# Patient Record
Sex: FEMALE | Race: BLACK OR AFRICAN AMERICAN | NOT HISPANIC OR LATINO | Employment: OTHER | URBAN - METROPOLITAN AREA
[De-identification: names, ages, dates, MRNs, and addresses within clinical notes are randomized per-mention and may not be internally consistent; named-entity substitution may affect disease eponyms.]

---

## 2017-01-26 ENCOUNTER — PATIENT OUTREACH (OUTPATIENT)
Dept: ADMINISTRATIVE | Facility: HOSPITAL | Age: 67
End: 2017-01-26

## 2017-01-26 NOTE — PROGRESS NOTES
Eye exam due report.    Due for your annual diabetic eye exam, mammogram, colon cancer screening, and possibly some immunizations (flu, shingles, pneumonia, and tetanus)

## 2017-01-26 NOTE — LETTER
January 26, 2017    Keyona Irene  [address]  [place] [address]             [facility]  [address]  [facility] [address]  Phone: [phone] Dear Mrs. Washington:    [facility] is committed to your overall health.  To help you get the most out of each of your visits, we will review your information to make sure you are up to date on all of your recommended tests and/or procedures.  We have Dr. Howard Gan listed as your primary care provider.     He has found that you may be due for your annual diabetic eye exam, mammogram, colon cancer screening, and possibly some immunizations (flu, shingles, pneumonia, and tetanus).     If you have had any of the above done at an outside facility, please let us know so I can update your record.  If you have a copy of these records, please provide a copy for us to scan into your chart.  If not, please provide that provider/facilities contact information so that we may obtain copies from that facility.     Otherwise, please schedule these appointments at your earliest convenience.  You are due for your diabetic follow up with Dr. Gan in April of 2017.    If you have any questions or concerns, please don't hesitate to call.    Thank you for letting us care for you,  Antonette Lugo LPN Clinical Care Coordinator  [facility]  [phone]

## 2017-02-13 ENCOUNTER — OFFICE VISIT (OUTPATIENT)
Dept: FAMILY MEDICINE | Facility: CLINIC | Age: 67
End: 2017-02-13
Payer: MEDICARE

## 2017-02-13 VITALS
WEIGHT: 110.13 LBS | SYSTOLIC BLOOD PRESSURE: 160 MMHG | HEART RATE: 82 BPM | BODY MASS INDEX: 20.79 KG/M2 | TEMPERATURE: 99 F | DIASTOLIC BLOOD PRESSURE: 74 MMHG | HEIGHT: 61 IN

## 2017-02-13 DIAGNOSIS — I10 ESSENTIAL HYPERTENSION: ICD-10-CM

## 2017-02-13 DIAGNOSIS — J30.1 SEASONAL ALLERGIC RHINITIS DUE TO POLLEN: ICD-10-CM

## 2017-02-13 DIAGNOSIS — R09.89 RIGHT CAROTID BRUIT: Primary | ICD-10-CM

## 2017-02-13 PROCEDURE — 99999 PR PBB SHADOW E&M-EST. PATIENT-LVL III: CPT | Mod: PBBFAC,,, | Performed by: FAMILY MEDICINE

## 2017-02-13 PROCEDURE — 99214 OFFICE O/P EST MOD 30 MIN: CPT | Mod: S$PBB,,, | Performed by: FAMILY MEDICINE

## 2017-02-13 PROCEDURE — 99213 OFFICE O/P EST LOW 20 MIN: CPT | Mod: PBBFAC,PO | Performed by: FAMILY MEDICINE

## 2017-02-13 RX ORDER — FLUTICASONE PROPIONATE 50 MCG
1 SPRAY, SUSPENSION (ML) NASAL DAILY
Qty: 16 G | Refills: 11 | Status: SHIPPED | OUTPATIENT
Start: 2017-02-13 | End: 2018-10-25 | Stop reason: SDUPTHER

## 2017-02-13 RX ORDER — LOSARTAN POTASSIUM AND HYDROCHLOROTHIAZIDE 12.5; 5 MG/1; MG/1
1 TABLET ORAL DAILY
Qty: 90 TABLET | Refills: 3 | Status: SHIPPED | OUTPATIENT
Start: 2017-02-13 | End: 2018-02-05

## 2017-02-13 RX ORDER — DOXEPIN HYDROCHLORIDE 10 MG/1
10 CAPSULE ORAL NIGHTLY
Qty: 90 CAPSULE | Refills: 3 | Status: SHIPPED | OUTPATIENT
Start: 2017-02-13 | End: 2018-10-16 | Stop reason: ALTCHOICE

## 2017-02-13 NOTE — PATIENT INSTRUCTIONS
Check  Your blood pressure weekly. Write them down. Return in 1 month with all of your medication bottles.

## 2017-02-13 NOTE — MR AVS SNAPSHOT
[facility]  [address]  [place] [address]  Phone: [phone]  Fax: [phone]                  Keyona Luis Washington   2017 8:30 AM   Office Visit    Description:  Female : 1950   Provider:  Howard Gan MD   Department:  [facility]           Reason for Visit     Follow-up           Diagnoses this Visit        Comments    Right carotid bruit    -  Primary     Essential hypertension         Seasonal allergic rhinitis due to pollen                To Do List           Future Appointments        Provider Department Dept Phone    2/15/2017 11:00 AM [facility] [phone]      Goals (5 Years of Data)     None      Follow-Up and Disposition     Return in about 4 weeks (around 3/13/2017).       These Medications        Disp Refills Start End    losartan-hydrochlorothiazide 50-12.5 mg (HYZAAR) 50-12.5 mg per tablet 90 tablet 3 2017    Take 1 tablet by mouth once daily. - Oral    Pharmacy: [facility] [address] AT [facility] Ph #: [phone]       doxepin (SINEQUAN) 10 MG capsule 90 capsule 3 2017     Take 1 capsule (10 mg total) by mouth every evening. - Oral    Pharmacy: [facility] [address] Ph #: [phone]       fluticasone (FLONASE) 50 mcg/actuation nasal spray 16 g 11 2017     1 spray by Each Nare route once daily. - Each Nare    Pharmacy: [facility] [address] AT [facility] Ph #: [phone]         [facility]     [facility] -  Assistance  Registered nurses in the [facility] provide clinical advisement, health education, appointment booking, and other advisory services.  Call for this free service at [phone].             Medications            Message regarding Medications     Verify the changes and/or additions to your medication regime listed below are the same as discussed with your clinician today.  If any of these changes or additions are incorrect, please notify your healthcare provider.        START taking these NEW medications        Refills    losartan-hydrochlorothiazide 50-12.5 mg (HYZAAR) 50-12.5 mg per tablet 3    Sig: Take 1 tablet by mouth once daily.    Class: Normal    Route: Oral      STOP taking these medications     valsartan-hydrochlorothiazide (DIOVAN-HCT) 320-12.5 mg per tablet Take 1 tablet by mouth once daily.           Verify that the below list of medications is an accurate representation of the medications you are currently taking.  If none reported, the list may be blank. If incorrect, please contact your healthcare provider. Carry this list with you in case of emergency.           Current Medications     amlodipine (NORVASC) 10 MG tablet Take 1 tablet (10 mg total) by mouth once daily.    aspirin (ECOTRIN) 325 MG EC tablet Take 325 mg by mouth once daily.    cyanocobalamin (VITAMIN B-12) 1000 MCG tablet Take 1 tablet (1,000 mcg total) by mouth once daily.    cyproheptadine (PERIACTIN) 4 mg tablet Take 1 tablet (4 mg total) by mouth 2 (two) times daily as needed.    doxepin (SINEQUAN) 10 MG capsule Take 1 capsule (10 mg total) by mouth every evening.    esomeprazole (NEXIUM) 40 MG capsule Take 1 capsule (40 mg total) by mouth before breakfast.    fluticasone (FLONASE) 50 mcg/actuation nasal spray 1 spray by Each Nare route once daily.    losartan-hydrochlorothiazide 50-12.5 mg (HYZAAR) 50-12.5 mg per tablet Take 1 tablet by mouth once daily.    multivit-min-FA-lycopen-lutein (CENTRUM SILVER) 0.4-300-250 mg-mcg-mcg Tab Take 1 tablet by mouth once daily.    potassium chloride (KLOR-CON) 10 MEQ TbSR Take 1 tablet (10 mEq total) by mouth once daily.    tramadol (ULTRAM) 50 mg tablet Take 1 tablet (50 mg total) by mouth every 6  "(six) hours as needed for Pain.    ursodiol (ACTIGALL) 300 mg capsule Take 1 capsule (300 mg total) by mouth 2 (two) times daily.           Clinical Reference Information           Your Vitals Were     BP Pulse Temp Height Weight Last Period    160/74 (BP Location: Right arm) 82 98.7 °F (37.1 °C) (Oral) 5' 1" (1.549 m) 50 kg (110 lb 1.9 oz) (LMP Unknown)    BMI                20.81 kg/m2          Blood Pressure          Most Recent Value    BP  (!)  160/74      Allergies as of 2/13/2017     No Known Allergies      Immunizations Administered on Date of Encounter - 2/13/2017     None      Orders Placed During Today's Visit     Future Labs/Procedures Expected by Expires    US Carotid Bilateral  2/13/2017 2/13/2018      [facility] Sign-Up     Activating your [facility] account is as easy as 1-2-3!     1) Visit [email or web address], select Sign Up Now, enter this activation code and your date of birth, then select Next.  Activation code not generated  Current Patient Portal Status: Account disabled      2) Create a username and password to use when you visit [facility] in the future and select a security question in case you lose your password and select Next.    3) Enter your e-mail address and click Sign Up!    Additional Information  If you have questions, please e-mail [email or web address] or call [phone] to talk to our [facility] staff. Remember, [facility] is NOT to be used for urgent needs. For medical emergencies, dial 911.         Instructions    Check  Your blood pressure weekly. Write them down. Return in 1 month with all of your medication bottles.        Smoking Cessation     If you would like to quit smoking:   You may be eligible for free services if you are a [place] resident and started smoking cigarettes before September 1, 1988.  Call the Smoking Cessation [employer] (SCT) toll free at (102) 432-8670 or (727) 776-9065.   Call 8-800-QUIT-NOW if you do not meet the above criteria.            Language " Assistance Services     ATTENTION: Language assistance services are available, free of charge. Please call [phone].      ATENCIÓN: Si habla jolly, tiene a hernandez disposición servicios gratuitos de asistencia lingüística. Llame al [phone].     CHÚ Ý: N?u b?n nói Ti?ng Vi?t, có các d?ch v? h? tr? ngôn ng? mi?n phí dành cho b?n. G?i s? [phone].         [facility] complies with applicable Federal civil rights laws and does not discriminate on the basis of race, color, national origin, age, disability, or sex.

## 2017-02-13 NOTE — PROGRESS NOTES
"Subjective:       Patient ID: Keyona Irene is a 66 y.o. female.    Chief Complaint: Follow-up (Discuss HTN meds)    HPI Comments: She is here for follow-up of hypertension.  Her blood pressure has been in variable control.  Back in September I changed her from losartan to valsartan 320/12.5 mg.  It sounds like she has been taking losartan/ mg/12.5.  She wants to return to 50/12.5 because she feels lightheaded.  She occasionally checks her blood pressure at the store and she says that it is normal.  She had her last ERCP in November.  It sounds like that went well and that the stents were removed.  It sounds like they removed further biliary stone fragments.  She is currently not having any abdominal pain.  She has regained some of her lost weight.    Review of Systems   Constitutional: Negative for fatigue, fever and unexpected weight change.   Respiratory: Positive for cough. Negative for shortness of breath.    Cardiovascular: Negative for chest pain, palpitations and leg swelling.   Gastrointestinal: Negative for abdominal pain.       Objective:     Blood pressure (!) 160/74, pulse 82, temperature 98.7 °F (37.1 °C), temperature source Oral, height 5' 1" (1.549 m), weight 50 kg (110 lb 1.9 oz).      Physical Exam   Constitutional:   She is thin and in no distress.   Cardiovascular: Normal rate, regular rhythm and normal heart sounds.    She has a right carotid bruit.  She does comment on a roaring sound in her right ear.  She does not complain of left sided weakness.   Pulmonary/Chest: Effort normal and breath sounds normal.   Abdominal: Soft. She exhibits no distension. There is no tenderness.   Neurological: She is alert.       Assessment:       1. Right carotid bruit    2. Essential hypertension    3. Seasonal allergic rhinitis due to pollen        Plan:       I will refill losartan/HCT, 50/12.5 mg.  She is to check her blood pressure weekly, write them down, return to see me in 4 weeks.  " Carotid ultrasound ordered.  I refilled her Flonase for ALLERGIC rhinitis.

## 2017-02-15 ENCOUNTER — HOSPITAL ENCOUNTER (OUTPATIENT)
Dept: RADIOLOGY | Facility: HOSPITAL | Age: 67
Discharge: HOME | End: 2017-02-15
Attending: FAMILY MEDICINE
Payer: MEDICARE

## 2017-02-15 DIAGNOSIS — R09.89 RIGHT CAROTID BRUIT: ICD-10-CM

## 2017-02-15 PROCEDURE — 93880 EXTRACRANIAL BILAT STUDY: CPT | Mod: 26,,, | Performed by: RADIOLOGY

## 2017-02-15 PROCEDURE — 93880 EXTRACRANIAL BILAT STUDY: CPT | Mod: TC,PO

## 2017-02-24 ENCOUNTER — PATIENT OUTREACH (OUTPATIENT)
Dept: ADMINISTRATIVE | Facility: HOSPITAL | Age: 67
End: 2017-02-24

## 2017-02-24 NOTE — LETTER
February 24, 2017    Keyona Irene  [address]  [place] [address]             [facility]  [address]  [place] [address]  Phone: [phone] Dear Mrs. Washington:    [facility] committed to your overall health.  To help you get the most out of each of your visits, we will review your information to make sure you are up to date on all of your recommended tests and/or procedures.      Dr. Howard Gan has found that you may be due for your annual diabetic eye exam, colon cancer screening, mammogram, and possibly some immunizations (flu, shingles, pneumonia, and tetanus).     If you have had any of the above done at another facility, please bring the records or information with you so that your record at [facility] will be complete.    If you are currently taking medication, please bring it with you to your appointment for review.    If you have any questions or concerns, please don't hesitate to call.    Thank you for letting us care for you,  Antonette Lugo LPN Clinical Care Coordinator  [facility] [place]  [phone]

## 2017-03-13 ENCOUNTER — OFFICE VISIT (OUTPATIENT)
Dept: FAMILY MEDICINE | Facility: CLINIC | Age: 67
End: 2017-03-13
Payer: MEDICARE

## 2017-03-13 VITALS
DIASTOLIC BLOOD PRESSURE: 84 MMHG | BODY MASS INDEX: 21.5 KG/M2 | SYSTOLIC BLOOD PRESSURE: 160 MMHG | HEIGHT: 61 IN | WEIGHT: 113.88 LBS | TEMPERATURE: 98 F

## 2017-03-13 DIAGNOSIS — H54.7 BLINDNESS, CONGENITAL: ICD-10-CM

## 2017-03-13 DIAGNOSIS — E11.9 TYPE 2 DIABETES MELLITUS WITHOUT COMPLICATION, WITHOUT LONG-TERM CURRENT USE OF INSULIN: ICD-10-CM

## 2017-03-13 DIAGNOSIS — K42.9 UMBILICAL HERNIA WITHOUT OBSTRUCTION AND WITHOUT GANGRENE: ICD-10-CM

## 2017-03-13 DIAGNOSIS — I10 ESSENTIAL HYPERTENSION: Primary | ICD-10-CM

## 2017-03-13 LAB
ALBUMIN SERPL BCP-MCNC: 3.3 G/DL
ALP SERPL-CCNC: 151 U/L
ALT SERPL W/O P-5'-P-CCNC: 12 U/L
ANION GAP SERPL CALC-SCNC: 11 MMOL/L
AST SERPL-CCNC: 18 U/L
BILIRUB SERPL-MCNC: 0.5 MG/DL
BUN SERPL-MCNC: 9 MG/DL
CALCIUM SERPL-MCNC: 9.7 MG/DL
CHLORIDE SERPL-SCNC: 102 MMOL/L
CHOLEST/HDLC SERPL: 5.3 {RATIO}
CO2 SERPL-SCNC: 27 MMOL/L
CREAT SERPL-MCNC: 0.9 MG/DL
EST. GFR  (AFRICAN AMERICAN): >60 ML/MIN/1.73 M^2
EST. GFR  (NON AFRICAN AMERICAN): >60 ML/MIN/1.73 M^2
GLUCOSE SERPL-MCNC: 329 MG/DL
HDL/CHOLESTEROL RATIO: 18.8 %
HDLC SERPL-MCNC: 218 MG/DL
HDLC SERPL-MCNC: 41 MG/DL
LDLC SERPL CALC-MCNC: 153 MG/DL
NONHDLC SERPL-MCNC: 177 MG/DL
POTASSIUM SERPL-SCNC: 3.3 MMOL/L
PROT SERPL-MCNC: 7.4 G/DL
SODIUM SERPL-SCNC: 140 MMOL/L
TRIGL SERPL-MCNC: 120 MG/DL

## 2017-03-13 PROCEDURE — 80053 COMPREHEN METABOLIC PANEL: CPT

## 2017-03-13 PROCEDURE — 83036 HEMOGLOBIN GLYCOSYLATED A1C: CPT

## 2017-03-13 PROCEDURE — 80061 LIPID PANEL: CPT

## 2017-03-13 PROCEDURE — 99999 PR PBB SHADOW E&M-EST. PATIENT-LVL III: CPT | Mod: PBBFAC,,, | Performed by: FAMILY MEDICINE

## 2017-03-13 PROCEDURE — 99213 OFFICE O/P EST LOW 20 MIN: CPT | Mod: PBBFAC,PO | Performed by: FAMILY MEDICINE

## 2017-03-13 PROCEDURE — 99214 OFFICE O/P EST MOD 30 MIN: CPT | Mod: S$PBB,,, | Performed by: FAMILY MEDICINE

## 2017-03-13 PROCEDURE — 82570 ASSAY OF URINE CREATININE: CPT

## 2017-03-13 NOTE — PROGRESS NOTES
"Subjective:       Patient ID: Keyona Irene is a 66 y.o. female.    Chief Complaint: Follow-up (1 mo f/u)    HPI Comments: Follow-up hypertension.  She feels that higher doses of blood pressure medicine tend to cause weakness and low blood pressure.  She says that her home readings have been in the 120/70 range.  She also complains of some pain near her belly button over the past 2 days.  She has diabetes and is due for lab work.  She is not complaining of chest pain.  She has had no more biliary symptoms.    Review of Systems   Constitutional: Negative for fever and unexpected weight change.   Respiratory: Negative for cough and shortness of breath.    Cardiovascular: Negative for chest pain.   Gastrointestinal: Positive for abdominal pain.       Objective:     Blood pressure (!) 160/84, temperature 98.2 °F (36.8 °C), temperature source Oral, height 5' 1" (1.549 m), weight 51.6 kg (113 lb 13.9 oz).      Physical Exam   Constitutional: She appears well-nourished. No distress.   Cardiovascular: Normal rate, regular rhythm, normal heart sounds and intact distal pulses.    Pulmonary/Chest: Effort normal and breath sounds normal.   Abdominal: Soft.   She has a small umbilical hernia and possibly a right sided quan-umbilical hernia.  Easily reducible and slightly tender.   Musculoskeletal: She exhibits no edema.   Neurological: She is alert.   Psychiatric: She has a normal mood and affect.       Assessment:       1. Essential hypertension    2. Umbilical hernia without obstruction and without gangrene    3. Type 2 diabetes mellitus without complication, without long-term current use of insulin    4. Blindness, congenital        Plan:       Continue her present dose of losartan/HCT.  Write down her home blood pressures.  See me in 3 months.  Lab work ordered.  Optometry exam ordered.     "

## 2017-03-13 NOTE — PATIENT INSTRUCTIONS
Write down blood pressure and pulse and come back to clinic in 3 months    Optometry. Dr. Iniguez. [phone]

## 2017-03-14 LAB
CREAT UR-MCNC: 12 MG/DL
ESTIMATED AVG GLUCOSE: 189 MG/DL
HBA1C MFR BLD HPLC: 8.2 %
MICROALBUMIN UR DL<=1MG/L-MCNC: <2.5 UG/ML
MICROALBUMIN/CREATININE RATIO: ABNORMAL UG/MG

## 2017-03-15 ENCOUNTER — TELEPHONE (OUTPATIENT)
Dept: FAMILY MEDICINE | Facility: CLINIC | Age: 67
End: 2017-03-15

## 2017-03-15 RX ORDER — METFORMIN HYDROCHLORIDE 500 MG/1
1000 TABLET, EXTENDED RELEASE ORAL DAILY
Qty: 60 TABLET | Refills: 2 | Status: SHIPPED | OUTPATIENT
Start: 2017-03-15 | End: 2017-06-23 | Stop reason: SDUPTHER

## 2017-05-26 DIAGNOSIS — Z12.31 OTHER SCREENING MAMMOGRAM: ICD-10-CM

## 2017-05-28 DIAGNOSIS — I10 ESSENTIAL HYPERTENSION: ICD-10-CM

## 2017-05-29 RX ORDER — AMLODIPINE BESYLATE 10 MG/1
TABLET ORAL
Qty: 90 TABLET | Refills: 3 | Status: SHIPPED | OUTPATIENT
Start: 2017-05-29 | End: 2018-04-23 | Stop reason: SDUPTHER

## 2017-06-23 DIAGNOSIS — E11.9 TYPE 2 DIABETES MELLITUS WITHOUT COMPLICATION: ICD-10-CM

## 2017-06-23 RX ORDER — METFORMIN HYDROCHLORIDE 500 MG/1
TABLET, EXTENDED RELEASE ORAL
Qty: 60 TABLET | Refills: 3 | Status: SHIPPED | OUTPATIENT
Start: 2017-06-23 | End: 2017-07-26 | Stop reason: SDUPTHER

## 2017-07-13 ENCOUNTER — PATIENT OUTREACH (OUTPATIENT)
Dept: ADMINISTRATIVE | Facility: HOSPITAL | Age: 67
End: 2017-07-13

## 2017-07-13 NOTE — PROGRESS NOTES
DM non [facility] bulk communication, due an office visit with him, some labs for your diabetes, your annual diabetic eye exam, a mammogram, colon cancer screening, and possibly some immunizations (tetanus, pneumonia, and shingles)    Last ov Malik 3/2017    appt given.

## 2017-07-26 ENCOUNTER — OFFICE VISIT (OUTPATIENT)
Dept: FAMILY MEDICINE | Facility: CLINIC | Age: 67
End: 2017-07-26
Payer: MEDICARE

## 2017-07-26 VITALS
HEART RATE: 74 BPM | BODY MASS INDEX: 21.64 KG/M2 | TEMPERATURE: 98 F | DIASTOLIC BLOOD PRESSURE: 70 MMHG | SYSTOLIC BLOOD PRESSURE: 120 MMHG | HEIGHT: 61 IN | WEIGHT: 114.63 LBS

## 2017-07-26 DIAGNOSIS — E11.42 TYPE 2 DIABETES MELLITUS WITH DIABETIC POLYNEUROPATHY, WITHOUT LONG-TERM CURRENT USE OF INSULIN: ICD-10-CM

## 2017-07-26 DIAGNOSIS — I10 ESSENTIAL HYPERTENSION: Primary | ICD-10-CM

## 2017-07-26 LAB
ALBUMIN SERPL BCP-MCNC: 3.4 G/DL
ALP SERPL-CCNC: 150 U/L
ALT SERPL W/O P-5'-P-CCNC: 21 U/L
ANION GAP SERPL CALC-SCNC: 10 MMOL/L
AST SERPL-CCNC: 33 U/L
BILIRUB SERPL-MCNC: 0.6 MG/DL
BUN SERPL-MCNC: 6 MG/DL
CALCIUM SERPL-MCNC: 9.3 MG/DL
CHLORIDE SERPL-SCNC: 100 MMOL/L
CO2 SERPL-SCNC: 27 MMOL/L
CREAT SERPL-MCNC: 0.8 MG/DL
EST. GFR  (AFRICAN AMERICAN): >60 ML/MIN/1.73 M^2
EST. GFR  (NON AFRICAN AMERICAN): >60 ML/MIN/1.73 M^2
GLUCOSE SERPL-MCNC: 185 MG/DL
POTASSIUM SERPL-SCNC: 3.2 MMOL/L
PROT SERPL-MCNC: 7.3 G/DL
SODIUM SERPL-SCNC: 137 MMOL/L

## 2017-07-26 PROCEDURE — 3045F PR MOST RECENT HEMOGLOBIN A1C LEVEL 7.0-9.0%: CPT | Mod: ,,, | Performed by: FAMILY MEDICINE

## 2017-07-26 PROCEDURE — 83036 HEMOGLOBIN GLYCOSYLATED A1C: CPT

## 2017-07-26 PROCEDURE — 99213 OFFICE O/P EST LOW 20 MIN: CPT | Mod: PBBFAC,PO | Performed by: FAMILY MEDICINE

## 2017-07-26 PROCEDURE — 4010F ACE/ARB THERAPY RXD/TAKEN: CPT | Mod: ,,, | Performed by: FAMILY MEDICINE

## 2017-07-26 PROCEDURE — 99999 PR PBB SHADOW E&M-EST. PATIENT-LVL III: CPT | Mod: PBBFAC,,, | Performed by: FAMILY MEDICINE

## 2017-07-26 PROCEDURE — 80053 COMPREHEN METABOLIC PANEL: CPT

## 2017-07-26 PROCEDURE — 99214 OFFICE O/P EST MOD 30 MIN: CPT | Mod: S$PBB,,, | Performed by: FAMILY MEDICINE

## 2017-07-26 RX ORDER — METFORMIN HYDROCHLORIDE 500 MG/1
1000 TABLET, EXTENDED RELEASE ORAL 2 TIMES DAILY WITH MEALS
Qty: 120 TABLET | Refills: 3 | Status: SHIPPED | OUTPATIENT
Start: 2017-07-26 | End: 2018-04-20 | Stop reason: SDUPTHER

## 2017-07-26 NOTE — PROGRESS NOTES
"Subjective:       Patient ID: Keyona Irene is a 66 y.o. female.    Chief Complaint: Follow-up (DM f/u. Poss due for lab)    Follow-up hypertension and diabetes.  She does not check blood sugar.  She takes metformin 1000 mg a day.  She is fearful of needles and blood draws.  She does not complain of chest pain.  She is a little bit uncertain about her medications.      Review of Systems   Constitutional: Negative for fever and unexpected weight change.   Eyes: Positive for visual disturbance.   Respiratory: Negative for cough and shortness of breath.    Cardiovascular: Negative for chest pain, palpitations and leg swelling.   Neurological: Positive for numbness.       Objective:     Blood pressure 120/70, pulse 74, temperature 98.3 °F (36.8 °C), temperature source Oral, height 5' 1" (1.549 m), weight 52 kg (114 lb 10.2 oz).      Physical Exam   Constitutional:   She is a thin lady in no distress.   Cardiovascular: Normal rate, regular rhythm and normal heart sounds.    No murmur heard.  Pulses:       Dorsalis pedis pulses are 1+ on the right side, and 1+ on the left side.        Posterior tibial pulses are 1+ on the right side, and 1+ on the left side.   No carotid bruits   Pulmonary/Chest: Effort normal and breath sounds normal.   Musculoskeletal:        Right foot: There is no deformity.        Left foot: There is no deformity.   Feet:   Right Foot:   Protective Sensation: 4 sites tested. 0 sites sensed.   Skin Integrity: Negative for ulcer.   Left Foot:   Protective Sensation: 4 sites tested. 0 sites sensed.   Skin Integrity: Negative for ulcer.       Assessment:       1. Essential hypertension    2. Type 2 diabetes mellitus with diabetic polyneuropathy, without long-term current use of insulin        Plan:       Draw lab today.  Increase metformin to 1 g twice a day.  Follow-up in 3 months.     "

## 2017-07-27 LAB
ESTIMATED AVG GLUCOSE: 148 MG/DL
HBA1C MFR BLD HPLC: 6.8 %

## 2017-08-21 RX ORDER — LOSARTAN POTASSIUM AND HYDROCHLOROTHIAZIDE 12.5; 1 MG/1; MG/1
TABLET ORAL
Qty: 90 TABLET | Refills: 0 | Status: SHIPPED | OUTPATIENT
Start: 2017-08-21 | End: 2017-12-04 | Stop reason: SDUPTHER

## 2017-09-07 ENCOUNTER — PATIENT OUTREACH (OUTPATIENT)
Dept: ADMINISTRATIVE | Facility: HOSPITAL | Age: 67
End: 2017-09-07

## 2017-09-07 ENCOUNTER — TELEPHONE (OUTPATIENT)
Dept: ADMINISTRATIVE | Facility: HOSPITAL | Age: 67
End: 2017-09-07

## 2017-09-07 NOTE — LETTER
September 7, 2017    Keyona Irene  [address]  [place] [address]             [facility]  [address]  [facility] [address]  Phone: [phone] Dear Mrs. Washington:    [facility] committed to your overall health.  To help you get the most out of each of your visits, we will review your information to make sure you are up to date on all of your recommended tests and/or procedures.      We have Dr. Howard Gan listed as your primary care provider.  He has found that you may be due for colon cancer screening, mammogram, and possibly some immunizations (tetanus, pneumonia, and shingles).     Medicare does not cover all immunizations to be given in the clinic.  Check your benefits to ensure that you do not need to receive your immunizations at the pharmacy.    If you have had any of the above done at an outside facility, please let us know so I can update your record.  If you have a copy of these records, please provide a copy for us to scan into your chart.  If not, please provide that provider/facilities contact information so that we may obtain copies from that facility.     Otherwise, please schedule these appointments at your earliest convenience.  You are due for your diabetic follow up with Dr. Gan in January of 2018.    If you have any questions or concerns, please don't hesitate to call.    Thank you for letting us care for you,  Antonette Lugo LPN Clinical Care Coordinator  [facility] [place]  [phone]

## 2017-09-07 NOTE — PROGRESS NOTES
Overdue eye exam report, due colon cancer screening, mammogram, and possibly some immunizations (tetanus, pneumonia, and shingles)

## 2017-12-04 RX ORDER — LOSARTAN POTASSIUM AND HYDROCHLOROTHIAZIDE 12.5; 1 MG/1; MG/1
TABLET ORAL
Qty: 90 TABLET | Refills: 0 | Status: SHIPPED | OUTPATIENT
Start: 2017-12-04 | End: 2018-06-05 | Stop reason: SDUPTHER

## 2018-01-24 DIAGNOSIS — I10 ESSENTIAL HYPERTENSION: ICD-10-CM

## 2018-01-24 DIAGNOSIS — E11.42 TYPE 2 DIABETES MELLITUS WITH DIABETIC POLYNEUROPATHY, WITHOUT LONG-TERM CURRENT USE OF INSULIN: Primary | ICD-10-CM

## 2018-01-24 RX ORDER — POTASSIUM CHLORIDE 750 MG/1
TABLET, EXTENDED RELEASE ORAL
Qty: 30 TABLET | Refills: 0 | Status: SHIPPED | OUTPATIENT
Start: 2018-01-24 | End: 2018-02-22 | Stop reason: SDUPTHER

## 2018-01-24 NOTE — TELEPHONE ENCOUNTER
Attempted to contact pt to inform her that she is due for follow up with labs.     No answer; left message to return call to schedule.

## 2018-02-05 ENCOUNTER — OFFICE VISIT (OUTPATIENT)
Dept: FAMILY MEDICINE | Facility: CLINIC | Age: 68
End: 2018-02-05
Payer: MEDICARE

## 2018-02-05 VITALS
HEIGHT: 61 IN | DIASTOLIC BLOOD PRESSURE: 68 MMHG | HEART RATE: 100 BPM | TEMPERATURE: 99 F | BODY MASS INDEX: 20.06 KG/M2 | WEIGHT: 106.25 LBS | SYSTOLIC BLOOD PRESSURE: 120 MMHG

## 2018-02-05 DIAGNOSIS — E11.42 TYPE 2 DIABETES MELLITUS WITH DIABETIC POLYNEUROPATHY, WITHOUT LONG-TERM CURRENT USE OF INSULIN: ICD-10-CM

## 2018-02-05 DIAGNOSIS — K80.50 CHOLEDOCHOLITHIASIS: Primary | ICD-10-CM

## 2018-02-05 DIAGNOSIS — I10 ESSENTIAL HYPERTENSION: ICD-10-CM

## 2018-02-05 PROCEDURE — 99213 OFFICE O/P EST LOW 20 MIN: CPT | Mod: PBBFAC,PN | Performed by: FAMILY MEDICINE

## 2018-02-05 PROCEDURE — 99214 OFFICE O/P EST MOD 30 MIN: CPT | Mod: S$PBB,,, | Performed by: FAMILY MEDICINE

## 2018-02-05 PROCEDURE — 99999 PR PBB SHADOW E&M-EST. PATIENT-LVL III: CPT | Mod: PBBFAC,,, | Performed by: FAMILY MEDICINE

## 2018-02-05 RX ORDER — INSULIN PUMP SYRINGE, 3 ML
EACH MISCELLANEOUS
Qty: 100 EACH | Refills: 5 | Status: SHIPPED | OUTPATIENT
Start: 2018-02-05 | End: 2022-05-25

## 2018-02-05 RX ORDER — LANCETS
EACH MISCELLANEOUS
Qty: 100 EACH | Refills: 5 | Status: SHIPPED | OUTPATIENT
Start: 2018-02-05 | End: 2022-05-25

## 2018-02-05 NOTE — PROGRESS NOTES
"Subjective:       Patient ID: Keyona Irene is a 67 y.o. female.    Chief Complaint: Follow-up (Med f/u. Please review pt's BP meds. Pt unable to get lab prior to to an insurance issue. Will try [facility] or [facility].)    Ms. Irene is a 66 yo female with a pmh significant for choledocholithiasis s/p cholecystectomy with 4 ERCP's and T2DM who presents to the office today with complains of epigastric pain and vomiting that started on 1/31. She reports that she had frequent bouts of bilious vomiting that resolved on Saturday that was accompanied by epigastric pain that was dull and constant constant with radiation to the umbilicus. She describes the pain as the same as when she had choledocholithiasis and she fears that she the gallstones have returned. She also reports subjective fevers and loss of appetite.    T2DM. Her last HbA1c was 6.8 in July 2017. She is due for repeat labs. She has blindness in her right eye and she has not seen an ophthalmologist in a "long time". She would like a referral for an eye doctor. She is taking metformin 1000mg twice daily.       Review of Systems   Constitutional: Positive for appetite change, fatigue and fever.   Respiratory: Negative for chest tightness and shortness of breath.    Cardiovascular: Negative for chest pain, palpitations and leg swelling.   Gastrointestinal: Positive for abdominal pain (in epigastric region), nausea and vomiting. Negative for diarrhea.   Genitourinary: Negative for dysuria, frequency and hematuria.       Objective:     /68 (BP Location: Left arm)   Pulse 100   Temp 99.1 °F (37.3 °C) (Oral)   Ht 5' 1" (1.549 m)   Wt 48.2 kg (106 lb 4.2 oz)   LMP  (LMP Unknown)   BMI 20.08 kg/m²     Physical Exam   Constitutional: She appears well-developed. No distress.   HENT:   Head: Normocephalic.   Cardiovascular: Normal rate, regular rhythm, normal heart sounds and intact distal pulses.    Pulses:       Dorsalis pedis pulses are 1+ on the " right side, and 1+ on the left side.        Posterior tibial pulses are 1+ on the right side, and 1+ on the left side.   Pulmonary/Chest: Effort normal and breath sounds normal. No respiratory distress. She has no wheezes.   Abdominal: Soft. Bowel sounds are normal. She exhibits no mass. There is tenderness (on light palpation in all quadrants). No hernia.   Feet:   Right Foot:   Protective Sensation: 4 sites tested. 4 sites sensed.   Skin Integrity: Negative for ulcer or skin breakdown.   Left Foot:   Protective Sensation: 4 sites tested. 4 sites sensed.   Skin Integrity: Negative for ulcer or skin breakdown.       Assessment:       1. Choledocholithiasis    2. Essential hypertension    3. Type 2 diabetes mellitus with diabetic polyneuropathy, without long-term current use of insulin        Plan:       1. Choledocholithiasis   - abdominal ultrasound ordered  2. HTN   - continue losartan/HCT   - continue to monitor BP  3. T2DM   - labs done today   - home blood glucose kit ordered   - continue metformin   - referral to eye doctor next visit

## 2018-02-06 LAB
ALBUMIN SERPL-MCNC: 4.3 G/DL (ref 3.6–5.1)
ALBUMIN/CREAT UR: 4 MCG/MG CREAT
ALBUMIN/GLOB SERPL: 1.4 (CALC) (ref 1–2.5)
ALP SERPL-CCNC: 179 U/L (ref 33–130)
ALT SERPL-CCNC: 32 U/L (ref 6–29)
AST SERPL-CCNC: 36 U/L (ref 10–35)
BASOPHILS # BLD AUTO: 41 CELLS/UL (ref 0–200)
BASOPHILS NFR BLD AUTO: 0.9 %
BILIRUB SERPL-MCNC: 0.4 MG/DL (ref 0.2–1.2)
BUN SERPL-MCNC: 7 MG/DL (ref 7–25)
BUN/CREAT SERPL: ABNORMAL (CALC) (ref 6–22)
CALCIUM SERPL-MCNC: 9.7 MG/DL (ref 8.6–10.4)
CHLORIDE SERPL-SCNC: 103 MMOL/L (ref 98–110)
CHOLEST SERPL-MCNC: 191 MG/DL
CHOLEST/HDLC SERPL: 3.5 (CALC)
CO2 SERPL-SCNC: 32 MMOL/L (ref 20–31)
CREAT SERPL-MCNC: 0.64 MG/DL (ref 0.5–0.99)
CREAT UR-MCNC: 57 MG/DL (ref 20–320)
EOSINOPHIL # BLD AUTO: 18 CELLS/UL (ref 15–500)
EOSINOPHIL NFR BLD AUTO: 0.4 %
ERYTHROCYTE [DISTWIDTH] IN BLOOD BY AUTOMATED COUNT: 14.2 % (ref 11–15)
GFR SERPL CREATININE-BSD FRML MDRD: 94 ML/MIN/1.73M2
GLOBULIN SER CALC-MCNC: 3.1 G/DL (CALC) (ref 1.9–3.7)
GLUCOSE SERPL-MCNC: 121 MG/DL (ref 65–99)
HBA1C MFR BLD: 6.2 % OF TOTAL HGB
HCT VFR BLD AUTO: 37 % (ref 35–45)
HDLC SERPL-MCNC: 54 MG/DL
HGB BLD-MCNC: 12.1 G/DL (ref 11.7–15.5)
LDLC SERPL CALC-MCNC: 119 MG/DL (CALC)
LYMPHOCYTES # BLD AUTO: 2249 CELLS/UL (ref 850–3900)
LYMPHOCYTES NFR BLD AUTO: 48.9 %
MCH RBC QN AUTO: 27.4 PG (ref 27–33)
MCHC RBC AUTO-ENTMCNC: 32.7 G/DL (ref 32–36)
MCV RBC AUTO: 83.9 FL (ref 80–100)
MICROALBUMIN UR-MCNC: 0.2 MG/DL
MONOCYTES # BLD AUTO: 290 CELLS/UL (ref 200–950)
MONOCYTES NFR BLD AUTO: 6.3 %
NEUTROPHILS # BLD AUTO: 2001 CELLS/UL (ref 1500–7800)
NEUTROPHILS NFR BLD AUTO: 43.5 %
NONHDLC SERPL-MCNC: 137 MG/DL (CALC)
PLATELET # BLD AUTO: 271 THOUSAND/UL (ref 140–400)
PMV BLD REES-ECKER: 10.2 FL (ref 7.5–12.5)
POTASSIUM SERPL-SCNC: 3.7 MMOL/L (ref 3.5–5.3)
PROT SERPL-MCNC: 7.4 G/DL (ref 6.1–8.1)
RBC # BLD AUTO: 4.41 MILLION/UL (ref 3.8–5.1)
SODIUM SERPL-SCNC: 142 MMOL/L (ref 135–146)
TRIGL SERPL-MCNC: 84 MG/DL
WBC # BLD AUTO: 4.6 THOUSAND/UL (ref 3.8–10.8)

## 2018-02-21 ENCOUNTER — HOSPITAL ENCOUNTER (OUTPATIENT)
Dept: RADIOLOGY | Facility: HOSPITAL | Age: 68
Discharge: HOME | End: 2018-02-21
Attending: FAMILY MEDICINE
Payer: COMMERCIAL

## 2018-02-21 DIAGNOSIS — K80.50 CHOLEDOCHOLITHIASIS: ICD-10-CM

## 2018-02-21 PROCEDURE — 76700 US EXAM ABDOM COMPLETE: CPT | Mod: 26,,, | Performed by: RADIOLOGY

## 2018-02-21 PROCEDURE — 76700 US EXAM ABDOM COMPLETE: CPT | Mod: TC,PO

## 2018-02-22 RX ORDER — POTASSIUM CHLORIDE 750 MG/1
TABLET, EXTENDED RELEASE ORAL
Qty: 30 TABLET | Refills: 3 | Status: SHIPPED | OUTPATIENT
Start: 2018-02-22 | End: 2018-04-20 | Stop reason: SDUPTHER

## 2018-02-22 RX ORDER — ESOMEPRAZOLE MAGNESIUM 40 MG/1
CAPSULE, DELAYED RELEASE ORAL
Qty: 90 CAPSULE | Refills: 0 | Status: SHIPPED | OUTPATIENT
Start: 2018-02-22 | End: 2018-05-29 | Stop reason: SDUPTHER

## 2018-02-26 ENCOUNTER — TELEPHONE (OUTPATIENT)
Dept: FAMILY MEDICINE | Facility: CLINIC | Age: 68
End: 2018-02-26

## 2018-02-26 DIAGNOSIS — K80.50 CHOLEDOCHOLITHIASIS: Primary | ICD-10-CM

## 2018-02-26 DIAGNOSIS — K80.50 CALCULUS OF BILE DUCT WITHOUT CHOLECYSTITIS AND WITHOUT OBSTRUCTION: ICD-10-CM

## 2018-02-26 NOTE — TELEPHONE ENCOUNTER
I spoke with her.  Still some upper abd pain. US shows some biliary abnormalities with possible retained stones. It is hard for her to go to the [facility]. I will refer her to Dr. Aguirre. Please call her and tell her how to get an appointment.

## 2018-02-26 NOTE — TELEPHONE ENCOUNTER
Called Dr Aguirre's office to schedule appt for pt.  indicates they do not take [employer]. Next recommendation?

## 2018-02-26 NOTE — TELEPHONE ENCOUNTER
Spoke w/ pt. She would like us to call Dr Aguirre to sched appt. She would like AM appt, not too early. Advised pt I would schedule it and call her back w/ appt info. Pt agreed

## 2018-02-27 NOTE — TELEPHONE ENCOUNTER
Miguel Yarbrough. I am having trouble and must not be able to book appt for consult with Dr Bustos. Do you contact pt to schedule or do we need to give pt the phone number to call? Would super appreciate if someone in GI Dept could help shcedule this pt as we are afraid she won't call to make own appt. Please advise.

## 2018-02-27 NOTE — TELEPHONE ENCOUNTER
Please let me know if pt should be referred back to [facility]. Dr. Gan is referring pt to you for an ERCP. Please advised.

## 2018-02-28 NOTE — TELEPHONE ENCOUNTER
Pt needs to return to [facility], has had 5 ERCP's there, all complicated requiring lithotripsy of stones, which is not done on [facility].

## 2018-03-06 NOTE — TELEPHONE ENCOUNTER
Please review notes below and pt's record. Please contact pt to advise on how to set up ERCP or what the protocol may be prior to scheduling this. Thank you

## 2018-03-06 NOTE — TELEPHONE ENCOUNTER
Message   Received: Today   Message Contents   MD Cheri Dye MA   Caller: Unspecified (1 week ago)             EUS + ERCP     Please sign order

## 2018-03-06 NOTE — TELEPHONE ENCOUNTER
Spoke w/ pt. Advised that this will have to be done on [facility] and that we would send the msg to Dr Kristopher Bennett staff for review. And that someone would contact her to schedule this.

## 2018-03-08 ENCOUNTER — TELEPHONE (OUTPATIENT)
Dept: GASTROENTEROLOGY | Facility: CLINIC | Age: 68
End: 2018-03-08

## 2018-03-08 NOTE — TELEPHONE ENCOUNTER
Spoke with patient. EUS/ ERCP scheduled for 3/22 at . Reviewed prep instructions. Ms Irene verbalized understanding.

## 2018-03-12 ENCOUNTER — TELEPHONE (OUTPATIENT)
Dept: ENDOSCOPY | Facility: HOSPITAL | Age: 68
End: 2018-03-12

## 2018-03-19 ENCOUNTER — TELEPHONE (OUTPATIENT)
Dept: GASTROENTEROLOGY | Facility: CLINIC | Age: 68
End: 2018-03-19

## 2018-03-19 NOTE — TELEPHONE ENCOUNTER
I spoke with patient and informed her that we are out of network for her insurance. She would need to go to [facility] since they are in network. Patient says she did not want to go to [facility]. She still wants to have procedure here. I will notify pre-service so they can contact her.

## 2018-03-22 ENCOUNTER — HOSPITAL ENCOUNTER (OUTPATIENT)
Facility: HOSPITAL | Age: 68
Discharge: HOME | End: 2018-03-22
Attending: INTERNAL MEDICINE | Admitting: INTERNAL MEDICINE
Payer: COMMERCIAL

## 2018-03-22 ENCOUNTER — ANESTHESIA EVENT (OUTPATIENT)
Dept: ENDOSCOPY | Facility: HOSPITAL | Age: 68
End: 2018-03-22
Payer: COMMERCIAL

## 2018-03-22 ENCOUNTER — SURGERY (OUTPATIENT)
Age: 68
End: 2018-03-22

## 2018-03-22 ENCOUNTER — ANESTHESIA (OUTPATIENT)
Dept: ENDOSCOPY | Facility: HOSPITAL | Age: 68
End: 2018-03-22
Payer: COMMERCIAL

## 2018-03-22 VITALS
OXYGEN SATURATION: 100 % | DIASTOLIC BLOOD PRESSURE: 78 MMHG | WEIGHT: 110 LBS | SYSTOLIC BLOOD PRESSURE: 151 MMHG | TEMPERATURE: 98 F | RESPIRATION RATE: 18 BRPM | BODY MASS INDEX: 20.77 KG/M2 | HEART RATE: 91 BPM | HEIGHT: 61 IN

## 2018-03-22 DIAGNOSIS — K80.50 CHOLEDOCHOLITHIASIS: Primary | ICD-10-CM

## 2018-03-22 DIAGNOSIS — K83.8 DILATED BILE DUCT: ICD-10-CM

## 2018-03-22 LAB
POCT GLUCOSE: 118 MG/DL (ref 70–110)
POCT GLUCOSE: 124 MG/DL (ref 70–110)

## 2018-03-22 PROCEDURE — 37000008 HC ANESTHESIA 1ST 15 MINUTES: Performed by: INTERNAL MEDICINE

## 2018-03-22 PROCEDURE — 43259 EGD US EXAM DUODENUM/JEJUNUM: CPT | Mod: ,,, | Performed by: INTERNAL MEDICINE

## 2018-03-22 PROCEDURE — 37000009 HC ANESTHESIA EA ADD 15 MINS: Performed by: INTERNAL MEDICINE

## 2018-03-22 PROCEDURE — 82962 GLUCOSE BLOOD TEST: CPT | Performed by: INTERNAL MEDICINE

## 2018-03-22 PROCEDURE — D9220A PRA ANESTHESIA: Mod: ANES,,, | Performed by: ANESTHESIOLOGY

## 2018-03-22 PROCEDURE — 43259 EGD US EXAM DUODENUM/JEJUNUM: CPT | Performed by: INTERNAL MEDICINE

## 2018-03-22 PROCEDURE — 25000003 PHARM REV CODE 250: Performed by: NURSE ANESTHETIST, CERTIFIED REGISTERED

## 2018-03-22 PROCEDURE — 25000003 PHARM REV CODE 250: Performed by: INTERNAL MEDICINE

## 2018-03-22 PROCEDURE — D9220A PRA ANESTHESIA: Mod: CRNA,,, | Performed by: NURSE ANESTHETIST, CERTIFIED REGISTERED

## 2018-03-22 PROCEDURE — 63600175 PHARM REV CODE 636 W HCPCS: Performed by: NURSE ANESTHETIST, CERTIFIED REGISTERED

## 2018-03-22 RX ORDER — SODIUM CHLORIDE 0.9 % (FLUSH) 0.9 %
3 SYRINGE (ML) INJECTION
Status: DISCONTINUED | OUTPATIENT
Start: 2018-03-22 | End: 2018-03-22 | Stop reason: HOSPADM

## 2018-03-22 RX ORDER — FENTANYL CITRATE 50 UG/ML
INJECTION, SOLUTION INTRAMUSCULAR; INTRAVENOUS
Status: DISCONTINUED | OUTPATIENT
Start: 2018-03-22 | End: 2018-03-22

## 2018-03-22 RX ORDER — LIDOCAINE HCL/PF 100 MG/5ML
SYRINGE (ML) INTRAVENOUS
Status: DISCONTINUED | OUTPATIENT
Start: 2018-03-22 | End: 2018-03-22

## 2018-03-22 RX ORDER — PROPOFOL 10 MG/ML
VIAL (ML) INTRAVENOUS
Status: DISCONTINUED | OUTPATIENT
Start: 2018-03-22 | End: 2018-03-22

## 2018-03-22 RX ORDER — SODIUM CHLORIDE 9 MG/ML
INJECTION, SOLUTION INTRAVENOUS CONTINUOUS
Status: DISCONTINUED | OUTPATIENT
Start: 2018-03-22 | End: 2018-03-22 | Stop reason: HOSPADM

## 2018-03-22 RX ORDER — SODIUM CHLORIDE 0.9 % (FLUSH) 0.9 %
3 SYRINGE (ML) INJECTION
Status: CANCELLED | OUTPATIENT
Start: 2018-03-22

## 2018-03-22 RX ORDER — PROPOFOL 10 MG/ML
VIAL (ML) INTRAVENOUS CONTINUOUS PRN
Status: DISCONTINUED | OUTPATIENT
Start: 2018-03-22 | End: 2018-03-22

## 2018-03-22 RX ORDER — ESMOLOL HYDROCHLORIDE 10 MG/ML
INJECTION INTRAVENOUS
Status: DISCONTINUED | OUTPATIENT
Start: 2018-03-22 | End: 2018-03-22

## 2018-03-22 RX ORDER — MIDAZOLAM HYDROCHLORIDE 1 MG/ML
INJECTION, SOLUTION INTRAMUSCULAR; INTRAVENOUS
Status: DISCONTINUED | OUTPATIENT
Start: 2018-03-22 | End: 2018-03-22

## 2018-03-22 RX ADMIN — FENTANYL CITRATE 25 MCG: 50 INJECTION, SOLUTION INTRAMUSCULAR; INTRAVENOUS at 08:03

## 2018-03-22 RX ADMIN — MIDAZOLAM HYDROCHLORIDE 2 MG: 1 INJECTION, SOLUTION INTRAMUSCULAR; INTRAVENOUS at 08:03

## 2018-03-22 RX ADMIN — SODIUM CHLORIDE: 0.9 INJECTION, SOLUTION INTRAVENOUS at 07:03

## 2018-03-22 RX ADMIN — PROPOFOL 150 MCG/KG/MIN: 10 INJECTION, EMULSION INTRAVENOUS at 08:03

## 2018-03-22 RX ADMIN — ESMOLOL HYDROCHLORIDE 10 MG: 10 INJECTION INTRAVENOUS at 08:03

## 2018-03-22 RX ADMIN — PROPOFOL 30 MG: 10 INJECTION, EMULSION INTRAVENOUS at 08:03

## 2018-03-22 RX ADMIN — LIDOCAINE HYDROCHLORIDE 50 MG: 20 INJECTION, SOLUTION INTRAVENOUS at 08:03

## 2018-03-22 NOTE — ANESTHESIA POSTPROCEDURE EVALUATION
"Anesthesia Post Evaluation    Patient: Keyona Irene    Procedure(s) Performed: Procedure(s) (LRB):  ULTRASOUND-ENDOSCOPIC-UPPER (N/A)    Final Anesthesia Type: general  Patient location during evaluation: PACU  Patient participation: Yes- Able to Participate  Level of consciousness: awake and alert and oriented  Pain management: adequate  Airway patency: patent  PONV status at discharge: No PONV  Anesthetic complications: no      Cardiovascular status: blood pressure returned to baseline and hemodynamically stable  Respiratory status: unassisted  Hydration status: euvolemic  Follow-up not needed.        Visit Vitals  BP (!) 151/78   Pulse 91   Temp 36.9 °C (98.4 °F) (Temporal)   Resp 18   Ht 5' 1" (1.549 m)   Wt 49.9 kg (110 lb)   LMP  (LMP Unknown)   SpO2 100%   Breastfeeding? No   BMI 20.78 kg/m²       Pain/Leonardo Score: Pain Assessment Performed: Yes (3/22/2018  9:30 AM)  Presence of Pain: denies (3/22/2018  9:30 AM)  Leonardo Score: 10 (3/22/2018  9:30 AM)      "

## 2018-03-22 NOTE — PROGRESS NOTES
Patient states she plans to drink alcohol and eat fried chicken.  Educated patient that she should not drink alcohol within 24 hours after having anesthesia.

## 2018-03-22 NOTE — PROVATION PATIENT INSTRUCTIONS
Discharge Summary/Instructions after an Endoscopic Procedure  Patient Name: Keyona Irene  Patient MRN: [id number]  Patient YOB: 1950 Thursday, March 22, 2018  Lamin Robison MD  RESTRICTIONS:  During your procedure today, you received medications for sedation.  These   medications may affect your judgment, balance and coordination.  Therefore,   for 24 hours, you have the following restrictions:   - DO NOT drive a car, operate machinery, make legal/financial decisions,   sign important papers or drink alcohol.    ACTIVITY:  The following day: return to full activity including work, except no heavy   lifting, straining or running for 3 days if polyps were removed.  DIET:  Eat and drink normally unless instructed otherwise.     TREATMENT FOR COMMON SIDE EFFECTS:  - Mild abdominal pain, nausea, belching, bloating or excessive gas:  rest,   eat lightly and use a heating pad.  - Sore Throat: treat with throat lozenges and/or gargle with warm salt   water.  - Because air was used during the procedure, expelling large amounts of air   from your rectum or belching is normal.  - If a bowel prep was taken, you may not have a bowel movement for 1-3 days.    This is normal.  SYMPTOMS TO WATCH FOR AND REPORT TO YOUR PHYSICIAN:  1. Abdominal pain or bloating, other than gas cramps.  2. Chest pain.  3. Back pain.  4. Signs of infection such as: chills or fever occurring within 24 hours   after the procedure.  5. Rectal bleeding, which would show as bright red, maroon, or black stools.   (A tablespoon of blood from the rectum is not serious, especially if   hemorrhoids are present.)  6. Vomiting.  7. Weakness or dizziness.  GO DIRECTLY TO THE NEAREST EMERGENCY ROOM IF YOU HAVE ANY OF THE FOLLOWING:      Difficulty breathing              Chills and/or fever over 101 F   Persistent vomiting and/or vomiting blood   Severe abdominal pain   Severe chest pain   Black, tarry stools   Bleeding- more than one  tablespoon   Any other symptom or condition that you feel may need urgent attention  Your doctor recommends these additional instructions:  If any biopsies were taken, your doctors clinic will contact you in 1 to 2   weeks with any results.  You are being discharged to home.   Resume your previous diet.   Continue your present medications.   Return to your primary care physician at appointment to be scheduled.  For questions, problems or results please call your physician - Lamin Robison MD at Work:  [phone].  [facility] [place], EMERGENCY ROOM PHONE NUMBER: [phone]  IF A COMPLICATION OR EMERGENCY SITUATION ARISES AND YOU ARE UNABLE TO REACH   YOUR PHYSICIAN - GO DIRECTLY TO THE EMERGENCY ROOM.  Lamin Robison MD  3/22/2018 8:51:37 AM  This report has been verified and signed electronically.

## 2018-03-22 NOTE — OR NURSING
Upper dentures given to daughter.  Pt verbalized she would not like information to be shared with family if anything more than stones are found.

## 2018-03-22 NOTE — ANESTHESIA PREPROCEDURE EVALUATION
03/22/2018  Keyona Irene is a 67 y.o., female.  Patient Active Problem List   Diagnosis    Essential hypertension    Tobacco abuse    Hyperbilirubinemia    Choledocholithiasis    Anxiety    Anemia    Type 2 diabetes mellitus with diabetic polyneuropathy, without long-term current use of insulin    Blindness, congenital    Calculus of bile duct without cholecystitis and without obstruction    Dilated bile duct     Past Surgical History:   Procedure Laterality Date    COLONOSCOPY      ERCP W/ PLASTIC STENT PLACEMENT      ERCP W/ SPHICTEROTOMY      ESOPHAGOGASTRODUODENOSCOPY      HYSTERECTOMY         Anesthesia Evaluation    I have reviewed the Patient Summary Reports.    I have reviewed the Nursing Notes.   I have reviewed the Medications.     Review of Systems      Physical Exam  General:  Well nourished    Airway/Jaw/Neck:  Airway Findings: Mouth Opening: Normal General Airway Assessment: Adult  Mallampati: II  Improves to II with phonation.  Jaw/Neck Findings:  Limited Ability to Prognath  Neck ROM: Normal ROM     Eyes/Ears/Nose:  Eyes/Ears/Nose Findings:    Dental:  Dental Findings: In tact   Chest/Lungs:  Chest/Lungs Findings: Clear to auscultation, Normal Respiratory Rate     Heart/Vascular:  Heart Findings: Rate: Normal  Rhythm: Regular Rhythm  Sounds: Normal     Abdomen:  Abdomen Findings:  Normal     Musculoskeletal:  Musculoskeletal Findings:    Skin:  Skin Findings:     Mental Status:  Mental Status Findings:  Cooperative, Alert and Oriented         Anesthesia Plan  Type of Anesthesia, risks & benefits discussed:  Anesthesia Type:  general, MAC  Patient's Preference:   Intra-op Monitoring Plan:   Intra-op Monitoring Plan Comments:   Post Op Pain Control Plan:   Post Op Pain Control Plan Comments:   Induction:   IV  Beta Blocker:  Patient is not currently on a Beta-Blocker  (No further documentation required).       Informed Consent: Patient understands risks and agrees with Anesthesia plan.  Questions answered. Anesthesia consent signed with patient.  ASA Score: 3     Day of Surgery Review of History & Physical:    H&P update referred to the surgeon.         Ready For Surgery From Anesthesia Perspective.

## 2018-03-22 NOTE — TRANSFER OF CARE
"Anesthesia Transfer of Care Note    Patient: Keyona Irene    Procedure(s) Performed: Procedure(s) (LRB):  ULTRASOUND-ENDOSCOPIC-UPPER (N/A)    Patient location: PACU    Anesthesia Type: general    Transport from OR: Transported from OR on 2-3 L/min O2 by NC with adequate spontaneous ventilation    Post pain: adequate analgesia    Post assessment: no apparent anesthetic complications and tolerated procedure well    Post vital signs: stable    Level of consciousness: sedated and responds to stimulation    Nausea/Vomiting: no nausea/vomiting    Complications: none    Transfer of care protocol was followed      Last vitals:   Visit Vitals  /77   Pulse 88   Temp 36.7 °C (98.1 °F)   Resp 17   Ht 5' 1" (1.549 m)   Wt 49.9 kg (110 lb)   LMP  (LMP Unknown)   SpO2 100%   Breastfeeding? No   BMI 20.78 kg/m²     "

## 2018-03-22 NOTE — DISCHARGE INSTRUCTIONS
Endoscopic Ultrasound (EUS)    An endoscopic ultrasound (EUS) is a test to look at the inside of your gastrointestinal (GI) tract. It's commonly used to look for cancers or growths in the esophagus, stomach, pancreas, liver, and rectum. It can help to stage cancer (see how advanced a cancer is). EUS may also be used to help diagnose certain diseases or to drain cysts or abscesses.  What is EUS?  EUS shows both ultrasound images and live video of the GI tract. During the test, a flexible tube called an endoscope (scope) is used. At the end of the scope is a tiny video camera and light. The video camera sends live images to a monitor. The scope also contains a very small ultrasound device. This uses sound waves to create images and send them to a monitor.  A needle is passed through the scope. The needle can be used take a small sample of tissue for testing. This is called a biopsy. The needle can be used to take a sample of fluid. This is called fine-needle aspiration (FNA).  Risks and possible complications of EUS  Risks and possible complications include the following:  · Bleeding  · Infection  · A perforation (hole) in the digestive tract   · Risks of sedation or anesthesia   Before the test  Be prepared prior to the test:  · Tell your healthcare provider what medicine you take. This includes vitamins, herbs, and over-the-counter medicine. It also includes any blood thinners, such as warfarin, clopidogrel, ibuprofen, or daily aspirin. Ask your healthcare provider if you need to stop taking some or all of them before the test.  · You may be prescribed antibiotics to take before or after the test. This depends on the area being studied and what is done during the test. These medicines help prevent infection.  · Carefully follow the instructions for preparing for the test to make sure results are accurate. Instructions may include:  ¨ If youre having an EUS of the upper GI tract (esophagus, stomach, duodenum,  pancreas, liver):  § Do not eat or drink for 6 hours before the test.  ¨ If youre having an EUS of the lower GI tract (rectum):  § Before the test, do bowel prep as instructed to clean your rectum of stool. This may involve a clear liquid diet and using a laxative (liquid or pills) the night before the test. Or it may mean doing one or more enemas the morning of the test.  § Do not eat or drink for 6 hours before the test.  · Be sure to arrive on time at the facility. Bring your identification and health insurance card. Leave valuables at home. If you have them, bring X-rays or other test results with you.  Let the healthcare provider know  For your safety, tell the healthcare provider if you:  · Take insulin. Your dose may need to be changed on the day of your test.  · Are allergic to latex.  · Have any other allergies.  · Are taking blood thinners.   During the test  An endoscopic ultrasound usually takes place in a hospital. The procedure itself may take 1 to 2 hours. You will likely go home soon afterward. During the test:  · You lie on your left side on an exam table.  · An intravenous (IV) line will be put into a vein in your arm or hand. This line supplies fluids and medicines. To keep you comfortable during the test, you will be given a sedative medicine. This medicine prevents discomfort and will make you sleepy.  · If you are having an EUS of the upper GI tract, local anesthetic may be sprayed in your throat. This will help you be more comfortable as the healthcare provider inserts the scope. The healthcare provider then gently puts the flexible scope into your mouth or nose and down your throat.  · If youre having an EUS of the lower GI tract, the healthcare provider gently puts the flexible scope into your anus.  · During the test, the scope sends live video and ultrasound images from inside your body to nearby monitors. These are used to examine your GI tract. Specialized procedures, such as drainage,  are done as needed.  · The healthcare provider may discuss the results with you soon after the test. Biopsy results take several  days.  · In most cases, you can go home within a few hours of the test. When you leave the facility, have an adult family member or friend drive you, even if you don't feel that sleepy.  After the test  Here is what to expect after the test:  · You may feel tired from the sedative. This should wear off by the end of the day.  · If you had an upper digestive endoscopy, your throat may feel sore for a day or two. Over-the-counter sore throat lozenges and spray should help.  · You can eat and drink normally as soon as the test is done.  When to call the healthcare provider  Call your healthcare provider if you notice any of the following:  · Fever of 100.4°F (38.0°C) or higher, or as advised by your healthcare provider  · Shortness of breath  · Vomiting blood, blood in stool, or black stools  · Coughing or hoarse voice that wont go away   Date Last Reviewed: 7/1/2016  © 9086-3824 "[employer]. [address] [place] [address]. All rights reserved. This information is not intended as a substitute for professional medical care. Always follow your healthcare professional's instructions.

## 2018-03-22 NOTE — H&P
Short Stay Endoscopy History and Physical    PCP - Howard Gan MD  Referring Physician - Howard Gan MD  [address]  [place] [address]    Procedure - eus/ercp  ASA - per anesthesia  Mallampati - per anesthesia  History of Anesthesia problems - no  Family history Anesthesia problems -  no   Plan of anesthesia - General    HPI:  This is a 67 y.o. female here for evaluation of: cbd stones    Reflux - no  Dysphagia - no  Abdominal pain - no  Diarrhea - no    ROS:  Constitutional: No fevers, chills, No weight loss  CV: No chest pain  Pulm: No cough, No shortness of breath  Ophtho: No vision changes  GI: see HPI  Derm: No rash    Medical History:  has a past medical history of Anemia; Biliary obstruction; Cholangitis; Diabetes mellitus; EtOH dependence; and HTN (hypertension) (12/30/2013).    Surgical History:  has a past surgical history that includes Hysterectomy; Colonoscopy; Esophagogastroduodenoscopy; ERCP w/ sphicterotomy; and ERCP w/ plastic stent placement.    Family History: family history is not on file..    Social History:  reports that she has been smoking Cigarettes.  She has a 20.00 pack-year smoking history. She has never used smokeless tobacco. She reports that she drinks about 7.2 oz of alcohol per week . She reports that she does not use drugs.    Review of patient's allergies indicates:  No Known Allergies    Medications:   Prescriptions Prior to Admission   Medication Sig Dispense Refill Last Dose    amlodipine (NORVASC) 10 MG tablet TAKE 1 TABLET(10 MG) BY MOUTH EVERY DAY 90 tablet 3 3/22/2018 at Unknown time    aspirin (ECOTRIN) 325 MG EC tablet Take 325 mg by mouth once daily.   3/21/2018 at Unknown time    cyanocobalamin (VITAMIN B-12) 1000 MCG tablet Take 1 tablet (1,000 mcg total) by mouth once daily. 30 tablet 2 3/21/2018 at Unknown time    cyproheptadine (PERIACTIN) 4 mg tablet Take 1 tablet (4 mg total) by mouth 2 (two) times daily as needed. 60 tablet 1  3/21/2018 at Unknown time    doxepin (SINEQUAN) 10 MG capsule Take 1 capsule (10 mg total) by mouth every evening. 90 capsule 3 3/21/2018 at Unknown time    esomeprazole (NEXIUM) 40 MG capsule TAKE 1 CAPSULE(40 MG) BY MOUTH BEFORE BREAKFAST 90 capsule 0 3/21/2018 at Unknown time    losartan-hydrochlorothiazide 100-12.5 mg (HYZAAR) 100-12.5 mg Tab TAKE 1 TABLET BY MOUTH EVERY DAY 90 tablet 0 3/21/2018 at Unknown time    metformin (GLUCOPHAGE-XR) 500 MG 24 hr tablet Take 2 tablets (1,000 mg total) by mouth 2 (two) times daily with meals. 120 tablet 3 3/21/2018 at Unknown time    multivit-min-FA-lycopen-lutein (CENTRUM SILVER) 0.4-300-250 mg-mcg-mcg Tab Take 1 tablet by mouth once daily.   3/21/2018 at Unknown time    potassium chloride (KLOR-CON) 10 MEQ TbSR TAKE 1 TABLET(10 MEQ) BY MOUTH EVERY DAY 30 tablet 3 3/21/2018 at Unknown time    tramadol (ULTRAM) 50 mg tablet Take 1 tablet (50 mg total) by mouth every 6 (six) hours as needed for Pain. 60 tablet 0 3/21/2018 at Unknown time    blood sugar diagnostic Strp To check BG 1 times daily, to use with insurance preferred meter 100 strip 5     blood-glucose meter kit To check BG 1 times daily, to use with insurance preferred meter 100 each 5     fluticasone (FLONASE) 50 mcg/actuation nasal spray 1 spray by Each Nare route once daily. 16 g 11 More than a month at Unknown time    lancets Misc To check BG 1 times daily, to use with insurance preferred meter 100 each 5        Physical Exam:    Vital Signs:   Vitals:    03/22/18 0728   BP: 133/77   Pulse: 88   Resp: 17   Temp: 98.1 °F (36.7 °C)       General Appearance: Well appearing in no acute distress  Eyes:    No scleral icterus  ENT: Neck supple  Lungs: CTA anteriorly  Heart:  Regular rate  Abdomen: Soft, non tender, non distended with normal bowel sounds.  Extremities: No edema  Skin: No rash    Labs:  Lab Results   Component Value Date    WBC 4.6 02/05/2018    HGB 12.1 02/05/2018    HCT 37.0 02/05/2018      02/05/2018    CHOL 191 02/05/2018    TRIG 84 02/05/2018    HDL 54 02/05/2018    ALT 32 (H) 02/05/2018    AST 36 (H) 02/05/2018     02/05/2018    K 3.7 02/05/2018     02/05/2018    CREATININE 0.64 02/05/2018    BUN 7 02/05/2018    CO2 32 (H) 02/05/2018    TSH 0.540 10/26/2010    INR 1.4 (H) 09/04/2016    HGBA1C 6.8 (H) 07/26/2017    MICROALBUR 0.2 02/05/2018       I have explained the risks and benefits of this endoscopic procedure to the patient including but not limited to bleeding, inflammation, infection, perforation, and death.      Lamin Robison MD

## 2018-04-20 RX ORDER — METFORMIN HYDROCHLORIDE 500 MG/1
TABLET, EXTENDED RELEASE ORAL
Qty: 120 TABLET | Refills: 3 | Status: SHIPPED | OUTPATIENT
Start: 2018-04-20 | End: 2018-10-09 | Stop reason: SDUPTHER

## 2018-04-21 RX ORDER — POTASSIUM CHLORIDE 750 MG/1
TABLET, EXTENDED RELEASE ORAL
Qty: 30 TABLET | Refills: 3 | Status: SHIPPED | OUTPATIENT
Start: 2018-04-21 | End: 2019-01-16 | Stop reason: SDUPTHER

## 2018-04-23 DIAGNOSIS — I10 ESSENTIAL HYPERTENSION: ICD-10-CM

## 2018-04-23 RX ORDER — AMLODIPINE BESYLATE 10 MG/1
TABLET ORAL
Qty: 90 TABLET | Refills: 3 | Status: SHIPPED | OUTPATIENT
Start: 2018-04-23 | End: 2019-04-30 | Stop reason: SDUPTHER

## 2018-05-07 ENCOUNTER — OFFICE VISIT (OUTPATIENT)
Dept: FAMILY MEDICINE | Facility: CLINIC | Age: 68
End: 2018-05-07
Payer: COMMERCIAL

## 2018-05-07 VITALS
BODY MASS INDEX: 19.75 KG/M2 | DIASTOLIC BLOOD PRESSURE: 62 MMHG | SYSTOLIC BLOOD PRESSURE: 138 MMHG | WEIGHT: 104.63 LBS | HEART RATE: 84 BPM | TEMPERATURE: 99 F | HEIGHT: 61 IN

## 2018-05-07 DIAGNOSIS — I10 ESSENTIAL HYPERTENSION: ICD-10-CM

## 2018-05-07 DIAGNOSIS — E11.42 TYPE 2 DIABETES MELLITUS WITH DIABETIC POLYNEUROPATHY, WITHOUT LONG-TERM CURRENT USE OF INSULIN: Primary | ICD-10-CM

## 2018-05-07 DIAGNOSIS — R21 SCALY PATCH RASH: ICD-10-CM

## 2018-05-07 PROCEDURE — 99214 OFFICE O/P EST MOD 30 MIN: CPT | Mod: S$GLB,,, | Performed by: FAMILY MEDICINE

## 2018-05-07 PROCEDURE — 99999 PR PBB SHADOW E&M-EST. PATIENT-LVL III: CPT | Mod: PBBFAC,,, | Performed by: FAMILY MEDICINE

## 2018-05-07 RX ORDER — TRIAMCINOLONE ACETONIDE 1 MG/G
OINTMENT TOPICAL 2 TIMES DAILY
Qty: 30 G | Refills: 11 | Status: ON HOLD | OUTPATIENT
Start: 2018-05-07 | End: 2020-05-23 | Stop reason: CLARIF

## 2018-05-07 RX ORDER — TERBINAFINE HYDROCHLORIDE 250 MG/1
250 TABLET ORAL DAILY
Qty: 14 TABLET | Refills: 0 | Status: SHIPPED | OUTPATIENT
Start: 2018-05-07 | End: 2018-05-21

## 2018-05-07 NOTE — PROGRESS NOTES
"Subjective:       Patient ID: Keyona Irene is a 67 y.o. female.    Chief Complaint: Follow-up (f/u tests done at main campus. Needs mammo.)    HTN on losartan/HCT and amlodipine.  Variable control.  Has hx of epigastric pain and choledocholithiasis. She had ERCP in January with no stone. She describes epigastric pain and when she pushes there, she feels some regurgitation. No melena. Tolerates fatty foods. Weight is variable. There have been issues with her [employer] insurance. Diabetes has been controlled on metformin with HgA1c 6.2%.        Review of Systems   Constitutional: Negative for appetite change and fever.   Respiratory: Negative for shortness of breath and wheezing.    Cardiovascular: Negative for chest pain, palpitations and leg swelling.   Gastrointestinal: Positive for abdominal pain.   Skin:        Itchy rash on back that she says started after her hospitalization in January. She uses Zest to bathe.        Objective:     Blood pressure 138/62, pulse 84, temperature 98.9 °F (37.2 °C), temperature source Oral, height 5' 1" (1.549 m), weight 47.4 kg (104 lb 9.7 oz).      Physical Exam   Constitutional:   Thin lady in no distress   Neck: No thyromegaly present.   Cardiovascular: Normal rate, regular rhythm and normal heart sounds.    No murmur heard.  Pulmonary/Chest: Effort normal and breath sounds normal. No respiratory distress.   Abdominal: Soft. Bowel sounds are normal. She exhibits no distension. There is tenderness (Mild mid abd tenderness).   Musculoskeletal: She exhibits no edema.   Lymphadenopathy:     She has no cervical adenopathy.   Neurological: She is alert.       Assessment:       1. Type 2 diabetes mellitus with diabetic polyneuropathy, without long-term current use of insulin    2. Essential hypertension    3. Scaly patch rash Tinea vs eczema.        Plan:       Will monitor abd pain. Change from Zest to Dove. Less hot water. Triamcinalone ointment.    Trial lamisil 250 " daily for two weeks. RTC 3 months

## 2018-05-07 NOTE — PATIENT INSTRUCTIONS
Change Zest to Dove.   Take lamisil 250 mg daily for 2 weeks  Use triamcinolone ointment on the rash.

## 2018-05-29 RX ORDER — ESOMEPRAZOLE MAGNESIUM 40 MG/1
CAPSULE, DELAYED RELEASE ORAL
Qty: 90 CAPSULE | Refills: 0 | Status: SHIPPED | OUTPATIENT
Start: 2018-05-29 | End: 2019-06-26 | Stop reason: SDUPTHER

## 2018-06-05 RX ORDER — LOSARTAN POTASSIUM AND HYDROCHLOROTHIAZIDE 12.5; 1 MG/1; MG/1
TABLET ORAL
Qty: 90 TABLET | Refills: 0 | Status: SHIPPED | OUTPATIENT
Start: 2018-06-05 | End: 2018-10-16 | Stop reason: SDUPTHER

## 2018-06-05 RX ORDER — LOSARTAN POTASSIUM AND HYDROCHLOROTHIAZIDE 12.5; 1 MG/1; MG/1
TABLET ORAL
Qty: 90 TABLET | Refills: 0 | Status: SHIPPED | OUTPATIENT
Start: 2018-06-05 | End: 2018-10-25 | Stop reason: SDUPTHER

## 2018-07-27 DIAGNOSIS — Z12.39 BREAST CANCER SCREENING: ICD-10-CM

## 2018-08-17 DIAGNOSIS — E11.9 TYPE 2 DIABETES MELLITUS WITHOUT COMPLICATION: ICD-10-CM

## 2018-10-09 ENCOUNTER — TELEPHONE (OUTPATIENT)
Dept: FAMILY MEDICINE | Facility: CLINIC | Age: 68
End: 2018-10-09

## 2018-10-09 DIAGNOSIS — E11.42 TYPE 2 DIABETES MELLITUS WITH DIABETIC POLYNEUROPATHY, WITHOUT LONG-TERM CURRENT USE OF INSULIN: Primary | ICD-10-CM

## 2018-10-09 RX ORDER — METFORMIN HYDROCHLORIDE 500 MG/1
TABLET, EXTENDED RELEASE ORAL
Qty: 360 TABLET | Refills: 0 | Status: SHIPPED | OUTPATIENT
Start: 2018-10-09 | End: 2018-10-16

## 2018-10-10 ENCOUNTER — LAB VISIT (OUTPATIENT)
Dept: LAB | Facility: HOSPITAL | Age: 68
End: 2018-10-10
Attending: FAMILY MEDICINE
Payer: COMMERCIAL

## 2018-10-10 DIAGNOSIS — E11.42 TYPE 2 DIABETES MELLITUS WITH DIABETIC POLYNEUROPATHY, WITHOUT LONG-TERM CURRENT USE OF INSULIN: ICD-10-CM

## 2018-10-10 LAB
ALBUMIN SERPL BCP-MCNC: 3.8 G/DL
ALP SERPL-CCNC: 110 U/L
ALT SERPL W/O P-5'-P-CCNC: 12 U/L
ANION GAP SERPL CALC-SCNC: 10 MMOL/L
AST SERPL-CCNC: 24 U/L
BILIRUB SERPL-MCNC: 0.6 MG/DL
BUN SERPL-MCNC: 7 MG/DL
CALCIUM SERPL-MCNC: 8.7 MG/DL
CHLORIDE SERPL-SCNC: 105 MMOL/L
CHOLEST SERPL-MCNC: 170 MG/DL
CHOLEST/HDLC SERPL: 2.7 {RATIO}
CO2 SERPL-SCNC: 25 MMOL/L
CREAT SERPL-MCNC: 0.7 MG/DL
EST. GFR  (AFRICAN AMERICAN): >60 ML/MIN/1.73 M^2
EST. GFR  (NON AFRICAN AMERICAN): >60 ML/MIN/1.73 M^2
ESTIMATED AVG GLUCOSE: 105 MG/DL
GLUCOSE SERPL-MCNC: 106 MG/DL
HBA1C MFR BLD HPLC: 5.3 %
HDLC SERPL-MCNC: 62 MG/DL
HDLC SERPL: 36.5 %
LDLC SERPL CALC-MCNC: 95.4 MG/DL
NONHDLC SERPL-MCNC: 108 MG/DL
POTASSIUM SERPL-SCNC: 3.4 MMOL/L
PROT SERPL-MCNC: 7.6 G/DL
SODIUM SERPL-SCNC: 140 MMOL/L
TRIGL SERPL-MCNC: 63 MG/DL

## 2018-10-10 PROCEDURE — 36415 COLL VENOUS BLD VENIPUNCTURE: CPT | Mod: PN

## 2018-10-10 PROCEDURE — 83036 HEMOGLOBIN GLYCOSYLATED A1C: CPT

## 2018-10-10 PROCEDURE — 80053 COMPREHEN METABOLIC PANEL: CPT

## 2018-10-10 PROCEDURE — 80061 LIPID PANEL: CPT

## 2018-10-11 ENCOUNTER — TELEPHONE (OUTPATIENT)
Dept: FAMILY MEDICINE | Facility: CLINIC | Age: 68
End: 2018-10-11

## 2018-10-11 NOTE — TELEPHONE ENCOUNTER
----- Message from Janet Andrade sent at 10/11/2018  8:46 AM CDT -----  Contact: Anastasia from [facility]  Name of Who is Calling: Anastasia      What is the request in detail: Anastasia is calling requesting speak with a nurse in regards to add on patient's appointment. Patient needs the follow things checked: high blood pressure screening, diabetic eye exam, medication for diabetes.      Can the clinic reply by [facility]:       What Number to Call Back if not in [facility]: Anastasia [phone]

## 2018-10-16 ENCOUNTER — OFFICE VISIT (OUTPATIENT)
Dept: FAMILY MEDICINE | Facility: CLINIC | Age: 68
End: 2018-10-16
Payer: COMMERCIAL

## 2018-10-16 VITALS
BODY MASS INDEX: 19.16 KG/M2 | SYSTOLIC BLOOD PRESSURE: 138 MMHG | HEIGHT: 61 IN | TEMPERATURE: 100 F | DIASTOLIC BLOOD PRESSURE: 72 MMHG | WEIGHT: 101.5 LBS | HEART RATE: 80 BPM

## 2018-10-16 DIAGNOSIS — Z72.0 TOBACCO ABUSE: ICD-10-CM

## 2018-10-16 DIAGNOSIS — Z12.11 COLON CANCER SCREENING: ICD-10-CM

## 2018-10-16 DIAGNOSIS — E11.42 TYPE 2 DIABETES MELLITUS WITH DIABETIC POLYNEUROPATHY, WITHOUT LONG-TERM CURRENT USE OF INSULIN: Primary | ICD-10-CM

## 2018-10-16 DIAGNOSIS — R06.00 DYSPNEA, UNSPECIFIED TYPE: ICD-10-CM

## 2018-10-16 DIAGNOSIS — H54.7 VISION IMPAIRMENT: ICD-10-CM

## 2018-10-16 DIAGNOSIS — R63.4 WEIGHT LOSS, ABNORMAL: ICD-10-CM

## 2018-10-16 PROCEDURE — 99999 PR PBB SHADOW E&M-EST. PATIENT-LVL IV: CPT | Mod: PBBFAC,,, | Performed by: FAMILY MEDICINE

## 2018-10-16 PROCEDURE — 99214 OFFICE O/P EST MOD 30 MIN: CPT | Mod: S$PBB,,, | Performed by: FAMILY MEDICINE

## 2018-10-16 PROCEDURE — 99214 OFFICE O/P EST MOD 30 MIN: CPT | Mod: PBBFAC,PN | Performed by: FAMILY MEDICINE

## 2018-10-16 RX ORDER — LANOLIN ALCOHOL/MO/W.PET/CERES
1000 CREAM (GRAM) TOPICAL DAILY
Qty: 30 TABLET | Refills: 2 | COMMUNITY
Start: 2018-10-16 | End: 2022-05-25

## 2018-10-16 RX ORDER — METFORMIN HYDROCHLORIDE 500 MG/1
1000 TABLET, EXTENDED RELEASE ORAL DAILY
Qty: 360 TABLET | Refills: 0
Start: 2018-10-16 | End: 2019-01-16 | Stop reason: SDUPTHER

## 2018-10-16 NOTE — PROGRESS NOTES
"Subjective:       Patient ID: Keyona Irene is a 68 y.o. female.    Chief Complaint: Diabetes (F/U)    She is here with her daughter.  Follow-up diabetes.  I reviewed her recent lab work.  Hemoglobin A1c 5.3% with normal fasting blood sugar.  Her weight has been variable.  We did some evaluation and treatment for weight loss a year so ago.  She had an endoscopic ultrasound because of a dilated bile duct.  She takes Nexium for heartburn and indigestion.  She does have occasional dysphagia.  There was no sign of esophageal lesions on her upper endoscopy in March.  She takes metformin 1 g twice a day for diabetes.  She has taken Periactin in the past and it helped her appetite and she would like to resume.  She has lost 5 lb since her last visit.  She complains of some posterior neck pain.  She continues to smoke 1/2 pack per day.  She does acknowledge coughing and dyspnea.  She has a history of an elevated MCV.      Review of Systems   Constitutional: Positive for appetite change and unexpected weight change. Negative for fever.   Eyes: Positive for visual disturbance (Previous right cataract surgery did not improve her vision.).   Respiratory: Positive for cough and shortness of breath.    Cardiovascular: Negative for leg swelling.   Gastrointestinal: Negative for abdominal pain.   Neurological: Negative for numbness.       Objective:     Blood pressure 138/72, pulse 80, temperature 99.6 °F (37.6 °C), temperature source Oral, height 5' 1" (1.549 m), weight 46.1 kg (101 lb 8.4 oz).      Physical Exam   Constitutional:   She is a thin lady.  Upper and lower dentures   Neck: Normal range of motion. No thyromegaly present.   She has some tender posterior cervical muscles especially on the left.  45° rotation bilaterally.   Cardiovascular: Normal rate, regular rhythm and normal heart sounds.   No murmur heard.  Pulses:       Dorsalis pedis pulses are 1+ on the right side, and 1+ on the left side.        " Posterior tibial pulses are 1+ on the right side, and 1+ on the left side.   Pulmonary/Chest: Effort normal.   Mild delay of expiration.   Abdominal: Soft. Bowel sounds are normal. There is tenderness (Mild diffuse tenderness with no mass.).   Musculoskeletal:        Right foot: There is no deformity.        Left foot: There is no deformity.   Feet:   Right Foot:   Protective Sensation: 4 sites tested. 4 sites sensed.   Skin Integrity: Negative for ulcer.   Left Foot:   Protective Sensation: 4 sites tested. 4 sites sensed.   Skin Integrity: Negative for ulcer.   Lymphadenopathy:     She has no cervical adenopathy.   Neurological: She is alert.       Assessment:       1. Type 2 diabetes mellitus with diabetic polyneuropathy, without long-term current use of insulin    2. Vision impairment    3. Dyspnea, unspecified type    4. Tobacco abuse    5. Colon cancer screening    6. Weight loss, abnormal        Plan:       Periactin for appetite improvement.  Decrease metformin to 1 g daily.  Fit kit.  Vitamin B12 supplement.  Follow-up in 3 months.

## 2018-10-23 ENCOUNTER — LAB VISIT (OUTPATIENT)
Dept: LAB | Facility: HOSPITAL | Age: 68
End: 2018-10-23
Attending: FAMILY MEDICINE
Payer: COMMERCIAL

## 2018-10-23 DIAGNOSIS — Z12.11 COLON CANCER SCREENING: ICD-10-CM

## 2018-10-23 LAB — HEMOCCULT STL QL IA: NEGATIVE

## 2018-10-23 PROCEDURE — 82274 ASSAY TEST FOR BLOOD FECAL: CPT

## 2018-10-25 DIAGNOSIS — I10 ESSENTIAL HYPERTENSION: ICD-10-CM

## 2018-10-25 DIAGNOSIS — J30.1 SEASONAL ALLERGIC RHINITIS DUE TO POLLEN: ICD-10-CM

## 2018-10-25 NOTE — TELEPHONE ENCOUNTER
----- Message from Anika Rosas sent at 10/25/2018  2:16 PM CDT -----  Contact: Pt    Pt says she did not get a refill yet on her medication     cyanocobalamin (VITAMIN B-12) 1000 MCG tablet    losartan-hydrochlorothiazide 100-12.5 mg (HYZAAR) 100-12.5 mg Tab    fluticasone (FLONASE) 50 mcg/actuation nasal spray    Requesting that it is sent to pharmacy    Pt can be reached at [phone]      Thanks

## 2018-10-26 RX ORDER — FLUTICASONE PROPIONATE 50 MCG
1 SPRAY, SUSPENSION (ML) NASAL DAILY
Qty: 16 G | Refills: 11 | Status: SHIPPED | OUTPATIENT
Start: 2018-10-26 | End: 2022-05-25

## 2018-10-26 RX ORDER — LOSARTAN POTASSIUM AND HYDROCHLOROTHIAZIDE 12.5; 1 MG/1; MG/1
1 TABLET ORAL DAILY
Qty: 90 TABLET | Refills: 1 | Status: SHIPPED | OUTPATIENT
Start: 2018-10-26 | End: 2019-01-16 | Stop reason: SDUPTHER

## 2019-01-02 ENCOUNTER — PATIENT OUTREACH (OUTPATIENT)
Dept: ADMINISTRATIVE | Facility: HOSPITAL | Age: 69
End: 2019-01-02

## 2019-01-02 NOTE — LETTER
January 2, 2019    Keyona Irene  [address]  [place] [address]             [facility]  [address]  [place] [address]  Phone: [phone] Dear Mrs. Washington:    [facility] is committed to your overall health.  To help you get the most out of each of your visits, we will review your information to make sure you are up to date on all of your recommended tests and/or procedures.      Dr. Gan has found that your chart shows you may be due for DEXA scan, mammogram and some vaccinations.     If you have had any of the above done at another facility, please bring the records or information with you so that your record at [facility] will be complete.  If you would like to schedule any of these, please contact me.    If you are currently taking medication, please bring it with you to your appointment for review.    Also, if you have any type of Advanced Directives, please bring them with you to your office visit so we may scan them into your chart.        If you have any questions or concerns, please don't hesitate to call.    Sincerely,        Miguel Romero LPN Clinical Care Coordinator  [facility]  [address] [facility] [address]  [place] [address]  [phone] (p)   [phone] (f)

## 2019-01-04 RX ORDER — LOSARTAN POTASSIUM AND HYDROCHLOROTHIAZIDE 12.5; 1 MG/1; MG/1
TABLET ORAL
Qty: 90 TABLET | Refills: 0 | Status: SHIPPED | OUTPATIENT
Start: 2019-01-04 | End: 2019-01-16 | Stop reason: SDUPTHER

## 2019-01-16 ENCOUNTER — HOSPITAL ENCOUNTER (OUTPATIENT)
Dept: RADIOLOGY | Facility: HOSPITAL | Age: 69
Discharge: HOME | End: 2019-01-16
Attending: FAMILY MEDICINE
Payer: COMMERCIAL

## 2019-01-16 ENCOUNTER — OFFICE VISIT (OUTPATIENT)
Dept: FAMILY MEDICINE | Facility: CLINIC | Age: 69
End: 2019-01-16
Payer: COMMERCIAL

## 2019-01-16 VITALS
HEIGHT: 61 IN | WEIGHT: 101.44 LBS | SYSTOLIC BLOOD PRESSURE: 122 MMHG | HEART RATE: 86 BPM | BODY MASS INDEX: 19.15 KG/M2 | DIASTOLIC BLOOD PRESSURE: 74 MMHG

## 2019-01-16 DIAGNOSIS — R06.00 DYSPNEA, UNSPECIFIED TYPE: ICD-10-CM

## 2019-01-16 DIAGNOSIS — Z72.0 TOBACCO ABUSE: ICD-10-CM

## 2019-01-16 DIAGNOSIS — E11.42 TYPE 2 DIABETES MELLITUS WITH DIABETIC POLYNEUROPATHY, WITHOUT LONG-TERM CURRENT USE OF INSULIN: ICD-10-CM

## 2019-01-16 DIAGNOSIS — I10 ESSENTIAL HYPERTENSION: Primary | ICD-10-CM

## 2019-01-16 PROCEDURE — 99214 OFFICE O/P EST MOD 30 MIN: CPT | Mod: S$GLB,,, | Performed by: FAMILY MEDICINE

## 2019-01-16 PROCEDURE — 99999 PR PBB SHADOW E&M-EST. PATIENT-LVL III: ICD-10-PCS | Mod: PBBFAC,,, | Performed by: FAMILY MEDICINE

## 2019-01-16 PROCEDURE — 71046 X-RAY EXAM CHEST 2 VIEWS: CPT | Mod: TC,PN

## 2019-01-16 PROCEDURE — 71046 X-RAY EXAM CHEST 2 VIEWS: CPT | Mod: 26,,, | Performed by: RADIOLOGY

## 2019-01-16 PROCEDURE — 71046 XR CHEST PA AND LATERAL: ICD-10-PCS | Mod: 26,,, | Performed by: RADIOLOGY

## 2019-01-16 PROCEDURE — 99999 PR PBB SHADOW E&M-EST. PATIENT-LVL III: CPT | Mod: PBBFAC,,, | Performed by: FAMILY MEDICINE

## 2019-01-16 PROCEDURE — 99214 PR OFFICE/OUTPT VISIT, EST, LEVL IV, 30-39 MIN: ICD-10-PCS | Mod: S$GLB,,, | Performed by: FAMILY MEDICINE

## 2019-01-16 RX ORDER — METFORMIN HYDROCHLORIDE 500 MG/1
1000 TABLET, EXTENDED RELEASE ORAL DAILY
Qty: 180 TABLET | Refills: 3 | Status: SHIPPED | OUTPATIENT
Start: 2019-01-16 | End: 2019-06-26 | Stop reason: SDUPTHER

## 2019-01-16 RX ORDER — AMLODIPINE BESYLATE 10 MG/1
TABLET ORAL
Qty: 90 TABLET | Refills: 3 | Status: SHIPPED | OUTPATIENT
Start: 2019-01-16 | End: 2019-06-26 | Stop reason: SDUPTHER

## 2019-01-16 RX ORDER — CYPROHEPTADINE HYDROCHLORIDE 4 MG/1
4 TABLET ORAL 2 TIMES DAILY PRN
Qty: 60 TABLET | Refills: 1 | Status: SHIPPED | OUTPATIENT
Start: 2019-01-16 | End: 2019-04-30 | Stop reason: SDUPTHER

## 2019-01-16 RX ORDER — POTASSIUM CHLORIDE 750 MG/1
TABLET, EXTENDED RELEASE ORAL
Qty: 90 TABLET | Refills: 3 | Status: SHIPPED | OUTPATIENT
Start: 2019-01-16 | End: 2019-06-26 | Stop reason: SDUPTHER

## 2019-01-16 RX ORDER — LOSARTAN POTASSIUM AND HYDROCHLOROTHIAZIDE 12.5; 1 MG/1; MG/1
1 TABLET ORAL DAILY
Qty: 90 TABLET | Refills: 3 | Status: SHIPPED | OUTPATIENT
Start: 2019-01-16 | End: 2019-06-26 | Stop reason: SDUPTHER

## 2019-01-16 NOTE — PROGRESS NOTES
"Subjective:       Patient ID: Keyona Irene is a 68 y.o. female.    Chief Complaint: Follow-up (3 mo f/u)    She is here with her daughter.  She had experience weight loss and no appetite.  I had meant to refer prescribed Periactin last time but I apparently had for gotten.  She probably has emphysema and does have a chronic cough and continues to smoke.  She did not get the chest x-ray that I ordered last time nor the mammogram.  Her diabetes has been well controlled with metformin 1 g. She does complain of some decreased sensation in her feet.  Her blood pressure has been well controlled on 3 medications.  She has run out of potassium.  She does have a history of hypokalemia.      Review of Systems   Constitutional: Positive for appetite change and unexpected weight change. Negative for fever.   Respiratory: Positive for cough and shortness of breath.    Cardiovascular: Negative for chest pain, palpitations and leg swelling.   Gastrointestinal: Negative for abdominal pain.       Objective:     Blood pressure 122/74, pulse 86, height 5' 1" (1.549 m), weight 46 kg (101 lb 6.6 oz).      Physical Exam   Constitutional:   She is a thin lady in no distress.   Neck: No thyromegaly present.   Cardiovascular: Normal rate, regular rhythm and normal heart sounds.   No murmur heard.  Pulses:       Dorsalis pedis pulses are 1+ on the right side, and 1+ on the left side.        Posterior tibial pulses are 1+ on the right side, and 1+ on the left side.   Pulmonary/Chest: Effort normal and breath sounds normal. No respiratory distress.   Abdominal: Soft. Bowel sounds are normal. There is tenderness (Mild epigastric tenderness.).   Musculoskeletal:        Right foot: There is no deformity.        Left foot: There is no deformity.   Feet:   Right Foot:   Protective Sensation: 4 sites tested. 2 sites sensed.   Skin Integrity: Negative for ulcer.   Left Foot:   Protective Sensation: 4 sites tested. 2 sites sensed.   Skin " Integrity: Negative for ulcer.   Lymphadenopathy:     She has no cervical adenopathy.   Neurological: She is alert.       Assessment:       1. Essential hypertension    2. Type 2 diabetes mellitus with diabetic polyneuropathy, without long-term current use of insulin        Plan:     she declines vaccines.  Chest x-ray today.  Order mammogram.  Prescription for Periactin.  I refilled other medication.

## 2019-01-23 ENCOUNTER — PATIENT OUTREACH (OUTPATIENT)
Dept: ADMINISTRATIVE | Facility: HOSPITAL | Age: 69
End: 2019-01-23

## 2019-03-10 RX ORDER — METFORMIN HYDROCHLORIDE 500 MG/1
TABLET, EXTENDED RELEASE ORAL
Qty: 360 TABLET | Refills: 3 | Status: SHIPPED | OUTPATIENT
Start: 2019-03-10 | End: 2019-06-26 | Stop reason: SDUPTHER

## 2019-04-03 RX ORDER — LOSARTAN POTASSIUM AND HYDROCHLOROTHIAZIDE 12.5; 1 MG/1; MG/1
TABLET ORAL
Qty: 90 TABLET | Refills: 0 | Status: SHIPPED | OUTPATIENT
Start: 2019-04-03 | End: 2019-06-26 | Stop reason: SDUPTHER

## 2019-04-09 ENCOUNTER — PATIENT OUTREACH (OUTPATIENT)
Dept: ADMINISTRATIVE | Facility: HOSPITAL | Age: 69
End: 2019-04-09

## 2019-04-30 DIAGNOSIS — I10 ESSENTIAL HYPERTENSION: ICD-10-CM

## 2019-04-30 RX ORDER — CYPROHEPTADINE HYDROCHLORIDE 4 MG/1
TABLET ORAL
Qty: 60 TABLET | Refills: 0 | Status: SHIPPED | OUTPATIENT
Start: 2019-04-30 | End: 2019-06-26

## 2019-04-30 RX ORDER — AMLODIPINE BESYLATE 10 MG/1
TABLET ORAL
Qty: 90 TABLET | Refills: 0 | Status: SHIPPED | OUTPATIENT
Start: 2019-04-30 | End: 2019-06-26 | Stop reason: SDUPTHER

## 2019-06-26 ENCOUNTER — OFFICE VISIT (OUTPATIENT)
Dept: FAMILY MEDICINE | Facility: CLINIC | Age: 69
End: 2019-06-26
Payer: COMMERCIAL

## 2019-06-26 ENCOUNTER — LAB VISIT (OUTPATIENT)
Dept: LAB | Facility: HOSPITAL | Age: 69
End: 2019-06-26
Attending: FAMILY MEDICINE
Payer: COMMERCIAL

## 2019-06-26 VITALS
WEIGHT: 110.44 LBS | HEIGHT: 61 IN | SYSTOLIC BLOOD PRESSURE: 138 MMHG | DIASTOLIC BLOOD PRESSURE: 70 MMHG | HEART RATE: 86 BPM | BODY MASS INDEX: 20.85 KG/M2

## 2019-06-26 DIAGNOSIS — E11.42 TYPE 2 DIABETES MELLITUS WITH DIABETIC POLYNEUROPATHY, WITHOUT LONG-TERM CURRENT USE OF INSULIN: ICD-10-CM

## 2019-06-26 DIAGNOSIS — I10 ESSENTIAL HYPERTENSION: ICD-10-CM

## 2019-06-26 DIAGNOSIS — K21.9 GASTROESOPHAGEAL REFLUX DISEASE WITHOUT ESOPHAGITIS: ICD-10-CM

## 2019-06-26 DIAGNOSIS — E11.42 TYPE 2 DIABETES MELLITUS WITH DIABETIC POLYNEUROPATHY, WITHOUT LONG-TERM CURRENT USE OF INSULIN: Primary | ICD-10-CM

## 2019-06-26 LAB
ALBUMIN SERPL BCP-MCNC: 3.7 G/DL (ref 3.5–5.2)
ALP SERPL-CCNC: 123 U/L (ref 55–135)
ALT SERPL W/O P-5'-P-CCNC: 26 U/L (ref 10–44)
ANION GAP SERPL CALC-SCNC: 9 MMOL/L (ref 8–16)
AST SERPL-CCNC: 32 U/L (ref 10–40)
BILIRUB SERPL-MCNC: 0.7 MG/DL (ref 0.1–1)
BUN SERPL-MCNC: 11 MG/DL (ref 8–23)
CALCIUM SERPL-MCNC: 9.7 MG/DL (ref 8.7–10.5)
CHLORIDE SERPL-SCNC: 102 MMOL/L (ref 95–110)
CHOLEST SERPL-MCNC: 201 MG/DL (ref 120–199)
CHOLEST/HDLC SERPL: 2.9 {RATIO} (ref 2–5)
CO2 SERPL-SCNC: 29 MMOL/L (ref 23–29)
CREAT SERPL-MCNC: 0.8 MG/DL (ref 0.5–1.4)
ERYTHROCYTE [DISTWIDTH] IN BLOOD BY AUTOMATED COUNT: 16.6 % (ref 11.5–14.5)
EST. GFR  (AFRICAN AMERICAN): >60 ML/MIN/1.73 M^2
EST. GFR  (NON AFRICAN AMERICAN): >60 ML/MIN/1.73 M^2
ESTIMATED AVG GLUCOSE: 117 MG/DL (ref 68–131)
GLUCOSE SERPL-MCNC: 197 MG/DL (ref 70–110)
HBA1C MFR BLD HPLC: 5.7 % (ref 4–5.6)
HCT VFR BLD AUTO: 37.9 % (ref 37–48.5)
HDLC SERPL-MCNC: 69 MG/DL (ref 40–75)
HDLC SERPL: 34.3 % (ref 20–50)
HGB BLD-MCNC: 12 G/DL (ref 12–16)
LDLC SERPL CALC-MCNC: 119.6 MG/DL (ref 63–159)
MCH RBC QN AUTO: 27.8 PG (ref 27–31)
MCHC RBC AUTO-ENTMCNC: 31.7 G/DL (ref 32–36)
MCV RBC AUTO: 88 FL (ref 82–98)
NONHDLC SERPL-MCNC: 132 MG/DL
PLATELET # BLD AUTO: 279 K/UL (ref 150–350)
PMV BLD AUTO: 10 FL (ref 9.2–12.9)
POTASSIUM SERPL-SCNC: 3.2 MMOL/L (ref 3.5–5.1)
PROT SERPL-MCNC: 7.7 G/DL (ref 6–8.4)
RBC # BLD AUTO: 4.31 M/UL (ref 4–5.4)
SODIUM SERPL-SCNC: 140 MMOL/L (ref 136–145)
TRIGL SERPL-MCNC: 62 MG/DL (ref 30–150)
WBC # BLD AUTO: 4.33 K/UL (ref 3.9–12.7)

## 2019-06-26 PROCEDURE — 80061 LIPID PANEL: CPT

## 2019-06-26 PROCEDURE — 99999 PR PBB SHADOW E&M-EST. PATIENT-LVL III: ICD-10-PCS | Mod: PBBFAC,,, | Performed by: FAMILY MEDICINE

## 2019-06-26 PROCEDURE — 99214 PR OFFICE/OUTPT VISIT, EST, LEVL IV, 30-39 MIN: ICD-10-PCS | Mod: S$GLB,,, | Performed by: FAMILY MEDICINE

## 2019-06-26 PROCEDURE — 36415 COLL VENOUS BLD VENIPUNCTURE: CPT | Mod: PN

## 2019-06-26 PROCEDURE — 99999 PR PBB SHADOW E&M-EST. PATIENT-LVL III: CPT | Mod: PBBFAC,,, | Performed by: FAMILY MEDICINE

## 2019-06-26 PROCEDURE — 99214 OFFICE O/P EST MOD 30 MIN: CPT | Mod: S$GLB,,, | Performed by: FAMILY MEDICINE

## 2019-06-26 PROCEDURE — 85027 COMPLETE CBC AUTOMATED: CPT

## 2019-06-26 PROCEDURE — 80053 COMPREHEN METABOLIC PANEL: CPT

## 2019-06-26 PROCEDURE — 83036 HEMOGLOBIN GLYCOSYLATED A1C: CPT

## 2019-06-26 RX ORDER — CYPROHEPTADINE HYDROCHLORIDE 4 MG/1
4 TABLET ORAL DAILY
Qty: 90 TABLET | Refills: 3 | Status: SHIPPED | OUTPATIENT
Start: 2019-06-26 | End: 2020-05-18 | Stop reason: SDUPTHER

## 2019-06-26 RX ORDER — AMLODIPINE BESYLATE 10 MG/1
TABLET ORAL
Qty: 90 TABLET | Refills: 3 | Status: SHIPPED | OUTPATIENT
Start: 2019-06-26 | End: 2020-06-10 | Stop reason: SDUPTHER

## 2019-06-26 RX ORDER — LOSARTAN POTASSIUM AND HYDROCHLOROTHIAZIDE 12.5; 1 MG/1; MG/1
1 TABLET ORAL DAILY
Qty: 90 TABLET | Refills: 3 | Status: SHIPPED | OUTPATIENT
Start: 2019-06-26 | End: 2020-05-18 | Stop reason: SDUPTHER

## 2019-06-26 RX ORDER — METFORMIN HYDROCHLORIDE 500 MG/1
1000 TABLET, EXTENDED RELEASE ORAL DAILY
Qty: 180 TABLET | Refills: 3 | Status: SHIPPED | OUTPATIENT
Start: 2019-06-26 | End: 2020-05-18 | Stop reason: SDUPTHER

## 2019-06-26 RX ORDER — ESOMEPRAZOLE MAGNESIUM 40 MG/1
CAPSULE, DELAYED RELEASE ORAL
Qty: 90 CAPSULE | Refills: 3 | Status: SHIPPED | OUTPATIENT
Start: 2019-06-26 | End: 2020-05-18 | Stop reason: SDUPTHER

## 2019-06-26 RX ORDER — POTASSIUM CHLORIDE 750 MG/1
TABLET, EXTENDED RELEASE ORAL
Qty: 90 TABLET | Refills: 3 | Status: SHIPPED | OUTPATIENT
Start: 2019-06-26 | End: 2020-05-18 | Stop reason: SDUPTHER

## 2019-06-26 NOTE — PROGRESS NOTES
"Subjective:       Patient ID: Keyona Irene is a 68 y.o. female.    Chief Complaint: Follow-up (3 mo f/u. Needs meds refilled.)    Follow-up hypertension and weight loss.  She has been taking Periactin 4 mg daily and her appetite is better and she has regained some of the lost weight.  Her blood pressure is usually fairly well controlled.  No concerns about abdominal pain. She has a history of diabetes but her last hemoglobin A1c was 5.3%.  She is taking metformin 1000 mg daily.  We may be able to adjust that dose after we get her lab work.  She takes Nexium for gastritis.  If she misses more than 2 days she gets burning in the epigastrium.    Review of Systems   Constitutional: Negative for fatigue, fever and unexpected weight change.   Respiratory: Negative for shortness of breath and wheezing.    Cardiovascular: Negative for chest pain, palpitations and leg swelling.       Objective:     Blood pressure 138/70, pulse 86, height 5' 1" (1.549 m), weight 50.1 kg (110 lb 7.2 oz).      Physical Exam   Constitutional:   Thin lady in no distress. She has a very soft voice.   Neck: No thyromegaly present.   Cardiovascular: Normal rate and regular rhythm.   Pulmonary/Chest: Effort normal and breath sounds normal. No respiratory distress.   Musculoskeletal: She exhibits no edema.   Lymphadenopathy:     She has no cervical adenopathy.   Neurological: She is alert.       Assessment:       1. Type 2 diabetes mellitus with diabetic polyneuropathy, without long-term current use of insulin    2. Essential hypertension    3. Gastroesophageal reflux disease without esophagitis        Plan:       Lab work today.  We still need to talk to her about possible cholesterol medicine because of her diagnosis of diabetes.  Continue current medications.      "

## 2019-07-29 DIAGNOSIS — I10 ESSENTIAL HYPERTENSION: ICD-10-CM

## 2019-07-29 RX ORDER — AMLODIPINE BESYLATE 10 MG/1
TABLET ORAL
Qty: 90 TABLET | Refills: 3 | Status: ON HOLD | OUTPATIENT
Start: 2019-07-29 | End: 2020-05-23 | Stop reason: CLARIF

## 2020-01-17 DIAGNOSIS — E11.9 TYPE 2 DIABETES MELLITUS WITHOUT COMPLICATION: ICD-10-CM

## 2020-04-03 DIAGNOSIS — Z12.11 COLON CANCER SCREENING: ICD-10-CM

## 2020-05-18 DIAGNOSIS — E11.42 TYPE 2 DIABETES MELLITUS WITH DIABETIC POLYNEUROPATHY, WITHOUT LONG-TERM CURRENT USE OF INSULIN: Primary | ICD-10-CM

## 2020-05-18 RX ORDER — LOSARTAN POTASSIUM AND HYDROCHLOROTHIAZIDE 12.5; 1 MG/1; MG/1
1 TABLET ORAL DAILY
Qty: 90 TABLET | Refills: 0 | Status: ON HOLD | OUTPATIENT
Start: 2020-05-18 | End: 2020-06-08 | Stop reason: HOSPADM

## 2020-05-18 RX ORDER — METFORMIN HYDROCHLORIDE 500 MG/1
1000 TABLET, EXTENDED RELEASE ORAL DAILY
Qty: 180 TABLET | Refills: 0 | Status: SHIPPED | OUTPATIENT
Start: 2020-05-18 | End: 2020-08-20

## 2020-05-18 RX ORDER — POTASSIUM CHLORIDE 750 MG/1
TABLET, EXTENDED RELEASE ORAL
Qty: 90 TABLET | Refills: 0 | Status: ON HOLD | OUTPATIENT
Start: 2020-05-18 | End: 2020-06-08 | Stop reason: HOSPADM

## 2020-05-18 RX ORDER — ESOMEPRAZOLE MAGNESIUM 40 MG/1
CAPSULE, DELAYED RELEASE ORAL
Qty: 90 CAPSULE | Refills: 0 | Status: ON HOLD | OUTPATIENT
Start: 2020-05-18 | End: 2020-06-08 | Stop reason: SDUPTHER

## 2020-05-18 RX ORDER — CYPROHEPTADINE HYDROCHLORIDE 4 MG/1
4 TABLET ORAL DAILY
Qty: 90 TABLET | Refills: 0 | Status: SHIPPED | OUTPATIENT
Start: 2020-05-18 | End: 2020-07-25

## 2020-05-18 NOTE — TELEPHONE ENCOUNTER
Received refill request form [facility] mail order pharmacy for Esomeprazole and Losartan HCTZ. Please review and advise.     LOV 6/26/19  Last refill 6/26/19 #90 x 3  Last labs 6/26/19

## 2020-05-20 PROBLEM — J96.01 ACUTE HYPOXEMIC RESPIRATORY FAILURE: Status: ACTIVE | Noted: 2020-05-20

## 2020-05-20 PROBLEM — J96.00 ACUTE RESPIRATORY FAILURE: Status: ACTIVE | Noted: 2020-05-20

## 2020-05-20 PROBLEM — E87.6 HYPOKALEMIA: Status: ACTIVE | Noted: 2020-05-20

## 2020-05-20 PROBLEM — E87.20 LACTIC ACIDOSIS: Status: ACTIVE | Noted: 2020-05-20

## 2020-05-20 PROBLEM — J96.00 ACUTE RESPIRATORY FAILURE: Status: RESOLVED | Noted: 2020-05-20 | Resolved: 2020-05-20

## 2020-05-21 PROBLEM — J18.9 PNEUMONIA OF BOTH LUNGS DUE TO INFECTIOUS ORGANISM: Status: ACTIVE | Noted: 2020-05-21

## 2020-05-22 PROBLEM — R50.9 FEBRILE: Status: ACTIVE | Noted: 2020-05-22

## 2020-05-23 PROBLEM — I25.110 CORONARY ARTERY DISEASE INVOLVING NATIVE CORONARY ARTERY OF NATIVE HEART WITH UNSTABLE ANGINA PECTORIS: Status: ACTIVE | Noted: 2020-05-23

## 2020-05-30 PROBLEM — I35.1 AI (AORTIC INCOMPETENCE): Status: ACTIVE | Noted: 2020-05-30

## 2020-05-30 PROBLEM — Z95.1 S/P CABG (CORONARY ARTERY BYPASS GRAFT): Status: ACTIVE | Noted: 2020-05-30

## 2020-05-30 PROBLEM — I34.0 MR (MITRAL REGURGITATION): Status: ACTIVE | Noted: 2020-05-30

## 2020-06-05 PROBLEM — R00.0 SINUS TACHYCARDIA: Status: ACTIVE | Noted: 2020-06-05

## 2020-06-05 PROBLEM — G93.41 METABOLIC ENCEPHALOPATHY: Status: ACTIVE | Noted: 2020-06-05

## 2020-06-05 PROBLEM — J18.9 PNEUMONIA OF BOTH LUNGS DUE TO INFECTIOUS ORGANISM: Status: ACTIVE | Noted: 2020-06-05

## 2020-06-09 ENCOUNTER — TELEPHONE (OUTPATIENT)
Dept: VASCULAR SURGERY | Facility: CLINIC | Age: 70
End: 2020-06-09

## 2020-06-09 NOTE — TELEPHONE ENCOUNTER
Spoke to Yola, informed no intervention need for temp of 100.8.  Also requested clarification of metoprolol dosage, instructed to contact patient's cardiologist Dr Carbajal.  Voices understanding and is agreeable.

## 2020-06-09 NOTE — TELEPHONE ENCOUNTER
----- Message from [place] sent at 6/9/2020 12:42 PM CDT -----  Contact: Yola  Type: Needs Medical Advice  Who Called:  Yola with [facility] home health  Symptoms (please be specific):    How long has patient had these symptoms:    Pharmacy name and phone #:    Best Call Back Number:   Additional Information: stated she is starting patient with home health today and patient has a temp of 100.8, and need to verify medication requesting a call back

## 2020-06-17 ENCOUNTER — PATIENT OUTREACH (OUTPATIENT)
Dept: ADMINISTRATIVE | Facility: HOSPITAL | Age: 70
End: 2020-06-17

## 2020-06-17 DIAGNOSIS — Z78.0 POST-MENOPAUSAL: Primary | ICD-10-CM

## 2020-06-17 NOTE — PROGRESS NOTES
Chart review completed 06/17/2020.  Care Everywhere updates requested and reviewed.  Immunizations reconciled. Media reviewed.     Lab vahid, and quest reviewed.  WOG orders placed for bone density scan.  Letter mailed to patient.    Health Maintenance Due   Topic Date Due    TETANUS VACCINE  09/25/1970    DEXA SCAN  09/25/1992    Shingles Vaccine (1 of 2) 09/25/2002    Colorectal Cancer Screening  10/24/2018    Foot Exam  01/16/2020    Urine Microalbumin  06/26/2020

## 2020-06-17 NOTE — LETTER
June 17, 2020    Keyona Luis Washington  [address] [facility] [address]             [facility]  [address]  [facility] [address]  Phone: [phone] Dear Farshad [facility] is committed to your overall health.  To help you get the most out of each of your visits, we will review your information to make sure you are up to date on all of your recommended tests and/or procedures.      Howard Gan MD  has found that your chart shows you may be due for the following:    Colon Cancer Screening  Bone density scan  Foot exam  Urine microalbumin    If you have had any of the above done at another facility, please bring the records with you or Fax them to [phone] so that your record at [facility] will be complete. If you have not had any of these tests or procedures done recently and would like to complete this testing ,  please call [phone] or send a message through your [facility] portal to your provider's office.     If you have an upcoming scheduled appointment for the above test and/or procedures, please disregard this letter.    If you are currently taking medication, please bring it with you to your appointment for review.      Thank you for letting us care for you,    Radha Garner, Care Coordinator  [facility]  Phone: [phone]  Fax: [phone]

## 2020-06-25 ENCOUNTER — OFFICE VISIT (OUTPATIENT)
Dept: VASCULAR SURGERY | Facility: CLINIC | Age: 70
End: 2020-06-25
Payer: MEDICARE

## 2020-06-25 VITALS
SYSTOLIC BLOOD PRESSURE: 120 MMHG | DIASTOLIC BLOOD PRESSURE: 63 MMHG | WEIGHT: 110.25 LBS | HEART RATE: 85 BPM | HEIGHT: 60 IN | BODY MASS INDEX: 21.65 KG/M2

## 2020-06-25 DIAGNOSIS — Z95.1 S/P CABG (CORONARY ARTERY BYPASS GRAFT): Primary | ICD-10-CM

## 2020-06-25 PROCEDURE — 99999 PR PBB SHADOW E&M-EST. PATIENT-LVL IV: CPT | Mod: PBBFAC,HCNC,, | Performed by: THORACIC SURGERY (CARDIOTHORACIC VASCULAR SURGERY)

## 2020-06-25 PROCEDURE — 99499 RISK ADDL DX/OHS AUDIT: ICD-10-PCS | Mod: S$GLB,,, | Performed by: THORACIC SURGERY (CARDIOTHORACIC VASCULAR SURGERY)

## 2020-06-25 PROCEDURE — 99024 POSTOP FOLLOW-UP VISIT: CPT | Mod: HCNC,S$GLB,, | Performed by: THORACIC SURGERY (CARDIOTHORACIC VASCULAR SURGERY)

## 2020-06-25 PROCEDURE — 99499 UNLISTED E&M SERVICE: CPT | Mod: S$GLB,,, | Performed by: THORACIC SURGERY (CARDIOTHORACIC VASCULAR SURGERY)

## 2020-06-25 PROCEDURE — 99999 PR PBB SHADOW E&M-EST. PATIENT-LVL IV: ICD-10-PCS | Mod: PBBFAC,HCNC,, | Performed by: THORACIC SURGERY (CARDIOTHORACIC VASCULAR SURGERY)

## 2020-06-25 PROCEDURE — 99024 PR POST-OP FOLLOW-UP VISIT: ICD-10-PCS | Mod: HCNC,S$GLB,, | Performed by: THORACIC SURGERY (CARDIOTHORACIC VASCULAR SURGERY)

## 2020-06-25 NOTE — PROGRESS NOTES
The patient is a 69-year-old female who on the when coronary artery bypass grafting at [facility] on 05/29/2020 after she presented to the hospital with shortness of breath and a chest CT scan of the chest showing ground-glass appearance and bilateral consolidations.  She was thought to have COVID-19 but testing for this was negative.  She was placed on antibiotics.  Her BNP was 1900.  She was treated with diuretics.  Her troponins were elevated and cardiac catheterization was performed.  This showed severe coronary artery disease and a left ventricular ejection fraction of around 45%.  She states that she is still feeling weak but is getting stronger and stronger.  Her sternum is stable.  Heart has regular rate and rhythm lungs are clear.  She continues to smoke cigarettes an has had pancreatitis in the past because of alcohol use.  She was counseled regarding smoking cessation.  She will follow up with her cardiologist return to see me on a p.r.n. basis.

## 2020-07-02 ENCOUNTER — PATIENT OUTREACH (OUTPATIENT)
Dept: ADMINISTRATIVE | Facility: HOSPITAL | Age: 70
End: 2020-07-02

## 2020-07-02 NOTE — PROGRESS NOTES
Chart review completed 2020.  Care Everywhere updates requested and reviewed.  Immunizations reconciled. Media reports reviewed.  Duplicate HM overrides and  orders removed.  Overdue HM topic chart audit and/or requested.  Overdue lab testing linked to upcoming lab appointments if applies.    Lab vahid, and quest reviewed.  DIS reviewed.         Health Maintenance Due   Topic Date Due    TETANUS VACCINE  1970    DEXA SCAN  1992    Shingles Vaccine (1 of 2) 2002    Colorectal Cancer Screening  10/24/2018    Foot Exam  2020    Urine Microalbumin  2020

## 2020-07-09 ENCOUNTER — OFFICE VISIT (OUTPATIENT)
Dept: FAMILY MEDICINE | Facility: CLINIC | Age: 70
End: 2020-07-09
Payer: MEDICARE

## 2020-07-09 ENCOUNTER — LAB VISIT (OUTPATIENT)
Dept: LAB | Facility: HOSPITAL | Age: 70
End: 2020-07-09
Attending: FAMILY MEDICINE
Payer: MEDICARE

## 2020-07-09 VITALS
WEIGHT: 106.69 LBS | TEMPERATURE: 99 F | BODY MASS INDEX: 20.84 KG/M2 | SYSTOLIC BLOOD PRESSURE: 118 MMHG | DIASTOLIC BLOOD PRESSURE: 60 MMHG | HEART RATE: 74 BPM

## 2020-07-09 DIAGNOSIS — I10 ESSENTIAL HYPERTENSION: Primary | ICD-10-CM

## 2020-07-09 DIAGNOSIS — E11.42 TYPE 2 DIABETES MELLITUS WITH DIABETIC POLYNEUROPATHY, WITHOUT LONG-TERM CURRENT USE OF INSULIN: ICD-10-CM

## 2020-07-09 DIAGNOSIS — Z95.1 S/P CABG (CORONARY ARTERY BYPASS GRAFT): ICD-10-CM

## 2020-07-09 PROBLEM — E87.20 LACTIC ACIDOSIS: Status: RESOLVED | Noted: 2020-05-20 | Resolved: 2020-07-09

## 2020-07-09 PROBLEM — E87.6 HYPOKALEMIA: Status: RESOLVED | Noted: 2020-05-20 | Resolved: 2020-07-09

## 2020-07-09 PROBLEM — R50.9 FEBRILE: Status: RESOLVED | Noted: 2020-05-22 | Resolved: 2020-07-09

## 2020-07-09 PROBLEM — J96.01 ACUTE HYPOXEMIC RESPIRATORY FAILURE: Status: RESOLVED | Noted: 2020-05-20 | Resolved: 2020-07-09

## 2020-07-09 PROBLEM — R00.0 SINUS TACHYCARDIA: Status: RESOLVED | Noted: 2020-06-05 | Resolved: 2020-07-09

## 2020-07-09 PROBLEM — J18.9 PNEUMONIA OF BOTH LUNGS DUE TO INFECTIOUS ORGANISM: Status: RESOLVED | Noted: 2020-06-05 | Resolved: 2020-07-09

## 2020-07-09 PROBLEM — G93.41 METABOLIC ENCEPHALOPATHY: Status: RESOLVED | Noted: 2020-06-05 | Resolved: 2020-07-09

## 2020-07-09 LAB
ALBUMIN SERPL BCP-MCNC: 3.6 G/DL (ref 3.5–5.2)
ALP SERPL-CCNC: 143 U/L (ref 55–135)
ALT SERPL W/O P-5'-P-CCNC: 9 U/L (ref 10–44)
ANION GAP SERPL CALC-SCNC: 9 MMOL/L (ref 8–16)
AST SERPL-CCNC: 18 U/L (ref 10–40)
BILIRUB SERPL-MCNC: 0.5 MG/DL (ref 0.1–1)
BUN SERPL-MCNC: 7 MG/DL (ref 8–23)
CALCIUM SERPL-MCNC: 9.8 MG/DL (ref 8.7–10.5)
CHLORIDE SERPL-SCNC: 103 MMOL/L (ref 95–110)
CHOLEST SERPL-MCNC: 145 MG/DL (ref 120–199)
CHOLEST/HDLC SERPL: 2.7 {RATIO} (ref 2–5)
CO2 SERPL-SCNC: 25 MMOL/L (ref 23–29)
CREAT SERPL-MCNC: 0.8 MG/DL (ref 0.5–1.4)
ERYTHROCYTE [DISTWIDTH] IN BLOOD BY AUTOMATED COUNT: 15.5 % (ref 11.5–14.5)
EST. GFR  (AFRICAN AMERICAN): >60 ML/MIN/1.73 M^2
EST. GFR  (NON AFRICAN AMERICAN): >60 ML/MIN/1.73 M^2
ESTIMATED AVG GLUCOSE: 131 MG/DL (ref 68–131)
GLUCOSE SERPL-MCNC: 116 MG/DL (ref 70–110)
HBA1C MFR BLD HPLC: 6.2 % (ref 4–5.6)
HCT VFR BLD AUTO: 34.6 % (ref 37–48.5)
HDLC SERPL-MCNC: 54 MG/DL (ref 40–75)
HDLC SERPL: 37.2 % (ref 20–50)
HGB BLD-MCNC: 10.8 G/DL (ref 12–16)
LDLC SERPL CALC-MCNC: 77.2 MG/DL (ref 63–159)
MCH RBC QN AUTO: 28.4 PG (ref 27–31)
MCHC RBC AUTO-ENTMCNC: 31.2 G/DL (ref 32–36)
MCV RBC AUTO: 91 FL (ref 82–98)
NONHDLC SERPL-MCNC: 91 MG/DL
PLATELET # BLD AUTO: 320 K/UL (ref 150–350)
PMV BLD AUTO: 10.1 FL (ref 9.2–12.9)
POTASSIUM SERPL-SCNC: 3.6 MMOL/L (ref 3.5–5.1)
PROT SERPL-MCNC: 8.4 G/DL (ref 6–8.4)
RBC # BLD AUTO: 3.8 M/UL (ref 4–5.4)
SODIUM SERPL-SCNC: 137 MMOL/L (ref 136–145)
TRIGL SERPL-MCNC: 69 MG/DL (ref 30–150)
WBC # BLD AUTO: 9.22 K/UL (ref 3.9–12.7)

## 2020-07-09 PROCEDURE — 1101F PT FALLS ASSESS-DOCD LE1/YR: CPT | Mod: HCNC,CPTII,S$GLB, | Performed by: FAMILY MEDICINE

## 2020-07-09 PROCEDURE — 83036 HEMOGLOBIN GLYCOSYLATED A1C: CPT | Mod: HCNC

## 2020-07-09 PROCEDURE — 3044F PR MOST RECENT HEMOGLOBIN A1C LEVEL <7.0%: ICD-10-PCS | Mod: HCNC,CPTII,S$GLB, | Performed by: FAMILY MEDICINE

## 2020-07-09 PROCEDURE — 3078F DIAST BP <80 MM HG: CPT | Mod: HCNC,CPTII,S$GLB, | Performed by: FAMILY MEDICINE

## 2020-07-09 PROCEDURE — 3074F PR MOST RECENT SYSTOLIC BLOOD PRESSURE < 130 MM HG: ICD-10-PCS | Mod: HCNC,CPTII,S$GLB, | Performed by: FAMILY MEDICINE

## 2020-07-09 PROCEDURE — 3044F HG A1C LEVEL LT 7.0%: CPT | Mod: HCNC,CPTII,S$GLB, | Performed by: FAMILY MEDICINE

## 2020-07-09 PROCEDURE — 99999 PR PBB SHADOW E&M-EST. PATIENT-LVL V: CPT | Mod: PBBFAC,HCNC,, | Performed by: FAMILY MEDICINE

## 2020-07-09 PROCEDURE — 1159F MED LIST DOCD IN RCRD: CPT | Mod: HCNC,S$GLB,, | Performed by: FAMILY MEDICINE

## 2020-07-09 PROCEDURE — 80061 LIPID PANEL: CPT | Mod: HCNC

## 2020-07-09 PROCEDURE — 3008F PR BODY MASS INDEX (BMI) DOCUMENTED: ICD-10-PCS | Mod: HCNC,CPTII,S$GLB, | Performed by: FAMILY MEDICINE

## 2020-07-09 PROCEDURE — 1126F AMNT PAIN NOTED NONE PRSNT: CPT | Mod: HCNC,S$GLB,, | Performed by: FAMILY MEDICINE

## 2020-07-09 PROCEDURE — 99214 OFFICE O/P EST MOD 30 MIN: CPT | Mod: HCNC,S$GLB,, | Performed by: FAMILY MEDICINE

## 2020-07-09 PROCEDURE — 1101F PR PT FALLS ASSESS DOC 0-1 FALLS W/OUT INJ PAST YR: ICD-10-PCS | Mod: HCNC,CPTII,S$GLB, | Performed by: FAMILY MEDICINE

## 2020-07-09 PROCEDURE — 99999 PR PBB SHADOW E&M-EST. PATIENT-LVL V: ICD-10-PCS | Mod: PBBFAC,HCNC,, | Performed by: FAMILY MEDICINE

## 2020-07-09 PROCEDURE — 80053 COMPREHEN METABOLIC PANEL: CPT | Mod: HCNC

## 2020-07-09 PROCEDURE — 99214 PR OFFICE/OUTPT VISIT, EST, LEVL IV, 30-39 MIN: ICD-10-PCS | Mod: HCNC,S$GLB,, | Performed by: FAMILY MEDICINE

## 2020-07-09 PROCEDURE — 3078F PR MOST RECENT DIASTOLIC BLOOD PRESSURE < 80 MM HG: ICD-10-PCS | Mod: HCNC,CPTII,S$GLB, | Performed by: FAMILY MEDICINE

## 2020-07-09 PROCEDURE — 1159F PR MEDICATION LIST DOCUMENTED IN MEDICAL RECORD: ICD-10-PCS | Mod: HCNC,S$GLB,, | Performed by: FAMILY MEDICINE

## 2020-07-09 PROCEDURE — 85027 COMPLETE CBC AUTOMATED: CPT | Mod: HCNC

## 2020-07-09 PROCEDURE — 3008F BODY MASS INDEX DOCD: CPT | Mod: HCNC,CPTII,S$GLB, | Performed by: FAMILY MEDICINE

## 2020-07-09 PROCEDURE — 1126F PR PAIN SEVERITY QUANTIFIED, NO PAIN PRESENT: ICD-10-PCS | Mod: HCNC,S$GLB,, | Performed by: FAMILY MEDICINE

## 2020-07-09 PROCEDURE — 3074F SYST BP LT 130 MM HG: CPT | Mod: HCNC,CPTII,S$GLB, | Performed by: FAMILY MEDICINE

## 2020-07-09 PROCEDURE — 36415 COLL VENOUS BLD VENIPUNCTURE: CPT | Mod: HCNC,PN

## 2020-07-09 NOTE — PROGRESS NOTES
Subjective:       Patient ID: Keyona Irene is a 69 y.o. female.    Chief Complaint: Diabetes (DM f/u)    She is here with her daughter.  She had 5 vessel bypass surgery the end of May.  She presented with dyspnea.  I reviewed her updated medication list.  She seems to be tolerating it well.  She still has some fatigue and chest wall pain.  Her glucose this morning was 127.  She is taking metformin 1 g daily.  No side effects.  Her weight has been stable.  She is still taking Periactin to stimulate her appetite.  She has stop smoking since her hospitalization but it is still a struggle to fight the addiction.  Her blood pressure has been well controlled.  She is due for follow-up relating to her diabetes.  She is due for an eye exam.    Review of Systems   Constitutional: Positive for fatigue. Negative for fever and unexpected weight change.   Respiratory: Negative for shortness of breath.    Cardiovascular: Positive for chest pain and leg swelling (Mild left leg swelling).   Gastrointestinal: Negative for abdominal pain and constipation.         Objective:     Blood pressure 118/60, pulse 74, temperature 98.6 °F (37 °C), temperature source Temporal, weight 48.4 kg (106 lb 11.2 oz).      Physical Exam  Constitutional:       Comments: She is a thin soft spoken lady who does not seem to be in any distress   Cardiovascular:      Rate and Rhythm: Normal rate and regular rhythm.      Pulses:           Dorsalis pedis pulses are 1+ on the right side and 1+ on the left side.        Posterior tibial pulses are 1+ on the right side and 1+ on the left side.      Heart sounds: No murmur.   Pulmonary:      Effort: Pulmonary effort is normal. No respiratory distress.      Breath sounds: No wheezing or rales.   Abdominal:      Palpations: There is no mass.      Tenderness: There is no abdominal tenderness.   Musculoskeletal:      Right lower leg: No edema.      Left lower leg: Edema present.      Right foot: No  deformity.      Left foot: No deformity.   Feet:      Right foot:      Protective Sensation: 4 sites tested. 4 sites sensed.      Skin integrity: No ulcer.      Left foot:      Protective Sensation: 4 sites tested. 4 sites sensed.      Skin integrity: No ulcer.   Lymphadenopathy:      Cervical: No cervical adenopathy.         Assessment:       1. Essential hypertension    2. Type 2 diabetes mellitus with diabetic polyneuropathy, without long-term current use of insulin    3. S/P CABG (coronary artery bypass graft)        Plan:       Lab work today to include CMP, hemoglobin A1c, lipid, microalbumin, blood count

## 2020-08-20 RX ORDER — METFORMIN HYDROCHLORIDE 500 MG/1
1000 TABLET, EXTENDED RELEASE ORAL DAILY
Qty: 180 TABLET | Refills: 3 | Status: SHIPPED | OUTPATIENT
Start: 2020-08-20 | End: 2020-08-24

## 2020-08-31 NOTE — TELEPHONE ENCOUNTER
----- Message from Fide Richard sent at 8/31/2020  3:29 PM CDT -----  Regarding: Refill  Contact: Daughter  Type:  RX Refill Request    Who Called:  Nelsy, daughter  Refill or New Rx:  New from Dr. Gan  RX Name and Strength: spironolactone (ALDACTONE) 25 MG tablet  How is the patient currently taking it? (ex. 1XDay):  1XDay  Is this a 30 day or 90 day RX:    Preferred Pharmacy with phone number:  [id number] [address] [facility] [phone] (Phone)   Local or Mail Order:  Local  Ordering Provider:  Lesa prescribed in hospital  Best Call Back Number:  [phone]- nelsy  Additional Information:    Pt was prescribed this in hospital  & requesting Dr. Gan please take over prescribing this for her.  Requesting call back to find out if possible, thank you!

## 2020-09-01 RX ORDER — SPIRONOLACTONE 25 MG/1
TABLET ORAL
Qty: 90 TABLET | Refills: 0 | Status: SHIPPED | OUTPATIENT
Start: 2020-09-01 | End: 2020-12-10 | Stop reason: SDUPTHER

## 2020-09-01 RX ORDER — SPIRONOLACTONE 25 MG/1
25 TABLET ORAL DAILY
Qty: 30 TABLET | Refills: 1 | Status: SHIPPED | OUTPATIENT
Start: 2020-09-01 | End: 2020-09-01

## 2020-09-25 RX ORDER — METOPROLOL TARTRATE 75 MG/1
75 TABLET, FILM COATED ORAL 3 TIMES DAILY
Qty: 90 TABLET | Refills: 1 | Status: SHIPPED | OUTPATIENT
Start: 2020-09-25 | End: 2021-01-19 | Stop reason: SDUPTHER

## 2020-09-25 NOTE — TELEPHONE ENCOUNTER
----- Message from Gucci Adames sent at 9/25/2020 11:52 AM CDT -----  Regarding: rx  Contact: pharmacist  Type:  RX Refill Request    Who Called: Jesus Fernandesill or New Rx:  refill  RX Name and Strength:  metoprolol 75 mg   How is the patient currently taking it? (ex. 1XDay):   Is this a 30 day or 90 day RX: 90 day supply  Preferred Pharmacy with phone number:  [facility]  Local or Mail Order: local  Ordering Provider:  Dr Malik Albarado Call Back Number:  [phone]  Additional Information:

## 2020-10-22 RX ORDER — ESOMEPRAZOLE MAGNESIUM 40 MG/1
40 CAPSULE, DELAYED RELEASE ORAL DAILY
Qty: 90 CAPSULE | Refills: 1 | Status: SHIPPED | OUTPATIENT
Start: 2020-10-22 | End: 2021-04-21

## 2020-12-09 ENCOUNTER — TELEPHONE (OUTPATIENT)
Dept: FAMILY MEDICINE | Facility: CLINIC | Age: 70
End: 2020-12-09

## 2020-12-09 NOTE — TELEPHONE ENCOUNTER
Rec'd refill request for cyproheptadine 4mg.   Pt last seen 7/9/20  Last refill date 7/25/20 #90 x 0R    Please advise if pt needs appt. If ok, please send to [facility]

## 2020-12-10 RX ORDER — CYPROHEPTADINE HYDROCHLORIDE 4 MG/1
TABLET ORAL
Qty: 90 TABLET | Refills: 0 | Status: SHIPPED | OUTPATIENT
Start: 2020-12-10 | End: 2021-03-13

## 2020-12-10 RX ORDER — SPIRONOLACTONE 25 MG/1
25 TABLET ORAL DAILY
Qty: 90 TABLET | Refills: 0 | Status: SHIPPED | OUTPATIENT
Start: 2020-12-10 | End: 2021-03-13

## 2021-01-19 RX ORDER — METOPROLOL TARTRATE 75 MG/1
75 TABLET, FILM COATED ORAL 3 TIMES DAILY
Qty: 90 TABLET | Refills: 1 | Status: SHIPPED | OUTPATIENT
Start: 2021-01-19 | End: 2021-05-13 | Stop reason: SDUPTHER

## 2021-02-26 DIAGNOSIS — E11.42 TYPE 2 DIABETES MELLITUS WITH DIABETIC POLYNEUROPATHY, WITHOUT LONG-TERM CURRENT USE OF INSULIN: Primary | ICD-10-CM

## 2021-02-27 RX ORDER — INSULIN PUMP SYRINGE, 3 ML
EACH MISCELLANEOUS
Qty: 1 EACH | Refills: 0 | Status: SHIPPED | OUTPATIENT
Start: 2021-02-27 | End: 2022-01-12 | Stop reason: SDUPTHER

## 2021-02-27 RX ORDER — LANCETS
EACH MISCELLANEOUS
Qty: 200 EACH | Refills: 3 | Status: SHIPPED | OUTPATIENT
Start: 2021-02-27 | End: 2022-01-12 | Stop reason: SDUPTHER

## 2021-03-09 ENCOUNTER — LAB VISIT (OUTPATIENT)
Dept: LAB | Facility: HOSPITAL | Age: 71
End: 2021-03-09
Attending: FAMILY MEDICINE
Payer: MEDICARE

## 2021-03-09 DIAGNOSIS — E11.42 TYPE 2 DIABETES MELLITUS WITH DIABETIC POLYNEUROPATHY, WITHOUT LONG-TERM CURRENT USE OF INSULIN: ICD-10-CM

## 2021-03-09 LAB
ALBUMIN SERPL BCP-MCNC: 4 G/DL (ref 3.5–5.2)
ALP SERPL-CCNC: 163 U/L (ref 55–135)
ALT SERPL W/O P-5'-P-CCNC: 23 U/L (ref 10–44)
ANION GAP SERPL CALC-SCNC: 8 MMOL/L (ref 8–16)
AST SERPL-CCNC: 25 U/L (ref 10–40)
BILIRUB SERPL-MCNC: 0.6 MG/DL (ref 0.1–1)
BUN SERPL-MCNC: 9 MG/DL (ref 8–23)
CALCIUM SERPL-MCNC: 9.7 MG/DL (ref 8.7–10.5)
CHLORIDE SERPL-SCNC: 107 MMOL/L (ref 95–110)
CHOLEST SERPL-MCNC: 135 MG/DL (ref 120–199)
CHOLEST/HDLC SERPL: 2.5 {RATIO} (ref 2–5)
CO2 SERPL-SCNC: 27 MMOL/L (ref 23–29)
CREAT SERPL-MCNC: 0.8 MG/DL (ref 0.5–1.4)
ERYTHROCYTE [DISTWIDTH] IN BLOOD BY AUTOMATED COUNT: 16.6 % (ref 11.5–14.5)
EST. GFR  (AFRICAN AMERICAN): >60 ML/MIN/1.73 M^2
EST. GFR  (NON AFRICAN AMERICAN): >60 ML/MIN/1.73 M^2
GLUCOSE SERPL-MCNC: 146 MG/DL (ref 70–110)
HCT VFR BLD AUTO: 34.8 % (ref 37–48.5)
HDLC SERPL-MCNC: 55 MG/DL (ref 40–75)
HDLC SERPL: 40.7 % (ref 20–50)
HGB BLD-MCNC: 10.6 G/DL (ref 12–16)
LDLC SERPL CALC-MCNC: 67.2 MG/DL (ref 63–159)
MCH RBC QN AUTO: 26.8 PG (ref 27–31)
MCHC RBC AUTO-ENTMCNC: 30.5 G/DL (ref 32–36)
MCV RBC AUTO: 88 FL (ref 82–98)
NONHDLC SERPL-MCNC: 80 MG/DL
PLATELET # BLD AUTO: 316 K/UL (ref 150–350)
PMV BLD AUTO: 10.8 FL (ref 9.2–12.9)
POTASSIUM SERPL-SCNC: 4.1 MMOL/L (ref 3.5–5.1)
PROT SERPL-MCNC: 7.5 G/DL (ref 6–8.4)
RBC # BLD AUTO: 3.96 M/UL (ref 4–5.4)
SODIUM SERPL-SCNC: 142 MMOL/L (ref 136–145)
TRIGL SERPL-MCNC: 64 MG/DL (ref 30–150)
WBC # BLD AUTO: 6.87 K/UL (ref 3.9–12.7)

## 2021-03-09 PROCEDURE — 83036 HEMOGLOBIN GLYCOSYLATED A1C: CPT | Performed by: FAMILY MEDICINE

## 2021-03-09 PROCEDURE — 80053 COMPREHEN METABOLIC PANEL: CPT | Performed by: FAMILY MEDICINE

## 2021-03-09 PROCEDURE — 80061 LIPID PANEL: CPT | Performed by: FAMILY MEDICINE

## 2021-03-09 PROCEDURE — 36415 COLL VENOUS BLD VENIPUNCTURE: CPT | Mod: PN | Performed by: FAMILY MEDICINE

## 2021-03-09 PROCEDURE — 85027 COMPLETE CBC AUTOMATED: CPT | Performed by: FAMILY MEDICINE

## 2021-03-10 LAB
ESTIMATED AVG GLUCOSE: 143 MG/DL (ref 68–131)
HBA1C MFR BLD: 6.6 % (ref 4–5.6)

## 2021-03-16 ENCOUNTER — OFFICE VISIT (OUTPATIENT)
Dept: FAMILY MEDICINE | Facility: CLINIC | Age: 71
End: 2021-03-16
Payer: MEDICARE

## 2021-03-16 VITALS
DIASTOLIC BLOOD PRESSURE: 70 MMHG | HEART RATE: 84 BPM | WEIGHT: 114.44 LBS | SYSTOLIC BLOOD PRESSURE: 138 MMHG | HEIGHT: 61 IN | BODY MASS INDEX: 21.61 KG/M2

## 2021-03-16 DIAGNOSIS — Z95.1 S/P CABG (CORONARY ARTERY BYPASS GRAFT): ICD-10-CM

## 2021-03-16 DIAGNOSIS — I25.110 CORONARY ARTERY DISEASE INVOLVING NATIVE CORONARY ARTERY OF NATIVE HEART WITH UNSTABLE ANGINA PECTORIS: ICD-10-CM

## 2021-03-16 DIAGNOSIS — E11.42 TYPE 2 DIABETES MELLITUS WITH DIABETIC POLYNEUROPATHY, WITHOUT LONG-TERM CURRENT USE OF INSULIN: Primary | ICD-10-CM

## 2021-03-16 DIAGNOSIS — Z12.11 COLON CANCER SCREENING: ICD-10-CM

## 2021-03-16 DIAGNOSIS — Z72.0 TOBACCO ABUSE: ICD-10-CM

## 2021-03-16 DIAGNOSIS — J30.1 SEASONAL ALLERGIC RHINITIS DUE TO POLLEN: ICD-10-CM

## 2021-03-16 DIAGNOSIS — I10 ESSENTIAL HYPERTENSION: ICD-10-CM

## 2021-03-16 PROCEDURE — 99499 UNLISTED E&M SERVICE: CPT | Mod: S$GLB,,, | Performed by: FAMILY MEDICINE

## 2021-03-16 PROCEDURE — 3078F DIAST BP <80 MM HG: CPT | Mod: CPTII,S$GLB,, | Performed by: FAMILY MEDICINE

## 2021-03-16 PROCEDURE — 3044F HG A1C LEVEL LT 7.0%: CPT | Mod: CPTII,S$GLB,, | Performed by: FAMILY MEDICINE

## 2021-03-16 PROCEDURE — 3008F BODY MASS INDEX DOCD: CPT | Mod: CPTII,S$GLB,, | Performed by: FAMILY MEDICINE

## 2021-03-16 PROCEDURE — 1101F PR PT FALLS ASSESS DOC 0-1 FALLS W/OUT INJ PAST YR: ICD-10-PCS | Mod: CPTII,S$GLB,, | Performed by: FAMILY MEDICINE

## 2021-03-16 PROCEDURE — 3078F PR MOST RECENT DIASTOLIC BLOOD PRESSURE < 80 MM HG: ICD-10-PCS | Mod: CPTII,S$GLB,, | Performed by: FAMILY MEDICINE

## 2021-03-16 PROCEDURE — 3075F SYST BP GE 130 - 139MM HG: CPT | Mod: CPTII,S$GLB,, | Performed by: FAMILY MEDICINE

## 2021-03-16 PROCEDURE — 3075F PR MOST RECENT SYSTOLIC BLOOD PRESS GE 130-139MM HG: ICD-10-PCS | Mod: CPTII,S$GLB,, | Performed by: FAMILY MEDICINE

## 2021-03-16 PROCEDURE — 99499 RISK ADDL DX/OHS AUDIT: ICD-10-PCS | Mod: S$GLB,,, | Performed by: FAMILY MEDICINE

## 2021-03-16 PROCEDURE — 3288F PR FALLS RISK ASSESSMENT DOCUMENTED: ICD-10-PCS | Mod: CPTII,S$GLB,, | Performed by: FAMILY MEDICINE

## 2021-03-16 PROCEDURE — 1101F PT FALLS ASSESS-DOCD LE1/YR: CPT | Mod: CPTII,S$GLB,, | Performed by: FAMILY MEDICINE

## 2021-03-16 PROCEDURE — 3044F PR MOST RECENT HEMOGLOBIN A1C LEVEL <7.0%: ICD-10-PCS | Mod: CPTII,S$GLB,, | Performed by: FAMILY MEDICINE

## 2021-03-16 PROCEDURE — 3288F FALL RISK ASSESSMENT DOCD: CPT | Mod: CPTII,S$GLB,, | Performed by: FAMILY MEDICINE

## 2021-03-16 PROCEDURE — 99397 PER PM REEVAL EST PAT 65+ YR: CPT | Mod: S$GLB,,, | Performed by: FAMILY MEDICINE

## 2021-03-16 PROCEDURE — 1126F PR PAIN SEVERITY QUANTIFIED, NO PAIN PRESENT: ICD-10-PCS | Mod: S$GLB,,, | Performed by: FAMILY MEDICINE

## 2021-03-16 PROCEDURE — 99397 PR PREVENTIVE VISIT,EST,65 & OVER: ICD-10-PCS | Mod: S$GLB,,, | Performed by: FAMILY MEDICINE

## 2021-03-16 PROCEDURE — 1126F AMNT PAIN NOTED NONE PRSNT: CPT | Mod: S$GLB,,, | Performed by: FAMILY MEDICINE

## 2021-03-16 PROCEDURE — 3008F PR BODY MASS INDEX (BMI) DOCUMENTED: ICD-10-PCS | Mod: CPTII,S$GLB,, | Performed by: FAMILY MEDICINE

## 2021-03-16 RX ORDER — LEVOCETIRIZINE DIHYDROCHLORIDE 5 MG/1
5 TABLET, FILM COATED ORAL NIGHTLY
Qty: 30 TABLET | Refills: 11 | Status: SHIPPED | OUTPATIENT
Start: 2021-03-16 | End: 2021-06-01 | Stop reason: SDUPTHER

## 2021-04-21 RX ORDER — ESOMEPRAZOLE MAGNESIUM 40 MG/1
40 CAPSULE, DELAYED RELEASE ORAL DAILY
Qty: 90 CAPSULE | Refills: 1 | Status: SHIPPED | OUTPATIENT
Start: 2021-04-21 | End: 2022-01-12

## 2021-05-26 DIAGNOSIS — I10 ESSENTIAL HYPERTENSION: ICD-10-CM

## 2021-05-26 RX ORDER — CYPROHEPTADINE HYDROCHLORIDE 4 MG/1
4 TABLET ORAL DAILY
Qty: 90 TABLET | Refills: 0 | Status: SHIPPED | OUTPATIENT
Start: 2021-05-26 | End: 2022-05-25

## 2021-05-26 RX ORDER — METFORMIN HYDROCHLORIDE 500 MG/1
1000 TABLET, EXTENDED RELEASE ORAL DAILY
Qty: 180 TABLET | Refills: 3 | Status: SHIPPED | OUTPATIENT
Start: 2021-05-26 | End: 2021-06-01 | Stop reason: SDUPTHER

## 2021-05-26 RX ORDER — METOPROLOL TARTRATE 75 MG/1
1 TABLET, FILM COATED ORAL 3 TIMES DAILY
Qty: 90 TABLET | Refills: 1 | Status: SHIPPED | OUTPATIENT
Start: 2021-05-26 | End: 2021-06-01 | Stop reason: SDUPTHER

## 2021-05-26 RX ORDER — AMLODIPINE BESYLATE 10 MG/1
10 TABLET ORAL DAILY
Qty: 90 TABLET | Refills: 3 | Status: SHIPPED | OUTPATIENT
Start: 2021-05-26 | End: 2022-05-25

## 2021-05-26 RX ORDER — SPIRONOLACTONE 25 MG/1
25 TABLET ORAL DAILY
Qty: 90 TABLET | Refills: 3 | Status: SHIPPED | OUTPATIENT
Start: 2021-05-26 | End: 2021-06-01 | Stop reason: SDUPTHER

## 2021-05-26 RX ORDER — ATORVASTATIN CALCIUM 10 MG/1
10 TABLET, FILM COATED ORAL NIGHTLY
Qty: 90 TABLET | Refills: 3 | Status: SHIPPED | OUTPATIENT
Start: 2021-05-26 | End: 2022-05-30

## 2021-06-01 ENCOUNTER — TELEPHONE (OUTPATIENT)
Dept: FAMILY MEDICINE | Facility: CLINIC | Age: 71
End: 2021-06-01

## 2021-06-01 RX ORDER — SPIRONOLACTONE 25 MG/1
25 TABLET ORAL DAILY
Qty: 90 TABLET | Refills: 1 | Status: SHIPPED | OUTPATIENT
Start: 2021-06-01 | End: 2022-01-12 | Stop reason: SDUPTHER

## 2021-06-01 RX ORDER — METFORMIN HYDROCHLORIDE 500 MG/1
1000 TABLET, EXTENDED RELEASE ORAL DAILY
Qty: 180 TABLET | Refills: 1 | Status: SHIPPED | OUTPATIENT
Start: 2021-06-01 | End: 2022-08-03

## 2021-06-01 RX ORDER — ISOPROPYL ALCOHOL 70 ML/100ML
1 SWAB TOPICAL
Qty: 100 EACH | Refills: 2 | Status: ON HOLD | OUTPATIENT
Start: 2021-06-01 | End: 2024-03-06 | Stop reason: HOSPADM

## 2021-06-01 RX ORDER — LISINOPRIL 10 MG/1
10 TABLET ORAL DAILY
Qty: 90 TABLET | Refills: 3 | Status: SHIPPED | OUTPATIENT
Start: 2021-06-01 | End: 2022-01-12 | Stop reason: SDUPTHER

## 2021-06-01 RX ORDER — METOPROLOL TARTRATE 75 MG/1
1 TABLET, FILM COATED ORAL 3 TIMES DAILY
Qty: 90 TABLET | Refills: 1 | Status: SHIPPED | OUTPATIENT
Start: 2021-06-01 | End: 2021-12-27 | Stop reason: SDUPTHER

## 2021-06-01 RX ORDER — LEVOCETIRIZINE DIHYDROCHLORIDE 5 MG/1
5 TABLET, FILM COATED ORAL NIGHTLY
Qty: 90 TABLET | Refills: 1 | Status: SHIPPED | OUTPATIENT
Start: 2021-06-01 | End: 2022-05-25

## 2021-06-01 RX ORDER — BLOOD GLUCOSE CONTROL HIGH,LOW
EACH MISCELLANEOUS
Qty: 1 EACH | Refills: 1 | Status: ON HOLD | OUTPATIENT
Start: 2021-06-01 | End: 2024-03-06 | Stop reason: HOSPADM

## 2021-10-06 DIAGNOSIS — E11.9 TYPE 2 DIABETES MELLITUS WITHOUT COMPLICATION: ICD-10-CM

## 2021-12-07 ENCOUNTER — PATIENT OUTREACH (OUTPATIENT)
Dept: ADMINISTRATIVE | Facility: HOSPITAL | Age: 71
End: 2021-12-07
Payer: MEDICARE

## 2021-12-27 NOTE — TELEPHONE ENCOUNTER
Care Due:                  Date            Visit Type   Department     Provider  --------------------------------------------------------------------------------                                             [facility]  Last Visit: 03-      None         MEDICINE       Howard Gan  Next Visit: None Scheduled  None         None Found                                                            Last  Test          Frequency    Reason                     Performed    Due Date  --------------------------------------------------------------------------------    Office Visit  12 months..  amLODIPine, atorvastatin,   03- 03-                             cyproheptadine,                             esomeprazole,                             levocetirizine,                             lisinopriL, metFORMIN,                             metoprolol,                             spironolactone...........    CMP.........  12 months..  atorvastatin, lisinopriL,   Not Found    Overdue                             metFORMIN, spironolactone    HBA1C.......  6 months...  metFORMIN................  03-   09-    Lipid Panel.  12 months..  atorvastatin.............  Not Found    Overdue    Powered by [email or web address]. Reference number: [id number].   12/27/2021 4:39:35 PM CST

## 2021-12-31 RX ORDER — METOPROLOL TARTRATE 75 MG/1
1 TABLET, FILM COATED ORAL 3 TIMES DAILY
Qty: 270 TABLET | Refills: 0 | Status: SHIPPED | OUTPATIENT
Start: 2021-12-31 | End: 2022-01-12 | Stop reason: SDUPTHER

## 2022-01-01 NOTE — TELEPHONE ENCOUNTER
Refill Authorization Note   Keyona Irene  is requesting a refill authorization.  Brief Assessment and Rationale for Refill:  Approve    -Medication-Related Problems Identified: Requires labs  Medication Therapy Plan:  FOVS    Medication Reconciliation Completed: No   Comments:   --->Care Gap information included below if applicable.   Orders Placed This Encounter    metoprolol tartrate 75 mg Tab      Requested Prescriptions   Signed Prescriptions Disp Refills    metoprolol tartrate 75 mg Tab 270 tablet 0     Sig: Take 1 tablet by mouth 3 (three) times daily.       Cardiovascular:  Beta Blockers Passed - 12/27/2021  5:08 PM        Passed - Patient is at least 18 years old        Passed - Last BP in normal range within 360 days.     BP Readings from Last 3 Encounters:   03/16/21 138/70   07/14/20 114/67   07/09/20 118/60              Passed - Last Heart Rate in normal range within 360 days.     Pulse Readings from Last 3 Encounters:   03/16/21 84   07/14/20 89   07/09/20 74             Passed - Office visit in past 12 months.     Recent Visits  Date Type Provider Dept   03/16/21 Office Visit Howard Gan MD [facility]   07/09/20 Office Visit Howard Gan MD [facility]   Showing recent visits within past 720 days and meeting all other requirements  Future Appointments  No visits were found meeting these conditions.  Showing future appointments within next 150 days and meeting all other requirements      Future Appointments              In 1 week Howard Gan MD [facility]                    Appointments  past 12m or future 3m with PCP    Date Provider   Last Visit   3/16/2021 Howard Gan MD   Next Visit   1/12/2022 Howard Gan MD   ED visits in past 90 days: 0     Note composed:7:34 PM 12/31/2021

## 2022-01-01 NOTE — TELEPHONE ENCOUNTER
Provider Staff:     Action is required for this patient.   Please see care gap opportunities below in Care Due Message.     Thanks!  [facility]     Appointments      Date Provider   Last Visit   3/16/2021 Howard Gan MD   Next Visit   1/12/2022 Howard Gan MD     Note composed:7:34 PM 12/31/2021

## 2022-01-10 ENCOUNTER — PATIENT OUTREACH (OUTPATIENT)
Dept: ADMINISTRATIVE | Facility: HOSPITAL | Age: 72
End: 2022-01-10
Payer: MEDICARE

## 2022-01-10 NOTE — PROGRESS NOTES
Digital medicine Day sign up  at the [facility] 1-6:00 for patients eligible for either the DM or HTN program.    Left message for patient to return call with a family member.  Patient is attending a  currently.  Will welcome patient to come in on Thursday to gather information, sign up, etc.  This patient is eligible for both the HTN and DM program.  Will put notes in appt note

## 2022-01-12 ENCOUNTER — LAB VISIT (OUTPATIENT)
Dept: LAB | Facility: HOSPITAL | Age: 72
End: 2022-01-12
Attending: FAMILY MEDICINE
Payer: MEDICARE

## 2022-01-12 ENCOUNTER — OFFICE VISIT (OUTPATIENT)
Dept: FAMILY MEDICINE | Facility: CLINIC | Age: 72
End: 2022-01-12
Payer: MEDICARE

## 2022-01-12 ENCOUNTER — TELEPHONE (OUTPATIENT)
Dept: GASTROENTEROLOGY | Facility: CLINIC | Age: 72
End: 2022-01-12
Payer: MEDICARE

## 2022-01-12 VITALS
HEART RATE: 88 BPM | WEIGHT: 105.38 LBS | BODY MASS INDEX: 19.39 KG/M2 | HEIGHT: 62 IN | DIASTOLIC BLOOD PRESSURE: 70 MMHG | SYSTOLIC BLOOD PRESSURE: 132 MMHG

## 2022-01-12 DIAGNOSIS — E11.42 TYPE 2 DIABETES MELLITUS WITH DIABETIC POLYNEUROPATHY, WITHOUT LONG-TERM CURRENT USE OF INSULIN: ICD-10-CM

## 2022-01-12 DIAGNOSIS — R10.13 EPIGASTRIC PAIN: Primary | ICD-10-CM

## 2022-01-12 DIAGNOSIS — I25.119 ATHEROSCLEROSIS OF NATIVE CORONARY ARTERY OF NATIVE HEART WITH ANGINA PECTORIS: ICD-10-CM

## 2022-01-12 DIAGNOSIS — R13.19 ESOPHAGEAL DYSPHAGIA: ICD-10-CM

## 2022-01-12 LAB
ALBUMIN/CREAT UR: NORMAL UG/MG (ref 0–30)
CREAT UR-MCNC: 39 MG/DL (ref 15–325)
MICROALBUMIN UR DL<=1MG/L-MCNC: <5 UG/ML

## 2022-01-12 PROCEDURE — 1101F PR PT FALLS ASSESS DOC 0-1 FALLS W/OUT INJ PAST YR: ICD-10-PCS | Mod: HCNC,CPTII,S$GLB, | Performed by: FAMILY MEDICINE

## 2022-01-12 PROCEDURE — 3078F PR MOST RECENT DIASTOLIC BLOOD PRESSURE < 80 MM HG: ICD-10-PCS | Mod: HCNC,CPTII,S$GLB, | Performed by: FAMILY MEDICINE

## 2022-01-12 PROCEDURE — 3008F PR BODY MASS INDEX (BMI) DOCUMENTED: ICD-10-PCS | Mod: HCNC,CPTII,S$GLB, | Performed by: FAMILY MEDICINE

## 2022-01-12 PROCEDURE — 1160F PR REVIEW ALL MEDS BY PRESCRIBER/CLIN PHARMACIST DOCUMENTED: ICD-10-PCS | Mod: HCNC,CPTII,S$GLB, | Performed by: FAMILY MEDICINE

## 2022-01-12 PROCEDURE — 3288F FALL RISK ASSESSMENT DOCD: CPT | Mod: HCNC,CPTII,S$GLB, | Performed by: FAMILY MEDICINE

## 2022-01-12 PROCEDURE — 3288F PR FALLS RISK ASSESSMENT DOCUMENTED: ICD-10-PCS | Mod: HCNC,CPTII,S$GLB, | Performed by: FAMILY MEDICINE

## 2022-01-12 PROCEDURE — 99999 PR PBB SHADOW E&M-EST. PATIENT-LVL V: ICD-10-PCS | Mod: PBBFAC,HCNC,, | Performed by: FAMILY MEDICINE

## 2022-01-12 PROCEDURE — 1159F MED LIST DOCD IN RCRD: CPT | Mod: HCNC,CPTII,S$GLB, | Performed by: FAMILY MEDICINE

## 2022-01-12 PROCEDURE — 1160F RVW MEDS BY RX/DR IN RCRD: CPT | Mod: HCNC,CPTII,S$GLB, | Performed by: FAMILY MEDICINE

## 2022-01-12 PROCEDURE — 3008F BODY MASS INDEX DOCD: CPT | Mod: HCNC,CPTII,S$GLB, | Performed by: FAMILY MEDICINE

## 2022-01-12 PROCEDURE — 1126F AMNT PAIN NOTED NONE PRSNT: CPT | Mod: HCNC,CPTII,S$GLB, | Performed by: FAMILY MEDICINE

## 2022-01-12 PROCEDURE — 99999 PR PBB SHADOW E&M-EST. PATIENT-LVL V: CPT | Mod: PBBFAC,HCNC,, | Performed by: FAMILY MEDICINE

## 2022-01-12 PROCEDURE — 3078F DIAST BP <80 MM HG: CPT | Mod: HCNC,CPTII,S$GLB, | Performed by: FAMILY MEDICINE

## 2022-01-12 PROCEDURE — 4010F PR ACE/ARB THEARPY RXD/TAKEN: ICD-10-PCS | Mod: HCNC,CPTII,S$GLB, | Performed by: FAMILY MEDICINE

## 2022-01-12 PROCEDURE — 82043 UR ALBUMIN QUANTITATIVE: CPT | Mod: HCNC | Performed by: FAMILY MEDICINE

## 2022-01-12 PROCEDURE — 99214 PR OFFICE/OUTPT VISIT, EST, LEVL IV, 30-39 MIN: ICD-10-PCS | Mod: HCNC,S$GLB,, | Performed by: FAMILY MEDICINE

## 2022-01-12 PROCEDURE — 99499 RISK ADDL DX/OHS AUDIT: ICD-10-PCS | Mod: S$PBB,,, | Performed by: FAMILY MEDICINE

## 2022-01-12 PROCEDURE — 4010F ACE/ARB THERAPY RXD/TAKEN: CPT | Mod: HCNC,CPTII,S$GLB, | Performed by: FAMILY MEDICINE

## 2022-01-12 PROCEDURE — 3075F SYST BP GE 130 - 139MM HG: CPT | Mod: HCNC,CPTII,S$GLB, | Performed by: FAMILY MEDICINE

## 2022-01-12 PROCEDURE — 3075F PR MOST RECENT SYSTOLIC BLOOD PRESS GE 130-139MM HG: ICD-10-PCS | Mod: HCNC,CPTII,S$GLB, | Performed by: FAMILY MEDICINE

## 2022-01-12 PROCEDURE — 99214 OFFICE O/P EST MOD 30 MIN: CPT | Mod: HCNC,S$GLB,, | Performed by: FAMILY MEDICINE

## 2022-01-12 PROCEDURE — 99499 UNLISTED E&M SERVICE: CPT | Mod: S$PBB,,, | Performed by: FAMILY MEDICINE

## 2022-01-12 PROCEDURE — 1159F PR MEDICATION LIST DOCUMENTED IN MEDICAL RECORD: ICD-10-PCS | Mod: HCNC,CPTII,S$GLB, | Performed by: FAMILY MEDICINE

## 2022-01-12 PROCEDURE — 1101F PT FALLS ASSESS-DOCD LE1/YR: CPT | Mod: HCNC,CPTII,S$GLB, | Performed by: FAMILY MEDICINE

## 2022-01-12 PROCEDURE — 1126F PR PAIN SEVERITY QUANTIFIED, NO PAIN PRESENT: ICD-10-PCS | Mod: HCNC,CPTII,S$GLB, | Performed by: FAMILY MEDICINE

## 2022-01-12 RX ORDER — CLOPIDOGREL BISULFATE 75 MG/1
75 TABLET ORAL DAILY
Qty: 90 TABLET | Refills: 3 | Status: SHIPPED | OUTPATIENT
Start: 2022-01-12 | End: 2022-12-04 | Stop reason: SDUPTHER

## 2022-01-12 RX ORDER — LISINOPRIL 10 MG/1
10 TABLET ORAL DAILY
Qty: 90 TABLET | Refills: 3 | Status: SHIPPED | OUTPATIENT
Start: 2022-01-12 | End: 2022-08-04

## 2022-01-12 RX ORDER — SPIRONOLACTONE 25 MG/1
25 TABLET ORAL DAILY
Qty: 90 TABLET | Refills: 3 | Status: SHIPPED | OUTPATIENT
Start: 2022-01-12 | End: 2022-12-20

## 2022-01-12 RX ORDER — METOPROLOL TARTRATE 75 MG/1
1 TABLET, FILM COATED ORAL 3 TIMES DAILY
Qty: 270 TABLET | Refills: 3 | Status: ON HOLD | OUTPATIENT
Start: 2022-01-12 | End: 2022-05-28 | Stop reason: HOSPADM

## 2022-01-12 NOTE — TELEPHONE ENCOUNTER
Pt MB full. Unable to leave vmail. [employer] msg sent requesting call back to schedule cscope.

## 2022-01-12 NOTE — PATIENT INSTRUCTIONS
Stay off nexium until after upper scope.   Get Covid vaccine at [facility] or at your pharmacy. Call for an appointment.

## 2022-01-12 NOTE — PROGRESS NOTES
"Subjective:       Patient ID: Keyona Irene is a 71 y.o. female.    Chief Complaint: Abdominal Pain (Upper stomach pain. Worse w/ eating/drinking. Started 3-4 mo ago. Pt out of nexium x 1 mo. Pt only have BM 2x/wk, for longer than 3wk. Pain feels better w/ belching.)    She is here with her daughter.  She complains of some epigastric pain.  the duration is uncertain.  She refers back to pain that she had starting in 2014. I reviewed some of those notes when she had gallstones and later on air in the bile ducts and pancreatic ducts.  Her last endoscopic ultrasound in 2018 did not show any esophageal or gastric abnormalities.  She relates the stomach pain as worsening when she eats and a feeling that the food does not go all the way into her stomach.  She relates some nausea and decreased appetite and has lost a few lb.  No vomiting.  She had lost weight then regained it and then has lost the weight.  She ran out of Nexium 1 month ago and has not noticed any change in stomach symptoms and has not reported any heartburn.  Her bowel movements are daily except for the past week when they have been less frequent.  No melena or blood.  No dysuria or frequency.  She also asks about 2 dark spots on her upper abdomen.  She is status post coronary bypass surgery in 2000. She relates some chest discomfort and shortness of breath with working in the yard.  She has not seen Cardiology in some time.      Review of Systems   Constitutional: Positive for appetite change and unexpected weight change. Negative for fever.   Respiratory: Positive for chest tightness and shortness of breath.    Cardiovascular: Negative for palpitations and leg swelling.   Gastrointestinal: Positive for abdominal pain. Negative for blood in stool and diarrhea.         Objective:     Blood pressure 132/70, pulse 88, height 5' 2" (1.575 m), weight 47.8 kg (105 lb 6.1 oz).      Physical Exam  Constitutional:       General: She is not in acute " distress.     Appearance: She is normal weight.   Neck:      Vascular: No carotid bruit.   Cardiovascular:      Rate and Rhythm: Normal rate and regular rhythm.   Pulmonary:      Effort: No respiratory distress.      Breath sounds: No wheezing or rales.   Abdominal:      General: There is no distension.      Palpations: There is no mass.      Comments: 2+ epigastric tenderness.  plus-minus tenderness in other parts of the abdomen.  The dark spots that she was asking about look to be scars from previous drains after her CABG   Lymphadenopathy:      Cervical: No cervical adenopathy.   Neurological:      Mental Status: She is alert.   Psychiatric:         Mood and Affect: Mood normal.         Assessment:       Problem List Items Addressed This Visit     Type 2 diabetes mellitus with diabetic polyneuropathy, without long-term current use of insulin    Relevant Orders    Comprehensive Metabolic Panel    Hemoglobin A1C    Lipid Panel    Microalbumin/Creatinine Ratio, Urine    Atherosclerosis of native coronary artery of native heart with angina pectoris      Other Visit Diagnoses     Epigastric pain    -  Primary    Relevant Orders    Amylase    CBC Without Differential    US Abdomen Complete    Case Request Endoscopy: ESOPHAGOGASTRODUODENOSCOPY (EGD) (Completed)    Esophageal dysphagia        Relevant Orders    Case Request Endoscopy: ESOPHAGOGASTRODUODENOSCOPY (EGD) (Completed)          Plan:       Stay off Nexium for now.  Abdominal ultrasound and EGD scheduled.  Lab work scheduled.  She has well-controlled diabetes but is due for routine lab work.  She declines a flu shot.    she is receptive to the idea of getting COVID vaccine.  I gave her information on contacting [facility] or her pharmacy.

## 2022-01-18 ENCOUNTER — HOSPITAL ENCOUNTER (OUTPATIENT)
Dept: RADIOLOGY | Facility: HOSPITAL | Age: 72
Discharge: HOME | End: 2022-01-18
Attending: FAMILY MEDICINE
Payer: MEDICARE

## 2022-01-18 DIAGNOSIS — R10.13 EPIGASTRIC PAIN: ICD-10-CM

## 2022-01-18 PROCEDURE — 76700 US EXAM ABDOM COMPLETE: CPT | Mod: TC,HCNC,PO

## 2022-01-18 PROCEDURE — 76700 US EXAM ABDOM COMPLETE: CPT | Mod: 26,HCNC,, | Performed by: RADIOLOGY

## 2022-01-18 PROCEDURE — 76700 US ABDOMEN COMPLETE: ICD-10-PCS | Mod: 26,HCNC,, | Performed by: RADIOLOGY

## 2022-02-04 ENCOUNTER — TELEPHONE (OUTPATIENT)
Dept: OBSTETRICS AND GYNECOLOGY | Facility: CLINIC | Age: 72
End: 2022-02-04
Payer: MEDICARE

## 2022-02-04 NOTE — TELEPHONE ENCOUNTER
----- Message from Karlie Mahoney sent at 2/4/2022  9:39 AM CST -----  Contact: Keyona  .Type:  Sooner Apoointment Request    Caller is requesting a sooner appointment.  Caller declined first available appointment listed below.  Caller will not accept being placed on the waitlist and is requesting a message be sent to doctor.  Name of Caller: [facility]  When is the first available appointment? unknown  Symptoms: to establish care for pain for tubal  Would the patient rather a call back or a response via [facility]? call  Best Call Back Number: [phone]  Additional Information: patient requests to be seen today, please give a hi  priority call back  Thanks,  DAISY

## 2022-03-29 ENCOUNTER — TELEPHONE (OUTPATIENT)
Dept: GASTROENTEROLOGY | Facility: CLINIC | Age: 72
End: 2022-03-29
Payer: MEDICARE

## 2022-03-29 DIAGNOSIS — Z01.818 PRE-OP TESTING: ICD-10-CM

## 2022-05-05 ENCOUNTER — TELEPHONE (OUTPATIENT)
Dept: FAMILY MEDICINE | Facility: CLINIC | Age: 72
End: 2022-05-05
Payer: MEDICARE

## 2022-05-05 NOTE — TELEPHONE ENCOUNTER
----- Message from Delroy No sent at 5/5/2022 11:57 AM CDT -----  Type:  Patient Returning Call    Who Called: Patient  Who Left Message for Patient:  NA  Does the patient know what this is regarding?: Appointment  Best Call Back Number: [phone]  Additional Information:

## 2022-05-25 ENCOUNTER — HOSPITAL ENCOUNTER (INPATIENT)
Facility: HOSPITAL | Age: 72
LOS: 3 days | Discharge: HOME HEALTH | DRG: 189 | End: 2022-05-28
Attending: EMERGENCY MEDICINE | Admitting: HOSPITALIST
Payer: MEDICARE

## 2022-05-25 DIAGNOSIS — I50.23 ACUTE ON CHRONIC SYSTOLIC CONGESTIVE HEART FAILURE: ICD-10-CM

## 2022-05-25 DIAGNOSIS — R07.9 CHEST PAIN: ICD-10-CM

## 2022-05-25 DIAGNOSIS — R06.02 SHORTNESS OF BREATH: ICD-10-CM

## 2022-05-25 DIAGNOSIS — I50.9 ACUTE HEART FAILURE, UNSPECIFIED HEART FAILURE TYPE: Primary | ICD-10-CM

## 2022-05-25 DIAGNOSIS — I50.9 CHF (CONGESTIVE HEART FAILURE): ICD-10-CM

## 2022-05-25 DIAGNOSIS — R06.02 SOB (SHORTNESS OF BREATH): ICD-10-CM

## 2022-05-25 LAB
ALBUMIN SERPL BCP-MCNC: 3.5 G/DL (ref 3.5–5.2)
ALP SERPL-CCNC: 112 U/L (ref 55–135)
ALT SERPL W/O P-5'-P-CCNC: 34 U/L (ref 10–44)
ANION GAP SERPL CALC-SCNC: 18 MMOL/L (ref 8–16)
AST SERPL-CCNC: 69 U/L (ref 10–40)
BASOPHILS # BLD AUTO: 0.05 K/UL (ref 0–0.2)
BASOPHILS NFR BLD: 0.5 % (ref 0–1.9)
BILIRUB SERPL-MCNC: 0.5 MG/DL (ref 0.1–1)
BNP SERPL-MCNC: 1795 PG/ML (ref 0–99)
BUN SERPL-MCNC: 10 MG/DL (ref 8–23)
CALCIUM SERPL-MCNC: 9.2 MG/DL (ref 8.7–10.5)
CHLORIDE SERPL-SCNC: 109 MMOL/L (ref 95–110)
CO2 SERPL-SCNC: 15 MMOL/L (ref 23–29)
CREAT SERPL-MCNC: 1.1 MG/DL (ref 0.5–1.4)
DIFFERENTIAL METHOD: ABNORMAL
EOSINOPHIL # BLD AUTO: 0.1 K/UL (ref 0–0.5)
EOSINOPHIL NFR BLD: 0.6 % (ref 0–8)
ERYTHROCYTE [DISTWIDTH] IN BLOOD BY AUTOMATED COUNT: 16.5 % (ref 11.5–14.5)
EST. GFR  (AFRICAN AMERICAN): 59 ML/MIN/1.73 M^2
EST. GFR  (NON AFRICAN AMERICAN): 51 ML/MIN/1.73 M^2
GLUCOSE SERPL-MCNC: 263 MG/DL (ref 70–110)
HCT VFR BLD AUTO: 37.7 % (ref 37–48.5)
HGB BLD-MCNC: 12.2 G/DL (ref 12–16)
IMM GRANULOCYTES # BLD AUTO: 0.03 K/UL (ref 0–0.04)
IMM GRANULOCYTES NFR BLD AUTO: 0.3 % (ref 0–0.5)
LYMPHOCYTES # BLD AUTO: 5.9 K/UL (ref 1–4.8)
LYMPHOCYTES NFR BLD: 61.7 % (ref 18–48)
MAGNESIUM SERPL-MCNC: 1.9 MG/DL (ref 1.6–2.6)
MCH RBC QN AUTO: 27.9 PG (ref 27–31)
MCHC RBC AUTO-ENTMCNC: 32.4 G/DL (ref 32–36)
MCV RBC AUTO: 86 FL (ref 82–98)
MONOCYTES # BLD AUTO: 0.3 K/UL (ref 0.3–1)
MONOCYTES NFR BLD: 2.6 % (ref 4–15)
NEUTROPHILS # BLD AUTO: 3.3 K/UL (ref 1.8–7.7)
NEUTROPHILS NFR BLD: 34.3 % (ref 38–73)
NRBC BLD-RTO: 0 /100 WBC
PLATELET # BLD AUTO: 263 K/UL (ref 150–450)
PMV BLD AUTO: 11.4 FL (ref 9.2–12.9)
POTASSIUM SERPL-SCNC: 4 MMOL/L (ref 3.5–5.1)
PROT SERPL-MCNC: 7.5 G/DL (ref 6–8.4)
RBC # BLD AUTO: 4.38 M/UL (ref 4–5.4)
SARS-COV-2 RDRP RESP QL NAA+PROBE: NEGATIVE
SODIUM SERPL-SCNC: 142 MMOL/L (ref 136–145)
TROPONIN I SERPL DL<=0.01 NG/ML-MCNC: 0.01 NG/ML (ref 0–0.03)
WBC # BLD AUTO: 9.59 K/UL (ref 3.9–12.7)

## 2022-05-25 PROCEDURE — 63600175 PHARM REV CODE 636 W HCPCS: Performed by: NURSE PRACTITIONER

## 2022-05-25 PROCEDURE — 93005 ELECTROCARDIOGRAM TRACING: CPT

## 2022-05-25 PROCEDURE — 93010 EKG 12-LEAD: ICD-10-PCS | Mod: 76,,, | Performed by: INTERNAL MEDICINE

## 2022-05-25 PROCEDURE — 93010 ELECTROCARDIOGRAM REPORT: CPT | Mod: 76,,, | Performed by: INTERNAL MEDICINE

## 2022-05-25 PROCEDURE — 12000002 HC ACUTE/MED SURGE SEMI-PRIVATE ROOM

## 2022-05-25 PROCEDURE — 83735 ASSAY OF MAGNESIUM: CPT | Performed by: NURSE PRACTITIONER

## 2022-05-25 PROCEDURE — 96375 TX/PRO/DX INJ NEW DRUG ADDON: CPT

## 2022-05-25 PROCEDURE — 94660 CPAP INITIATION&MGMT: CPT

## 2022-05-25 PROCEDURE — 94760 N-INVAS EAR/PLS OXIMETRY 1: CPT

## 2022-05-25 PROCEDURE — 96374 THER/PROPH/DIAG INJ IV PUSH: CPT

## 2022-05-25 PROCEDURE — 63600175 PHARM REV CODE 636 W HCPCS: Performed by: EMERGENCY MEDICINE

## 2022-05-25 PROCEDURE — U0002 COVID-19 LAB TEST NON-CDC: HCPCS | Performed by: EMERGENCY MEDICINE

## 2022-05-25 PROCEDURE — 83036 HEMOGLOBIN GLYCOSYLATED A1C: CPT | Performed by: NURSE PRACTITIONER

## 2022-05-25 PROCEDURE — 27000221 HC OXYGEN, UP TO 24 HOURS

## 2022-05-25 PROCEDURE — 99285 EMERGENCY DEPT VISIT HI MDM: CPT | Mod: 25

## 2022-05-25 PROCEDURE — 85025 COMPLETE CBC W/AUTO DIFF WBC: CPT | Performed by: PHYSICIAN ASSISTANT

## 2022-05-25 PROCEDURE — 80053 COMPREHEN METABOLIC PANEL: CPT | Performed by: PHYSICIAN ASSISTANT

## 2022-05-25 PROCEDURE — 36415 COLL VENOUS BLD VENIPUNCTURE: CPT | Performed by: NURSE PRACTITIONER

## 2022-05-25 PROCEDURE — 83880 ASSAY OF NATRIURETIC PEPTIDE: CPT | Performed by: PHYSICIAN ASSISTANT

## 2022-05-25 PROCEDURE — 93010 ELECTROCARDIOGRAM REPORT: CPT | Mod: ,,, | Performed by: INTERNAL MEDICINE

## 2022-05-25 PROCEDURE — 20000000 HC ICU ROOM

## 2022-05-25 PROCEDURE — 84484 ASSAY OF TROPONIN QUANT: CPT | Performed by: PHYSICIAN ASSISTANT

## 2022-05-25 PROCEDURE — 27000190 HC CPAP FULL FACE MASK W/VALVE

## 2022-05-25 PROCEDURE — 25000003 PHARM REV CODE 250: Performed by: NURSE PRACTITIONER

## 2022-05-25 PROCEDURE — 99900035 HC TECH TIME PER 15 MIN (STAT)

## 2022-05-25 RX ORDER — FUROSEMIDE 10 MG/ML
40 INJECTION INTRAMUSCULAR; INTRAVENOUS
Status: COMPLETED | OUTPATIENT
Start: 2022-05-25 | End: 2022-05-27

## 2022-05-25 RX ORDER — IBUPROFEN 200 MG
16 TABLET ORAL
Status: DISCONTINUED | OUTPATIENT
Start: 2022-05-25 | End: 2022-05-28 | Stop reason: HOSPADM

## 2022-05-25 RX ORDER — GLUCAGON 1 MG
1 KIT INJECTION
Status: DISCONTINUED | OUTPATIENT
Start: 2022-05-25 | End: 2022-05-28 | Stop reason: HOSPADM

## 2022-05-25 RX ORDER — LISINOPRIL 10 MG/1
10 TABLET ORAL DAILY
Status: DISCONTINUED | OUTPATIENT
Start: 2022-05-26 | End: 2022-05-27

## 2022-05-25 RX ORDER — INSULIN ASPART 100 [IU]/ML
1-10 INJECTION, SOLUTION INTRAVENOUS; SUBCUTANEOUS
Status: DISCONTINUED | OUTPATIENT
Start: 2022-05-25 | End: 2022-05-28 | Stop reason: HOSPADM

## 2022-05-25 RX ORDER — IBUPROFEN 200 MG
24 TABLET ORAL
Status: DISCONTINUED | OUTPATIENT
Start: 2022-05-25 | End: 2022-05-28 | Stop reason: HOSPADM

## 2022-05-25 RX ORDER — SODIUM CHLORIDE 0.9 % (FLUSH) 0.9 %
10 SYRINGE (ML) INJECTION
Status: DISCONTINUED | OUTPATIENT
Start: 2022-05-25 | End: 2022-05-28 | Stop reason: HOSPADM

## 2022-05-25 RX ORDER — FUROSEMIDE 10 MG/ML
40 INJECTION INTRAMUSCULAR; INTRAVENOUS
Status: COMPLETED | OUTPATIENT
Start: 2022-05-25 | End: 2022-05-25

## 2022-05-25 RX ORDER — ATORVASTATIN CALCIUM 10 MG/1
10 TABLET, FILM COATED ORAL NIGHTLY
Status: DISCONTINUED | OUTPATIENT
Start: 2022-05-25 | End: 2022-05-28 | Stop reason: HOSPADM

## 2022-05-25 RX ORDER — HEPARIN SODIUM 5000 [USP'U]/ML
5000 INJECTION, SOLUTION INTRAVENOUS; SUBCUTANEOUS EVERY 8 HOURS
Status: DISCONTINUED | OUTPATIENT
Start: 2022-05-25 | End: 2022-05-25

## 2022-05-25 RX ORDER — METOPROLOL TARTRATE 50 MG/1
50 TABLET ORAL 2 TIMES DAILY
Refills: 3 | Status: DISCONTINUED | OUTPATIENT
Start: 2022-05-25 | End: 2022-05-27

## 2022-05-25 RX ORDER — ONDANSETRON 2 MG/ML
8 INJECTION INTRAMUSCULAR; INTRAVENOUS
Status: COMPLETED | OUTPATIENT
Start: 2022-05-25 | End: 2022-05-25

## 2022-05-25 RX ORDER — ASPIRIN 81 MG/1
81 TABLET ORAL DAILY
Status: DISCONTINUED | OUTPATIENT
Start: 2022-05-26 | End: 2022-05-28 | Stop reason: HOSPADM

## 2022-05-25 RX ORDER — CLOPIDOGREL BISULFATE 75 MG/1
75 TABLET ORAL DAILY
Status: DISCONTINUED | OUTPATIENT
Start: 2022-05-26 | End: 2022-05-28 | Stop reason: HOSPADM

## 2022-05-25 RX ADMIN — FUROSEMIDE 40 MG: 10 INJECTION, SOLUTION INTRAMUSCULAR; INTRAVENOUS at 07:05

## 2022-05-25 RX ADMIN — FUROSEMIDE 40 MG: 10 INJECTION, SOLUTION INTRAVENOUS at 10:05

## 2022-05-25 RX ADMIN — METOPROLOL TARTRATE 50 MG: 50 TABLET, FILM COATED ORAL at 10:05

## 2022-05-25 RX ADMIN — ATORVASTATIN CALCIUM 10 MG: 10 TABLET, FILM COATED ORAL at 11:05

## 2022-05-25 RX ADMIN — ONDANSETRON 8 MG: 2 INJECTION INTRAMUSCULAR; INTRAVENOUS at 07:05

## 2022-05-26 PROBLEM — E43 SEVERE MALNUTRITION: Status: ACTIVE | Noted: 2022-05-26

## 2022-05-26 PROBLEM — I35.1 AI (AORTIC INCOMPETENCE): Status: RESOLVED | Noted: 2020-05-30 | Resolved: 2022-05-26

## 2022-05-26 PROBLEM — Z95.1 S/P CABG (CORONARY ARTERY BYPASS GRAFT): Status: RESOLVED | Noted: 2020-05-30 | Resolved: 2022-05-26

## 2022-05-26 LAB
ALLENS TEST: ABNORMAL
ANION GAP SERPL CALC-SCNC: 13 MMOL/L (ref 8–16)
AORTIC ROOT ANNULUS: 3.03 CM
AORTIC VALVE CUSP SEPERATION: 1.65 CM
AV INDEX (PROSTH): 0.89
AV MEAN GRADIENT: 5 MMHG
AV PEAK GRADIENT: 10 MMHG
AV VALVE AREA: 2.62 CM2
AV VELOCITY RATIO: 0.7
B-OH-BUTYR BLD STRIP-SCNC: 0.4 MMOL/L (ref 0–0.5)
BSA FOR ECHO PROCEDURE: 1.37 M2
BUN SERPL-MCNC: 13 MG/DL (ref 8–23)
CALCIUM SERPL-MCNC: 9.5 MG/DL (ref 8.7–10.5)
CHLORIDE SERPL-SCNC: 102 MMOL/L (ref 95–110)
CO2 SERPL-SCNC: 28 MMOL/L (ref 23–29)
CREAT SERPL-MCNC: 0.9 MG/DL (ref 0.5–1.4)
CV ECHO LV RWT: 0.44 CM
DELSYS: ABNORMAL
DOP CALC AO PEAK VEL: 1.58 M/S
DOP CALC AO VTI: 24.69 CM
DOP CALC LVOT AREA: 3 CM2
DOP CALC LVOT DIAMETER: 1.94 CM
DOP CALC LVOT PEAK VEL: 1.11 M/S
DOP CALC LVOT STROKE VOLUME: 64.73 CM3
DOP CALC MV VTI: 32.72 CM
DOP CALCLVOT PEAK VEL VTI: 21.91 CM
E WAVE DECELERATION TIME: 244.15 MSEC
E/A RATIO: 1.17
E/E' RATIO: 34.29 M/S
ECHO LV POSTERIOR WALL: 1.05 CM (ref 0.6–1.1)
EJECTION FRACTION: 40 %
EP: 6
ERYTHROCYTE [SEDIMENTATION RATE] IN BLOOD BY WESTERGREN METHOD: 12 MM/H
EST. GFR  (AFRICAN AMERICAN): >60 ML/MIN/1.73 M^2
EST. GFR  (NON AFRICAN AMERICAN): >60 ML/MIN/1.73 M^2
ESTIMATED AVG GLUCOSE: 117 MG/DL (ref 68–131)
ESTIMATED AVG GLUCOSE: 117 MG/DL (ref 68–131)
FIO2: 40
FRACTIONAL SHORTENING: 19 % (ref 28–44)
GLUCOSE SERPL-MCNC: 115 MG/DL (ref 70–110)
HBA1C MFR BLD: 5.7 % (ref 4–5.6)
HBA1C MFR BLD: 5.7 % (ref 4–5.6)
HCO3 UR-SCNC: 27.4 MMOL/L (ref 24–28)
INTERVENTRICULAR SEPTUM: 0.98 CM (ref 0.6–1.1)
IP: 12
LA MAJOR: 4.91 CM
LA MINOR: 5.37 CM
LA WIDTH: 4.45 CM
LACTATE SERPL-SCNC: 1.3 MMOL/L (ref 0.5–2.2)
LEFT ATRIUM SIZE: 4.51 CM
LEFT ATRIUM VOLUME INDEX MOD: 38.6 ML/M2
LEFT ATRIUM VOLUME INDEX: 63.4 ML/M2
LEFT ATRIUM VOLUME MOD: 53.26 CM3
LEFT ATRIUM VOLUME: 87.51 CM3
LEFT INTERNAL DIMENSION IN SYSTOLE: 3.88 CM (ref 2.1–4)
LEFT VENTRICLE DIASTOLIC VOLUME INDEX: 76.8 ML/M2
LEFT VENTRICLE DIASTOLIC VOLUME: 105.99 ML
LEFT VENTRICLE MASS INDEX: 125 G/M2
LEFT VENTRICLE SYSTOLIC VOLUME INDEX: 47.1 ML/M2
LEFT VENTRICLE SYSTOLIC VOLUME: 65.04 ML
LEFT VENTRICULAR INTERNAL DIMENSION IN DIASTOLE: 4.77 CM (ref 3.5–6)
LEFT VENTRICULAR MASS: 171.91 G
LV LATERAL E/E' RATIO: 30 M/S
LV SEPTAL E/E' RATIO: 40 M/S
MIN VOL: 6
MODE: ABNORMAL
MV MEAN GRADIENT: 1 MMHG
MV PEAK A VEL: 1.03 M/S
MV PEAK E VEL: 1.2 M/S
MV PEAK GRADIENT: 8 MMHG
MV STENOSIS PRESSURE HALF TIME: 73.54 MS
MV VALVE AREA BY CONTINUITY EQUATION: 1.98 CM2
MV VALVE AREA P 1/2 METHOD: 2.99 CM2
PCO2 BLDA: 44 MMHG (ref 35–45)
PH SMN: 7.4 [PH] (ref 7.35–7.45)
PISA MRMAX VEL: 0.06 M/S
PISA RADIUS: 0.22 CM
PISA TR MAX VEL: 2.62 M/S
PISA TR VN NYQUIST: 0 M/S
PO2 BLDA: 63 MMHG (ref 40–60)
POC BE: 3 MMOL/L
POC SATURATED O2: 92 % (ref 95–100)
POC TCO2: 29 MMOL/L (ref 24–29)
POCT GLUCOSE: 118 MG/DL (ref 70–110)
POTASSIUM SERPL-SCNC: 3.7 MMOL/L (ref 3.5–5.1)
PV PEAK VELOCITY: 0.87 CM/S
RA MAJOR: 4.07 CM
RA PRESSURE: 3 MMHG
RA WIDTH: 3.53 CM
RIGHT VENTRICULAR END-DIASTOLIC DIMENSION: 2.81 CM
RV TISSUE DOPPLER FREE WALL SYSTOLIC VELOCITY 1 (APICAL 4 CHAMBER VIEW): 10.11 CM/S
SAMPLE: ABNORMAL
SITE: ABNORMAL
SODIUM SERPL-SCNC: 143 MMOL/L (ref 136–145)
SP02: 100
SPONT RATE: 19
TDI LATERAL: 0.04 M/S
TDI SEPTAL: 0.03 M/S
TDI: 0.04 M/S
TR MAX PG: 27 MMHG
TRICUSPID ANNULAR PLANE SYSTOLIC EXCURSION: 1.84 CM
TV REST PULMONARY ARTERY PRESSURE: 30 MMHG

## 2022-05-26 PROCEDURE — 25000003 PHARM REV CODE 250: Performed by: HOSPITALIST

## 2022-05-26 PROCEDURE — 99223 1ST HOSP IP/OBS HIGH 75: CPT | Mod: 25,,, | Performed by: GENERAL PRACTICE

## 2022-05-26 PROCEDURE — 82010 KETONE BODYS QUAN: CPT | Performed by: INTERNAL MEDICINE

## 2022-05-26 PROCEDURE — 94660 CPAP INITIATION&MGMT: CPT

## 2022-05-26 PROCEDURE — 36415 COLL VENOUS BLD VENIPUNCTURE: CPT | Performed by: NURSE PRACTITIONER

## 2022-05-26 PROCEDURE — 99900035 HC TECH TIME PER 15 MIN (STAT)

## 2022-05-26 PROCEDURE — 63600175 PHARM REV CODE 636 W HCPCS: Performed by: NURSE PRACTITIONER

## 2022-05-26 PROCEDURE — 25000003 PHARM REV CODE 250: Performed by: NURSE PRACTITIONER

## 2022-05-26 PROCEDURE — 80048 BASIC METABOLIC PNL TOTAL CA: CPT | Performed by: NURSE PRACTITIONER

## 2022-05-26 PROCEDURE — 94761 N-INVAS EAR/PLS OXIMETRY MLT: CPT

## 2022-05-26 PROCEDURE — 27000221 HC OXYGEN, UP TO 24 HOURS

## 2022-05-26 PROCEDURE — 82803 BLOOD GASES ANY COMBINATION: CPT

## 2022-05-26 PROCEDURE — 63600175 PHARM REV CODE 636 W HCPCS: Performed by: HOSPITALIST

## 2022-05-26 PROCEDURE — 83605 ASSAY OF LACTIC ACID: CPT | Performed by: INTERNAL MEDICINE

## 2022-05-26 PROCEDURE — 12000002 HC ACUTE/MED SURGE SEMI-PRIVATE ROOM

## 2022-05-26 PROCEDURE — 99223 PR INITIAL HOSPITAL CARE,LEVL III: ICD-10-PCS | Mod: 25,,, | Performed by: GENERAL PRACTICE

## 2022-05-26 RX ORDER — ACETAMINOPHEN 500 MG
1000 TABLET ORAL EVERY 6 HOURS PRN
Status: DISCONTINUED | OUTPATIENT
Start: 2022-05-26 | End: 2022-05-28 | Stop reason: HOSPADM

## 2022-05-26 RX ORDER — MUPIROCIN 20 MG/G
OINTMENT TOPICAL 2 TIMES DAILY
Status: DISCONTINUED | OUTPATIENT
Start: 2022-05-26 | End: 2022-05-28 | Stop reason: HOSPADM

## 2022-05-26 RX ORDER — HYDRALAZINE HYDROCHLORIDE 20 MG/ML
10 INJECTION INTRAMUSCULAR; INTRAVENOUS EVERY 8 HOURS PRN
Status: DISCONTINUED | OUTPATIENT
Start: 2022-05-26 | End: 2022-05-28 | Stop reason: HOSPADM

## 2022-05-26 RX ADMIN — METOPROLOL TARTRATE 50 MG: 50 TABLET, FILM COATED ORAL at 08:05

## 2022-05-26 RX ADMIN — MUPIROCIN: 20 OINTMENT TOPICAL at 08:05

## 2022-05-26 RX ADMIN — FUROSEMIDE 40 MG: 10 INJECTION, SOLUTION INTRAVENOUS at 08:05

## 2022-05-26 RX ADMIN — ATORVASTATIN CALCIUM 10 MG: 10 TABLET, FILM COATED ORAL at 08:05

## 2022-05-26 RX ADMIN — LISINOPRIL 10 MG: 10 TABLET ORAL at 08:05

## 2022-05-26 RX ADMIN — CLOPIDOGREL 75 MG: 75 TABLET, FILM COATED ORAL at 08:05

## 2022-05-26 RX ADMIN — Medication 81 MG: at 08:05

## 2022-05-26 RX ADMIN — ACETAMINOPHEN 1000 MG: 500 TABLET ORAL at 08:05

## 2022-05-26 NOTE — ED PROVIDER NOTES
Encounter Date: 5/25/2022       History     Chief Complaint   Patient presents with    Shortness of Breath     Tachyarrhythmia on arrival - unable to make complete sentances without being winded; skin clammy. Placed on monitor/O2 via NRB     Is a 71-year-old female with a past medical history of hypertension prior alcohol abuse diabetes cholangitis anemia who presents the emergency room for shortness of breath that started suddenly on her way to [facility].  Family brought her to emergency room.  She denies much chest discomfort.  She feels very dyspneic.  She denies any history of CHF or COPD.  She does smoke.  She states she does not use inhalers.  She has no history of PE.  She denies any productive cough or fevers.  Limited historian secondary to distress.        Review of patient's allergies indicates:  No Known Allergies  Past Medical History:   Diagnosis Date    Anemia     Biliary obstruction     Cholangitis     Diabetes mellitus     pt denies, does not take meds 11/2016    EtOH dependence     HTN (hypertension) 12/30/2013     Past Surgical History:   Procedure Laterality Date    ARTERIOGRAPHY OF SUBCLAVIAN ARTERY  5/22/2020    Procedure: Arteriogram, Subclavian;  Surgeon: Heather Carbajal MD;  Location: [facility] CATH;  Service: Cardiology;;    COLONOSCOPY      CORONARY ANGIOGRAPHY N/A 5/22/2020    Procedure: ANGIOGRAM, CORONARY ARTERY;  Surgeon: Heather Carbajal MD;  Location: [facility] CATH;  Service: Cardiology;  Laterality: N/A;    CORONARY ARTERY BYPASS GRAFT (CABG) N/A 5/29/2020    Procedure: CORONARY ARTERY BYPASS GRAFT (CABG) x 5 VESSELS;  Surgeon: Dima Laughlin MD;  Location: [facility] OR;  Service: Cardiovascular;  Laterality: N/A;    ENDOSCOPIC HARVEST OF VEIN N/A 5/29/2020    Procedure: SURGICAL PROCUREMENT, VEIN, ENDOSCOPIC;  Surgeon: Dima Laughlin MD;  Location: [facility] OR;  Service: Cardiovascular;  Laterality: N/A;    ERCP W/ PLASTIC STENT PLACEMENT      ERCP W/ SPHICTEROTOMY       ESOPHAGOGASTRODUODENOSCOPY      HYSTERECTOMY      INSERTION OF INTRA-AORTIC BALLOON ASSIST DEVICE N/A 2020    Procedure: INSERTION, INTRA-AORTIC BALLOON PUMP;  Surgeon: Dima Laughlin MD;  Location: [facility] OR;  Service: Cardiovascular;  Laterality: N/A;    LEFT HEART CATHETERIZATION Left 2020    Procedure: Left heart cath;  Surgeon: Heather Carbajal MD;  Location: [facility] CATH;  Service: Cardiology;  Laterality: Left;     History reviewed. No pertinent family history.  Social History     Tobacco Use    Smoking status: Current Every Day Smoker     Packs/day: 0.50     Years: 40.00     Pack years: 20.00     Last attempt to quit: 2020     Years since quittin.0    Smokeless tobacco: Never Used   Substance Use Topics    Alcohol use: Yes     Alcohol/week: 12.0 standard drinks     Types: 12 Cans of beer per week     Comment: sober x 6 months    Drug use: No     Review of Systems   Unable to perform ROS: Acuity of condition   Constitutional: Positive for fatigue. Negative for fever.   HENT: Negative for rhinorrhea.    Respiratory: Positive for chest tightness and shortness of breath. Negative for cough and choking.    Cardiovascular: Negative for chest pain, palpitations and leg swelling.   Gastrointestinal: Negative for abdominal pain, diarrhea, nausea and vomiting.   Genitourinary: Negative for difficulty urinating.   Musculoskeletal: Negative for myalgias.   Skin: Negative for rash.   Neurological: Negative for syncope, weakness, numbness and headaches.   All other systems reviewed and are negative.      Physical Exam     Initial Vitals [22 1849]   BP Pulse Resp Temp SpO2   (!) 196/118 (!) 148 (!) 24 98.1 °F (36.7 °C) 98 %      MAP       --         Physical Exam    Nursing note and vitals reviewed.  Constitutional:  Non-toxic appearance. She appears distressed.   Patient appears to be in respiratory distress and she is also fatigued   HENT:   Head: Normocephalic and atraumatic.   Eyes: EOM are  normal. Pupils are equal, round, and reactive to light.   Neck: Neck supple. JVD present.   Cardiovascular: Normal rate, regular rhythm, normal heart sounds and intact distal pulses. Exam reveals no gallop and no friction rub.    No murmur heard.  Pulmonary/Chest: No respiratory distress. She has no decreased breath sounds. She has wheezes. She has no rhonchi. She has rales.   Abdominal: Abdomen is soft. Bowel sounds are normal. She exhibits no distension. There is no abdominal tenderness.   Musculoskeletal:         General: Edema (Subtle right ankle, negative Homans) present. Normal range of motion.      Cervical back: Neck supple.     Neurological: She is alert and oriented to person, place, and time. She has normal strength. No sensory deficit. GCS score is 15. GCS eye subscore is 4. GCS verbal subscore is 5. GCS motor subscore is 6.   Skin: Skin is warm and dry. No rash noted.   Psychiatric: She has a normal mood and affect.         ED Course   Critical Care    Date/Time: 5/25/2022 7:23 PM  Performed by: Lamin Morton MD  Authorized by: Lamin Morton MD   Direct patient critical care time: 15 minutes  Additional history critical care time: 5 minutes  Ordering / reviewing critical care time: 5 minutes  Documentation critical care time: 5 minutes  Consulting other physicians critical care time: 5 minutes  Consult with family critical care time: 5 minutes  Other critical care time: 5 minutes  Total critical care time (exclusive of procedural time) : 45 minutes  Critical care was necessary to treat or prevent imminent or life-threatening deterioration of the following conditions: respiratory failure.  Critical care was time spent personally by me on the following activities: discussions with consultants, evaluation of patient's response to treatment, obtaining history from patient or surrogate, ordering and review of laboratory studies, pulse oximetry, review of old charts, re-evaluation of patient's  condition, ordering and review of radiographic studies and ordering and performing treatments and interventions.        Labs Reviewed   CBC W/ AUTO DIFFERENTIAL - Abnormal; Notable for the following components:       Result Value    RDW 16.5 (*)     Lymph # 5.9 (*)     Gran % 34.3 (*)     Lymph % 61.7 (*)     Mono % 2.6 (*)     All other components within normal limits   COMPREHENSIVE METABOLIC PANEL - Abnormal; Notable for the following components:    CO2 15 (*)     Glucose 263 (*)     AST 69 (*)     Anion Gap 18 (*)     eGFR if  59 (*)     eGFR if non  51 (*)     All other components within normal limits   B-TYPE NATRIURETIC PEPTIDE - Abnormal; Notable for the following components:    BNP 1,795 (*)     All other components within normal limits   TROPONIN I   SARS-COV-2 RNA AMPLIFICATION, QUAL   MAGNESIUM     EKG Readings: (Independently Interpreted)   EKG rate 143 appears to be in atrial bigeminy pattern with incomplete right bundle-branch block left posterior fascicular block ST segment depression inferior and lateral leads no ST segment elevation     ECG Results          EKG 12-lead (Final result)  Result time 05/26/22 18:57:45    Final result by Interface, Lab In [facility] (05/26/22 18:57:45)                 Narrative:    Test Reason : R06.02,    Vent. Rate : 105 BPM     Atrial Rate : 105 BPM     P-R Int : 144 ms          QRS Dur : 088 ms      QT Int : 344 ms       P-R-T Axes : 030 046 197 degrees     QTc Int : 454 ms    Sinus tachycardia with occasional Premature ventricular complexes  LVH with repolarization abnormality  Cannot rule out Septal infarct (cited on or before 07-JUN-2020)  Abnormal ECG  When compared with ECG of 25-MAY-2022 18:41,  Premature ventricular complexes are now Present  Left posterior fascicular block is no longer Present  Incomplete right bundle branch block is no longer Present  Questionable change in initial forces of Septal leads  Confirmed by Consuelo  Pepe HOWARD ([id number]) on 5/26/2022 6:57:32 PM    Referred By: ESTELA   SELF           Confirmed By:Pepe Cordero MD                             EKG 12-lead (Final result)  Result time 05/26/22 18:57:19    Final result by Interface, Lab In [facility] (05/26/22 18:57:19)                 Narrative:    Test Reason : R07.9,    Vent. Rate : 143 BPM     Atrial Rate : 143 BPM     P-R Int : 130 ms          QRS Dur : 102 ms      QT Int : 320 ms       P-R-T Axes : 000 135 -65 degrees     QTc Int : 493 ms    Sinus tachycardia  Incomplete right bundle branch block  Left posterior fascicular block  Possible Anterior infarct (cited on or before 07-JUN-2020)  Marked ST abnormality, possible inferior subendocardial injury  Abnormal ECG  When compared with ECG of 08-JUN-2020 08:14,  Left posterior fascicular block is now Present  Incomplete right bundle branch block is now Present  Questionable change in initial forces of Septal leads  Confirmed by Pepe Cordero MD ([id number]) on 5/26/2022 6:57:09 PM    Referred By: AAAREFVICTOR HUGO   SELF           Confirmed By:Pepe Cordero MD                            Imaging Results          X-Ray Chest AP Portable (Final result)  Result time 05/25/22 19:31:42    Final result by Enoc Foley MD (05/25/22 19:31:42)                 Impression:      Worsening of airspace infiltrates and effusions.  Correlate for worsening CHF since old exam in 2020      Electronically signed by: Enoc Foley  Date:    05/25/2022  Time:    19:31             Narrative:    EXAMINATION:  XR CHEST AP PORTABLE    CLINICAL HISTORY:  CHF;    TECHNIQUE:  Single frontal view of the chest was performed.    COMPARISON:  06/05/2020    FINDINGS:  The heart is not enlarged the trachea is midline.  Changes of median sternotomy and CABG are again noted.  Increased reticular opacities are noted within the lungs more confluent in the lung bases with small effusions.                                 Medications   sodium chloride 0.9%  flush 10 mL (has no administration in time range)   furosemide injection 40 mg (40 mg Intravenous Given 5/26/22 2053)   glucose chewable tablet 16 g (has no administration in time range)   glucose chewable tablet 24 g (has no administration in time range)   dextrose 50% injection 12.5 g (has no administration in time range)   dextrose 50% injection 25 g (has no administration in time range)   glucagon (human recombinant) injection 1 mg (has no administration in time range)   insulin aspart U-100 pen 1-10 Units (0 Units Subcutaneous Return to Cabinet 5/26/22 0815)   aspirin EC tablet 81 mg (81 mg Oral Given 5/26/22 0822)   atorvastatin tablet 10 mg (10 mg Oral Given 5/26/22 2053)   clopidogreL tablet 75 mg (75 mg Oral Given 5/26/22 0822)   lisinopriL tablet 10 mg (10 mg Oral Given 5/26/22 0822)   metoprolol tartrate (LOPRESSOR) tablet 50 mg (50 mg Oral Given 5/26/22 2053)   hydrALAZINE injection 10 mg (has no administration in time range)   mupirocin 2 % ointment ( Nasal Given 5/26/22 2053)   sars-cov-2 (covid-19) (Pfizer COVID-19) 30 mcg/0.3 ml injection 0.3 mL (has no administration in time range)   acetaminophen tablet 1,000 mg (1,000 mg Oral Given 5/26/22 2053)   ondansetron injection 8 mg (8 mg Intravenous Given 5/25/22 1918)   furosemide injection 40 mg (40 mg Intravenous Given 5/25/22 1956)   potassium chloride SA CR tablet 40 mEq (40 mEq Oral Given 5/27/22 0536)     Medical Decision Making:   Patient will be admitted for heart failure exacerbation.  She was given Lasix here in the emergency room.  She appeared to be very dyspneic on arrival and was hypertensive and tachycardic.  She definitely needs admission and this cannot be managed as an outpatient at this time.             ED Course as of 05/27/22 0550   Wed May 25, 2022   1920 Second EKG independently interpreted by me as rate 105 sinus tachycardia with PVCs ST depression in the lateral leads verses LVH no ST segment elevation [JS]   1920 Chest x-ray  independently interpreted by me shows sternotomy  wires, cardiomegaly, pulmonary edema. [JS]      ED Course User Index  [JS] Lamin Morton MD             Clinical Impression:   Final diagnoses:  [R06.02] Shortness of breath  [R06.02] SOB (shortness of breath)  [I50.9] Acute heart failure, unspecified heart failure type (Primary)          ED Disposition Condition    Admit               Lamin Morton MD  05/27/22 0551       Lamin Morton MD  06/26/22 0853

## 2022-05-26 NOTE — CONSULTS
[facility]  Department of Cardiology  Consult Note      PATIENT NAME: Keyona Irene    MRN: [id number]  TODAY'S DATE: 05/26/2022  ADMIT DATE: 5/25/2022                          CONSULT REQUESTED BY: Jesus Carreon MD    SUBJECTIVE     PRINCIPAL PROBLEM: Acute on chronic systolic congestive heart failure      REASON FOR CONSULT:        HPI:  Keyona Irene is a 71-year-old female who presents to the emergency room for evaluation of shortness of breath and generalized weakness.  She reports shortness of breath and weakness onset several days ago and progressively worsen.  She endorses orthopnea and dyspnea on exertion.  She denies fever chills.  No known sick contacts or travel.  Vaccination status is unknown.  Previous medical history includes aortic insufficiency, anemia, atherosclerotic coronary artery disease, hypertension, EtOH abuse, CABG, active smoker, diabetes, GERD.  ER workup:  CBC unremarkable.  Glucose 263 and bicarb of 15 otherwise unremarkable.  BNP 1795. Chest x-ray demonstrates worsening airspace infiltrates and effusions consistent with acute CHF.  Patient was given Lasix in ER.  Patient was placed on CHF pathway and admitted to ICU.  Patient required BiPAP.       Congestive heart failure, exacerbated with pulmonary edema.  Continue Lasix.  Monitor urine output and electrolytes.  The patient has history of coronary artery disease, continue home medications.  The patient denies any chest pain.  EKG with nonspecific ST changes, troponin negative.    # Acute respiratory failure, due to pulmonary edema.  Continue oxygen and titrate to maintain saturation between 90-94%.  BiPAP if needed.    # Metabolic acidosis with increased anion gap.? Etiology.  Patient has been on metformin at home.?  Lactic acidosis due to metformin, precipitated by hypoxia.  History of diabetes.  Check lactic acid and beta hydroxybutyrate.       After Visit Summary (Printed 6/15/2020)        Progress  Notes  Heather Carbajal MD (Physician)   Cardiology  Subjective:    Patient ID:  Keyona Irene is a 69 y.o. female who presents for follow-up.     HPI  She has history of diabetes mellitus, hypertension and tobacco use.  She was hospitalized last month with congestive heart failure and mild troponin elevation.  She was diuresed and has undergone cardiac workup was found multivessel coronary artery disease (see below). She has undergone 5v-CABG (LIMA to LAD, SVG to diagonal, SVG to PLB, jump SVG to OM2 and OM3) on 5/29/20 by Dr Laughlin. She was discharged earlier this month.  She is here for follow-up. She reports chest soreness and generalized fatigue.  No shortness of breath or leg swelling.        The EKG today shows normal sinus rhythm, left fascicular block, nonspecific ST-T wave abnormality.  I have reviewed the patient's medical history in detail and updated the computerized patient record.     5/20 echo:  · Concentric left ventricular hypertrophy.  · Mildly decreased overall left ventricular systolic function. The estimated ejection fraction is 45%.  · Grade I (mild) left ventricular diastolic dysfunction consistent with impaired relaxation.  · Normal right ventricular systolic function.  · Mild aortic regurgitation.  · Moderate mitral regurgitation.     5/20 LHC:  · Three vessel coronary artery disease.  · Mid LAD lesion , 85% stenosed.  · Ost 2nd Diag lesion , 80% stenosed.  · Dist Cx lesion , 90% stenosed.  · Ost 3rd Mrg lesion , 90% stenosed.  · RPDA lesion , 80% stenosed.  · LV end diastolic pressure is severely elevated.       Review of patient's allergies indicates:  No Known Allergies    Past Medical History:   Diagnosis Date    Anemia     Biliary obstruction     Cholangitis     Diabetes mellitus     pt denies, does not take meds 11/2016    EtOH dependence     HTN (hypertension) 12/30/2013     Past Surgical History:   Procedure Laterality Date    ARTERIOGRAPHY OF SUBCLAVIAN ARTERY   2020    Procedure: Arteriogram, Subclavian;  Surgeon: Heather Carbajal MD;  Location: [facility] CATH;  Service: Cardiology;;    COLONOSCOPY      CORONARY ANGIOGRAPHY N/A 2020    Procedure: ANGIOGRAM, CORONARY ARTERY;  Surgeon: Heather Carbajal MD;  Location: [facility] CATH;  Service: Cardiology;  Laterality: N/A;    CORONARY ARTERY BYPASS GRAFT (CABG) N/A 2020    Procedure: CORONARY ARTERY BYPASS GRAFT (CABG) x 5 VESSELS;  Surgeon: Dima Laughlin MD;  Location: [facility] OR;  Service: Cardiovascular;  Laterality: N/A;    ENDOSCOPIC HARVEST OF VEIN N/A 2020    Procedure: SURGICAL PROCUREMENT, VEIN, ENDOSCOPIC;  Surgeon: Dima Laughlin MD;  Location: [facility] OR;  Service: Cardiovascular;  Laterality: N/A;    ERCP W/ PLASTIC STENT PLACEMENT      ERCP W/ SPHICTEROTOMY      ESOPHAGOGASTRODUODENOSCOPY      HYSTERECTOMY      INSERTION OF INTRA-AORTIC BALLOON ASSIST DEVICE N/A 2020    Procedure: INSERTION, INTRA-AORTIC BALLOON PUMP;  Surgeon: Dima Laughlin MD;  Location: [facility] OR;  Service: Cardiovascular;  Laterality: N/A;    LEFT HEART CATHETERIZATION Left 2020    Procedure: Left heart cath;  Surgeon: Heather Carbajal MD;  Location: [facility] CATH;  Service: Cardiology;  Laterality: Left;     Social History     Tobacco Use    Smoking status: Current Every Day Smoker     Packs/day: 0.50     Years: 40.00     Pack years: 20.00     Last attempt to quit: 2020     Years since quittin.0    Smokeless tobacco: Never Used   Substance Use Topics    Alcohol use: Yes     Alcohol/week: 12.0 standard drinks     Types: 12 Cans of beer per week     Comment: sober x 6 months    Drug use: No        REVIEW OF SYSTEMS  CONSTITUTIONAL: Negative for chills, fatigue and fever.   EYES: No double vision, No blurred vision  NEURO: No headaches, No dizziness  RESPIRATORY: Negative for cough, shortness of breath and wheezing.    CARDIOVASCULAR: Negative for chest pain. Negative for palpitations and leg swelling.   GI: Negative for  abdominal pain, No melena, diarrhea, nausea and vomiting.   : Negative for dysuria and frequency, Negative for hematuria  SKIN: Negative for bruising, Negative for edema or discoloration noted.   ENDOCRINE: Negative for polyphagia, Negative for heat intolerance, Negative for cold intolerance  PSYCHIATRIC: Negative for depression, Negative for anxiety, Negative for memory loss  MUSCULOSKELETAL: Negative for neck pain, Negative for muscle weakness, Negative for back pain     OBJECTIVE     VITAL SIGNS (Most Recent)  Temp: 98.2 °F (36.8 °C) (05/26/22 0800)  Pulse: 82 (05/26/22 1100)  Resp: (!) 33 (05/26/22 1100)  BP: (!) 168/79 (05/26/22 1100)  SpO2: 100 % (05/26/22 1100)    VENTILATION STATUS  Resp: (!) 33 (05/26/22 1100)  SpO2: 100 % (05/26/22 1100)  Oxygen Concentration (%):  [40-50] 40    I & O (Last 24H):    Intake/Output Summary (Last 24 hours) at 5/26/2022 1152  Last data filed at 5/26/2022 1122  Gross per 24 hour   Intake 490 ml   Output 3450 ml   Net -2960 ml       WEIGHTS  Wt Readings from Last 1 Encounters:   05/26/22 1101 43.6 kg (96 lb 1.9 oz)   05/26/22 0000 43.6 kg (96 lb 1.9 oz)   05/25/22 2315 43.6 kg (96 lb 1.9 oz)   05/25/22 1849 45.8 kg (101 lb)       PHYSICAL EXAM  GENERAL: well built, well nourished, well-developed in no apparent distress alert and oriented.   HEENT: Normocephalic. Pupils normal and conjunctivae normal.  Mucous membranes normal, no cyanosis or icterus, trachea central,no pallor or icterus is noted..   NECK: No JVD. No bruit..   THYROID: Thyroid not enlarged. No nodules present..   CARDIAC: Regular rate and rhythm. S1 is normal.S2 is normal.No gallops, clicks or murmurs noted at this time.  CHEST ANATOMY: normal.   LUNGS: Clear to auscultation. No wheezing or rhonchi..   ABDOMEN: Soft no masses or organomegaly.  No abdomen pulsations or bruits.  Normal bowel sounds. No pulsations and no masses felt, No guarding or rebound.   URINARY: No nagel catheter   EXTREMITIES: No cyanosis,  clubbing or edema noted at this time., no calf tenderness bilaterally.   PERIPHERAL VASCULAR SYSTEM: Good palpable distal pulses.   CENTRAL NERVOUS SYSTEM: No focal motor or sensory deficits noted.   SKIN: Skin without lesions, moist, well perfused.   MUSCLE STRENGTH & TONE: No noteable weakness, atrophy or abnormal movement.     HOME MEDICATIONS:  No current facility-administered medications on file prior to encounter.     Current Outpatient Medications on File Prior to Encounter   Medication Sig Dispense Refill    aspirin (ECOTRIN) 81 MG EC tablet Take 1 tablet (81 mg total) by mouth once daily. 30 tablet 0    atorvastatin (LIPITOR) 10 MG tablet Take 1 tablet (10 mg total) by mouth every evening. 90 tablet 3    clopidogreL (PLAVIX) 75 mg tablet Take 1 tablet (75 mg total) by mouth once daily. 90 tablet 3    lisinopriL 10 MG tablet Take 1 tablet (10 mg total) by mouth once daily. 90 tablet 3    metFORMIN (GLUCOPHAGE-XR) 500 MG ER 24hr tablet Take 2 tablets (1,000 mg total) by mouth once daily. 180 tablet 1    metoprolol tartrate 75 mg Tab Take 1 tablet by mouth 3 (three) times daily. 270 tablet 3    spironolactone (ALDACTONE) 25 MG tablet Take 1 tablet (25 mg total) by mouth once daily. 90 tablet 3    alcohol swabs PadM Apply 1 each topically as needed. 100 each 2    blood glucose control high,low (ACCU-CHEK KEDAR CONTROL SOLN) Soln Use to test controls per mfr guidelines/instructions 1 each 1       SCHEDULED MEDS:   aspirin  81 mg Oral Daily    atorvastatin  10 mg Oral QHS    clopidogreL  75 mg Oral Daily    furosemide (LASIX) injection  40 mg Intravenous Q12H    lisinopriL  10 mg Oral Daily    metoprolol tartrate  50 mg Oral BID    mupirocin   Nasal BID       CONTINUOUS INFUSIONS:    PRN MEDS:dextrose 50%, dextrose 50%, glucagon (human recombinant), glucose, glucose, hydrALAZINE, insulin aspart U-100, sars-cov-2 (covid-19), sodium chloride 0.9%    LABS AND DIAGNOSTICS     CBC LAST 3 DAYS  Recent  Labs   Lab 05/25/22  1845   WBC 9.59   RBC 4.38   HGB 12.2   HCT 37.7   MCV 86   MCH 27.9   MCHC 32.4   RDW 16.5*      MPV 11.4   GRAN 34.3*  3.3   LYMPH 61.7*  5.9*   MONO 2.6*  0.3   BASO 0.05   NRBC 0       COAGULATION LAST 3 DAYS  No results for input(s): LABPT, INR, APTT in the last 168 hours.    CHEMISTRY LAST 3 DAYS  Recent Labs   Lab 05/25/22  1845 05/26/22  0049 05/26/22  0312     --  143   K 4.0  --  3.7     --  102   CO2 15*  --  28   ANIONGAP 18*  --  13   BUN 10  --  13   CREATININE 1.1  --  0.9   *  --  115*   CALCIUM 9.2  --  9.5   PH  --  7.402  --    MG 1.9  --   --    ALBUMIN 3.5  --   --    PROT 7.5  --   --    ALKPHOS 112  --   --    ALT 34  --   --    AST 69*  --   --    BILITOT 0.5  --   --        CARDIAC PROFILE LAST 3 DAYS  Recent Labs   Lab 05/25/22  1845   BNP 1,795*   TROPONINI 0.015       ENDOCRINE LAST 3 DAYS  No results for input(s): TSH, PROCAL in the last 168 hours.    LAST ARTERIAL BLOOD GAS  ABG  Recent Labs   Lab 05/26/22  0049   PH 7.402   PO2 63*   PCO2 44.0   HCO3 27.4   BE 3       LAST 7 DAYS MICROBIOLOGY   Microbiology Results (last 7 days)     ** No results found for the last 168 hours. **          MOST RECENT IMAGING  X-Ray Chest AP Portable  Narrative: EXAMINATION:  XR CHEST AP PORTABLE    CLINICAL HISTORY:  CHF;    TECHNIQUE:  Single frontal view of the chest was performed.    COMPARISON:  06/05/2020    FINDINGS:  The heart is not enlarged the trachea is midline.  Changes of median sternotomy and CABG are again noted.  Increased reticular opacities are noted within the lungs more confluent in the lung bases with small effusions.  Impression: Worsening of airspace infiltrates and effusions.  Correlate for worsening CHF since old exam in 2020    Electronically signed by: Enoc Foley  Date:    05/25/2022  Time:    19:31      ECHOCARDIOGRAM RESULTS (last 5)  Results for orders placed during the hospital encounter of 05/19/20    Echo Color Flow  Doppler? No    Interpretation Summary  · Moderately decreased left ventricular systolic function. The estimated ejection fraction is 38%.  · Normal right ventricular systolic function.  · Mild aortic regurgitation.      Echo Color Flow Doppler? Yes    Interpretation Summary  · Concentric left ventricular hypertrophy.  · Mildly decreased overall left ventricular systolic function. The estimated ejection fraction is 45%.  · Grade I (mild) left ventricular diastolic dysfunction consistent with impaired relaxation.  · Normal right ventricular systolic function.  · Mild aortic regurgitation.  · Moderate mitral regurgitation.      CURRENT/PREVIOUS VISIT EKG  Results for orders placed or performed during the hospital encounter of 05/25/22   EKG 12-lead    Collection Time: 05/25/22  7:17 PM    Narrative    Test Reason : R06.02,    Vent. Rate : 105 BPM     Atrial Rate : 105 BPM     P-R Int : 144 ms          QRS Dur : 088 ms      QT Int : 344 ms       P-R-T Axes : 030 046 197 degrees     QTc Int : 454 ms    Sinus tachycardia with occasional Premature ventricular complexes  LVH with repolarization abnormality  Cannot rule out Septal infarct (cited on or before 07-JUN-2020)  Abnormal ECG  When compared with ECG of 25-MAY-2022 18:41,  Premature ventricular complexes are now Present  Left posterior fascicular block is no longer Present  Incomplete right bundle branch block is no longer Present  Questionable change in initial forces of Septal leads    Referred By: [email or web address]   SELF           Confirmed By:            ASSESSMENT/PLAN:     Active Hospital Problems    Diagnosis    *Acute on chronic systolic congestive heart failure    Severe malnutrition    Atherosclerosis of native coronary artery of native heart with angina pectoris    Acute hypoxemic respiratory failure    Gastroesophageal reflux disease without esophagitis    Essential hypertension    Tobacco abuse       ASSESSMENT & PLAN:   CONGESTIVE HEART FAILURE  HISTORY  CORONARY BYPASS X5 MAY OF 2020  MILD DECREASED EJECTION FRACTION      RECOMMENDATIONS:  PATIENT HAS DIURESED ALMOST 3 L  CURRENTLY APPEARS COMPENSATED.    ECHO FOR LV FUNCTION  ADD JARDIANCE FOR CHF AT TIME OF DISCHARGE  ISCHEMIA WORKUP CAN BE DONE AS OUTPATIENT WITH STRESS TESTS PENDING ECHO      Wilfredo Woo MD  [facility]  Department of Cardiology  Date of Service: 05/26/2022  11:52 AM

## 2022-05-26 NOTE — CARE UPDATE
05/25/22 1909   Patient Assessment/Suction   Level of Consciousness (AVPU) responds to voice   Respiratory Effort Labored;Mouth breathing   Expansion/Accessory Muscles/Retractions abdominal muscle use;accessory muscle use   All Lung Fields Breath Sounds diminished   TEODORO Breath Sounds diminished   LLL Breath Sounds diminished   RUL Breath Sounds diminished   RML Breath Sounds diminished   RLL Breath Sounds diminished   Rhythm/Pattern, Respiratory assisted mechanically  (on bipap now)   PRE-TX-O2   O2 Device (Oxygen Therapy) BiPAP   $ Is the patient on Low Flow Oxygen? Yes   Oxygen Concentration (%) 50   SpO2 (!) 92 %   Pulse Oximetry Type Continuous   $ Pulse Oximetry - Single Charge Pulse Oximetry - Single   Pulse (!) 115   Ready to Wean/Extubation Screen   FIO2<=50 (chart decimal) 0.5   Preset CPAP/BiPAP Settings   Mode Of Delivery BiPAP S/T   $ CPAP/BiPAP Daily Charge BiPAP/CPAP Daily   $ Initial CPAP/BiPAP Setup? Yes   $ Is patient using? Yes   Size of Mask Small/Medium   Sized Appropriately? Yes   Equipment Type Vision   Airway Device Type medium full face mask   Ipap 12   EPAP (cm H2O) 6   Pressure Support (cm H2O) 6   Set Rate (Breaths/Min) 12   ITime (sec) 0.9   Rise Time (sec) 0.4   Patient CPAP/BiPAP Settings   Timed Inspiration (Sec) 0.9   IPAP Rise Time (sec) 0.4   RR Total (Breaths/Min) 31   Tidal Volume (mL) 357   VE Minute Ventilation (L/min) 11 L/min   Peak Inspiratory Pressure (cm H2O) 11   TiTOT (%) 34   Total Leak (L/Min) 5   Patient Trigger - ST Mode Only (%) 97   CPAP/BiPAP Alarms   High Pressure (cm H2O) 20   Low Pressure (cm H2O) 5   Low Pressure Delay (Sec) 30   Minute Ventilation (L/Min) 3   High RR (breaths/min) 45   Low RR (breaths/min) 8   Apnea (Sec) 30   RT called to place pt on Bipap, pt on NRB at arrival. Pt labored and sob, nodding her head since being placed on bipap her sob is a little better.

## 2022-05-26 NOTE — PLAN OF CARE
Shift goals discussed with patient with time given for questions and answers.     Problem: Adult Inpatient Plan of Care  Goal: Absence of Hospital-Acquired Illness or Injury  Outcome: Ongoing, Progressing-Safety maintained     Problem: Diabetes Comorbidity  Goal: Blood Glucose Level Within Targeted Range  Outcome: Ongoing, Progressing-Glucose monitored

## 2022-05-26 NOTE — SUBJECTIVE & OBJECTIVE
Past Medical History:   Diagnosis Date    Anemia     Biliary obstruction     Cholangitis     Diabetes mellitus     pt denies, does not take meds 11/2016    EtOH dependence     HTN (hypertension) 12/30/2013       Past Surgical History:   Procedure Laterality Date    ARTERIOGRAPHY OF SUBCLAVIAN ARTERY  5/22/2020    Procedure: Arteriogram, Subclavian;  Surgeon: Heather Carbajal MD;  Location: [facility] CATH;  Service: Cardiology;;    COLONOSCOPY      CORONARY ANGIOGRAPHY N/A 5/22/2020    Procedure: ANGIOGRAM, CORONARY ARTERY;  Surgeon: Heather Carbajal MD;  Location: [facility] CATH;  Service: Cardiology;  Laterality: N/A;    CORONARY ARTERY BYPASS GRAFT (CABG) N/A 5/29/2020    Procedure: CORONARY ARTERY BYPASS GRAFT (CABG) x 5 VESSELS;  Surgeon: Dima Laughlin MD;  Location: [facility] OR;  Service: Cardiovascular;  Laterality: N/A;    ENDOSCOPIC HARVEST OF VEIN N/A 5/29/2020    Procedure: SURGICAL PROCUREMENT, VEIN, ENDOSCOPIC;  Surgeon: Dima Laughlin MD;  Location: [facility];  Service: Cardiovascular;  Laterality: N/A;    ERCP W/ PLASTIC STENT PLACEMENT      ERCP W/ SPHICTEROTOMY      ESOPHAGOGASTRODUODENOSCOPY      HYSTERECTOMY      INSERTION OF INTRA-AORTIC BALLOON ASSIST DEVICE N/A 5/29/2020    Procedure: INSERTION, INTRA-AORTIC BALLOON PUMP;  Surgeon: Dima Laughlin MD;  Location: [facility] OR;  Service: Cardiovascular;  Laterality: N/A;    LEFT HEART CATHETERIZATION Left 5/22/2020    Procedure: Left heart cath;  Surgeon: Heather Carbajal MD;  Location: [facility] CATH;  Service: Cardiology;  Laterality: Left;       Review of patient's allergies indicates:  No Known Allergies    No current facility-administered medications on file prior to encounter.     Current Outpatient Medications on File Prior to Encounter   Medication Sig    aspirin (ECOTRIN) 81 MG EC tablet Take 1 tablet (81 mg total) by mouth once daily.    atorvastatin (LIPITOR) 10 MG tablet Take 1 tablet (10 mg total) by mouth every evening.    clopidogreL (PLAVIX) 75 mg tablet Take 1 tablet  (75 mg total) by mouth once daily.    lisinopriL 10 MG tablet Take 1 tablet (10 mg total) by mouth once daily.    metFORMIN (GLUCOPHAGE-XR) 500 MG ER 24hr tablet Take 2 tablets (1,000 mg total) by mouth once daily.    metoprolol tartrate 75 mg Tab Take 1 tablet by mouth 3 (three) times daily.    spironolactone (ALDACTONE) 25 MG tablet Take 1 tablet (25 mg total) by mouth once daily.    alcohol swabs PadM Apply 1 each topically as needed.    blood glucose control high,low (ACCU-CHEK KEDAR CONTROL SOLN) Soln Use to test controls per r guidelines/instructions    [DISCONTINUED] acetaminophen (TYLENOL) 325 MG tablet Take 2 tablets (650 mg total) by mouth every 6 (six) hours as needed for Pain.    [DISCONTINUED] amLODIPine (NORVASC) 10 MG tablet Take 1 tablet (10 mg total) by mouth once daily.    [DISCONTINUED] aspirin-acetaminophen-caffeine 250-250-65 mg (EXCEDRIN MIGRAINE) 250-250-65 mg per tablet Take 1 tablet by mouth daily as needed (headache).    [DISCONTINUED] blood sugar diagnostic Strp To check BG 1 times daily, to use with insurance preferred meter    [DISCONTINUED] blood-glucose meter kit To check BG 1 times daily, to use with insurance preferred meter    [DISCONTINUED] cyanocobalamin (VITAMIN B-12) 1000 MCG tablet Take 1 tablet (1,000 mcg total) by mouth once daily.    [DISCONTINUED] cyproheptadine (PERIACTIN) 4 mg tablet Take 1 tablet (4 mg total) by mouth once daily.    [DISCONTINUED] fluticasone (FLONASE) 50 mcg/actuation nasal spray 1 spray (50 mcg total) by Each Nare route once daily.    [DISCONTINUED] lancets Misc To check BG 1 times daily, to use with insurance preferred meter    [DISCONTINUED] levocetirizine (XYZAL) 5 MG tablet Take 1 tablet (5 mg total) by mouth every evening.    [DISCONTINUED] polyethylene glycol (GLYCOLAX) 17 gram PwPk Take 17 g by mouth once daily.    [DISCONTINUED] potassium chloride (KLOR-CON) 10 MEQ TbSR TAKE 1 TABLET(10 MEQ) BY MOUTH EVERY DAY     Family History    None        Tobacco Use    Smoking status: Current Every Day Smoker     Packs/day: 0.50     Years: 40.00     Pack years: 20.00     Last attempt to quit: 2020     Years since quittin.0    Smokeless tobacco: Never Used   Substance and Sexual Activity    Alcohol use: Yes     Alcohol/week: 12.0 standard drinks     Types: 12 Cans of beer per week     Comment: sober x 6 months    Drug use: No    Sexual activity: Not on file     Review of Systems   Constitutional:  Positive for activity change. Negative for chills, diaphoresis and fever.   HENT:  Negative for congestion, nosebleeds and tinnitus.    Eyes:  Negative for photophobia and visual disturbance.   Respiratory:  Positive for shortness of breath. Negative for cough, chest tightness and wheezing.    Cardiovascular:  Negative for chest pain, palpitations and leg swelling.   Gastrointestinal:  Negative for abdominal distention, abdominal pain, constipation, diarrhea, nausea and vomiting.   Endocrine: Negative for cold intolerance and heat intolerance.   Genitourinary:  Negative for difficulty urinating, dysuria, frequency, hematuria and urgency.   Musculoskeletal:  Negative for arthralgias, back pain and myalgias.   Skin:  Negative for pallor, rash and wound.   Allergic/Immunologic: Negative for immunocompromised state.   Neurological:  Negative for dizziness, tremors, facial asymmetry, speech difficulty and weakness.   Hematological:  Negative for adenopathy. Does not bruise/bleed easily.   Psychiatric/Behavioral:  Negative for confusion and sleep disturbance. The patient is not nervous/anxious.    Objective:     Vital Signs (Most Recent):  Temp: 98.4 °F (36.9 °C) (22)  Pulse: 87 (22)  Resp: (!) 24 (22)  BP: (!) 151/82 (22)  SpO2: 100 % (22)   Vital Signs (24h Range):  Temp:  [98.1 °F (36.7 °C)-98.4 °F (36.9 °C)] 98.4 °F (36.9 °C)  Pulse:  [] 87  Resp:  [24] 24  SpO2:  [92 %-100 %] 100 %  BP:  (151-196)/() 151/82     Weight: 45.8 kg (101 lb)  Body mass index is 19.08 kg/m².    Physical Exam  Vitals and nursing note reviewed.   Constitutional:       General: She is not in acute distress.     Appearance: She is well-developed. She is not diaphoretic.   HENT:      Head: Normocephalic.      Mouth/Throat:      Mouth: Mucous membranes are moist.      Pharynx: Oropharynx is clear.   Eyes:      General: No scleral icterus.     Conjunctiva/sclera: Conjunctivae normal.      Pupils: Pupils are equal, round, and reactive to light.   Neck:      Vascular: No JVD.   Cardiovascular:      Rate and Rhythm: Normal rate and regular rhythm.      Heart sounds: Murmur heard.     No friction rub. No gallop.   Pulmonary:      Effort: Respiratory distress present.      Breath sounds: Rales present. No wheezing.   Abdominal:      General: Bowel sounds are normal. There is no distension.      Palpations: Abdomen is soft.      Tenderness: There is no abdominal tenderness. There is no guarding or rebound.   Musculoskeletal:         General: No tenderness. Normal range of motion.      Cervical back: Normal range of motion and neck supple.   Lymphadenopathy:      Cervical: No cervical adenopathy.   Skin:     General: Skin is warm and dry.      Capillary Refill: Capillary refill takes less than 2 seconds.      Coloration: Skin is not pale.      Findings: No erythema or rash.   Neurological:      Mental Status: She is alert and oriented to person, place, and time.      Cranial Nerves: No cranial nerve deficit.      Sensory: No sensory deficit.      Coordination: Coordination normal.      Deep Tendon Reflexes: Reflexes normal.   Psychiatric:         Behavior: Behavior normal.         Thought Content: Thought content normal.         Judgment: Judgment normal.         CRANIAL NERVES     CN III, IV, VI   Pupils are equal, round, and reactive to light.     Significant Labs: All pertinent labs within the past 24 hours have been  reviewed.  CBC:   Recent Labs   Lab 05/25/22 1845   WBC 9.59   HGB 12.2   HCT 37.7        CMP:   Recent Labs   Lab 05/25/22 1845      K 4.0      CO2 15*   *   BUN 10   CREATININE 1.1   CALCIUM 9.2   PROT 7.5   ALBUMIN 3.5   BILITOT 0.5   ALKPHOS 112   AST 69*   ALT 34   ANIONGAP 18*   EGFRNONAA 51*     Cardiac Markers:   Recent Labs   Lab 05/25/22 1845   BNP 1,795*       Significant Imaging: I have reviewed all pertinent imaging results/findings within the past 24 hours.    Chest x-ray:   FINDINGS:  The heart is not enlarged the trachea is midline.  Changes of median sternotomy and CABG are again noted.  Increased reticular opacities are noted within the lungs more confluent in the lung bases with small effusions.     Impression:   Worsening of airspace infiltrates and effusions.  Correlate for worsening CHF since old exam in 2020

## 2022-05-26 NOTE — ASSESSMENT & PLAN NOTE
Patient is identified as having Systolic (HFrEF) heart failure that is Acute on chronic. CHF is currently uncontrolled due to Rales/crackles on pulmonary exam and Pulmonary edema/pleural effusion on CXR. Latest ECHO performed and demonstrates- Results for orders placed during the hospital encounter of 05/19/20    Echo Color Flow Doppler? No    Interpretation Summary  · Moderately decreased left ventricular systolic function. The estimated ejection fraction is 38%.  · Normal right ventricular systolic function.  · Mild aortic regurgitation.  . Continue Furosemide and monitor clinical status closely. Monitor on telemetry. Patient is on CHF pathway.  Monitor strict Is&Os and daily weights.  Place on fluid restriction of 1.5 L. Continue to stress to patient importance of self efficacy and  on diet for CHF. Last BNP reviewed- and noted below   Recent Labs   Lab 05/25/22  1845   BNP 1,795*   .

## 2022-05-26 NOTE — PLAN OF CARE
[facility]  Initial Discharge Assessment       Primary Care Provider: Howard Gan MD    Admission Diagnosis: Shortness of breath [R06.02]  CHF (congestive heart failure) [I50.9]  SOB (shortness of breath) [R06.02]  Chest pain [R07.9]  Acute heart failure, unspecified heart failure type [I50.9]    Admission Date: 5/25/2022  Expected Discharge Date:      Pt confirmed her home address and that she lives with her spouse Nasra. Pt also has assistance from 2 daughters. Pt denied HH and DME. Pharmacy of choice is [facility] mail [facility] or anywhere in [place]. Pts PCP is Dr. Gan and she has [employer] managed medicare. Pt does not drive and stated that her  or family will provide transport at discharge. Pts discharge plan is home with family. CM following for additional needs.     Discharge Barriers Identified: None    Payor: [employer] MANAGED MEDICARE / Plan: [employer] SNP (SPECIAL NEEDS PLAN) / Product Type: Medicare Advantage /     Extended Emergency Contact Information  Primary Emergency Contact: Avani Alicia  Address: [address] [place] [address] [id number] [place] [address]  Home Phone: [phone]  Mobile Phone: [phone]  Relation: Daughter  Secondary Emergency Contact: SALVATORE ALICIA  Mobile Phone: [phone]  Relation: Daughter  Preferred language: English    Discharge Plan A: Home with family  Discharge Plan B: Home      [facility] - [place], [place] - [address] [facility]  [address] [address]  Phone: [phone] Fax: [phone]    [facility] [address] [address]  [address] [address] [address]  Phone: [phone] Fax: [phone]      Initial Assessment (most recent)     Adult Discharge Assessment - 05/26/22 1226        Discharge Assessment    Assessment Type Discharge Planning Assessment     Confirmed/corrected address, phone number and insurance Yes     Confirmed Demographics Correct on Facesheet     Source of  Information patient     Does patient/caregiver understand observation status Yes     Communicated MIKE with patient/caregiver Yes     Lives With spouse     Facility Arrived From: home     Do you expect to return to your current living situation? Yes     Do you have help at home or someone to help you manage your care at home? Yes     Who are your caregiver(s) and their phone number(s)? SpouseDaniel Aviles     Prior to hospitilization cognitive status: Alert/Oriented     Current cognitive status: Alert/Oriented     Walking or Climbing Stairs Difficulty none     Dressing/Bathing Difficulty none     Home Layout Able to live on 1st floor     Equipment Currently Used at Home none     Readmission within 30 days? No     Patient currently being followed by outpatient case management? No     Do you currently have service(s) that help you manage your care at home? No     Do you take prescription medications? Yes     Do you have prescription coverage? Yes     Is the patient taking medications as prescribed? yes     Who is going to help you get home at discharge? Rafael Aviles     How do you get to doctors appointments? car, drives self;family or friend will provide     Are you on dialysis? No     Do you take coumadin? No     Discharge Plan A Home with family     Discharge Plan B Home     DME Needed Upon Discharge  none     Discharge Plan discussed with: Patient     Discharge Barriers Identified None        Relationship/Environment    Name(s) of Who Lives With Patient rafael aviles

## 2022-05-26 NOTE — CONSULTS
[facility]  Adult Nutrition  Consult Note    SUMMARY    Intervention: carb/sodium/fat/fluid modified diet and nutrition education     Recommendation:  1) Continue diet as ordered to promote PO intakes   2) Add Boost glucose control vanilla BID   3) weigh daily   4) Nutrition education and handout given    Goals: 1) PO intakes > 75% of meals and supplements at f/u  Nutrition Goal Status: new  Communication of RD Recs:  (POC, sticky note)    Assessment and Plan    Severe malnutrition  Contributing Nutrition Diagnosis  Moderate chronic illness related malnutrition    Related to (etiology):   Decreased appetite and altered GI function    Signs and Symptoms (as evidenced by):   1) PO intakes <75% of needs x > 1 month  2) moderate-severe wasting as charted below  3) mild edema    Interventions:  Above    Nutrition Diagnosis Status:   new           Malnutrition Assessment     Skin (Micronutrient):  (Luis Felipe = 21)  Teeth (Micronutrient):  (missing some)   Micronutrient Evaluation: suspected deficiency  Micronutrient Evaluation Comments: check iron   Energy Intake (Malnutrition): less than 75% for greater than or equal to 1 month   Orbital Region (Subcutaneous Fat Loss): moderate depletion  Upper Arm Region (Subcutaneous Fat Loss): moderate depletion  Thoracic and Lumbar Region: moderate depletion   [place] Region (Muscle Loss): moderate depletion  Clavicle Bone Region (Muscle Loss): severe depletion  Clavicle and Acromion Bone Region (Muscle Loss): severe depletion  Scapular Bone Region (Muscle Loss): moderate depletion  Dorsal Hand (Muscle Loss): moderate depletion  Patellar Region (Muscle Loss): moderate depletion  Anterior Thigh Region (Muscle Loss): moderate depletion  Posterior Calf Region (Muscle Loss): severe depletion   Edema (Fluid Accumulation): 1-->trace   Subcutaneous Fat Loss (Final Summary): moderate protein-calorie malnutrition  Muscle Loss Evaluation (Final Summary): severe  "protein-calorie malnutrition         Reason for Assessment    Reason For Assessment: consult  Diagnosis:  (acute on chronic CHF)  Relevant Medical History: aortic insufficiency, anemia, atherosclerotic coronary artery disease, hypertension, EtOH abuse, CABG, active smoker, diabetes, GERD  Interdisciplinary Rounds: did not attend    General Information Comments: 72 y/o female admitted with CHF s/p a few days of SOB. NFPE done 5/26/22 moderate-severe wasting seen. PTA pt tells me she has a variable appetite r/t ulcers, sometimes has stomach pain. Stomach feels hard in places and she has pain in others. Follows low sodium diet at home, daughter helps her cook/shop. She knows she needs to avoid sugar but does not always do this. Drinks protein shakes sometimes- Ensure regular. I gave pt diabetic education, supplement suggestions and instructions for daughter.    Nutrition Discharge Planning: To be determined- DM 1500 kcal low sodium diet + boost glucose control 2-3 x daily    Nutrition Risk Screen    Nutrition Risk Screen: no indicators present    Nutrition/Diet History    Patient Reported Diet/Restrictions/Preferences: low salt  Spiritual, Cultural Beliefs, Mosque Practices, Values that Affect Care: no  Food Allergies: NKFA  Factors Affecting Nutritional Intake: altered gastrointestinal function    Anthropometrics    Temp: 98.2 °F (36.8 °C)  Height Method: Stated  Height: 5' 1"  Height (inches): 61 in  Weight Method: Bed Scale  Weight: 43.6 kg (96 lb 1.9 oz)  Weight (lb): 96.12 lb  Ideal Body Weight (IBW), Female: 105 lb  % Ideal Body Weight, Female (lb): 91.54 %  BMI (Calculated): 18.2  BMI Grade: 17 - 18.4 protein-energy malnutrition grade I       Lab/Procedures/Meds    Pertinent Labs Reviewed: reviewed  BMP  Lab Results   Component Value Date     05/26/2022    K 3.7 05/26/2022     05/26/2022    CO2 28 05/26/2022    BUN 13 05/26/2022    CREATININE 0.9 05/26/2022    CALCIUM 9.5 05/26/2022    ANIONGAP 13 " 05/26/2022    ESTGFRAFRICA >60 05/26/2022    EGFRNONAA >60 05/26/2022     Recent Labs   Lab 05/26/22  0819   POCTGLUCOSE 118*     Lab Results   Component Value Date    LABA1C 6.2 (H) 02/05/2018    HGBA1C 5.7 (H) 05/25/2022    HGBA1C 5.7 (H) 05/25/2022   \  Lab Results   Component Value Date    ALBUMIN 3.5 05/25/2022       Pertinent Medications Reviewed: reviewed  Pertinent Medications Comments: insulin, statin, lasix      Estimated/Assessed Needs    Weight Used For Calorie Calculations: 43.6 kg (96 lb 1.9 oz)  Energy Calorie Requirements (kcal): MSj ( x 1.5 wt gain) = 1350 kcal  Energy Need Method: [place]  Protein Requirements: 1.2-1.5 g protein/kg ( wasting) = 52-65 g  Weight Used For Protein Calculations: 43.6 kg (96 lb 1.9 oz)  Fluid Requirements (mL): 1500 ml per MD  Estimated Fluid Requirement Method: RDA Method  CHO Requirement: 151-184 g      Nutrition Prescription Ordered    Current Diet Order: DM 2000 kcal, cardiac, 1500 ml fluid    Evaluation of Received Nutrient/Fluid Intake    Energy Calories Required: meeting needs  Protein Required: meeting needs  Fluid Required: meeting needs  Comments: ate all of her breakfast  Tolerance: tolerating  % Intake of Estimated Energy Needs: %  % Meal Intake: %    Nutrition Risk    Level of Risk/Frequency of Follow-up: low - moderate 1-2 x weekly      Monitor and Evaluation    Food and Nutrient Intake: energy intake, food and beverage intake  Food and Nutrient Adminstration: diet order  Knowledge/Beliefs/Attitudes: food and nutrition knowledge/skill  Anthropometric Measurements: weight  Biochemical Data, Medical Tests and Procedures: electrolyte and renal panel, gastrointestinal profile, glucose/endocrine profile  Nutrition-Focused Physical Findings: overall appearance       Nutrition Follow-Up    RD Follow-up?: Yes

## 2022-05-26 NOTE — PHARMACY MED REC
"Admission Medication History     The home medication history was taken by Megan Sesay CPhT.    Medication history obtained from Patient     You may go to "Admission" then "Reconcile Home Medications" tabs to review and/or act upon these items.      The home medication list has been updated by the Pharmacy department.    Please read ALL comments highlighted in yellow.    Please address this information as you see fit.     Feel free to contact us if you have any questions or require assistance.      The medications listed below were removed from the home medication list.  Please reorder if appropriate:  Patient reports no longer taking the following medication(s):   Amlodipine 10mg   Vitamin B12   Cyproheptadine 4mg   Flonase   Levocetirizine 5mg   Potassium Cholride 10meq      Megan Sesay CPhT.  [phone]      .          "

## 2022-05-26 NOTE — NURSING TRANSFER
Nursing Transfer Note      5/26/2022     Reason patient is being transferred: stepdown from ICU    Transfer to 218    Transfer via wheelchair    Transfer with telemetry, O2, nasal cannula    Transported by RN    Medicines sent: NA    Any special needs or follow-up needed: NA    Chart send with patient: Yes    Notified: attempted to call family for patient; no answer    Patient reassessed at: 1200    Upon arrival to floor: patient transferred to bed, bed alarm set. Call light and telephone within reach. Patient watching TV at this time. Charge RN notified of patient arrival. Report given to Dinorah JORGE; all questions answered.

## 2022-05-26 NOTE — ASSESSMENT & PLAN NOTE
Chronic  Chronic, controlled.  Latest blood pressure and vitals reviewed-   Temp:  [98.1 °F (36.7 °C)-98.4 °F (36.9 °C)]   Pulse:  []   Resp:  [24]   BP: (151-196)/()   SpO2:  [92 %-100 %] .   Home meds for hypertension were reviewed and noted below.   Hypertension Medications             lisinopriL 10 MG tablet Take 1 tablet (10 mg total) by mouth once daily.    metoprolol tartrate 75 mg Tab Take 1 tablet by mouth 3 (three) times daily.    spironolactone (ALDACTONE) 25 MG tablet Take 1 tablet (25 mg total) by mouth once daily.          While in the hospital, will manage blood pressure as follows; Continue home antihypertensive regimen    Will utilize p.r.n. blood pressure medication only if patient's blood pressure greater than  180/110 and she develops symptoms such as worsening chest pain or shortness of breath.

## 2022-05-26 NOTE — EICU
EICU BRIEF ADMIT NOTE:    HISTORY: Please refer to H/P and ER notes for detail. Shortness of breath, improved since admission    CAMERA ASSESSMENT: Two way audiovisual assessment was done: no distress    Telemetry was reviewed. Medical records including notes, labs and imaging were reviewed.    DISCUSSED with bedside nurse.    ASSESSMENT AND PLAN:    #.  Congestive heart failure, exacerbated with pulmonary edema.  Continue Lasix.  Monitor urine output and electrolytes.  The patient has history of coronary artery disease, continue home medications.  The patient denies any chest pain.  EKG with nonspecific ST changes, troponin negative.    # Acute respiratory failure, due to pulmonary edema.  Continue oxygen and titrate to maintain saturation between 90-94%.  BiPAP if needed.    # Metabolic acidosis with increased anion gap.? Etiology.  Patient has been on metformin at home.?  Lactic acidosis due to metformin, precipitated by hypoxia.  History of diabetes.  Check lactic acid and beta hydroxybutyrate.     BEST PRACTICES REVIEW:    GLYCEMIN CONTROL:  SSI  STRESS ULCER PROPHYLAXIS: not indicated  DVT PROPHYLAXIS:   SCD    Thank You for allowing EICU to participate in the care of the patient. Please call as needed      Ilir Mays MD  [facility]  [phone]

## 2022-05-26 NOTE — HPI
Keyona Irene is a 71-year-old female who presents to the emergency room for evaluation of shortness of breath and generalized weakness.  She reports shortness of breath and weakness onset several days ago and progressively worsen.  She endorses orthopnea and dyspnea on exertion.  She denies fever chills.  No known sick contacts or travel.  Vaccination status is unknown.  Previous medical history includes aortic insufficiency, anemia, atherosclerotic coronary artery disease, hypertension, EtOH abuse, CABG, active smoker, diabetes, GERD.  ER workup:  CBC unremarkable.  Glucose 263 and bicarb of 15 otherwise unremarkable.  BNP 1795. Chest x-ray demonstrates worsening airspace infiltrates and effusions consistent with acute CHF.  Patient was given Lasix in ER.  Patient was placed on CHF pathway and admitted to ICU.  Patient required BiPAP.

## 2022-05-26 NOTE — PLAN OF CARE
Problem: Adult Inpatient Plan of Care  Goal: Plan of Care Review  Outcome: Ongoing, Progressing     Problem: Fluid Imbalance (Heart Failure)  Goal: Fluid Balance  Outcome: Ongoing, Progressing     Problem: Respiratory Compromise (Heart Failure)  Goal: Effective Oxygenation and Ventilation  Outcome: Ongoing, Progressing     Step 1: Admit - Current (PATHWAY - IP GENERAL HEART FAILURE - [facility])  Nurse: Patient education provided (located in [employer])  Outcome: Met  Nurse: Standing weight recorded within 1 hour of arrival to floor  Outcome: Met  Nurse: Daily standing weights recorded (routine at 6 AM)  Outcome: Met  Nurse: Urine output recorded (4 hrs after first lasix dose/750mL)  Outcome: Met     POC reviewed with the patient and she verbalized understanding. All comments and concerns addressed. AxOx4. 2L NC maintained. NSR on monitor. Cardiac diet in place, added boost drinks--encouraging PO intake. 1.5L fluid restriction. Purewick in place--approx 850 urine this am. Last BM 5/25. Patient reports occasional LUQ pain with deep breath--MD aware. PAN hose on. Bed locked in lowest position with bed alarm set, call light within reach. Safety precautions maintained.

## 2022-05-26 NOTE — H&P
[facility] Medicine  History & Physical    Patient Name: Keyona Irene  MRN: [id number]  Patient Class: IP- Inpatient  Admission Date: 5/25/2022  Attending Physician: Jesus Carreon MD   Primary Care Provider: Howard Gan MD         Patient information was obtained from patient, past medical records and ER records.     Subjective:     Principal Problem:Acute on chronic systolic congestive heart failure    Chief Complaint:   Chief Complaint   Patient presents with    Shortness of Breath     Tachyarrhythmia on arrival - unable to make complete sentances without being winded; skin clammy. Placed on monitor/O2 via NRB        HPI: Keyona Irene is a 71-year-old female who presents to the emergency room for evaluation of shortness of breath and generalized weakness.  She reports shortness of breath and weakness onset several days ago and progressively worsen.  She endorses orthopnea and dyspnea on exertion.  She denies fever chills.  No known sick contacts or travel.  Vaccination status is unknown.  Previous medical history includes aortic insufficiency, anemia, atherosclerotic coronary artery disease, hypertension, EtOH abuse, CABG, active smoker, diabetes, GERD.  ER workup:  CBC unremarkable.  Glucose 263 and bicarb of 15 otherwise unremarkable.  BNP 1795. Chest x-ray demonstrates worsening airspace infiltrates and effusions consistent with acute CHF.  Patient was given Lasix in ER.  Patient was placed on CHF pathway and admitted to ICU.  Patient required BiPAP.      Past Medical History:   Diagnosis Date    Anemia     Biliary obstruction     Cholangitis     Diabetes mellitus     pt denies, does not take meds 11/2016    EtOH dependence     HTN (hypertension) 12/30/2013       Past Surgical History:   Procedure Laterality Date    ARTERIOGRAPHY OF SUBCLAVIAN ARTERY  5/22/2020    Procedure: Arteriogram, Subclavian;  Surgeon: Heather Carbajal MD;  Location: Replaced by [facility];  Service:  Cardiology;;    COLONOSCOPY      CORONARY ANGIOGRAPHY N/A 5/22/2020    Procedure: ANGIOGRAM, CORONARY ARTERY;  Surgeon: Heather Carbajal MD;  Location: [facility] CATH;  Service: Cardiology;  Laterality: N/A;    CORONARY ARTERY BYPASS GRAFT (CABG) N/A 5/29/2020    Procedure: CORONARY ARTERY BYPASS GRAFT (CABG) x 5 VESSELS;  Surgeon: iDma Laughlin MD;  Location: [facility] OR;  Service: Cardiovascular;  Laterality: N/A;    ENDOSCOPIC HARVEST OF VEIN N/A 5/29/2020    Procedure: SURGICAL PROCUREMENT, VEIN, ENDOSCOPIC;  Surgeon: Dima Laughlin MD;  Location: [facility] OR;  Service: Cardiovascular;  Laterality: N/A;    ERCP W/ PLASTIC STENT PLACEMENT      ERCP W/ SPHICTEROTOMY      ESOPHAGOGASTRODUODENOSCOPY      HYSTERECTOMY      INSERTION OF INTRA-AORTIC BALLOON ASSIST DEVICE N/A 5/29/2020    Procedure: INSERTION, INTRA-AORTIC BALLOON PUMP;  Surgeon: Dima Laughlin MD;  Location: [facility] OR;  Service: Cardiovascular;  Laterality: N/A;    LEFT HEART CATHETERIZATION Left 5/22/2020    Procedure: Left heart cath;  Surgeon: Heather Carbajal MD;  Location: [facility] CATH;  Service: Cardiology;  Laterality: Left;       Review of patient's allergies indicates:  No Known Allergies    No current facility-administered medications on file prior to encounter.     Current Outpatient Medications on File Prior to Encounter   Medication Sig    aspirin (ECOTRIN) 81 MG EC tablet Take 1 tablet (81 mg total) by mouth once daily.    atorvastatin (LIPITOR) 10 MG tablet Take 1 tablet (10 mg total) by mouth every evening.    clopidogreL (PLAVIX) 75 mg tablet Take 1 tablet (75 mg total) by mouth once daily.    lisinopriL 10 MG tablet Take 1 tablet (10 mg total) by mouth once daily.    metFORMIN (GLUCOPHAGE-XR) 500 MG ER 24hr tablet Take 2 tablets (1,000 mg total) by mouth once daily.    metoprolol tartrate 75 mg Tab Take 1 tablet by mouth 3 (three) times daily.    spironolactone (ALDACTONE) 25 MG tablet Take 1 tablet (25 mg total) by mouth once daily.     alcohol swabs PadM Apply 1 each topically as needed.    blood glucose control high,low (ACCU-CHEK KEDAR CONTROL SOLN) Soln Use to test controls per r guidelines/instructions    [DISCONTINUED] acetaminophen (TYLENOL) 325 MG tablet Take 2 tablets (650 mg total) by mouth every 6 (six) hours as needed for Pain.    [DISCONTINUED] amLODIPine (NORVASC) 10 MG tablet Take 1 tablet (10 mg total) by mouth once daily.    [DISCONTINUED] aspirin-acetaminophen-caffeine 250-250-65 mg (EXCEDRIN MIGRAINE) 250-250-65 mg per tablet Take 1 tablet by mouth daily as needed (headache).    [DISCONTINUED] blood sugar diagnostic Strp To check BG 1 times daily, to use with insurance preferred meter    [DISCONTINUED] blood-glucose meter kit To check BG 1 times daily, to use with insurance preferred meter    [DISCONTINUED] cyanocobalamin (VITAMIN B-12) 1000 MCG tablet Take 1 tablet (1,000 mcg total) by mouth once daily.    [DISCONTINUED] cyproheptadine (PERIACTIN) 4 mg tablet Take 1 tablet (4 mg total) by mouth once daily.    [DISCONTINUED] fluticasone (FLONASE) 50 mcg/actuation nasal spray 1 spray (50 mcg total) by Each Nare route once daily.    [DISCONTINUED] lancets Misc To check BG 1 times daily, to use with insurance preferred meter    [DISCONTINUED] levocetirizine (XYZAL) 5 MG tablet Take 1 tablet (5 mg total) by mouth every evening.    [DISCONTINUED] polyethylene glycol (GLYCOLAX) 17 gram PwPk Take 17 g by mouth once daily.    [DISCONTINUED] potassium chloride (KLOR-CON) 10 MEQ TbSR TAKE 1 TABLET(10 MEQ) BY MOUTH EVERY DAY     Family History    None       Tobacco Use    Smoking status: Current Every Day Smoker     Packs/day: 0.50     Years: 40.00     Pack years: 20.00     Last attempt to quit: 2020     Years since quittin.0    Smokeless tobacco: Never Used   Substance and Sexual Activity    Alcohol use: Yes     Alcohol/week: 12.0 standard drinks     Types: 12 Cans of beer per week     Comment: sober x 6 months     Drug use: No    Sexual activity: Not on file     Review of Systems   Constitutional:  Positive for activity change. Negative for chills, diaphoresis and fever.   HENT:  Negative for congestion, nosebleeds and tinnitus.    Eyes:  Negative for photophobia and visual disturbance.   Respiratory:  Positive for shortness of breath. Negative for cough, chest tightness and wheezing.    Cardiovascular:  Negative for chest pain, palpitations and leg swelling.   Gastrointestinal:  Negative for abdominal distention, abdominal pain, constipation, diarrhea, nausea and vomiting.   Endocrine: Negative for cold intolerance and heat intolerance.   Genitourinary:  Negative for difficulty urinating, dysuria, frequency, hematuria and urgency.   Musculoskeletal:  Negative for arthralgias, back pain and myalgias.   Skin:  Negative for pallor, rash and wound.   Allergic/Immunologic: Negative for immunocompromised state.   Neurological:  Negative for dizziness, tremors, facial asymmetry, speech difficulty and weakness.   Hematological:  Negative for adenopathy. Does not bruise/bleed easily.   Psychiatric/Behavioral:  Negative for confusion and sleep disturbance. The patient is not nervous/anxious.    Objective:     Vital Signs (Most Recent):  Temp: 98.4 °F (36.9 °C) (05/25/22 2307)  Pulse: 87 (05/25/22 2307)  Resp: (!) 24 (05/25/22 2307)  BP: (!) 151/82 (05/25/22 2307)  SpO2: 100 % (05/25/22 2307)   Vital Signs (24h Range):  Temp:  [98.1 °F (36.7 °C)-98.4 °F (36.9 °C)] 98.4 °F (36.9 °C)  Pulse:  [] 87  Resp:  [24] 24  SpO2:  [92 %-100 %] 100 %  BP: (151-196)/() 151/82     Weight: 45.8 kg (101 lb)  Body mass index is 19.08 kg/m².    Physical Exam  Vitals and nursing note reviewed.   Constitutional:       General: She is not in acute distress.     Appearance: She is well-developed. She is not diaphoretic.   HENT:      Head: Normocephalic.      Mouth/Throat:      Mouth: Mucous membranes are moist.      Pharynx: Oropharynx is  clear.   Eyes:      General: No scleral icterus.     Conjunctiva/sclera: Conjunctivae normal.      Pupils: Pupils are equal, round, and reactive to light.   Neck:      Vascular: No JVD.   Cardiovascular:      Rate and Rhythm: Normal rate and regular rhythm.      Heart sounds: Murmur heard.     No friction rub. No gallop.   Pulmonary:      Effort: Respiratory distress present.      Breath sounds: Rales present. No wheezing.   Abdominal:      General: Bowel sounds are normal. There is no distension.      Palpations: Abdomen is soft.      Tenderness: There is no abdominal tenderness. There is no guarding or rebound.   Musculoskeletal:         General: No tenderness. Normal range of motion.      Cervical back: Normal range of motion and neck supple.   Lymphadenopathy:      Cervical: No cervical adenopathy.   Skin:     General: Skin is warm and dry.      Capillary Refill: Capillary refill takes less than 2 seconds.      Coloration: Skin is not pale.      Findings: No erythema or rash.   Neurological:      Mental Status: She is alert and oriented to person, place, and time.      Cranial Nerves: No cranial nerve deficit.      Sensory: No sensory deficit.      Coordination: Coordination normal.      Deep Tendon Reflexes: Reflexes normal.   Psychiatric:         Behavior: Behavior normal.         Thought Content: Thought content normal.         Judgment: Judgment normal.         CRANIAL NERVES     CN III, IV, VI   Pupils are equal, round, and reactive to light.     Significant Labs: All pertinent labs within the past 24 hours have been reviewed.  CBC:   Recent Labs   Lab 05/25/22  1845   WBC 9.59   HGB 12.2   HCT 37.7        CMP:   Recent Labs   Lab 05/25/22  1845      K 4.0      CO2 15*   *   BUN 10   CREATININE 1.1   CALCIUM 9.2   PROT 7.5   ALBUMIN 3.5   BILITOT 0.5   ALKPHOS 112   AST 69*   ALT 34   ANIONGAP 18*   EGFRNONAA 51*     Cardiac Markers:   Recent Labs   Lab 05/25/22  1845   BNP 1,795*        Significant Imaging: I have reviewed all pertinent imaging results/findings within the past 24 hours.    Chest x-ray:   FINDINGS:  The heart is not enlarged the trachea is midline.  Changes of median sternotomy and CABG are again noted.  Increased reticular opacities are noted within the lungs more confluent in the lung bases with small effusions.     Impression:   Worsening of airspace infiltrates and effusions.  Correlate for worsening CHF since old exam in 2020    Assessment/Plan:     * Acute on chronic systolic congestive heart failure  Patient is identified as having Systolic (HFrEF) heart failure that is Acute on chronic. CHF is currently uncontrolled due to Rales/crackles on pulmonary exam and Pulmonary edema/pleural effusion on CXR. Latest ECHO performed and demonstrates- Results for orders placed during the hospital encounter of 05/19/20    Echo Color Flow Doppler? No    Interpretation Summary  · Moderately decreased left ventricular systolic function. The estimated ejection fraction is 38%.  · Normal right ventricular systolic function.  · Mild aortic regurgitation.  . Continue Furosemide and monitor clinical status closely. Monitor on telemetry. Patient is on CHF pathway.  Monitor strict Is&Os and daily weights.  Place on fluid restriction of 1.5 L. Continue to stress to patient importance of self efficacy and  on diet for CHF. Last BNP reviewed- and noted below   Recent Labs   Lab 05/25/22  1845   BNP 1,795*   .      Atherosclerosis of native coronary artery of native heart with angina pectoris  Chronic problem  Denies chest pain  Stable  Continuous cardiac monitor        Acute hypoxemic respiratory failure  Patient with Hypoxic Respiratory failure which is Acute on chronic.  she is not on home oxygen. Supplemental oxygen was provided and noted- Oxygen Concentration (%):  [50] 50.   Signs/symptoms of respiratory failure include- tachypnea and increased work of breathing. Contributing  diagnoses includes - CHF Labs and images were reviewed. Patient Has not had a recent ABG. Will treat underlying causes and adjust management of respiratory failure as follows- BiPAP    Gastroesophageal reflux disease without esophagitis  Chronic  Stable      Tobacco abuse  Chronic  Dangers of cigarette smoking were reviewed with patient in detail for 10 minutes and patient was encouraged to quit. Nicotine replacement options were discussed.      Essential hypertension  Chronic  Chronic, controlled.  Latest blood pressure and vitals reviewed-   Temp:  [98.1 °F (36.7 °C)-98.4 °F (36.9 °C)]   Pulse:  []   Resp:  [24]   BP: (151-196)/()   SpO2:  [92 %-100 %] .   Home meds for hypertension were reviewed and noted below.   Hypertension Medications             lisinopriL 10 MG tablet Take 1 tablet (10 mg total) by mouth once daily.    metoprolol tartrate 75 mg Tab Take 1 tablet by mouth 3 (three) times daily.    spironolactone (ALDACTONE) 25 MG tablet Take 1 tablet (25 mg total) by mouth once daily.          While in the hospital, will manage blood pressure as follows; Continue home antihypertensive regimen    Will utilize p.r.n. blood pressure medication only if patient's blood pressure greater than  180/110 and she develops symptoms such as worsening chest pain or shortness of breath.        VTE Risk Mitigation (From admission, onward)         Ordered     IP VTE HIGH RISK PATIENT  Once         05/25/22 2038     Place sequential compression device  Until discontinued         05/25/22 2038     Place PAN hose  Until discontinued         05/25/22 2038               Critical care time spent on the evaluation and treatment of severe organ dysfunction, review of pertinent labs and imaging studies, discussions with consulting providers and discussions with patient/family 35 minutes      Carrillo Youssef NP  Department of Hospital Medicine   [facility] - Emergency Dept

## 2022-05-26 NOTE — PLAN OF CARE
Intervention: carb/sodium/fat/fluid modified diet and nutrition education     Recommendation:  1) Continue diet as ordered to promote PO intakes   2) Add Boost glucose control vanilla BID   3) weigh daily   4) Nutrition education and handout given    Goals: 1) PO intakes > 75% of meals and supplements at f/u  Nutrition Goal Status: new  Communication of RD Recs:  (POC, sticky note)

## 2022-05-26 NOTE — ASSESSMENT & PLAN NOTE
Contributing Nutrition Diagnosis  Moderate chronic illness related malnutrition    Related to (etiology):   Decreased appetite and altered GI function    Signs and Symptoms (as evidenced by):   1) PO intakes <75% of needs x > 1 month  2) moderate-severe wasting as charted below  3) mild edema    Interventions:  Above    Nutrition Diagnosis Status:   new

## 2022-05-26 NOTE — ASSESSMENT & PLAN NOTE
Patient with Hypoxic Respiratory failure which is Acute on chronic.  she is not on home oxygen. Supplemental oxygen was provided and noted- Oxygen Concentration (%):  [50] 50.   Signs/symptoms of respiratory failure include- tachypnea and increased work of breathing. Contributing diagnoses includes - CHF Labs and images were reviewed. Patient Has not had a recent ABG. Will treat underlying causes and adjust management of respiratory failure as follows- BiPAP

## 2022-05-26 NOTE — CARE UPDATE
05/26/22 0842   Patient Assessment/Suction   Level of Consciousness (AVPU) alert   Respiratory Effort Normal   All Lung Fields Breath Sounds diminished   Rhythm/Pattern, Respiratory unlabored   PRE-TX-O2   O2 Device (Oxygen Therapy) nasal cannula   $ Is the patient on Low Flow Oxygen? Yes   Flow (L/min) 2   Pulse Oximetry Type Continuous   $ Pulse Oximetry - Multiple Charge Pulse Oximetry - Multiple   Preset CPAP/BiPAP Settings   Mode Of Delivery Standby

## 2022-05-27 PROBLEM — J96.01 ACUTE HYPOXEMIC RESPIRATORY FAILURE: Status: RESOLVED | Noted: 2020-05-20 | Resolved: 2022-05-27

## 2022-05-27 LAB
ANION GAP SERPL CALC-SCNC: 13 MMOL/L (ref 8–16)
BUN SERPL-MCNC: 15 MG/DL (ref 8–23)
CALCIUM SERPL-MCNC: 9.6 MG/DL (ref 8.7–10.5)
CHLORIDE SERPL-SCNC: 101 MMOL/L (ref 95–110)
CO2 SERPL-SCNC: 29 MMOL/L (ref 23–29)
CREAT SERPL-MCNC: 0.7 MG/DL (ref 0.5–1.4)
EST. GFR  (AFRICAN AMERICAN): >60 ML/MIN/1.73 M^2
EST. GFR  (NON AFRICAN AMERICAN): >60 ML/MIN/1.73 M^2
GLUCOSE SERPL-MCNC: 137 MG/DL (ref 70–110)
POTASSIUM SERPL-SCNC: 2.9 MMOL/L (ref 3.5–5.1)
SODIUM SERPL-SCNC: 143 MMOL/L (ref 136–145)

## 2022-05-27 PROCEDURE — 63600175 PHARM REV CODE 636 W HCPCS: Performed by: HOSPITALIST

## 2022-05-27 PROCEDURE — 99406 BEHAV CHNG SMOKING 3-10 MIN: CPT

## 2022-05-27 PROCEDURE — 12000002 HC ACUTE/MED SURGE SEMI-PRIVATE ROOM

## 2022-05-27 PROCEDURE — 97530 THERAPEUTIC ACTIVITIES: CPT

## 2022-05-27 PROCEDURE — 36415 COLL VENOUS BLD VENIPUNCTURE: CPT | Performed by: HOSPITALIST

## 2022-05-27 PROCEDURE — 25000003 PHARM REV CODE 250: Performed by: HOSPITALIST

## 2022-05-27 PROCEDURE — 94761 N-INVAS EAR/PLS OXIMETRY MLT: CPT

## 2022-05-27 PROCEDURE — 99233 PR SUBSEQUENT HOSPITAL CARE,LEVL III: ICD-10-PCS | Mod: ,,, | Performed by: GENERAL PRACTICE

## 2022-05-27 PROCEDURE — 97161 PT EVAL LOW COMPLEX 20 MIN: CPT

## 2022-05-27 PROCEDURE — 99233 SBSQ HOSP IP/OBS HIGH 50: CPT | Mod: ,,, | Performed by: GENERAL PRACTICE

## 2022-05-27 PROCEDURE — 25000003 PHARM REV CODE 250: Performed by: NURSE PRACTITIONER

## 2022-05-27 PROCEDURE — 80048 BASIC METABOLIC PNL TOTAL CA: CPT | Performed by: HOSPITALIST

## 2022-05-27 PROCEDURE — 25000003 PHARM REV CODE 250: Performed by: GENERAL PRACTICE

## 2022-05-27 PROCEDURE — S4991 NICOTINE PATCH NONLEGEND: HCPCS | Performed by: HOSPITALIST

## 2022-05-27 RX ORDER — METOPROLOL TARTRATE 50 MG/1
100 TABLET ORAL 2 TIMES DAILY
Status: DISCONTINUED | OUTPATIENT
Start: 2022-05-27 | End: 2022-05-28 | Stop reason: HOSPADM

## 2022-05-27 RX ORDER — POTASSIUM CHLORIDE 20 MEQ/1
40 TABLET, EXTENDED RELEASE ORAL 2 TIMES DAILY
Status: DISCONTINUED | OUTPATIENT
Start: 2022-05-27 | End: 2022-05-28 | Stop reason: HOSPADM

## 2022-05-27 RX ORDER — IBUPROFEN 200 MG
1 TABLET ORAL DAILY
Status: DISCONTINUED | OUTPATIENT
Start: 2022-05-27 | End: 2022-05-28 | Stop reason: HOSPADM

## 2022-05-27 RX ORDER — LANOLIN ALCOHOL/MO/W.PET/CERES
800 CREAM (GRAM) TOPICAL
Status: DISCONTINUED | OUTPATIENT
Start: 2022-05-27 | End: 2022-05-28 | Stop reason: HOSPADM

## 2022-05-27 RX ORDER — SODIUM,POTASSIUM PHOSPHATES 280-250MG
2 POWDER IN PACKET (EA) ORAL
Status: DISCONTINUED | OUTPATIENT
Start: 2022-05-27 | End: 2022-05-28 | Stop reason: HOSPADM

## 2022-05-27 RX ORDER — FAMOTIDINE 20 MG/1
20 TABLET, FILM COATED ORAL 2 TIMES DAILY
Status: DISCONTINUED | OUTPATIENT
Start: 2022-05-27 | End: 2022-05-28 | Stop reason: HOSPADM

## 2022-05-27 RX ORDER — POTASSIUM CHLORIDE 20 MEQ/1
40 TABLET, EXTENDED RELEASE ORAL ONCE
Status: COMPLETED | OUTPATIENT
Start: 2022-05-27 | End: 2022-05-27

## 2022-05-27 RX ORDER — LISINOPRIL 10 MG/1
20 TABLET ORAL DAILY
Status: DISCONTINUED | OUTPATIENT
Start: 2022-05-28 | End: 2022-05-28 | Stop reason: HOSPADM

## 2022-05-27 RX ADMIN — METOPROLOL TARTRATE 100 MG: 50 TABLET, FILM COATED ORAL at 09:05

## 2022-05-27 RX ADMIN — FAMOTIDINE 20 MG: 20 TABLET, FILM COATED ORAL at 09:05

## 2022-05-27 RX ADMIN — METOPROLOL TARTRATE 50 MG: 50 TABLET, FILM COATED ORAL at 08:05

## 2022-05-27 RX ADMIN — CLOPIDOGREL 75 MG: 75 TABLET, FILM COATED ORAL at 08:05

## 2022-05-27 RX ADMIN — FUROSEMIDE 40 MG: 10 INJECTION, SOLUTION INTRAVENOUS at 08:05

## 2022-05-27 RX ADMIN — Medication 81 MG: at 08:05

## 2022-05-27 RX ADMIN — MUPIROCIN: 20 OINTMENT TOPICAL at 08:05

## 2022-05-27 RX ADMIN — MUPIROCIN: 20 OINTMENT TOPICAL at 09:05

## 2022-05-27 RX ADMIN — POTASSIUM CHLORIDE 40 MEQ: 1500 TABLET, EXTENDED RELEASE ORAL at 09:05

## 2022-05-27 RX ADMIN — POTASSIUM CHLORIDE 40 MEQ: 1500 TABLET, EXTENDED RELEASE ORAL at 05:05

## 2022-05-27 RX ADMIN — NICOTINE 1 PATCH: 14 PATCH, EXTENDED RELEASE TRANSDERMAL at 09:05

## 2022-05-27 RX ADMIN — POTASSIUM CHLORIDE 40 MEQ: 1500 TABLET, EXTENDED RELEASE ORAL at 03:05

## 2022-05-27 RX ADMIN — ATORVASTATIN CALCIUM 10 MG: 10 TABLET, FILM COATED ORAL at 09:05

## 2022-05-27 RX ADMIN — POTASSIUM BICARBONATE 60 MEQ: 391 TABLET, EFFERVESCENT ORAL at 11:05

## 2022-05-27 RX ADMIN — LISINOPRIL 10 MG: 10 TABLET ORAL at 08:05

## 2022-05-27 NOTE — ASSESSMENT & PLAN NOTE
Patient with Hypoxic Respiratory failure which is Acute on chronic.  she is not on home oxygen. Supplemental oxygen was provided and noted-  .   Signs/symptoms of respiratory failure include- tachypnea and increased work of breathing. Contributing diagnoses includes - CHF Labs and images were reviewed. Patient Has not had a recent ABG. Will treat underlying causes and adjust management of respiratory failure as follows- BiPAP

## 2022-05-27 NOTE — ASSESSMENT & PLAN NOTE
Patient has hypokalemia which is currently uncontrolled. Last electrolytes reviewed- Recent Labs   Lab 05/25/22  1845 05/26/22  0312 05/27/22  0359   K 4.0 3.7 2.9*   . Will replace potassium and monitor electrolytes closely.

## 2022-05-27 NOTE — CARE UPDATE
05/27/22 0800   Tobacco Cessation Intervention   Do you use any type of tobacco product? Yes   Are you interested in quitting use of tobacco products? Not interested   Are you interested in Nicotine Replacement for symptom relief? Yes   $ Smoking Cessation Charges Smoking Cessation - Intermediate (CTTS)   Patient a half a pack day smoker and is not interested in quitting at this time but is requesting a Nicotine patch.

## 2022-05-27 NOTE — ASSESSMENT & PLAN NOTE
Patient with known CAD s/p CABG, which is controlled Will continue ASA and Plavix and monitor for S/Sx of angina/ACS. Continue to monitor on telemetry.

## 2022-05-27 NOTE — ASSESSMENT & PLAN NOTE
Chronic, uncontrolled.  Latest blood pressure and vitals reviewed-   Temp:  [97.3 °F (36.3 °C)-98.6 °F (37 °C)]   Pulse:  []   Resp:  [16-39]   BP: (131-188)/(69-92)   SpO2:  [97 %-100 %] .   Home meds for hypertension were reviewed and noted below.   Hypertension Medications             lisinopriL 10 MG tablet Take 1 tablet (10 mg total) by mouth once daily.    metoprolol tartrate 75 mg Tab Take 1 tablet by mouth 3 (three) times daily.    spironolactone (ALDACTONE) 25 MG tablet Take 1 tablet (25 mg total) by mouth once daily.          While in the hospital, will manage blood pressure as follows; Continue home antihypertensive regimen    Will utilize p.r.n. blood pressure medication only if patient's blood pressure greater than  160/110 and she develops symptoms such as worsening chest pain or shortness of breath.

## 2022-05-27 NOTE — ASSESSMENT & PLAN NOTE
Chronic, uncontrolled.  Latest blood pressure and vitals reviewed-   Temp:  [97 °F (36.1 °C)-98.6 °F (37 °C)]   Pulse:  [60-86]   Resp:  [16-18]   BP: (128-188)/(67-91)   SpO2:  [98 %-100 %] .   Home meds for hypertension were reviewed and noted below.   Hypertension Medications             lisinopriL 10 MG tablet Take 1 tablet (10 mg total) by mouth once daily.    metoprolol tartrate 75 mg Tab Take 1 tablet by mouth 3 (three) times daily.    spironolactone (ALDACTONE) 25 MG tablet Take 1 tablet (25 mg total) by mouth once daily.          While in the hospital, will manage blood pressure as follows; Continue home antihypertensive regimen, increase metoprolol and lisinopril dose.    Will utilize p.r.n. blood pressure medication only if patient's blood pressure greater than  160/110 and she develops symptoms such as worsening chest pain or shortness of breath.

## 2022-05-27 NOTE — ASSESSMENT & PLAN NOTE
Patient's FSGs are uncontrolled due to hyperglycemia on current medication regimen.  Last A1c reviewed-   Lab Results   Component Value Date    LABA1C 6.2 (H) 02/05/2018    HGBA1C 5.7 (H) 05/25/2022    HGBA1C 5.7 (H) 05/25/2022     Most recent fingerstick glucose reviewed-   Recent Labs   Lab 05/26/22  0819   POCTGLUCOSE 118*     Current correctional scale  Medium  Maintain anti-hyperglycemic dose as follows-   Antihyperglycemics (From admission, onward)            Start     Stop Route Frequency Ordered    05/25/22 2137  insulin aspart U-100 pen 1-10 Units         -- SubQ Before meals & nightly PRN 05/25/22 2038        Hold Oral hypoglycemics while patient is in the hospital.

## 2022-05-27 NOTE — ASSESSMENT & PLAN NOTE
Patient with Hypoxic Respiratory failure which is Acute on chronic.  she is not on home oxygen. Supplemental oxygen was provided and noted- Oxygen Concentration (%):  [40] 40.   Signs/symptoms of respiratory failure include- tachypnea and increased work of breathing. Contributing diagnoses includes - CHF Labs and images were reviewed. Patient Has not had a recent ABG. Will treat underlying causes and adjust management of respiratory failure as follows- BiPAP

## 2022-05-27 NOTE — ASSESSMENT & PLAN NOTE
Patient's FSGs are uncontrolled due to hyperglycemia on current medication regimen.  Last A1c reviewed-   Lab Results   Component Value Date    LABA1C 6.2 (H) 02/05/2018    HGBA1C 5.7 (H) 05/25/2022    HGBA1C 5.7 (H) 05/25/2022     Most recent fingerstick glucose reviewed-   No results for input(s): POCTGLUCOSE in the last 24 hours.  Current correctional scale  Medium  Maintain anti-hyperglycemic dose as follows-   Antihyperglycemics (From admission, onward)            Start     Stop Route Frequency Ordered    05/25/22 2137  insulin aspart U-100 pen 1-10 Units         -- SubQ Before meals & nightly PRN 05/25/22 2038        Hold Oral hypoglycemics while patient is in the hospital.

## 2022-05-27 NOTE — ASSESSMENT & PLAN NOTE
Patient is identified as having Combined Systolic and Diastolic heart failure that is Acute on chronic. CHF is currently uncontrolled due to Rales/crackles on pulmonary exam and Pulmonary edema/pleural effusion on CXR. Latest ECHO performed and demonstrates- Results for orders placed during the hospital encounter of 05/19/20    Echo Color Flow Doppler? No    Interpretation Summary  · Moderately decreased left ventricular systolic function. The estimated ejection fraction is 38%.  · Normal right ventricular systolic function.  · Mild aortic regurgitation.  . Continue Furosemide and monitor clinical status closely. Monitor on telemetry. Patient is on CHF pathway.  Monitor strict Is&Os and daily weights.  Place on fluid restriction of 1.5 L. Continue to stress to patient importance of self efficacy and  on diet for CHF. Last BNP reviewed- and noted below   Recent Labs   Lab 05/25/22  1845   BNP 1,795*   .

## 2022-05-27 NOTE — SUBJECTIVE & OBJECTIVE
Interval History:  Patient seen and examined.  Overnight, patient is able to wean off of BiPAP and on nasal cannula.  She diuresed well with administration of IV Lasix.  Plan of care discussed with the patient at the bedside in detail.    Review of Systems   Constitutional:  Positive for fatigue.   Respiratory:  Positive for cough and shortness of breath.    Cardiovascular:  Negative for chest pain and leg swelling.   Gastrointestinal:  Negative for abdominal pain and nausea.   Neurological:  Positive for weakness.   Psychiatric/Behavioral:  Negative for confusion.    All other systems reviewed and are negative.  Objective:     Vital Signs (Most Recent):  Temp: 98.1 °F (36.7 °C) (05/26/22 1926)  Pulse: 85 (05/26/22 1926)  Resp: 18 (05/26/22 1926)  BP: (!) 175/83 (05/26/22 1926)  SpO2: 99 % (05/26/22 1926)   Vital Signs (24h Range):  Temp:  [97.3 °F (36.3 °C)-98.6 °F (37 °C)] 98.1 °F (36.7 °C)  Pulse:  [] 85  Resp:  [16-39] 18  SpO2:  [97 %-100 %] 99 %  BP: (131-188)/(69-92) 175/83     Weight: 43.5 kg (96 lb)  Body mass index is 18.14 kg/m².    Intake/Output Summary (Last 24 hours) at 5/26/2022 2014  Last data filed at 5/26/2022 1122  Gross per 24 hour   Intake 490 ml   Output 3450 ml   Net -2960 ml      Physical Exam  Vitals and nursing note reviewed.   Constitutional:       General: She is not in acute distress.     Comments: Elderly thin  female in no acute distress.   Eyes:      Pupils: Pupils are equal, round, and reactive to light.   Cardiovascular:      Rate and Rhythm: Normal rate and regular rhythm.      Heart sounds: No murmur heard.  Pulmonary:      Comments: Mildly increased respiratory effort noted on nasal cannula, bibasilar crackles noted.  Abdominal:      General: There is no distension.      Palpations: Abdomen is soft.      Tenderness: There is no abdominal tenderness.   Skin:     Coloration: Skin is not pale.      Findings: No rash.   Neurological:      Mental Status: She is  alert and oriented to person, place, and time.       Significant Labs: All pertinent labs within the past 24 hours have been reviewed.    Significant Imaging: I have reviewed all pertinent imaging results/findings within the past 24 hours.

## 2022-05-27 NOTE — ASSESSMENT & PLAN NOTE
Nutrition consulted. Most recent weight and BMI monitored-     Malnutrition (Moderate to Severe)  Energy Intake (Malnutrition): less than 75% for greater than or equal to 1 month    Final Summary  Subcutaneous Fat Loss (Final Summary): moderate protein-calorie malnutrition  Muscle Loss Evaluation (Final Summary): severe protein-calorie malnutrition    Measurements:  Wt Readings from Last 1 Encounters:   05/27/22 46 kg (101 lb 6.6 oz)   Body mass index is 19.16 kg/m².    Recommendations: Recommendation/Intervention: Intervention: carb/sodium/fat/fluid modified diet and nutrition education 1) Continue diet as ordered to promote PO intakes 2) Add Boost glucose control vanilla BID 3) weigh daily 4) Nutrition education and handout given  Goals: 1) PO intakes > 75% of meals and supplements at f/u    Patient has been screened and assessed by RD. RD will follow patient.

## 2022-05-27 NOTE — PLAN OF CARE
Pt appears to be resting, eyes are closed, breaths are deep and even. VSS, NAD noted at this time. Discussed PoC with pt, stated they understood and would help as able. Pt diuresing well, free from falls. C/o pain handled w/PRN pain medications, will continue to monitor.

## 2022-05-27 NOTE — ASSESSMENT & PLAN NOTE
Nutrition consulted. Most recent weight and BMI monitored-     Malnutrition (Moderate to Severe)  Energy Intake (Malnutrition): less than 75% for greater than or equal to 1 month    Final Summary  Subcutaneous Fat Loss (Final Summary): moderate protein-calorie malnutrition  Muscle Loss Evaluation (Final Summary): severe protein-calorie malnutrition    Measurements:  Wt Readings from Last 1 Encounters:   05/26/22 43.5 kg (96 lb)   Body mass index is 18.14 kg/m².    Recommendations: Recommendation/Intervention: Intervention: carb/sodium/fat/fluid modified diet and nutrition education 1) Continue diet as ordered to promote PO intakes 2) Add Boost glucose control vanilla BID 3) weigh daily 4) Nutrition education and handout given  Goals: 1) PO intakes > 75% of meals and supplements at f/u    Patient has been screened and assessed by RD. RD will follow patient.

## 2022-05-27 NOTE — SUBJECTIVE & OBJECTIVE
Interval History:  Patient seen and examined.    Continues to diurese well with Lasix, but potassium is low.  Patient is feeling better, weaned down on her oxygen requirement.    Review of Systems   Constitutional:  Positive for fatigue.   Respiratory:  Positive for cough and shortness of breath.    Cardiovascular:  Negative for chest pain and leg swelling.   Gastrointestinal:  Negative for abdominal pain and nausea.   Neurological:  Positive for weakness.   Psychiatric/Behavioral:  Negative for confusion.    All other systems reviewed and are negative.  Objective:     Vital Signs (Most Recent):  Temp: 97.2 °F (36.2 °C) (05/27/22 1211)  Pulse: 68 (05/27/22 1211)  Resp: 16 (05/27/22 1211)  BP: 128/78 (05/27/22 1211)  SpO2: 100 % (05/27/22 1211)   Vital Signs (24h Range):  Temp:  [97 °F (36.1 °C)-98.6 °F (37 °C)] 97.2 °F (36.2 °C)  Pulse:  [60-86] 68  Resp:  [16-18] 16  SpO2:  [98 %-100 %] 100 %  BP: (128-188)/(67-89) 128/78     Weight: 46 kg (101 lb 6.6 oz)  Body mass index is 19.16 kg/m².    Intake/Output Summary (Last 24 hours) at 5/27/2022 1531  Last data filed at 5/27/2022 1200  Gross per 24 hour   Intake --   Output 2550 ml   Net -2550 ml        Physical Exam  Vitals and nursing note reviewed.   Constitutional:       General: She is not in acute distress.     Comments: Elderly thin  female in no acute distress.   Eyes:      Pupils: Pupils are equal, round, and reactive to light.   Cardiovascular:      Rate and Rhythm: Normal rate and regular rhythm.      Heart sounds: No murmur heard.  Pulmonary:      Comments: Mildly increased respiratory effort noted on nasal cannula, bibasilar crackles noted.  Abdominal:      General: There is no distension.      Palpations: Abdomen is soft.      Tenderness: There is no abdominal tenderness.   Skin:     Coloration: Skin is not pale.      Findings: No rash.   Neurological:      Mental Status: She is alert and oriented to person, place, and time.        Significant Labs: All pertinent labs within the past 24 hours have been reviewed.    Significant Imaging: I have reviewed all pertinent imaging results/findings within the past 24 hours.

## 2022-05-27 NOTE — PROGRESS NOTES
[facility]  Department of Cardiology  Progress Note    PATIENT NAME: Keyona rIene  MRN: [id number]  TODAY'S DATE: 05/27/2022  ADMIT DATE: 5/25/2022    SUBJECTIVE     PRINCIPLE PROBLEM: Acute on chronic systolic congestive heart failure    INTERVAL HISTORY:      Keyona Irene is a 71-year-old female who presents to the emergency room for evaluation of shortness of breath and generalized weakness.  She reports shortness of breath and weakness onset several days ago and progressively worsen.  She endorses orthopnea and dyspnea on exertion.  She denies fever chills.  No known sick contacts or travel.  Vaccination status is unknown.  Previous medical history includes aortic insufficiency, anemia, atherosclerotic coronary artery disease, hypertension, EtOH abuse, CABG, active smoker, diabetes, GERD.  ER workup:  CBC unremarkable.  Glucose 263 and bicarb of 15 otherwise unremarkable.  BNP 1795. Chest x-ray demonstrates worsening airspace infiltrates and effusions consistent with acute CHF.  Patient was given Lasix in ER.  Patient was placed on CHF pathway and admitted to ICU.  Patient required BiPAP.       Congestive heart failure, exacerbated with pulmonary edema.  Continue Lasix.  Monitor urine output and electrolytes.  The patient has history of coronary artery disease, continue home medications.  The patient denies any chest pain.  EKG with nonspecific ST changes, troponin negative.    # Acute respiratory failure, due to pulmonary edema.  Continue oxygen and titrate to maintain saturation between 90-94%.  BiPAP if needed.    # Metabolic acidosis with increased anion gap.? Etiology.  Patient has been on metformin at home.?  Lactic acidosis due to metformin, precipitated by hypoxia.  History of diabetes.  Check lactic acid and beta hydroxybutyrate.       After Visit Summary (Printed 6/15/2020)           Progress Notes  Heather Carbajal MD (Physician)   Cardiology  Subjective:    Patient  ID:  Keyona Irene is a 69 y.o. female who presents for follow-up.     HPI  She has history of diabetes mellitus, hypertension and tobacco use.  She was hospitalized last month with congestive heart failure and mild troponin elevation.  She was diuresed and has undergone cardiac workup was found multivessel coronary artery disease (see below). She has undergone 5v-CABG (LIMA to LAD, SVG to diagonal, SVG to PLB, jump SVG to OM2 and OM3) on 5/29/20 by Dr Laughlin. She was discharged earlier this month.  She is here for follow-up. She reports chest soreness and generalized fatigue.  No shortness of breath or leg swelling.         The EKG today shows normal sinus rhythm, left fascicular block, nonspecific ST-T wave abnormality.  I have reviewed the patient's medical history in detail and updated the computerized patient record.     5/20 echo:  · Concentric left ventricular hypertrophy.  · Mildly decreased overall left ventricular systolic function. The estimated ejection fraction is 45%.  · Grade I (mild) left ventricular diastolic dysfunction consistent with impaired relaxation.  · Normal right ventricular systolic function.  · Mild aortic regurgitation.  · Moderate mitral regurgitation.     5/20 LHC:  · Three vessel coronary artery disease.  · Mid LAD lesion , 85% stenosed.  · Ost 2nd Diag lesion , 80% stenosed.  · Dist Cx lesion , 90% stenosed.  · Ost 3rd Mrg lesion , 90% stenosed.  · RPDA lesion , 80% stenosed.  · LV end diastolic pressure is severely elevated.             5/27/2022    Patient denies shortness of breath..  Is comfortable on room air.  Complains of epigastric discomfort especially with cold fluids feels discomfort going up behind her sternum.    Review of patient's allergies indicates:  No Known Allergies    REVIEW OF SYSTEMS  CARDIOVASCULAR: No recent chest pain, palpitations, arm, neck, or jaw pain  RESPIRATORY: No recent fever, cough chills, SOB or congestion  : No blood in the  urine  GI: No Nausea, vomiting, constipation, diarrhea, blood, or reflux.  MUSCULOSKELETAL: No myalgias  NEURO: No lightheadedness or dizziness  EYES: No Double vision, blurry, vision or headache     OBJECTIVE     VITAL SIGNS (Most Recent)  Temp: 97.2 °F (36.2 °C) (05/27/22 1211)  Pulse: 68 (05/27/22 1211)  Resp: 16 (05/27/22 1211)  BP: 128/78 (05/27/22 1211)  SpO2: 100 % (05/27/22 1211)    VENTILATION STATUS  Resp: 16 (05/27/22 1211)  SpO2: 100 % (05/27/22 1211)       I & O (Last 24H):    Intake/Output Summary (Last 24 hours) at 5/27/2022 1316  Last data filed at 5/27/2022 1200  Gross per 24 hour   Intake --   Output 2550 ml   Net -2550 ml       WEIGHTS  Wt Readings from Last 3 Encounters:   05/27/22 0548 46 kg (101 lb 6.6 oz)   05/26/22 1213 43.5 kg (96 lb)   05/26/22 1101 43.6 kg (96 lb 1.9 oz)   05/26/22 0000 43.6 kg (96 lb 1.9 oz)   05/25/22 2315 43.6 kg (96 lb 1.9 oz)   05/25/22 1849 45.8 kg (101 lb)   01/12/22 0728 47.8 kg (105 lb 6.1 oz)   03/16/21 0854 51.9 kg (114 lb 6.7 oz)       PHYSICAL EXAM  CONSTITUTIONAL: Well built, well nourished in no apparent distress  NECK: no carotid bruit, no JVD  LUNGS: CTA  CHEST WALL: no tenderness  HEART: regular rate and rhythm, S1, S2 normal, no murmur, click, rub or gallop   ABDOMEN: soft, non-tender; bowel sounds normal; no masses,  no organomegaly  EXTREMITIES: Extremities normal, no edema  NEURO: AAO X 3    SCHEDULED MEDS:   aspirin  81 mg Oral Daily    atorvastatin  10 mg Oral QHS    clopidogreL  75 mg Oral Daily    [START ON 5/28/2022] lisinopriL  20 mg Oral Daily    metoprolol tartrate  100 mg Oral BID    mupirocin   Nasal BID    nicotine  1 patch Transdermal Daily       CONTINUOUS INFUSIONS:    PRN MEDS:acetaminophen, dextrose 50%, dextrose 50%, glucagon (human recombinant), glucose, glucose, hydrALAZINE, insulin aspart U-100, magnesium oxide, magnesium oxide, potassium bicarbonate, potassium bicarbonate, potassium bicarbonate, potassium, sodium  phosphates, potassium, sodium phosphates, potassium, sodium phosphates, sars-cov-2 (covid-19), sodium chloride 0.9%    LABS AND DIAGNOSTICS     CBC LAST 3 DAYS  Recent Labs   Lab 05/25/22 1845   WBC 9.59   RBC 4.38   HGB 12.2   HCT 37.7   MCV 86   MCH 27.9   MCHC 32.4   RDW 16.5*      MPV 11.4   GRAN 34.3*  3.3   LYMPH 61.7*  5.9*   MONO 2.6*  0.3   BASO 0.05   NRBC 0       COAGULATION LAST 3 DAYS  No results for input(s): LABPT, INR, APTT in the last 168 hours.    CHEMISTRY LAST 3 DAYS  Recent Labs   Lab 05/25/22  1845 05/26/22  0049 05/26/22  0312 05/27/22  0359     --  143 143   K 4.0  --  3.7 2.9*     --  102 101   CO2 15*  --  28 29   ANIONGAP 18*  --  13 13   BUN 10  --  13 15   CREATININE 1.1  --  0.9 0.7   *  --  115* 137*   CALCIUM 9.2  --  9.5 9.6   PH  --  7.402  --   --    MG 1.9  --   --   --    ALBUMIN 3.5  --   --   --    PROT 7.5  --   --   --    ALKPHOS 112  --   --   --    ALT 34  --   --   --    AST 69*  --   --   --    BILITOT 0.5  --   --   --        CARDIAC PROFILE LAST 3 DAYS  Recent Labs   Lab 05/25/22 1845   BNP 1,795*   TROPONINI 0.015       ENDOCRINE LAST 3 DAYS  No results for input(s): TSH, PROCAL in the last 168 hours.    LAST ARTERIAL BLOOD GAS  ABG  Recent Labs   Lab 05/26/22  0049   PH 7.402   PO2 63*   PCO2 44.0   HCO3 27.4   BE 3       LAST 7 DAYS MICROBIOLOGY   Microbiology Results (last 7 days)     ** No results found for the last 168 hours. **          MOST RECENT IMAGING  Echo  · The left ventricle is normal in size with mild concentric hypertrophy   and mildly decreased systolic function.  · The estimated PA systolic pressure is 30 mmHg.  · Grade II left ventricular diastolic dysfunction.  · Normal right ventricular size with normal right ventricular systolic   function.  · Severe left atrial enlargement.  · Normal central venous pressure (3 mmHg).  · Moderate aortic regurgitation.  · Mild to moderate tricuspid regurgitation.  · Mild to moderate  pulmonic regurgitation.  · The estimated ejection fraction is 40%.  · Moderate mitral regurgitation.         [facility]  Results for orders placed during the hospital encounter of 05/25/22    Echo    Interpretation Summary  · The left ventricle is normal in size with mild concentric hypertrophy and mildly decreased systolic function.  · The estimated PA systolic pressure is 30 mmHg.  · Grade II left ventricular diastolic dysfunction.  · Normal right ventricular size with normal right ventricular systolic function.  · Severe left atrial enlargement.  · Normal central venous pressure (3 mmHg).  · Moderate aortic regurgitation.  · Mild to moderate tricuspid regurgitation.  · Mild to moderate pulmonic regurgitation.  · The estimated ejection fraction is 40%.  · Moderate mitral regurgitation.      CURRENT/PREVIOUS VISIT EKG  Results for orders placed or performed during the hospital encounter of 05/25/22   EKG 12-lead    Collection Time: 05/25/22  7:17 PM    Narrative    Test Reason : R06.02,    Vent. Rate : 105 BPM     Atrial Rate : 105 BPM     P-R Int : 144 ms          QRS Dur : 088 ms      QT Int : 344 ms       P-R-T Axes : 030 046 197 degrees     QTc Int : 454 ms    Sinus tachycardia with occasional Premature ventricular complexes  LVH with repolarization abnormality  Cannot rule out Septal infarct (cited on or before 07-JUN-2020)  Abnormal ECG  When compared with ECG of 25-MAY-2022 18:41,  Premature ventricular complexes are now Present  Left posterior fascicular block is no longer Present  Incomplete right bundle branch block is no longer Present  Questionable change in initial forces of Septal leads  Confirmed by Pepe Cordero MD ([id number]) on 5/26/2022 6:57:32 PM    Referred By: [email or web address]   SELF           Confirmed By:Pepe Cordero MD       ASSESSMENT/PLAN:     Active Hospital Problems    Diagnosis    *Acute on chronic systolic congestive heart failure    Severe malnutrition    MR (mitral regurgitation)     Atherosclerosis of native coronary artery of native heart with angina pectoris    Acute hypoxemic respiratory failure    Gastroesophageal reflux disease without esophagitis    Type 2 diabetes mellitus with diabetic polyneuropathy, without long-term current use of insulin    Essential hypertension    Tobacco abuse       ASSESSMENT & PLAN:   Congestive heart failure compensated  Gastroesophageal reflux  Hypertension  Hypokalemia  RECOMMENDATIONS:  Replace potassium  Pepcid AC and upper tonics  Increase lisinopril to 20  Jardiance 10 mg for CHF at time of discharge    Follow-up in office and workup for stress test can be done as outpatient        Wilfredo Woo MD  [facility]  Department of Cardiology  Date of Service: 05/27/2022  1:16 PM

## 2022-05-27 NOTE — PLAN OF CARE
Problem: Physical Therapy  Goal: Physical Therapy Goal  Description: Goals to be met by: 6/15/2022     Patient will increase functional independence with mobility by performin). Supine to sit with Modified [place]  2). Sit to supine with Modified [place]  3). Sit to stand transfer with Modified [place]  4). Gait  x > 150 feet with Modified [place]     Outcome: Ongoing, Progressing

## 2022-05-27 NOTE — ASSESSMENT & PLAN NOTE
Patient is identified as having Combined Systolic and Diastolic heart failure that is Acute on Chronic. CHF is currently uncontrolled due to volume overload due to: Continued edema of extremities and Pulmonary edema/pleural effusion on CXR. Latest ECHO performed and demonstrates- Results for orders placed during the hospital encounter of 05/25/22    Echo    Interpretation Summary  · The left ventricle is normal in size with mild concentric hypertrophy and mildly decreased systolic function.  · The estimated PA systolic pressure is 30 mmHg.  · Grade II left ventricular diastolic dysfunction.  · Normal right ventricular size with normal right ventricular systolic function.  · Severe left atrial enlargement.  · Normal central venous pressure (3 mmHg).  · Moderate aortic regurgitation.  · Mild to moderate tricuspid regurgitation.  · Mild to moderate pulmonic regurgitation.  · The estimated ejection fraction is 40%.  · Moderate mitral regurgitation.  . Continue Beta Blocker ACE/ARB Furosemide and monitor clinical status closely. Monitor on telemetry. Patient is on CHF pathway.  Monitor strict Is&Os and daily weights.  Place on fluid restriction of 1.5 L. Continue to stress to patient importance of self efficacy and  on diet for CHF. Last BNP reviewed- and noted below   Recent Labs   Lab 05/25/22  1845   BNP 1,795*   .

## 2022-05-27 NOTE — PROGRESS NOTES
[facility] Medicine  Progress Note    Patient Name: Keyona Irene  MRN: [id number]  Patient Class: IP- Inpatient   Admission Date: 5/25/2022  Length of Stay: 2 days  Attending Physician: Jesus Carreon MD  Primary Care Provider: Howard Gan MD        Subjective:     Principal Problem:Acute on chronic systolic congestive heart failure        HPI:  Keyona Irene is a 71-year-old female who presents to the emergency room for evaluation of shortness of breath and generalized weakness.  She reports shortness of breath and weakness onset several days ago and progressively worsen.  She endorses orthopnea and dyspnea on exertion.  She denies fever chills.  No known sick contacts or travel.  Vaccination status is unknown.  Previous medical history includes aortic insufficiency, anemia, atherosclerotic coronary artery disease, hypertension, EtOH abuse, CABG, active smoker, diabetes, GERD.  ER workup:  CBC unremarkable.  Glucose 263 and bicarb of 15 otherwise unremarkable.  BNP 1795. Chest x-ray demonstrates worsening airspace infiltrates and effusions consistent with acute CHF.  Patient was given Lasix in ER.  Patient was placed on CHF pathway and admitted to ICU.  Patient required BiPAP.      Overview/Hospital Course:  No notes on file    Interval History:  Patient seen and examined.    Continues to diurese well with Lasix, but potassium is low.  Patient is feeling better, weaned down on her oxygen requirement.    Review of Systems   Constitutional:  Positive for fatigue.   Respiratory:  Positive for cough and shortness of breath.    Cardiovascular:  Negative for chest pain and leg swelling.   Gastrointestinal:  Negative for abdominal pain and nausea.   Neurological:  Positive for weakness.   Psychiatric/Behavioral:  Negative for confusion.    All other systems reviewed and are negative.  Objective:     Vital Signs (Most Recent):  Temp: 97.2 °F (36.2 °C) (05/27/22 1211)  Pulse:  68 (05/27/22 1211)  Resp: 16 (05/27/22 1211)  BP: 128/78 (05/27/22 1211)  SpO2: 100 % (05/27/22 1211)   Vital Signs (24h Range):  Temp:  [97 °F (36.1 °C)-98.6 °F (37 °C)] 97.2 °F (36.2 °C)  Pulse:  [60-86] 68  Resp:  [16-18] 16  SpO2:  [98 %-100 %] 100 %  BP: (128-188)/(67-89) 128/78     Weight: 46 kg (101 lb 6.6 oz)  Body mass index is 19.16 kg/m².    Intake/Output Summary (Last 24 hours) at 5/27/2022 1531  Last data filed at 5/27/2022 1200  Gross per 24 hour   Intake --   Output 2550 ml   Net -2550 ml        Physical Exam  Vitals and nursing note reviewed.   Constitutional:       General: She is not in acute distress.     Comments: Elderly thin  female in no acute distress.   Eyes:      Pupils: Pupils are equal, round, and reactive to light.   Cardiovascular:      Rate and Rhythm: Normal rate and regular rhythm.      Heart sounds: No murmur heard.  Pulmonary:      Comments: Mildly increased respiratory effort noted on nasal cannula, bibasilar crackles noted.  Abdominal:      General: There is no distension.      Palpations: Abdomen is soft.      Tenderness: There is no abdominal tenderness.   Skin:     Coloration: Skin is not pale.      Findings: No rash.   Neurological:      Mental Status: She is alert and oriented to person, place, and time.       Significant Labs: All pertinent labs within the past 24 hours have been reviewed.    Significant Imaging: I have reviewed all pertinent imaging results/findings within the past 24 hours.      Assessment/Plan:      * Acute on chronic systolic congestive heart failure  Patient is identified as having Combined Systolic and Diastolic heart failure that is Acute on Chronic. CHF is currently uncontrolled due to volume overload due to: Continued edema of extremities and Pulmonary edema/pleural effusion on CXR. Latest ECHO performed and demonstrates- Results for orders placed during the hospital encounter of 05/25/22    Echo    Interpretation Summary  · The  left ventricle is normal in size with mild concentric hypertrophy and mildly decreased systolic function.  · The estimated PA systolic pressure is 30 mmHg.  · Grade II left ventricular diastolic dysfunction.  · Normal right ventricular size with normal right ventricular systolic function.  · Severe left atrial enlargement.  · Normal central venous pressure (3 mmHg).  · Moderate aortic regurgitation.  · Mild to moderate tricuspid regurgitation.  · Mild to moderate pulmonic regurgitation.  · The estimated ejection fraction is 40%.  · Moderate mitral regurgitation.  . Continue Beta Blocker ACE/ARB Furosemide and monitor clinical status closely. Monitor on telemetry. Patient is on CHF pathway.  Monitor strict Is&Os and daily weights.  Place on fluid restriction of 1.5 L. Continue to stress to patient importance of self efficacy and  on diet for CHF. Last BNP reviewed- and noted below   Recent Labs   Lab 05/25/22  1845   BNP 1,795*   .          Severe malnutrition  Nutrition consulted. Most recent weight and BMI monitored-     Malnutrition (Moderate to Severe)  Energy Intake (Malnutrition): less than 75% for greater than or equal to 1 month    Final Summary  Subcutaneous Fat Loss (Final Summary): moderate protein-calorie malnutrition  Muscle Loss Evaluation (Final Summary): severe protein-calorie malnutrition    Measurements:  Wt Readings from Last 1 Encounters:   05/27/22 46 kg (101 lb 6.6 oz)   Body mass index is 19.16 kg/m².    Recommendations: Recommendation/Intervention: Intervention: carb/sodium/fat/fluid modified diet and nutrition education 1) Continue diet as ordered to promote PO intakes 2) Add Boost glucose control vanilla BID 3) weigh daily 4) Nutrition education and handout given  Goals: 1) PO intakes > 75% of meals and supplements at f/u    Patient has been screened and assessed by RD. RD will follow patient.      MR (mitral regurgitation)  Patient is status post mitral valve replacement. Continue to  monitor.      Atherosclerosis of native coronary artery of native heart with angina pectoris  Patient with known CAD s/p CABG, which is controlled Will continue ASA and Plavix and monitor for S/Sx of angina/ACS. Continue to monitor on telemetry.           Hypokalemia  Patient has hypokalemia which is currently uncontrolled. Last electrolytes reviewed- Recent Labs   Lab 05/25/22  1845 05/26/22  0312 05/27/22  0359   K 4.0 3.7 2.9*   . Will replace potassium and monitor electrolytes closely.         Gastroesophageal reflux disease without esophagitis  Chronic  Stable      Type 2 diabetes mellitus with diabetic polyneuropathy, without long-term current use of insulin  Patient's FSGs are uncontrolled due to hyperglycemia on current medication regimen.  Last A1c reviewed-   Lab Results   Component Value Date    LABA1C 6.2 (H) 02/05/2018    HGBA1C 5.7 (H) 05/25/2022    HGBA1C 5.7 (H) 05/25/2022     Most recent fingerstick glucose reviewed-   No results for input(s): POCTGLUCOSE in the last 24 hours.  Current correctional scale  Medium  Maintain anti-hyperglycemic dose as follows-   Antihyperglycemics (From admission, onward)            Start     Stop Route Frequency Ordered    05/25/22 2137  insulin aspart U-100 pen 1-10 Units         -- SubQ Before meals & nightly PRN 05/25/22 2038        Hold Oral hypoglycemics while patient is in the hospital.        Tobacco abuse  Chronic  Dangers of cigarette smoking were reviewed with patient in detail for 10 minutes and patient was encouraged to quit. Nicotine replacement options were discussed.      Essential hypertension  Chronic, uncontrolled.  Latest blood pressure and vitals reviewed-   Temp:  [97 °F (36.1 °C)-98.6 °F (37 °C)]   Pulse:  [60-86]   Resp:  [16-18]   BP: (128-188)/(67-91)   SpO2:  [98 %-100 %] .   Home meds for hypertension were reviewed and noted below.   Hypertension Medications             lisinopriL 10 MG tablet Take 1 tablet (10 mg total) by mouth once daily.     metoprolol tartrate 75 mg Tab Take 1 tablet by mouth 3 (three) times daily.    spironolactone (ALDACTONE) 25 MG tablet Take 1 tablet (25 mg total) by mouth once daily.          While in the hospital, will manage blood pressure as follows; Continue home antihypertensive regimen, increase metoprolol and lisinopril dose.    Will utilize p.r.n. blood pressure medication only if patient's blood pressure greater than  160/110 and she develops symptoms such as worsening chest pain or shortness of breath.      VTE Risk Mitigation (From admission, onward)         Ordered     IP VTE HIGH RISK PATIENT  Once         05/25/22 2038     Place sequential compression device  Until discontinued         05/25/22 2038     Place PAN hose  Until discontinued         05/25/22 2038                Discharge Planning   MIKE: 5/30/2022     Code Status: Full Code   Is the patient medically ready for discharge?:     Reason for patient still in hospital (select all that apply): Treatment  Discharge Plan A: Home with family                  Jesus Carreon MD  Department of Hospital Medicine   [facility]

## 2022-05-27 NOTE — PT/OT/SLP EVAL
Physical Therapy Evaluation    Patient Name:  Keyona Irene   MRN:  [id number]    Recommendations:     Discharge Recommendations:  home   Discharge Equipment Recommendations: walker, rolling   Barriers to discharge: None    Assessment:     Keyona Irene is a 71 y.o. female admitted with a medical diagnosis of Acute on chronic systolic congestive heart failure.  She presents with the following impairments/functional limitations:  weakness, impaired endurance, impaired balance, gait instability, impaired functional mobilty, decreased lower extremity function, impaired cardiopulmonary response to activity  .    Rehab Prognosis: Good; patient would benefit from acute skilled PT services to address these deficits and reach maximum level of function.    Recent Surgery: * No surgery found *      Plan:     During this hospitalization, patient to be seen 6 x/week to address the identified rehab impairments via gait training, therapeutic activities, therapeutic exercises and progress toward the following goals:    · Plan of Care Expires:  06/15/22    Subjective     Patient/Family Comments/goals: agrees to work with PT to walk in [place]    Living Environment:  Trailer with spouse , few steps (with rail) to enter  Prior to admission, patients level of function was (independent without AD).  Equipment used at home: none.  DME owned (not currently used): none.  Upon discharge, patient will have assistance from .    Objective:     Communicated with nurse (Dinorah) prior to session.  Patient found supine with PureWick, telemetry, bed alarm  upon PT entry to room.    General Precautions: Standard, fall   Orthopedic Precautions:N/A   Braces: N/A  Respiratory Status: Room air    Functional Mobility training:  · Bed Mobility:     · Rolling Right: stand by assistance  · Supine to Sit: stand by assistance  · Sit to Supine: stand by assistance  · Transfers:     · Sit to Stand:  contact guard assistance with rolling  walker  · Gait: 125' with RW with CG/SBA    Therapeutic Activities and Exercises:  mobility training as above with cues for technique  SUPINE: SLR, ankle DF/PF, x 10 reps each  SEATED: LAQ x 10 reps each LE  Stand balance training with CGA and cues (ambulated ~ 15 without AD)      AM-PAC 6 CLICK MOBILITY  Total Score:18     Patient left supine with all lines intact, call button in reach and bed alarm on.    GOALS:   Multidisciplinary Problems     Physical Therapy Goals        Problem: Physical Therapy    Goal Priority Disciplines Outcome Goal Variances Interventions   Physical Therapy Goal     PT, PT/OT Ongoing, Progressing     Description: Goals to be met by: 6/15/2022     Patient will increase functional independence with mobility by performin). Supine to sit with Modified [place]  2). Sit to supine with Modified [place]  3). Sit to stand transfer with Modified [place]  4). Gait  x > 150 feet with Modified [place]                      History:     Past Medical History:   Diagnosis Date    Anemia     Biliary obstruction     Cholangitis     Diabetes mellitus     pt denies, does not take meds 2016    EtOH dependence     HTN (hypertension) 2013       Past Surgical History:   Procedure Laterality Date    ARTERIOGRAPHY OF SUBCLAVIAN ARTERY  2020    Procedure: Arteriogram, Subclavian;  Surgeon: Heather Carbajal MD;  Location: [facility] CATH;  Service: Cardiology;;    COLONOSCOPY      CORONARY ANGIOGRAPHY N/A 2020    Procedure: ANGIOGRAM, CORONARY ARTERY;  Surgeon: Heather Carbajal MD;  Location: [facility] CATH;  Service: Cardiology;  Laterality: N/A;    CORONARY ARTERY BYPASS GRAFT (CABG) N/A 2020    Procedure: CORONARY ARTERY BYPASS GRAFT (CABG) x 5 VESSELS;  Surgeon: Dima Laughlin MD;  Location: [facility] OR;  Service: Cardiovascular;  Laterality: N/A;    ENDOSCOPIC HARVEST OF VEIN N/A 2020    Procedure: SURGICAL PROCUREMENT, VEIN, ENDOSCOPIC;  Surgeon: Dima Laughlin MD;   Location: [facility] OR;  Service: Cardiovascular;  Laterality: N/A;    ERCP W/ PLASTIC STENT PLACEMENT      ERCP W/ SPHICTEROTOMY      ESOPHAGOGASTRODUODENOSCOPY      HYSTERECTOMY      INSERTION OF INTRA-AORTIC BALLOON ASSIST DEVICE N/A 5/29/2020    Procedure: INSERTION, INTRA-AORTIC BALLOON PUMP;  Surgeon: Dima Laughlin MD;  Location: [facility] OR;  Service: Cardiovascular;  Laterality: N/A;    LEFT HEART CATHETERIZATION Left 5/22/2020    Procedure: Left heart cath;  Surgeon: Heather Carbajal MD;  Location: [facility] CATH;  Service: Cardiology;  Laterality: Left;       Time Tracking:     PT Received On: 05/27/22  PT Start Time: 1027     PT Stop Time: 1047  PT Total Time (min): 20 min     Billable Minutes: Evaluation 10 and Therapeutic Activity 10      05/27/2022

## 2022-05-27 NOTE — PROGRESS NOTES
[facility] Medicine  Progress Note    Patient Name: Keyona Irene  MRN: [id number]  Patient Class: IP- Inpatient   Admission Date: 5/25/2022  Length of Stay: 1 days  Attending Physician: Jesus Carreon MD  Primary Care Provider: Howard Gan MD        Subjective:     Principal Problem:Acute on chronic systolic congestive heart failure        HPI:  Keyona Irene is a 71-year-old female who presents to the emergency room for evaluation of shortness of breath and generalized weakness.  She reports shortness of breath and weakness onset several days ago and progressively worsen.  She endorses orthopnea and dyspnea on exertion.  She denies fever chills.  No known sick contacts or travel.  Vaccination status is unknown.  Previous medical history includes aortic insufficiency, anemia, atherosclerotic coronary artery disease, hypertension, EtOH abuse, CABG, active smoker, diabetes, GERD.  ER workup:  CBC unremarkable.  Glucose 263 and bicarb of 15 otherwise unremarkable.  BNP 1795. Chest x-ray demonstrates worsening airspace infiltrates and effusions consistent with acute CHF.  Patient was given Lasix in ER.  Patient was placed on CHF pathway and admitted to ICU.  Patient required BiPAP.      Overview/Hospital Course:  No notes on file    Interval History:  Patient seen and examined.  Overnight, patient is able to wean off of BiPAP and on nasal cannula.  She diuresed well with administration of IV Lasix.  Plan of care discussed with the patient at the bedside in detail.    Review of Systems   Constitutional:  Positive for fatigue.   Respiratory:  Positive for cough and shortness of breath.    Cardiovascular:  Negative for chest pain and leg swelling.   Gastrointestinal:  Negative for abdominal pain and nausea.   Neurological:  Positive for weakness.   Psychiatric/Behavioral:  Negative for confusion.    All other systems reviewed and are negative.  Objective:     Vital Signs (Most  Recent):  Temp: 98.1 °F (36.7 °C) (05/26/22 1926)  Pulse: 85 (05/26/22 1926)  Resp: 18 (05/26/22 1926)  BP: (!) 175/83 (05/26/22 1926)  SpO2: 99 % (05/26/22 1926)   Vital Signs (24h Range):  Temp:  [97.3 °F (36.3 °C)-98.6 °F (37 °C)] 98.1 °F (36.7 °C)  Pulse:  [] 85  Resp:  [16-39] 18  SpO2:  [97 %-100 %] 99 %  BP: (131-188)/(69-92) 175/83     Weight: 43.5 kg (96 lb)  Body mass index is 18.14 kg/m².    Intake/Output Summary (Last 24 hours) at 5/26/2022 2014  Last data filed at 5/26/2022 1122  Gross per 24 hour   Intake 490 ml   Output 3450 ml   Net -2960 ml      Physical Exam  Vitals and nursing note reviewed.   Constitutional:       General: She is not in acute distress.     Comments: Elderly thin  female in no acute distress.   Eyes:      Pupils: Pupils are equal, round, and reactive to light.   Cardiovascular:      Rate and Rhythm: Normal rate and regular rhythm.      Heart sounds: No murmur heard.  Pulmonary:      Comments: Mildly increased respiratory effort noted on nasal cannula, bibasilar crackles noted.  Abdominal:      General: There is no distension.      Palpations: Abdomen is soft.      Tenderness: There is no abdominal tenderness.   Skin:     Coloration: Skin is not pale.      Findings: No rash.   Neurological:      Mental Status: She is alert and oriented to person, place, and time.       Significant Labs: All pertinent labs within the past 24 hours have been reviewed.    Significant Imaging: I have reviewed all pertinent imaging results/findings within the past 24 hours.      Assessment/Plan:      * Acute on chronic systolic congestive heart failure  Patient is identified as having Combined Systolic and Diastolic heart failure that is Acute on chronic. CHF is currently uncontrolled due to Rales/crackles on pulmonary exam and Pulmonary edema/pleural effusion on CXR. Latest ECHO performed and demonstrates- Results for orders placed during the hospital encounter of 05/19/20    Echo  Color Flow Doppler? No    Interpretation Summary  · Moderately decreased left ventricular systolic function. The estimated ejection fraction is 38%.  · Normal right ventricular systolic function.  · Mild aortic regurgitation.  . Continue Furosemide and monitor clinical status closely. Monitor on telemetry. Patient is on CHF pathway.  Monitor strict Is&Os and daily weights.  Place on fluid restriction of 1.5 L. Continue to stress to patient importance of self efficacy and  on diet for CHF. Last BNP reviewed- and noted below   Recent Labs   Lab 05/25/22  1845   BNP 1,795*   .      Acute hypoxemic respiratory failure  Patient with Hypoxic Respiratory failure which is Acute on chronic.  she is not on home oxygen. Supplemental oxygen was provided and noted- Oxygen Concentration (%):  [40] 40.   Signs/symptoms of respiratory failure include- tachypnea and increased work of breathing. Contributing diagnoses includes - CHF Labs and images were reviewed. Patient Has not had a recent ABG. Will treat underlying causes and adjust management of respiratory failure as follows- BiPAP    Severe malnutrition  Nutrition consulted. Most recent weight and BMI monitored-     Malnutrition (Moderate to Severe)  Energy Intake (Malnutrition): less than 75% for greater than or equal to 1 month    Final Summary  Subcutaneous Fat Loss (Final Summary): moderate protein-calorie malnutrition  Muscle Loss Evaluation (Final Summary): severe protein-calorie malnutrition    Measurements:  Wt Readings from Last 1 Encounters:   05/26/22 43.5 kg (96 lb)   Body mass index is 18.14 kg/m².    Recommendations: Recommendation/Intervention: Intervention: carb/sodium/fat/fluid modified diet and nutrition education 1) Continue diet as ordered to promote PO intakes 2) Add Boost glucose control vanilla BID 3) weigh daily 4) Nutrition education and handout given  Goals: 1) PO intakes > 75% of meals and supplements at f/u    Patient has been screened and  assessed by RD. RD will follow patient.      MR (mitral regurgitation)  Patient is status post mitral valve replacement. Continue to monitor.      Atherosclerosis of native coronary artery of native heart with angina pectoris  Patient with known CAD s/p CABG, which is controlled Will continue ASA and Plavix and monitor for S/Sx of angina/ACS. Continue to monitor on telemetry.           Gastroesophageal reflux disease without esophagitis  Chronic  Stable      Type 2 diabetes mellitus with diabetic polyneuropathy, without long-term current use of insulin  Patient's FSGs are uncontrolled due to hyperglycemia on current medication regimen.  Last A1c reviewed-   Lab Results   Component Value Date    LABA1C 6.2 (H) 02/05/2018    HGBA1C 5.7 (H) 05/25/2022    HGBA1C 5.7 (H) 05/25/2022     Most recent fingerstick glucose reviewed-   Recent Labs   Lab 05/26/22  0819   POCTGLUCOSE 118*     Current correctional scale  Medium  Maintain anti-hyperglycemic dose as follows-   Antihyperglycemics (From admission, onward)            Start     Stop Route Frequency Ordered    05/25/22 2137  insulin aspart U-100 pen 1-10 Units         -- SubQ Before meals & nightly PRN 05/25/22 2038        Hold Oral hypoglycemics while patient is in the hospital.        Tobacco abuse  Chronic  Dangers of cigarette smoking were reviewed with patient in detail for 10 minutes and patient was encouraged to quit. Nicotine replacement options were discussed.      Essential hypertension  Chronic, uncontrolled.  Latest blood pressure and vitals reviewed-   Temp:  [97.3 °F (36.3 °C)-98.6 °F (37 °C)]   Pulse:  []   Resp:  [16-39]   BP: (131-188)/(69-92)   SpO2:  [97 %-100 %] .   Home meds for hypertension were reviewed and noted below.   Hypertension Medications             lisinopriL 10 MG tablet Take 1 tablet (10 mg total) by mouth once daily.    metoprolol tartrate 75 mg Tab Take 1 tablet by mouth 3 (three) times daily.    spironolactone (ALDACTONE) 25 MG  tablet Take 1 tablet (25 mg total) by mouth once daily.          While in the hospital, will manage blood pressure as follows; Continue home antihypertensive regimen    Will utilize p.r.n. blood pressure medication only if patient's blood pressure greater than  160/110 and she develops symptoms such as worsening chest pain or shortness of breath.        VTE Risk Mitigation (From admission, onward)         Ordered     IP VTE HIGH RISK PATIENT  Once         05/25/22 2038     Place sequential compression device  Until discontinued         05/25/22 2038     Place PAN hose  Until discontinued         05/25/22 2038                Discharge Planning   MIKE: 5/27/2022     Code Status: Full Code   Is the patient medically ready for discharge?:     Reason for patient still in hospital (select all that apply): Treatment  Discharge Plan A: Home with family                  Jesus Carreon MD  Department of Hospital Medicine   [facility]

## 2022-05-28 VITALS
WEIGHT: 101.44 LBS | OXYGEN SATURATION: 98 % | HEIGHT: 61 IN | HEART RATE: 72 BPM | DIASTOLIC BLOOD PRESSURE: 78 MMHG | BODY MASS INDEX: 19.15 KG/M2 | TEMPERATURE: 98 F | RESPIRATION RATE: 16 BRPM | SYSTOLIC BLOOD PRESSURE: 174 MMHG

## 2022-05-28 LAB
ANION GAP SERPL CALC-SCNC: 10 MMOL/L (ref 8–16)
BUN SERPL-MCNC: 13 MG/DL (ref 8–23)
CALCIUM SERPL-MCNC: 9.8 MG/DL (ref 8.7–10.5)
CHLORIDE SERPL-SCNC: 103 MMOL/L (ref 95–110)
CO2 SERPL-SCNC: 27 MMOL/L (ref 23–29)
CREAT SERPL-MCNC: 0.8 MG/DL (ref 0.5–1.4)
EST. GFR  (AFRICAN AMERICAN): >60 ML/MIN/1.73 M^2
EST. GFR  (NON AFRICAN AMERICAN): >60 ML/MIN/1.73 M^2
GLUCOSE SERPL-MCNC: 125 MG/DL (ref 70–110)
POTASSIUM SERPL-SCNC: 4.8 MMOL/L (ref 3.5–5.1)
SODIUM SERPL-SCNC: 140 MMOL/L (ref 136–145)

## 2022-05-28 PROCEDURE — 91300 PHARM REV CODE 636 W HCPCS: CPT | Performed by: HOSPITALIST

## 2022-05-28 PROCEDURE — 25000003 PHARM REV CODE 250: Performed by: GENERAL PRACTICE

## 2022-05-28 PROCEDURE — 25000003 PHARM REV CODE 250: Performed by: HOSPITALIST

## 2022-05-28 PROCEDURE — 80048 BASIC METABOLIC PNL TOTAL CA: CPT | Performed by: HOSPITALIST

## 2022-05-28 PROCEDURE — 36415 COLL VENOUS BLD VENIPUNCTURE: CPT | Performed by: HOSPITALIST

## 2022-05-28 PROCEDURE — 63600175 PHARM REV CODE 636 W HCPCS: Performed by: HOSPITALIST

## 2022-05-28 PROCEDURE — S4991 NICOTINE PATCH NONLEGEND: HCPCS | Performed by: HOSPITALIST

## 2022-05-28 PROCEDURE — 0001A HC IMMUNIZ ADMIN, SARS-COV-2 COVID-19 VACC, 30MCG/0.3ML, 1ST DOSE: CPT | Performed by: HOSPITALIST

## 2022-05-28 PROCEDURE — 97165 OT EVAL LOW COMPLEX 30 MIN: CPT

## 2022-05-28 RX ORDER — FUROSEMIDE 20 MG/1
20 TABLET ORAL DAILY
Qty: 30 TABLET | Refills: 11 | Status: ON HOLD | OUTPATIENT
Start: 2022-05-28 | End: 2024-03-06 | Stop reason: HOSPADM

## 2022-05-28 RX ORDER — METOPROLOL TARTRATE 100 MG/1
100 TABLET ORAL 2 TIMES DAILY
Qty: 60 TABLET | Refills: 11 | Status: SHIPPED | OUTPATIENT
Start: 2022-05-28 | End: 2024-02-19

## 2022-05-28 RX ADMIN — RNA INGREDIENT BNT-162B2 0.3 ML: 0.23 INJECTION, SUSPENSION INTRAMUSCULAR at 10:05

## 2022-05-28 RX ADMIN — CLOPIDOGREL 75 MG: 75 TABLET, FILM COATED ORAL at 09:05

## 2022-05-28 RX ADMIN — LISINOPRIL 20 MG: 10 TABLET ORAL at 09:05

## 2022-05-28 RX ADMIN — POTASSIUM CHLORIDE 40 MEQ: 1500 TABLET, EXTENDED RELEASE ORAL at 09:05

## 2022-05-28 RX ADMIN — METOPROLOL TARTRATE 100 MG: 50 TABLET, FILM COATED ORAL at 09:05

## 2022-05-28 RX ADMIN — Medication 81 MG: at 09:05

## 2022-05-28 RX ADMIN — FAMOTIDINE 20 MG: 20 TABLET, FILM COATED ORAL at 09:05

## 2022-05-28 RX ADMIN — MUPIROCIN: 20 OINTMENT TOPICAL at 09:05

## 2022-05-28 RX ADMIN — NICOTINE 1 PATCH: 14 PATCH, EXTENDED RELEASE TRANSDERMAL at 09:05

## 2022-05-28 NOTE — DISCHARGE INSTRUCTIONS
Discharge Instructions, [facility]    Thank you for choosing [facility] for your medical care. The primary doctor who is taking care of you at the time of your discharge is Jesus Carreon MD.     You were admitted to the hospital with Acute on chronic systolic congestive heart failure.     Please note your discharge instructions, including diet/activity restrictions, follow-up appointments, and medication changes.  If you have any questions about your medical issues, prescriptions, or any other questions, please feel free to contact the [facility] Medicine Dept at [phone] and we will help.    If you are previously with Home health, outpatient PT/OT or under a therapy program, you are cleared to return to those programs.    Please direct all long term medication refills and follow up to your primary care provider, Howard Gan MD. Thank you again for letting us take care of your health care needs.

## 2022-05-28 NOTE — HOSPITAL COURSE
Patient is a 71-year-old  female who was admitted to the hospital for acute Congestive heart failure exacerbation and respiratory failure.  She was initially on BiPAP, and was started on high-dose IV Lasix, and diuresed well and her oxygen requirement improved.  She was markedly hypokalemic and required multiple pushes of IV potassium.  Following this, the patient was transitioned out of the ICU, and moved to the floor.  She continued to diurese well, and was continued on beta blocker, ACE-inhibitor, and loop diuretic as well as Aldactone.  Her symptoms returned back to baseline, and she was set up for discharge home with home health and management of heart failure.

## 2022-05-28 NOTE — PLAN OF CARE
The pt is cleared for discharge home from case management with [facility].    05/28/22 0945   Final Note   Assessment Type Final Discharge Note   Anticipated Discharge Disposition Home-Health   Hospital Resources/Appts/Education Provided Appointments scheduled and added to AVS

## 2022-05-28 NOTE — CARE UPDATE
05/27/22 2020   Patient Assessment/Suction   Level of Consciousness (AVPU) alert   PRE-TX-O2   O2 Device (Oxygen Therapy) room air   SpO2 98 %   Pulse Oximetry Type Intermittent

## 2022-05-28 NOTE — PLAN OF CARE
Problem: Adult Inpatient Plan of Care  Goal: Patient-Specific Goal (Individualized)  Outcome: Ongoing, Progressing     Problem: Malnutrition  Goal: Improved Nutritional Intake  Outcome: Ongoing, Progressing     Problem: Adjustment to Illness (Heart Failure)  Goal: Optimal Coping  Outcome: Ongoing, Progressing   Pt is alert and oriented, remains in bed. Tele monitored. 1.5 Fluid restriction maintained. BP monitored. No SOB noted. Bed alarm set, call light in reach.

## 2022-05-28 NOTE — PLAN OF CARE
[facility] for  services by [facility]. AVS updated and discharge plan closed in [facility].    05/28/22 4902   Post-Acute Status   Post-Acute Authorization Home Health   Home Health Status Set-up Complete/Auth obtained

## 2022-05-28 NOTE — PT/OT/SLP EVAL
Occupational Therapy   Evaluation    Name: Keyona Irene  MRN: [id number]  Admitting Diagnosis:  Acute on chronic systolic congestive heart failure  Recent Surgery: * No surgery found *      Recommendations:     Discharge Recommendations: home health OT  Discharge Equipment Recommendations:     Barriers to discharge:       Assessment:     Keyona Irene is a 71 y.o. female with a medical diagnosis of Acute on chronic systolic congestive heart failure.  She presents with the following performance deficits affecting function: weakness, impaired endurance, impaired self care skills, impaired functional mobilty, gait instability, impaired balance.  Pt was sitting EOB when OT entered room. Pt demonstrates BUE AROM/strength WFL's. Pt required SBA to move to other side of bed. Pt donned socks in sitting EOB. Pt required CGA with sit to stand with no AD. Pt took several steps with CGA and no AD. Pt required SBA with sit to supine. OT provided instruction in home safety with ADL's/IADL's including review of home set up and DME/AE. Pt verbalized understanding. OT provided education in calling for assist. Pt verbalized understanding. Recommend HHOT at discharge.    Rehab Prognosis: Good; patient would benefit from acute skilled OT services to address these deficits and reach maximum level of function.       Plan:     Patient to be seen 4 x/week to address the above listed problems via self-care/home management, therapeutic activities, therapeutic exercises  · Plan of Care Expires: 06/25/22  · Plan of Care Reviewed with: patient    Subjective     Chief Complaint: No complaints  Patient/Family Comments/goals: To go home    Occupational Profile:  Living Environment: Pt lives with spouse in 1 story home with 5STE with handrail. Pt has tub/shower and standard toilet.  Previous level of function: Independent  Equipment Used at Home:  none  Assistance upon Discharge: Spouse    Pain/Comfort:  · Pain Rating 1:  0/10  · Pain Rating Post-Intervention 1: 0/10    Patients cultural, spiritual, Restoration conflicts given the current situation:      Objective:     Communicated with: Nurse Lee prior to session.  Patient found sitting edge of bed with bed alarm, PureWick, peripheral IV, telemetry upon OT entry to room.    General Precautions: Standard, fall   Orthopedic Precautions:N/A   Braces: N/A  Respiratory Status: Room air    Occupational Performance:    Bed Mobility:    · Patient completed Sit to Supine with stand by assistance    Functional Mobility/Transfers:  · Patient completed Sit <> Stand Transfer with contact guard assistance  with  no assistive device     Activities of Daily Living:  · Feeding:  independence    · Grooming: stand by assistance set up in sitting  · Bathing: minimum assistance    · Upper Body Dressing: stand by assistance set up in sitting  · Lower Body Dressing: contact guard assistance    · Toileting: Purewick      Cognitive/Visual Perceptual:  Pt alert and oriented    Physical Exam:  Upper Extremity Strength:    -       Right Upper Extremity: WFL  -       Left Upper Extremity: WFL    AMPAC 6 Click ADL:  AMPAC Total Score: 19    Treatment & Education:  OT provided education in role of OT. Pt verbalized understanding and was agreeable to OT.  OT provided instruction in home safety with ADL's/IADL's including review of home set up and DME/AE. Pt verbalized understanding.  Education:    Patient left HOB elevated with all lines intact, call button in reach, bed alarm on and Nurse Lee notified    GOALS:   Multidisciplinary Problems     Occupational Therapy Goals        Problem: Occupational Therapy    Goal Priority Disciplines Outcome Interventions   Occupational Therapy Goal     OT, PT/OT     Description: Goals to be met by: 6/25/22    Patient will increase functional independence with ADLs by performing:    UE Dressing with [place].  LE Dressing with [place].  Grooming while standing at  sink with [place].  Toileting from toilet with [place] for hygiene and clothing management.   Bathing from  shower chair/bench with Modified [place].  Toilet transfer to toilet with [place].  Increased strength and functional activity tolerance for ADL's/IADL's as evidenced by requiring less assistance with these tasks.                     History:     Past Medical History:   Diagnosis Date    Anemia     Biliary obstruction     Cholangitis     Diabetes mellitus     pt denies, does not take meds 11/2016    EtOH dependence     HTN (hypertension) 12/30/2013       Past Surgical History:   Procedure Laterality Date    ARTERIOGRAPHY OF SUBCLAVIAN ARTERY  5/22/2020    Procedure: Arteriogram, Subclavian;  Surgeon: Heather Carbajal MD;  [facility];  Service: Cardiology;;    COLONOSCOPY      CORONARY ANGIOGRAPHY N/A 5/22/2020    Procedure: ANGIOGRAM, CORONARY ARTERY;  Surgeon: Heather Carbajal MD;  Location: [facility] CATH;  Service: Cardiology;  Laterality: N/A;    CORONARY ARTERY BYPASS GRAFT (CABG) N/A 5/29/2020    Procedure: CORONARY ARTERY BYPASS GRAFT (CABG) x 5 VESSELS;  Surgeon: Dima Laughlin MD;  Location: [facility] OR;  Service: Cardiovascular;  Laterality: N/A;    ENDOSCOPIC HARVEST OF VEIN N/A 5/29/2020    Procedure: SURGICAL PROCUREMENT, VEIN, ENDOSCOPIC;  Surgeon: Dima Laughlin MD;  Location: [facility] OR;  Service: Cardiovascular;  Laterality: N/A;    ERCP W/ PLASTIC STENT PLACEMENT      ERCP W/ SPHICTEROTOMY      ESOPHAGOGASTRODUODENOSCOPY      HYSTERECTOMY      INSERTION OF INTRA-AORTIC BALLOON ASSIST DEVICE N/A 5/29/2020    Procedure: INSERTION, INTRA-AORTIC BALLOON PUMP;  Surgeon: Dima Laughlin MD;  [facility];  Service: Cardiovascular;  Laterality: N/A;    LEFT HEART CATHETERIZATION Left 5/22/2020    Procedure: Left heart cath;  Surgeon: Heather Carbajal MD;  [facility];  Service: Cardiology;  Laterality: Left;       Time Tracking:     OT Date of Treatment:  05/28/22  OT Start Time: 1046  OT Stop Time: 1055  OT Total Time (min): 9 min    Billable Minutes:Evaluation 9    5/28/2022

## 2022-05-28 NOTE — PLAN OF CARE
Goals to be met by: 6/25/22    Patient will increase functional independence with ADLs by performing:    UE Dressing with [place].  LE Dressing with [place].  Grooming while standing at sink with [place].  Toileting from toilet with [place] for hygiene and clothing management.   Bathing from  shower chair/bench with Modified [place].  Toilet transfer to toilet with [place].  Increased strength and functional activity tolerance for ADL's/IADL's as evidenced by requiring less assistance with these tasks.

## 2022-05-28 NOTE — PLAN OF CARE
[facility] CM following.    05/28/22 0938   Post-Acute Status   Post-Acute Authorization Home Health   Home Health Status Referrals Sent   Patient choice form signed by patient/caregiver List with quality metrics by geographic area provided

## 2022-05-30 NOTE — DISCHARGE SUMMARY
[facility] Medicine  Discharge Summary      Patient Name: Keyona Irene  MRN: [id number]  Patient Class: IP- Inpatient  Admission Date: 5/25/2022  Hospital Length of Stay: 3 days  Discharge Date and Time: 5/28/2022 12:00 PM  Attending Physician: No att. providers found   Discharging Provider: Jesus Carreon MD  Primary Care Provider: Howard Gan MD      HPI:   Keyona Irene is a 71-year-old female who presents to the emergency room for evaluation of shortness of breath and generalized weakness.  She reports shortness of breath and weakness onset several days ago and progressively worsen.  She endorses orthopnea and dyspnea on exertion.  She denies fever chills.  No known sick contacts or travel.  Vaccination status is unknown.  Previous medical history includes aortic insufficiency, anemia, atherosclerotic coronary artery disease, hypertension, EtOH abuse, CABG, active smoker, diabetes, GERD.  ER workup:  CBC unremarkable.  Glucose 263 and bicarb of 15 otherwise unremarkable.  BNP 1795. Chest x-ray demonstrates worsening airspace infiltrates and effusions consistent with acute CHF.  Patient was given Lasix in ER.  Patient was placed on CHF pathway and admitted to ICU.  Patient required BiPAP.      * No surgery found *      Hospital Course:   Patient is a 71-year-old  female who was admitted to the hospital for acute Congestive heart failure exacerbation and respiratory failure.  She was initially on BiPAP, and was started on high-dose IV Lasix, and diuresed well and her oxygen requirement improved.  She was markedly hypokalemic and required multiple pushes of IV potassium.  Following this, the patient was transitioned out of the ICU, and moved to the floor.  She continued to diurese well, and was continued on beta blocker, ACE-inhibitor, and loop diuretic as well as Aldactone.  Her symptoms returned back to baseline, and she was set up for discharge home  with home health and management of heart failure.       Goals of Care Treatment Preferences:  Code Status: Full Code      Consults:   Consults (From admission, onward)        Status Ordering Provider     Inpatient consult to Cardiology  Once        Provider:  Wilfredo Woo MD    Completed FARRUKH FROST     Inpatient consult to Social Work/Case Management  Once        Provider:  (Not yet assigned)    Completed MAREN OTTO     Inpatient consult to Registered Dietitian/Nutritionist  Once        Provider:  (Not yet assigned)    Completed MAREN OTTO          No new Assessment & Plan notes have been filed under this hospital service since the last note was generated.  Service: Hospital Medicine    Final Active Diagnoses:    Diagnosis Date Noted POA    PRINCIPAL PROBLEM:  Acute on chronic systolic congestive heart failure [I50.23]  Yes    Severe malnutrition [E43] 05/26/2022 Yes    MR (mitral regurgitation) [I34.0] 05/30/2020 Yes    Atherosclerosis of native coronary artery of native heart with angina pectoris [I25.119] 05/23/2020 Yes    Hypokalemia [E87.6] 05/20/2020 Yes    Gastroesophageal reflux disease without esophagitis [K21.9] 06/26/2019 Yes    Type 2 diabetes mellitus with diabetic polyneuropathy, without long-term current use of insulin [E11.42] 05/01/2014 Yes    Essential hypertension [I10] 12/30/2013 Yes    Tobacco abuse [Z72.0] 12/30/2013 Yes      Problems Resolved During this Admission:    Diagnosis Date Noted Date Resolved POA    Acute hypoxemic respiratory failure [J96.01] 05/20/2020 05/27/2022 Yes       Discharged Condition: stable    [place] [facility]    Follow Up:   Follow-up Information     Howard Gan MD. Schedule an appointment as soon as possible for a visit in 1 week(s).    Specialty: Family Medicine  Contact information:  [address] [facility] [address]  [phone]             Seth Rodríguez MD. Schedule an appointment as soon as  possible for a visit in 1 week(s).    Specialties: Cardiology, Cardiovascular Disease  Contact information:  [facility]  [address]  [facility] [address]  [phone]                       Patient Instructions:      Ambulatory referral/consult to Home Health   Standing Status: Future   Referral Priority: Routine Referral Type: Home Health   Referral Reason: Specialty Services Required   Requested Specialty: Home Health Services   Number of Visits Requested: 1     Diet Cardiac     Call MD for:  temperature >100.4   Standing Status:      Call MD for:  persistent nausea and vomiting or diarrhea   Standing Status:      Call MD for:  severe uncontrolled pain   Standing Status:      Call MD for:  redness, tenderness, or signs of infection (pain, swelling, redness, odor or green/yellow discharge around incision site)   Standing Status:      Call MD for:  difficulty breathing or increased cough   Standing Status:      Call MD for:  severe persistent headache   Standing Status:      Call MD for:  worsening rash   Standing Status:      Call MD for:  persistent dizziness, light-headedness, or visual disturbances   Standing Status:      Call MD for:  increased confusion or weakness   Standing Status:      COVID-19 PFIZER 2ND DOSAGE APPT REQUEST   Standing Status: Future Standing Exp. Date: 05/28/23     Order Specific Question Answer Comments   Schedule the second dosage on this date: 6/18/2022        Significant Diagnostic Studies:     Pending Diagnostic Studies:     None         Medications:  Reconciled Home Medications:      Medication List      START taking these medications    furosemide 20 MG tablet  Commonly known as: LASIX  Take 1 tablet (20 mg total) by mouth once daily.        CHANGE how you take these medications    metoprolol tartrate 100 MG tablet  Commonly known as: LOPRESSOR  Take 1 tablet (100 mg total) by mouth 2 (two) times daily.  What changed:   · medication strength  · how much to take  · when to take  this        CONTINUE taking these medications    ACCU-CHEK KEDAR CONTROL SOLN Soln  Generic drug: blood glucose control high,low  Use to test controls per r guidelines/instructions     alcohol swabs Padm  Apply 1 each topically as needed.     aspirin 81 MG EC tablet  Commonly known as: ECOTRIN  Take 1 tablet (81 mg total) by mouth once daily.     clopidogreL 75 mg tablet  Commonly known as: PLAVIX  Take 1 tablet (75 mg total) by mouth once daily.     lisinopriL 10 MG tablet  Take 1 tablet (10 mg total) by mouth once daily.     metFORMIN 500 MG ER 24hr tablet  Commonly known as: GLUCOPHAGE-XR  Take 2 tablets (1,000 mg total) by mouth once daily.     spironolactone 25 MG tablet  Commonly known as: ALDACTONE  Take 1 tablet (25 mg total) by mouth once daily.        STOP taking these medications    atorvastatin 10 MG tablet  Commonly known as: LIPITOR            Indwelling Lines/Drains at time of discharge:   Lines/Drains/Airways     None                 Time spent on the discharge of patient: 32 minutes         Jesus Carreon MD  Department of Hospital Medicine  [facility]

## 2022-05-31 ENCOUNTER — TELEPHONE (OUTPATIENT)
Dept: MEDSURG UNIT | Facility: HOSPITAL | Age: 72
End: 2022-05-31
Payer: MEDICARE

## 2022-06-03 NOTE — PHYSICIAN QUERY
PT Name: Keyona Irene  MR #: [id number]     DOCUMENTATION CLARIFICATION     CDS/: Julissa Dwyer RN            Contact information: [email or web address]  This form is a permanent document in the medical record.     Query Date: Yolie 3, 2022    By submitting this query, we are merely seeking further clarification of documentation.  Please utilize your independent clinical judgment when addressing the question(s) below.    The Medical Record contains the following   Indicators Supporting Clinical Findings Location in Medical Record   x Heart Failure documented   Acute on chronic systolic congestive heart failure  Patient is identified as having Combined Systolic and Diastolic heart failure that is Acute on Chronic.    PRINCIPAL PROBLEM:    Acute on chronic systolic congestive heart failure       Progress Note 5/27       Hospital Medicine      Discharge Summary 5/28       Hospital Medicine    BNP     x EF/Echo The left ventricle is normal in size with mild concentric hypertrophy and mildly decreased systolic function.  The estimated PA systolic pressure is 30 mmHg.  Grade II left ventricular diastolic dysfunction.  Normal right ventricular size with normal right ventricular systolic function.  Severe left atrial enlargement.  Normal central venous pressure (3 mmHg).  Moderate aortic regurgitation.  Mild to moderate tricuspid regurgitation.  Mild to moderate pulmonic regurgitation.  The estimated ejection fraction is 40%.  Moderate mitral regurgitation.     Echo 5/26    Radiology findings      Subjective/Objective Respiratory Conditions      Recent/Current MI      Heart Transplant, LVAD      Edema, JVD      Ascites      Diuretics/Meds      Other Treatment      Other       Heart failure is a clinical diagnosis which includes symptomatic fluid retention, elevated intracardiac pressures, and/or the inability of the heart to deliver adequate blood flow.    Heart Failure with reduced Ejection Fraction  (HFrEF) or Systolic Heart Failure (loses ability to contract normally, EF is <40%)    Heart Failure with preserved Ejection Fraction (HFpEF) or Diastolic Heart Failure (stiff ventricles, does not relax properly, EF is >50%)     Heart Failure with Combined Systolic and Diastolic Failure (stiff ventricles, does not relax properly and EF is <50%)    Mid-range or mildly reduced ejection fraction (HFmrEF) is classified as systolic heart failure.   Common clues to acute exacerbation:  Rapidly progressive symptoms (w/in 2 weeks of presentation), using IV diuretics, using supplemental O2, pulmonary edema on Xray, new or worsening pleural effusion, +JVD or other signs of volume overload, MI w/in 4 weeks, and/or BNP >500  The clinical guidelines noted are only system guidelines, and do not replace the providers clinical judgment.    Provider, please clarify conflicting documentation regarding the type of Acute on Chronic Heart Failure diagnosis associated with the above clinical findings.    [   ]  Acute on Chronic Systolic Heart Failure (HFrEF or HFmrEF) - worsening of CHF signs/symptoms in preexisting CHF     [ x  ]  Acute on Chronic Combined Systolic and Diastolic Heart Failure - worsening of CHF signs/symptoms in preexisting CHF     [   ]  Other (please specify): __________________  _________________   [   ]  Clinically Undetermined         Please document in your progress notes daily for the duration of treatment until resolved and include in your discharge summary.    References:  American Heart Association editorial staff. (2017, May). Ejection Fraction Heart Failure Measurement. American Heart Association. https://www.heart.org/en/health-topics/heart-failure/diagnosing-heart-failure/ejection-fraction-heart-failure-measurement#:~:text=Ejection%20fraction%20(EF)%20is%20a,pushed%20out%20with%20each%20heartbeLORENE Watts (2020, December 15). Heart failure with preserved ejection fraction: Clinical manifestations  and diagnosis. UpToDate. https://www.[employer].com/contents/heart-failure-with-preserved-ejection-fraction-clinical-manifestations-and-diagnosis.  ICD-10-CM/PCS Coding Clinic Third Quarter ICD-10, Effective with discharges: September 8, 2020 [facility] § Heart failure with mid-range or mildly reduced ejection fraction (2020).  Form No. 13188

## 2022-06-20 ENCOUNTER — IMMUNIZATION (OUTPATIENT)
Dept: FAMILY MEDICINE | Facility: CLINIC | Age: 72
End: 2022-06-20
Payer: MEDICARE

## 2022-06-20 DIAGNOSIS — Z23 NEED FOR VACCINATION: Primary | ICD-10-CM

## 2022-06-20 PROCEDURE — 91305 COVID-19, MRNA, LNP-S, PF, 30 MCG/0.3 ML DOSE VACCINE (PFIZER): CPT | Mod: PBBFAC | Performed by: FAMILY MEDICINE

## 2022-06-21 ENCOUNTER — LAB VISIT (OUTPATIENT)
Dept: FAMILY MEDICINE | Facility: CLINIC | Age: 72
End: 2022-06-21
Payer: MEDICARE

## 2022-06-21 ENCOUNTER — TELEPHONE (OUTPATIENT)
Dept: ENDOSCOPY | Facility: HOSPITAL | Age: 72
End: 2022-06-21
Payer: MEDICARE

## 2022-06-21 DIAGNOSIS — Z01.818 PRE-OP TESTING: ICD-10-CM

## 2022-06-21 LAB — SARS-COV-2 RNA RESP QL NAA+PROBE: NOT DETECTED

## 2022-06-21 PROCEDURE — U0005 INFEC AGEN DETEC AMPLI PROBE: HCPCS | Performed by: INTERNAL MEDICINE

## 2022-06-21 PROCEDURE — U0003 INFECTIOUS AGENT DETECTION BY NUCLEIC ACID (DNA OR RNA); SEVERE ACUTE RESPIRATORY SYNDROME CORONAVIRUS 2 (SARS-COV-2) (CORONAVIRUS DISEASE [COVID-19]), AMPLIFIED PROBE TECHNIQUE, MAKING USE OF HIGH THROUGHPUT TECHNOLOGIES AS DESCRIBED BY CMS-2020-01-R: HCPCS | Performed by: INTERNAL MEDICINE

## 2022-06-21 NOTE — TELEPHONE ENCOUNTER
Spoke with patient she states that she is not taking Plavix, but  she seemed confused about what I was asking her and she did not know what Plavix was.  She went through her medication bag while was on the phone with her and she said that she does not have that one so she is not taking it.     ... I also provided her with the generic name and mg      If she is taking the Plavix she will need to be rescheduled.

## 2022-06-21 NOTE — TELEPHONE ENCOUNTER
Good morning,     Ms. Irene is currently taking both ASA and Plavix daily. Please contact her should she need to hold these meds prior to her EGD on Friday, 6/24.     Thank you!

## 2022-06-22 ENCOUNTER — TELEPHONE (OUTPATIENT)
Dept: ENDOSCOPY | Facility: HOSPITAL | Age: 72
End: 2022-06-22
Payer: MEDICARE

## 2022-06-22 NOTE — TELEPHONE ENCOUNTER
Good afternoon,    Ms. Irene was recently hospitalized on 5/25 for acute CHF along with chest pain and shortness of breath. Her daughter states Ms. Irene was hospitalized for 3 days and had some sort of cardiac intervention. The patient has not yet followed up with cardiology. After reviewing this patient's medical history, Dr. Coates has determined that Ms. Rand needs her EGD done in a hospital setting. Please contact this patient and her daughter to inform them of this change ASAP. Please contact Dr. Coates with any further questions.    Thank you!

## 2022-06-22 NOTE — TELEPHONE ENCOUNTER
Spoke with daughter to reschedule EGD, she wishes to push the procedure out further anyway's.   Patient was rescheduled to 9/9 at [facility].     New set of instructions placed in the mail.    Request faxed to Seth Rodríguez MD for the ok for patient to hold her plavix.

## 2022-07-25 ENCOUNTER — HOSPITAL ENCOUNTER (INPATIENT)
Facility: HOSPITAL | Age: 72
LOS: 1 days | Discharge: HOME | DRG: 291 | End: 2022-07-26
Attending: EMERGENCY MEDICINE | Admitting: INTERNAL MEDICINE
Payer: MEDICARE

## 2022-07-25 DIAGNOSIS — R07.9 CHEST PAIN: ICD-10-CM

## 2022-07-25 DIAGNOSIS — R06.02 SHORTNESS OF BREATH: ICD-10-CM

## 2022-07-25 DIAGNOSIS — J96.01 ACUTE RESPIRATORY FAILURE WITH HYPOXIA: ICD-10-CM

## 2022-07-25 PROBLEM — I16.1 HYPERTENSIVE EMERGENCY: Status: ACTIVE | Noted: 2022-07-25

## 2022-07-25 PROBLEM — I38 VALVULAR HEART DISEASE: Chronic | Status: ACTIVE | Noted: 2022-07-25

## 2022-07-25 PROBLEM — D64.9 ANEMIA: Chronic | Status: ACTIVE | Noted: 2022-07-25

## 2022-07-25 PROBLEM — I16.1 HYPERTENSIVE EMERGENCY: Status: RESOLVED | Noted: 2022-07-25 | Resolved: 2022-07-25

## 2022-07-25 PROBLEM — J44.9 COPD (CHRONIC OBSTRUCTIVE PULMONARY DISEASE): Chronic | Status: ACTIVE | Noted: 2022-07-25

## 2022-07-25 LAB
ALBUMIN SERPL BCP-MCNC: 3.7 G/DL (ref 3.5–5.2)
ALP SERPL-CCNC: 91 U/L (ref 55–135)
ALT SERPL W/O P-5'-P-CCNC: 19 U/L (ref 10–44)
ANION GAP SERPL CALC-SCNC: 13 MMOL/L (ref 8–16)
AST SERPL-CCNC: 44 U/L (ref 10–40)
BASOPHILS # BLD AUTO: 0.04 K/UL (ref 0–0.2)
BASOPHILS NFR BLD: 0.7 % (ref 0–1.9)
BILIRUB SERPL-MCNC: 0.9 MG/DL (ref 0.1–1)
BNP SERPL-MCNC: 1114 PG/ML (ref 0–99)
BUN SERPL-MCNC: 7 MG/DL (ref 8–23)
CALCIUM SERPL-MCNC: 8.8 MG/DL (ref 8.7–10.5)
CHLORIDE SERPL-SCNC: 101 MMOL/L (ref 95–110)
CO2 SERPL-SCNC: 21 MMOL/L (ref 23–29)
CREAT SERPL-MCNC: 0.9 MG/DL (ref 0.5–1.4)
DIFFERENTIAL METHOD: ABNORMAL
EOSINOPHIL # BLD AUTO: 0.1 K/UL (ref 0–0.5)
EOSINOPHIL NFR BLD: 1.2 % (ref 0–8)
ERYTHROCYTE [DISTWIDTH] IN BLOOD BY AUTOMATED COUNT: 18.6 % (ref 11.5–14.5)
EST. GFR  (AFRICAN AMERICAN): >60 ML/MIN/1.73 M^2
EST. GFR  (NON AFRICAN AMERICAN): >60 ML/MIN/1.73 M^2
GLUCOSE SERPL-MCNC: 250 MG/DL (ref 70–110)
HCT VFR BLD AUTO: 35.4 % (ref 37–48.5)
HGB BLD-MCNC: 11.1 G/DL (ref 12–16)
IMM GRANULOCYTES # BLD AUTO: 0.04 K/UL (ref 0–0.04)
IMM GRANULOCYTES NFR BLD AUTO: 0.7 % (ref 0–0.5)
LYMPHOCYTES # BLD AUTO: 2.2 K/UL (ref 1–4.8)
LYMPHOCYTES NFR BLD: 37.9 % (ref 18–48)
MAGNESIUM SERPL-MCNC: 1.6 MG/DL (ref 1.6–2.6)
MCH RBC QN AUTO: 26.9 PG (ref 27–31)
MCHC RBC AUTO-ENTMCNC: 31.4 G/DL (ref 32–36)
MCV RBC AUTO: 86 FL (ref 82–98)
MONOCYTES # BLD AUTO: 0.2 K/UL (ref 0.3–1)
MONOCYTES NFR BLD: 3.8 % (ref 4–15)
NEUTROPHILS # BLD AUTO: 3.2 K/UL (ref 1.8–7.7)
NEUTROPHILS NFR BLD: 55.7 % (ref 38–73)
NRBC BLD-RTO: 0 /100 WBC
PLATELET # BLD AUTO: 189 K/UL (ref 150–450)
PMV BLD AUTO: 11.2 FL (ref 9.2–12.9)
POTASSIUM SERPL-SCNC: 3.3 MMOL/L (ref 3.5–5.1)
PROT SERPL-MCNC: 7 G/DL (ref 6–8.4)
RBC # BLD AUTO: 4.12 M/UL (ref 4–5.4)
SARS-COV-2 RDRP RESP QL NAA+PROBE: NEGATIVE
SODIUM SERPL-SCNC: 135 MMOL/L (ref 136–145)
TROPONIN I SERPL DL<=0.01 NG/ML-MCNC: <0.03 NG/ML
WBC # BLD AUTO: 5.73 K/UL (ref 3.9–12.7)

## 2022-07-25 PROCEDURE — 25000003 PHARM REV CODE 250: Performed by: EMERGENCY MEDICINE

## 2022-07-25 PROCEDURE — 83735 ASSAY OF MAGNESIUM: CPT | Performed by: EMERGENCY MEDICINE

## 2022-07-25 PROCEDURE — 80053 COMPREHEN METABOLIC PANEL: CPT | Performed by: EMERGENCY MEDICINE

## 2022-07-25 PROCEDURE — 99900031 HC PATIENT EDUCATION (STAT)

## 2022-07-25 PROCEDURE — 36415 COLL VENOUS BLD VENIPUNCTURE: CPT | Performed by: EMERGENCY MEDICINE

## 2022-07-25 PROCEDURE — 99232 PR SUBSEQUENT HOSPITAL CARE,LEVL II: ICD-10-PCS | Mod: ,,, | Performed by: GENERAL PRACTICE

## 2022-07-25 PROCEDURE — 83880 ASSAY OF NATRIURETIC PEPTIDE: CPT | Performed by: EMERGENCY MEDICINE

## 2022-07-25 PROCEDURE — U0002 COVID-19 LAB TEST NON-CDC: HCPCS | Performed by: INTERNAL MEDICINE

## 2022-07-25 PROCEDURE — 85025 COMPLETE CBC W/AUTO DIFF WBC: CPT | Performed by: EMERGENCY MEDICINE

## 2022-07-25 PROCEDURE — 84484 ASSAY OF TROPONIN QUANT: CPT | Performed by: EMERGENCY MEDICINE

## 2022-07-25 PROCEDURE — 96374 THER/PROPH/DIAG INJ IV PUSH: CPT

## 2022-07-25 PROCEDURE — 94640 AIRWAY INHALATION TREATMENT: CPT

## 2022-07-25 PROCEDURE — 93010 EKG 12-LEAD: ICD-10-PCS | Mod: ,,, | Performed by: INTERNAL MEDICINE

## 2022-07-25 PROCEDURE — 21400001 HC TELEMETRY ROOM

## 2022-07-25 PROCEDURE — 25000003 PHARM REV CODE 250: Performed by: INTERNAL MEDICINE

## 2022-07-25 PROCEDURE — 63600175 PHARM REV CODE 636 W HCPCS: Performed by: INTERNAL MEDICINE

## 2022-07-25 PROCEDURE — 63600175 PHARM REV CODE 636 W HCPCS: Performed by: EMERGENCY MEDICINE

## 2022-07-25 PROCEDURE — 25000242 PHARM REV CODE 250 ALT 637 W/ HCPCS: Performed by: EMERGENCY MEDICINE

## 2022-07-25 PROCEDURE — 99900035 HC TECH TIME PER 15 MIN (STAT)

## 2022-07-25 PROCEDURE — 93010 ELECTROCARDIOGRAM REPORT: CPT | Mod: ,,, | Performed by: INTERNAL MEDICINE

## 2022-07-25 PROCEDURE — 99232 SBSQ HOSP IP/OBS MODERATE 35: CPT | Mod: ,,, | Performed by: GENERAL PRACTICE

## 2022-07-25 PROCEDURE — 93005 ELECTROCARDIOGRAM TRACING: CPT

## 2022-07-25 PROCEDURE — 63600175 PHARM REV CODE 636 W HCPCS: Performed by: GENERAL PRACTICE

## 2022-07-25 PROCEDURE — 99285 EMERGENCY DEPT VISIT HI MDM: CPT | Mod: 25

## 2022-07-25 RX ORDER — LANOLIN ALCOHOL/MO/W.PET/CERES
800 CREAM (GRAM) TOPICAL
Status: DISCONTINUED | OUTPATIENT
Start: 2022-07-25 | End: 2022-07-26 | Stop reason: HOSPADM

## 2022-07-25 RX ORDER — FUROSEMIDE 10 MG/ML
20 INJECTION INTRAMUSCULAR; INTRAVENOUS
Status: DISCONTINUED | OUTPATIENT
Start: 2022-07-25 | End: 2022-07-26 | Stop reason: HOSPADM

## 2022-07-25 RX ORDER — FUROSEMIDE 10 MG/ML
20 INJECTION INTRAMUSCULAR; INTRAVENOUS ONCE
Status: COMPLETED | OUTPATIENT
Start: 2022-07-25 | End: 2022-07-25

## 2022-07-25 RX ORDER — ACETAMINOPHEN 325 MG/1
650 TABLET ORAL EVERY 8 HOURS PRN
Status: DISCONTINUED | OUTPATIENT
Start: 2022-07-25 | End: 2022-07-26 | Stop reason: HOSPADM

## 2022-07-25 RX ORDER — FUROSEMIDE 10 MG/ML
20 INJECTION INTRAMUSCULAR; INTRAVENOUS
Status: COMPLETED | OUTPATIENT
Start: 2022-07-25 | End: 2022-07-25

## 2022-07-25 RX ORDER — TALC
6 POWDER (GRAM) TOPICAL NIGHTLY PRN
Status: DISCONTINUED | OUTPATIENT
Start: 2022-07-25 | End: 2022-07-26 | Stop reason: HOSPADM

## 2022-07-25 RX ORDER — METOPROLOL TARTRATE 50 MG/1
100 TABLET ORAL 2 TIMES DAILY
Status: DISCONTINUED | OUTPATIENT
Start: 2022-07-25 | End: 2022-07-26 | Stop reason: HOSPADM

## 2022-07-25 RX ORDER — ASPIRIN 81 MG/1
81 TABLET ORAL DAILY
Status: DISCONTINUED | OUTPATIENT
Start: 2022-07-25 | End: 2022-07-26 | Stop reason: HOSPADM

## 2022-07-25 RX ORDER — FLUTICASONE FUROATE AND VILANTEROL 100; 25 UG/1; UG/1
1 POWDER RESPIRATORY (INHALATION) DAILY
Status: DISCONTINUED | OUTPATIENT
Start: 2022-07-25 | End: 2022-07-26 | Stop reason: HOSPADM

## 2022-07-25 RX ORDER — SODIUM,POTASSIUM PHOSPHATES 280-250MG
2 POWDER IN PACKET (EA) ORAL
Status: DISCONTINUED | OUTPATIENT
Start: 2022-07-25 | End: 2022-07-26 | Stop reason: HOSPADM

## 2022-07-25 RX ORDER — NALOXONE HCL 0.4 MG/ML
0.02 VIAL (ML) INJECTION
Status: DISCONTINUED | OUTPATIENT
Start: 2022-07-25 | End: 2022-07-26 | Stop reason: HOSPADM

## 2022-07-25 RX ORDER — POLYETHYLENE GLYCOL 3350 17 G/17G
17 POWDER, FOR SOLUTION ORAL DAILY PRN
Status: DISCONTINUED | OUTPATIENT
Start: 2022-07-25 | End: 2022-07-26 | Stop reason: HOSPADM

## 2022-07-25 RX ORDER — CLOPIDOGREL BISULFATE 75 MG/1
75 TABLET ORAL DAILY
Status: DISCONTINUED | OUTPATIENT
Start: 2022-07-25 | End: 2022-07-26 | Stop reason: HOSPADM

## 2022-07-25 RX ORDER — IPRATROPIUM BROMIDE AND ALBUTEROL SULFATE 2.5; .5 MG/3ML; MG/3ML
3 SOLUTION RESPIRATORY (INHALATION)
Status: DISPENSED | OUTPATIENT
Start: 2022-07-25 | End: 2022-07-25

## 2022-07-25 RX ORDER — SPIRONOLACTONE 25 MG/1
25 TABLET ORAL DAILY
Status: DISCONTINUED | OUTPATIENT
Start: 2022-07-25 | End: 2022-07-26 | Stop reason: HOSPADM

## 2022-07-25 RX ORDER — LISINOPRIL 10 MG/1
10 TABLET ORAL DAILY
Status: DISCONTINUED | OUTPATIENT
Start: 2022-07-25 | End: 2022-07-25

## 2022-07-25 RX ORDER — ENOXAPARIN SODIUM 100 MG/ML
40 INJECTION SUBCUTANEOUS EVERY 24 HOURS
Status: DISCONTINUED | OUTPATIENT
Start: 2022-07-25 | End: 2022-07-26 | Stop reason: HOSPADM

## 2022-07-25 RX ORDER — ONDANSETRON 2 MG/ML
4 INJECTION INTRAMUSCULAR; INTRAVENOUS EVERY 8 HOURS PRN
Status: DISCONTINUED | OUTPATIENT
Start: 2022-07-25 | End: 2022-07-26 | Stop reason: HOSPADM

## 2022-07-25 RX ORDER — ATORVASTATIN CALCIUM 10 MG/1
10 TABLET, FILM COATED ORAL NIGHTLY
Status: DISCONTINUED | OUTPATIENT
Start: 2022-07-25 | End: 2022-07-26 | Stop reason: HOSPADM

## 2022-07-25 RX ADMIN — Medication 800 MG: at 08:07

## 2022-07-25 RX ADMIN — IPRATROPIUM BROMIDE AND ALBUTEROL SULFATE 6 ML: .5; 3 SOLUTION RESPIRATORY (INHALATION) at 04:07

## 2022-07-25 RX ADMIN — FUROSEMIDE 20 MG: 10 INJECTION, SOLUTION INTRAVENOUS at 08:07

## 2022-07-25 RX ADMIN — ASPIRIN 81 MG: 81 TABLET, COATED ORAL at 09:07

## 2022-07-25 RX ADMIN — METOPROLOL TARTRATE 100 MG: 50 TABLET, FILM COATED ORAL at 08:07

## 2022-07-25 RX ADMIN — METOPROLOL TARTRATE 100 MG: 50 TABLET, FILM COATED ORAL at 09:07

## 2022-07-25 RX ADMIN — FUROSEMIDE 20 MG: 10 INJECTION, SOLUTION INTRAVENOUS at 04:07

## 2022-07-25 RX ADMIN — FUROSEMIDE 20 MG: 10 INJECTION, SOLUTION INTRAVENOUS at 11:07

## 2022-07-25 RX ADMIN — LISINOPRIL 10 MG: 5 TABLET ORAL at 09:07

## 2022-07-25 RX ADMIN — ATORVASTATIN CALCIUM 10 MG: 10 TABLET, FILM COATED ORAL at 08:07

## 2022-07-25 RX ADMIN — SACUBITRIL AND VALSARTAN 2 TABLET: 24; 26 TABLET, FILM COATED ORAL at 08:07

## 2022-07-25 RX ADMIN — CLOPIDOGREL BISULFATE 75 MG: 75 TABLET, FILM COATED ORAL at 09:07

## 2022-07-25 RX ADMIN — NITROGLYCERIN 0.5 INCH: 20 OINTMENT TOPICAL at 04:07

## 2022-07-25 RX ADMIN — ENOXAPARIN SODIUM 40 MG: 40 INJECTION SUBCUTANEOUS at 04:07

## 2022-07-25 RX ADMIN — SPIRONOLACTONE 25 MG: 25 TABLET ORAL at 09:07

## 2022-07-25 RX ADMIN — POTASSIUM, SODIUM PHOSPHATES 280 MG-160 MG-250 MG ORAL POWDER PACKET 2 PACKET: POWDER IN PACKET at 04:07

## 2022-07-25 NOTE — CONSULTS
[facility]  Department of Cardiology  Consult Note      PATIENT NAME: Keyona Irene    MRN: [id number]  TODAY'S DATE: 07/25/2022  ADMIT DATE: 7/25/2022                          CONSULT REQUESTED BY: Brianna Potts MD    SUBJECTIVE     PRINCIPAL PROBLEM: <principal problem not specified>      REASON FOR CONSULT:  chf      HPI:  Heather Carbajal MD (Physician)   Cardiology  Subjective:    Patient ID:  Keyona Irene is a 69 y.o. female who presents for follow-up.     HPI  She has history of diabetes mellitus, hypertension and tobacco use.  She was hospitalized last month with congestive heart failure and mild troponin elevation.  She was diuresed and has undergone cardiac workup was found multivessel coronary artery disease (see below). She has undergone 5v-CABG (LIMA to LAD, SVG to diagonal, SVG to PLB, jump SVG to OM2 and OM3) on 5/29/20 by Dr Laughlin. She was discharged earlier this month.  She is here for follow-up. She reports chest soreness and generalized fatigue.  No shortness of breath or leg swelling        5/20 echo:  · Concentric left ventricular hypertrophy.  · Mildly decreased overall left ventricular systolic function. The estimated ejection fraction is 45%.  · Grade I (mild) left ventricular diastolic dysfunction consistent with impaired relaxation.  · Normal right ventricular systolic function.  · Mild aortic regurgitation.  · Moderate mitral regurgitation.     5/20 LHC:  · Three vessel coronary artery disease.  · Mid LAD lesion , 85% stenosed.  · Ost 2nd Diag lesion , 80% stenosed.  · Dist Cx lesion , 90% stenosed.  · Ost 3rd Mrg lesion , 90% stenosed.  · RPDA lesion , 80% stenosed.  · LV end diastolic pressure is severely elevated.     Impression and Plan:  1) CAD: s/p CABG; doing ok; has home health currently, refer to cardiac rehab (she prefers local facility and will have the request faxed over for approval).  2) hypertension:  Controlled.  Continue current  medications.  3) diabetes mellitus:  On OAD/ACEI    5/25//22  Keyona Irene is a 71-year-old female who presents to the emergency room for evaluation of shortness of breath and generalized weakness.  She reports shortness of breath and weakness onset several days ago and progressively worsen.  She endorses orthopnea and dyspnea on exertion.  She denies fever chills.  No known sick contacts or travel.  Vaccination status is unknown.  Previous medical history includes aortic insufficiency, anemia, atherosclerotic coronary artery disease, hypertension, EtOH abuse, CABG, active smoker, diabetes, GERD.  ER workup:  CBC unremarkable.  Glucose 263 and bicarb of 15 otherwise unremarkable.  BNP 1795. Chest x-ray demonstrates worsening airspace infiltrates and effusions consistent with acute CHF.  Patient was given Lasix in ER.  Patient was placed on CHF pathway and admitted to ICU.  Patient required BiPAP.       Congestive heart failure, exacerbated with pulmonary edema.  Continue Lasix.  Monitor urine output and electrolytes.  The patient has history of coronary artery disease, continue home medications.  The patient denies any chest pain.  EKG with nonspecific ST changes, troponin negative.    # Acute respiratory failure, due to pulmonary edema.  Continue oxygen and titrate to maintain saturation between 90-94%.  BiPAP if needed.    # Metabolic acidosis with increased anion gap.? Etiology.  Patient has been on metformin at home.?  Lactic acidosis due to metformin, precipitated by hypoxia.  History of diabetes.  Check lactic acid and beta hydroxybutyrate.      CONGESTIVE HEART FAILURE  HISTORY CORONARY BYPASS X5 MAY OF 2020  MILD DECREASED EJECTION FRACTION        RECOMMENDATIONS:  PATIENT HAS DIURESED ALMOST 3 L  CURRENTLY APPEARS COMPENSATED.    ECHO FOR LV FUNCTION  ADD JARDIANCE FOR CHF AT TIME OF DISCHARGE  ISCHEMIA WORKUP CAN BE DONE AS OUTPATIENT WITH STRESS TESTS PENDING ECHO     5/8/22    Hospital Course:    Patient is a 71-year-old  female who was admitted to the hospital for acute Congestive heart failure exacerbation and respiratory failure.  She was initially on BiPAP, and was started on high-dose IV Lasix, and diuresed well and her oxygen requirement improved.  She was markedly hypokalemic and required multiple pushes of IV potassium.  Following this, the patient was transitioned out of the ICU, and moved to the floor.  She continued to diurese well, and was continued on beta blocker, ACE-inhibitor, and loop diuretic as well as Aldactone.  Her symptoms returned back to baseline, and she was set up for discharge home with home health and management of heart failure.     7/25/22      Patient known to me from the past.  She awoke with shortness of breath at 2:00 a.m. in the morning submitted with recurrent heart failure..  She has no swelling or edema..  Her daughter at bedside were not sure exactly which diuretics she was taking..  She never received Jardiance.  Twelve beats of ventricular tachycardia    Review of patient's allergies indicates:  No Known Allergies    Past Medical History:   Diagnosis Date    Anemia     Biliary obstruction     Cholangitis     Diabetes mellitus     pt denies, does not take meds 11/2016    EtOH dependence     HTN (hypertension) 12/30/2013     Past Surgical History:   Procedure Laterality Date    ARTERIOGRAPHY OF SUBCLAVIAN ARTERY  5/22/2020    Procedure: Arteriogram, Subclavian;  Surgeon: Heather Carbajal MD;  Location: [facility] CATH;  Service: Cardiology;;    COLONOSCOPY      CORONARY ANGIOGRAPHY N/A 5/22/2020    Procedure: ANGIOGRAM, CORONARY ARTERY;  Surgeon: Heather Carbajal MD;  Location: [facility] CATH;  Service: Cardiology;  Laterality: N/A;    CORONARY ARTERY BYPASS GRAFT (CABG) N/A 5/29/2020    Procedure: CORONARY ARTERY BYPASS GRAFT (CABG) x 5 VESSELS;  Surgeon: Dima Laughlin MD;  Location: [facility] OR;  Service: Cardiovascular;  Laterality: N/A;    ENDOSCOPIC  HARVEST OF VEIN N/A 2020    Procedure: SURGICAL PROCUREMENT, VEIN, ENDOSCOPIC;  Surgeon: Dima Laughlin MD;  Location: [facility] OR;  Service: Cardiovascular;  Laterality: N/A;    ERCP W/ PLASTIC STENT PLACEMENT      ERCP W/ SPHICTEROTOMY      ESOPHAGOGASTRODUODENOSCOPY      HYSTERECTOMY      INSERTION OF INTRA-AORTIC BALLOON ASSIST DEVICE N/A 2020    Procedure: INSERTION, INTRA-AORTIC BALLOON PUMP;  Surgeon: Dima Laughlin MD;  Location: [facility] OR;  Service: Cardiovascular;  Laterality: N/A;    LEFT HEART CATHETERIZATION Left 2020    Procedure: Left heart cath;  Surgeon: Heather Carbajal MD;  Location: [facility] CATH;  Service: Cardiology;  Laterality: Left;     Social History     Tobacco Use    Smoking status: Current Every Day Smoker     Packs/day: 0.50     Years: 40.00     Pack years: 20.00     Last attempt to quit: 2020     Years since quittin.1    Smokeless tobacco: Never Used   Substance Use Topics    Alcohol use: Not Currently     Comment: sober 2 years 3 months    Drug use: No        REVIEW OF SYSTEMS  CONSTITUTIONAL: Negative for chills, fatigue and fever.   EYES: No double vision, No blurred vision  NEURO: No headaches, No dizziness  RESPIRATORY: Negative for cough, shortness of breath and wheezing.    CARDIOVASCULAR: Negative for chest pain. Negative for palpitations and leg swelling.   GI: Negative for abdominal pain, No melena, diarrhea, nausea and vomiting.   : Negative for dysuria and frequency, Negative for hematuria  SKIN: Negative for bruising, Negative for edema or discoloration noted.   ENDOCRINE: Negative for polyphagia, Negative for heat intolerance, Negative for cold intolerance  PSYCHIATRIC: Negative for depression, Negative for anxiety, Negative for memory loss  MUSCULOSKELETAL: Negative for neck pain, Negative for muscle weakness, Negative for back pain     OBJECTIVE     VITAL SIGNS (Most Recent)  Temp: 98.2 °F (36.8 °C) (22 0401)  Pulse: 78 (22 1120)  Resp: 15  (07/25/22 1100)  BP: (!) 148/83 (07/25/22 1120)  SpO2: 100 % (07/25/22 1120)    VENTILATION STATUS  Resp: 15 (07/25/22 1100)  SpO2: 100 % (07/25/22 1120)       I & O (Last 24H):    Intake/Output Summary (Last 24 hours) at 7/25/2022 1204  Last data filed at 7/25/2022 0920  Gross per 24 hour   Intake --   Output 1850 ml   Net -1850 ml       WEIGHTS  Wt Readings from Last 1 Encounters:   07/25/22 0401 45.4 kg (100 lb)       PHYSICAL EXAM  GENERAL: well built, well nourished, well-developed in no apparent distress alert and oriented.   HEENT: Normocephalic. Pupils normal and conjunctivae normal.  Mucous membranes normal, no cyanosis or icterus, trachea central,no pallor or icterus is noted..   NECK: No JVD. No bruit..   THYROID: Thyroid not enlarged. No nodules present..   CARDIAC: Regular rate and rhythm. S1 is normal.S2 is normal.No gallops, clicks or murmurs noted at this time.  CHEST ANATOMY: normal.   LUNGS:  BILATERAL COARSE RALES 1/3 UP  ABDOMEN:  2/6 SYSTOLIC EJECTION MURMUR AT THE APEX   No pulsations and no masses felt, No guarding or rebound.   URINARY: No nagel catheter   EXTREMITIES: No cyanosis, clubbing or edema noted at this time., no calf tenderness bilaterally.   PERIPHERAL VASCULAR SYSTEM: Good palpable distal pulses.   CENTRAL NERVOUS SYSTEM: No focal motor or sensory deficits noted.   SKIN: Skin without lesions, moist, well perfused.   MUSCLE STRENGTH & TONE: No noteable weakness, atrophy or abnormal movement.     HOME MEDICATIONS:  No current facility-administered medications on file prior to encounter.     Current Outpatient Medications on File Prior to Encounter   Medication Sig Dispense Refill    aspirin (ECOTRIN) 81 MG EC tablet Take 1 tablet (81 mg total) by mouth once daily. 30 tablet 0    atorvastatin (LIPITOR) 10 MG tablet TAKE 1 TABLET (10 MG TOTAL) BY MOUTH EVERY EVENING. 90 tablet 3    clopidogreL (PLAVIX) 75 mg tablet Take 1 tablet (75 mg total) by mouth once daily. 90 tablet 3     furosemide (LASIX) 20 MG tablet Take 1 tablet (20 mg total) by mouth once daily. 30 tablet 11    lisinopriL 10 MG tablet Take 1 tablet (10 mg total) by mouth once daily. 90 tablet 3    metFORMIN (GLUCOPHAGE-XR) 500 MG ER 24hr tablet Take 2 tablets (1,000 mg total) by mouth once daily. 180 tablet 1    metoprolol tartrate (LOPRESSOR) 100 MG tablet Take 1 tablet (100 mg total) by mouth 2 (two) times daily. 60 tablet 11    spironolactone (ALDACTONE) 25 MG tablet Take 1 tablet (25 mg total) by mouth once daily. 90 tablet 3    alcohol swabs PadM Apply 1 each topically as needed. 100 each 2    blood glucose control high,low (ACCU-CHEK KEDAR CONTROL SOLN) Soln Use to test controls per r guidelines/instructions 1 each 1       SCHEDULED MEDS:   aspirin  81 mg Oral Daily    atorvastatin  10 mg Oral QHS    clopidogreL  75 mg Oral Daily    enoxaparin  40 mg Subcutaneous Daily    fluticasone furoate-vilanteroL  1 puff Inhalation Daily    lisinopriL  10 mg Oral Daily    metoprolol tartrate  100 mg Oral BID    spironolactone  25 mg Oral Daily       CONTINUOUS INFUSIONS:    PRN MEDS:acetaminophen, magnesium oxide, magnesium oxide, melatonin, naloxone, ondansetron, polyethylene glycol, potassium bicarbonate, potassium bicarbonate, potassium bicarbonate, potassium, sodium phosphates, potassium, sodium phosphates, potassium, sodium phosphates    LABS AND DIAGNOSTICS     CBC LAST 3 DAYS  Recent Labs   Lab 07/25/22 0422   WBC 5.73   RBC 4.12   HGB 11.1*   HCT 35.4*   MCV 86   MCH 26.9*   MCHC 31.4*   RDW 18.6*      MPV 11.2   GRAN 55.7  3.2   LYMPH 37.9  2.2   MONO 3.8*  0.2*   BASO 0.04   NRBC 0       COAGULATION LAST 3 DAYS  No results for input(s): LABPT, INR, APTT in the last 168 hours.    CHEMISTRY LAST 3 DAYS  Recent Labs   Lab 07/25/22  0422   *   K 3.3*      CO2 21*   ANIONGAP 13   BUN 7*   CREATININE 0.9   *   CALCIUM 8.8   MG 1.6   ALBUMIN 3.7   PROT 7.0   ALKPHOS 91   ALT 19   AST  44*   BILITOT 0.9       CARDIAC PROFILE LAST 3 DAYS  Recent Labs   Lab 07/25/22  0422   BNP 1,114*   TROPONINI <0.030       ENDOCRINE LAST 3 DAYS  No results for input(s): TSH, PROCAL in the last 168 hours.    LAST ARTERIAL BLOOD GAS  ABG  No results for input(s): PH, PO2, PCO2, HCO3, BE in the last 168 hours.    LAST 7 DAYS MICROBIOLOGY   Microbiology Results (last 7 days)     ** No results found for the last 168 hours. **          MOST RECENT IMAGING  X-Ray Chest AP Portable  Chest single view    CLINICAL DATA: Shortness of breath    FINDINGS: Comparison to May 25. Heart size is normal. There has been previous median sternotomy. There is diffuse abnormal interstitial prominence, with faint bilateral groundglass opacities, left greater than right. Findings are likely on the basis of pulmonary edema, with pneumonia felt less likely. No significant effusion is identified.    IMPRESSION:  1. Interstitial and faint groundglass opacities suggest pulmonary edema, with atypical pneumonia within the differential but felt less likely.    Electronically signed by:  Mg Talavera MD  7/25/2022 6:57 AM CDT Workstation: [id number]      ECHOCARDIOGRAM RESULTS (last 5)  Results for orders placed during the hospital encounter of 05/25/22    Echo    Interpretation Summary  · The left ventricle is normal in size with mild concentric hypertrophy and mildly decreased systolic function.  · The estimated PA systolic pressure is 30 mmHg.  · Grade II left ventricular diastolic dysfunction.  · Normal right ventricular size with normal right ventricular systolic function.  · Severe left atrial enlargement.  · Normal central venous pressure (3 mmHg).  · Moderate aortic regurgitation.  · Mild to moderate tricuspid regurgitation.  · Mild to moderate pulmonic regurgitation.  · The estimated ejection fraction is 40%.  · Moderate mitral regurgitation.      Results for orders placed during the hospital encounter of 05/19/20    Echo Color Flow  Doppler? No    Interpretation Summary  · Moderately decreased left ventricular systolic function. The estimated ejection fraction is 38%.  · Normal right ventricular systolic function.  · Mild aortic regurgitation.      Echo Color Flow Doppler? Yes    Interpretation Summary  · Concentric left ventricular hypertrophy.  · Mildly decreased overall left ventricular systolic function. The estimated ejection fraction is 45%.  · Grade I (mild) left ventricular diastolic dysfunction consistent with impaired relaxation.  · Normal right ventricular systolic function.  · Mild aortic regurgitation.  · Moderate mitral regurgitation.      CURRENT/PREVIOUS VISIT EKG  Results for orders placed or performed during the hospital encounter of 05/25/22   EKG 12-lead    Collection Time: 07/25/22  3:58 AM    Narrative    Test Reason : R06.02,    Vent. Rate : 110 BPM     Atrial Rate : 110 BPM     P-R Int : 116 ms          QRS Dur : 084 ms      QT Int : 360 ms       P-R-T Axes : 024 006 110 degrees     QTc Int : 487 ms    Sinus tachycardia with occasional Premature ventricular complexes  Possible Left atrial enlargement  LVH with repolarization abnormality ( Puma product )  Anteroseptal infarct (cited on or before 07-JUN-2020)  Abnormal ECG  When compared with ECG of 25-MAY-2022 19:17,  Serial changes of Anteroseptal infarct Present    Referred By: [email or web address]   SELF           Confirmed By:            ASSESSMENT/PLAN:     Active Hospital Problems    Diagnosis    Hypertensive emergency    MR (mitral regurgitation)    Acute on chronic combined systolic and diastolic congestive heart failure    Hypokalemia    Gastroesophageal reflux disease without esophagitis    Tobacco abuse    Essential hypertension       ASSESSMENT & PLAN:   Heart failure mentally decreased ejection fraction  Diastolic dysfunction  HYPOKALEMIA  HISTORY BYPASS  Diabetes  Patient had 12 beat run of ventricular tachycardia    RECOMMENDATIONS:  LASIX 20 IV B.I.D.  INCREASED DOSE AT DISCHARGE  CHANGE LISINOPRIL TO ENTRESTO 04/20/2026 B.I.D.  CONTINUE ALDACTONE  JARDIANCE AS OUTPATIENT  ISCHEMIA WORKUP IN THE THEY ELECTIVELY OR BEFORE DISCHARGE.  Aggressive potassium replacement  Check a magnesium       Wilfredo Woo MD  [facility]  Department of Cardiology  Date of Service: 07/25/2022  12:04 PM

## 2022-07-25 NOTE — HPI
Patient is a 71-year-old  female with known COPD, combined CHF, essential hypertension and ongoing tobacco use presented to the ED by EMS secondary to acute onset shortness of breath.    Symptoms started earlier last night rather acutely.  She developed shortness of breath even while at rest.  She did not experience chest pain.  EMS was alerted and patient was found to be hypoxic and tachypneic.  Initial oxygen saturation was 77%.  She was given nebulized breathing treatments and placed on non-rebreather mask.  She denies chest pain.  No exacerbating or relieving factors.    Hypertensive on arrival.  In the ED she received nebulized breathing treatments, intravenous furosemide and topical nitroglycerin ointment.  Shortness of breath has improved but not resolved.    Admitted for similar symptoms in the end of May earlier this year.  She was discharged home on furosemide which she reports compliance to.  Advised to follow-up with cardiology however she was unable to do so.  Smokes half a pack per day.    Rest of the 10 point review of systems is negative except as mentioned above.

## 2022-07-25 NOTE — PROGRESS NOTES
Counts include 234 beds at the [facility] Medicine  Progress Note    Patient Name: Keyona Irene  MRN: [id number]  Patient Class: IP- Inpatient   Admission Date: 7/25/2022  Length of Stay: 0 days  Attending Physician: Brianna Potts MD  Primary Care Provider: Howard Gan MD        Subjective:     Principal Problem:Acute on chronic combined systolic and diastolic congestive heart failure        HPI:  Patient is a 71-year-old  female with known COPD, combined CHF, essential hypertension and ongoing tobacco use presented to the ED by EMS secondary to acute onset shortness of breath.    Symptoms started earlier last night rather acutely.  She developed shortness of breath even while at rest.  She did not experience chest pain.  EMS was alerted and patient was found to be hypoxic and tachypneic.  Initial oxygen saturation was 77%.  She was given nebulized breathing treatments and placed on non-rebreather mask.  She denies chest pain.  No exacerbating or relieving factors.    Hypertensive on arrival.  In the ED she received nebulized breathing treatments, intravenous furosemide and topical nitroglycerin ointment.  Shortness of breath has improved but not resolved.    Admitted for similar symptoms in the end of May earlier this year.  She was discharged home on furosemide which she reports compliance to.  Advised to follow-up with cardiology however she was unable to do so.  Smokes half a pack per day.    Rest of the 10 point review of systems is negative except as mentioned above.      Overview/Hospital Course:  Patient with a known history of CAD status post CABG by 5, combined congestive heart failure with last echo with grade 2 diastolic dysfunction with EF of 40% with PASP 30, valvular abnormalities (moderate MR/AR, mild to moderate TR/TN), COPD with continued tobacco use, hypertension, diabetes who presented with shortness of breath, hypoxic to 77, hypertensive on arrival (189/107), found to  have clinical and radiological evidence of decompensated heart failure with pulmonary edema and BNP 1 114.  Placed under observation, started on IV diuresis with cardiology consultation.      Interval History:  Patient admitted earlier this morning by overnight team.  See hospital course.  Breathing feels much improved,    Review of Systems   Constitutional:  Negative for fever.   Cardiovascular:  Negative for leg swelling.   Objective:     Vital Signs (Most Recent):  Temp: 98.6 °F (37 °C) (07/25/22 1300)  Pulse: 86 (07/25/22 1300)  Resp: 16 (07/25/22 1300)  BP: (!) 154/75 (07/25/22 1300)  SpO2: 99 % (07/25/22 1300)   Vital Signs (24h Range):  Temp:  [98.2 °F (36.8 °C)-98.6 °F (37 °C)] 98.6 °F (37 °C)  Pulse:  [] 86  Resp:  [13-32] 16  SpO2:  [88 %-100 %] 99 %  BP: (135-187)/() 154/75     Weight: 42.7 kg (94 lb 2.2 oz)  Body mass index is 17.79 kg/m².    Intake/Output Summary (Last 24 hours) at 7/25/2022 1804  Last data filed at 7/25/2022 1255  Gross per 24 hour   Intake --   Output 2650 ml   Net -2650 ml      Physical Exam  Vitals and nursing note reviewed.       Significant Labs: Blood Culture: No results for input(s): LABBLOO in the last 48 hours.  BMP:   Recent Labs   Lab 07/25/22 0422   *   *   K 3.3*      CO2 21*   BUN 7*   CREATININE 0.9   CALCIUM 8.8   MG 1.6     CBC:   Recent Labs   Lab 07/25/22 0422   WBC 5.73   HGB 11.1*   HCT 35.4*        CMP:   Recent Labs   Lab 07/25/22 0422   *   K 3.3*      CO2 21*   *   BUN 7*   CREATININE 0.9   CALCIUM 8.8   PROT 7.0   ALBUMIN 3.7   BILITOT 0.9   ALKPHOS 91   AST 44*   ALT 19   ANIONGAP 13   EGFRNONAA >60.0     Cardiac Markers:   Recent Labs   Lab 07/25/22 0422   BNP 1,114*     Lactic Acid: No results for input(s): LACTATE in the last 48 hours.  Magnesium:   Recent Labs   Lab 07/25/22  0422   MG 1.6       Significant Imaging: I have reviewed all pertinent imaging results/findings within the past 24  hours.    X-Ray Chest AP Portable    Result Date: 7/25/2022  Chest single view CLINICAL DATA: Shortness of breath FINDINGS: Comparison to May 25. Heart size is normal. There has been previous median sternotomy. There is diffuse abnormal interstitial prominence, with faint bilateral groundglass opacities, left greater than right. Findings are likely on the basis of pulmonary edema, with pneumonia felt less likely. No significant effusion is identified. IMPRESSION: 1. Interstitial and faint groundglass opacities suggest pulmonary edema, with atypical pneumonia within the differential but felt less likely. Electronically signed by:  Mg Talavera MD  7/25/2022 6:57 AM CDT Workstation: [id number]         Assessment/Plan:      Active Hospital Problems    Diagnosis    *Acute on chronic combined systolic and diastolic congestive heart failure    COPD (chronic obstructive pulmonary disease)    Valvular heart disease    Anemia    Hypokalemia    Gastroesophageal reflux disease without esophagitis    Type 2 diabetes mellitus with diabetic polyneuropathy, without long-term current use of insulin    Tobacco abuse    Essential hypertension     Plan:  Continue care on telemetry floor with continuous cardiac monitoring  Continue IV diuresis with Lasix 20 mg q.12 hours  Strict I/O, daily weights, oral fluid and sodium restriction  CHF education  Previous echo with EF of 40% with grade 2 diastolic dysfunction with PASP 30 with valvular heart disease  Optimizing cardiomyopathy medications, lisinopril changed to Entresto, continue beta-blocker, continue Aldactone, add Jardiance on discharge  Continue to reinforce smoking cessation  Electrolytes sliding scale repletion  A.m. labs ordered  Appreciate Cardiology input  Further plan as per clinical course    VTE Risk Mitigation (From admission, onward)         Ordered     enoxaparin injection 40 mg  Daily         07/25/22 0820     IP VTE HIGH RISK PATIENT  Once          07/25/22 0820     Place sequential compression device  Until discontinued         07/25/22 0820                Discharge Planning   MIKE:      Code Status: Full Code   Is the patient medically ready for discharge?:     Reason for patient still in hospital (select all that apply): Patient trending condition                     Brianna Potts MD  Department of Hospital Medicine   [facility]

## 2022-07-25 NOTE — SUBJECTIVE & OBJECTIVE
Past Medical History:   Diagnosis Date    Anemia     Biliary obstruction     Cholangitis     Diabetes mellitus     pt denies, does not take meds 11/2016    EtOH dependence     HTN (hypertension) 12/30/2013       Past Surgical History:   Procedure Laterality Date    ARTERIOGRAPHY OF SUBCLAVIAN ARTERY  5/22/2020    Procedure: Arteriogram, Subclavian;  Surgeon: Heather Carbajal MD;  Location: [facility] CATH;  Service: Cardiology;;    COLONOSCOPY      CORONARY ANGIOGRAPHY N/A 5/22/2020    Procedure: ANGIOGRAM, CORONARY ARTERY;  Surgeon: Heather Carbajal MD;  Location: [facility] CATH;  Service: Cardiology;  Laterality: N/A;    CORONARY ARTERY BYPASS GRAFT (CABG) N/A 5/29/2020    Procedure: CORONARY ARTERY BYPASS GRAFT (CABG) x 5 VESSELS;  Surgeon: Dima Laughlin MD;  Location: [facility] OR;  Service: Cardiovascular;  Laterality: N/A;    ENDOSCOPIC HARVEST OF VEIN N/A 5/29/2020    Procedure: SURGICAL PROCUREMENT, VEIN, ENDOSCOPIC;  Surgeon: Dima Laughlin MD;  Location: [facility] OR;  Service: Cardiovascular;  Laterality: N/A;    ERCP W/ PLASTIC STENT PLACEMENT      ERCP W/ SPHICTEROTOMY      ESOPHAGOGASTRODUODENOSCOPY      HYSTERECTOMY      INSERTION OF INTRA-AORTIC BALLOON ASSIST DEVICE N/A 5/29/2020    Procedure: INSERTION, INTRA-AORTIC BALLOON PUMP;  Surgeon: Dima Laughlin MD;  Location: [facility] OR;  Service: Cardiovascular;  Laterality: N/A;    LEFT HEART CATHETERIZATION Left 5/22/2020    Procedure: Left heart cath;  Surgeon: Heather Carbajal MD;  Location: [facility] CATH;  Service: Cardiology;  Laterality: Left;       Review of patient's allergies indicates:  No Known Allergies    No current facility-administered medications on file prior to encounter.     Current Outpatient Medications on File Prior to Encounter   Medication Sig    alcohol swabs PadM Apply 1 each topically as needed.    aspirin (ECOTRIN) 81 MG EC tablet Take 1 tablet (81 mg total) by mouth once daily.    atorvastatin (LIPITOR) 10 MG tablet TAKE 1 TABLET (10 MG TOTAL) BY MOUTH EVERY  EVENING.    blood glucose control high,low (ACCU-CHEK KEDAR CONTROL SOLN) Soln Use to test controls per [facility] guidelines/instructions    clopidogreL (PLAVIX) 75 mg tablet Take 1 tablet (75 mg total) by mouth once daily.    furosemide (LASIX) 20 MG tablet Take 1 tablet (20 mg total) by mouth once daily.    lisinopriL 10 MG tablet Take 1 tablet (10 mg total) by mouth once daily.    metFORMIN (GLUCOPHAGE-XR) 500 MG ER 24hr tablet Take 2 tablets (1,000 mg total) by mouth once daily.    metoprolol tartrate (LOPRESSOR) 100 MG tablet Take 1 tablet (100 mg total) by mouth 2 (two) times daily.    spironolactone (ALDACTONE) 25 MG tablet Take 1 tablet (25 mg total) by mouth once daily.     Family History    None       Tobacco Use    Smoking status: Current Every Day Smoker     Packs/day: 0.50     Years: 40.00     Pack years: 20.00     Last attempt to quit: 2020     Years since quittin.1    Smokeless tobacco: Never Used   Substance and Sexual Activity    Alcohol use: Not Currently     Comment: sober 2 years 3 months    Drug use: No    Sexual activity: Not on file       Objective:     Vital Signs (Most Recent):  Temp: 98.2 °F (36.8 °C) (22 0401)  Pulse: 102 (22 0620)  Resp: 13 (22 0550)  BP: (!) 168/95 (22 0620)  SpO2: 99 % (22 0620)   Vital Signs (24h Range):  Temp:  [98.2 °F (36.8 °C)] 98.2 °F (36.8 °C)  Pulse:  [] 102  Resp:  [13-32] 13  SpO2:  [88 %-99 %] 99 %  BP: (167-187)/() 168/95     Weight: 45.4 kg (100 lb)  Body mass index is 18.29 kg/m².    Physical Exam      General: Patient resting comfortably in no acute distress. Appears frail and cachectic  Head: Normocephalic and atraumatic   Eyes:  No conjunctival pallor. No scleral icterus.  Mouth: Oral mucosa moist   Lungs:  No tachypnea or accessory muscle use.  Rales audible  Cor: Regular rate and rhythm. No murmurs. No pedal edema.  Abd: Soft. Nontender  Musculoskeletal: No arthropathy, deformity.  Skin: No rashes,  swelling, or erythema.  Neuro: Alert. Moving all extremities equally. Follows commands  Ext: No cyanosis. Peripheral pulses +  Psych: Normal mood and affect. Memory intact     Significant Labs: All pertinent labs within the past 24 hours have been reviewed.  CBC:   Recent Labs   Lab 07/25/22 0422   WBC 5.73   HGB 11.1*   HCT 35.4*        CMP:   Recent Labs   Lab 07/25/22 0422   *   K 3.3*      CO2 21*   *   BUN 7*   CREATININE 0.9   CALCIUM 8.8   PROT 7.0   ALBUMIN 3.7   BILITOT 0.9   ALKPHOS 91   AST 44*   ALT 19   ANIONGAP 13   EGFRNONAA >60.0     Cardiac Markers:   Recent Labs   Lab 07/25/22 0422   BNP 1,114*     Troponin:   Recent Labs   Lab 07/25/22 0422   TROPONINI <0.030       Significant Imaging: I have reviewed all pertinent imaging results/findings within the past 24 hours.    Imaging Results              X-Ray Chest AP Portable (In process)                   CXR:  Independently interpreted.  Notable for bilateral interstitial edema.     EKG:  Independently interpreted.  Sinus tachycardia with LVH changes noted.

## 2022-07-25 NOTE — CARE UPDATE
07/25/22 0436   Patient Assessment/Suction   Level of Consciousness (AVPU) alert   Respiratory Effort Normal   Expansion/Accessory Muscles/Retractions no use of accessory muscles   All Lung Fields Breath Sounds diminished   Rhythm/Pattern, Respiratory unlabored   PRE-TX-O2   O2 Device (Oxygen Therapy) nasal cannula   Flow (L/min) 2   SpO2 (!) 93 %   Pulse Oximetry Type Continuous   Pulse 101   Resp 16   Aerosol Therapy   $ Aerosol Therapy Charges Aerosol Treatment   Daily Review of Necessity (SVN) completed   Respiratory Treatment Status (SVN) continuous   Treatment Route (SVN) air   Patient Position (SVN) semi-Odell's   Post Treatment Assessment (SVN) breath sounds unchanged;vital signs unchanged   Signs of Intolerance (SVN) none   Education   $ Education Bronchodilator;15 min   Respiratory Evaluation   $ Care Plan Tech Time 15 min

## 2022-07-25 NOTE — HOSPITAL COURSE
Patient with a known history of CAD status post CABG by 5, combined congestive heart failure with last echo with grade 2 diastolic dysfunction with EF of 40% with PASP 30, valvular abnormalities (moderate MR/AR, mild to moderate TR/IA), COPD with continued tobacco use, hypertension, diabetes who presented with shortness of breath, hypoxic to 77, hypertensive on arrival (189/107), found to have clinical and radiological evidence of decompensated heart failure with pulmonary edema and BNP 1 114.  Placed under observation, started on IV diuresis .  Patient appeared to have COPD and continues to smoke as well.  She got diuresis nearly 3 L and feeling better and not on any oxygen support and later discharged stable condition.  She had diuretic-induced hypokalemia and it was replaced IV and p.o. and normalized and discharged in stable condition.  She was on home Lasix already and continued and follow up with her primary care doctor as outpatient.

## 2022-07-25 NOTE — ED NOTES
PT C/O SOB  WITH ONSET APPROXIMATELY 3 HRS AGO. NO ACUTE DISTRESS NOTED. STABLE CONDITION. FAMILY MEMBERS AT BEDSIDE.

## 2022-07-25 NOTE — H&P
[facility] - Emergency Dept  Hospital Medicine  History & Physical    Patient Name: Keyona Irene  MRN: [id number]  Patient Class: IP- Inpatient  Admission Date: 7/25/2022  Attending Physician: Kalpesh Dumont MD  Primary Care Provider: Howard Gan MD         Patient information was obtained from patient, relative(s), past medical records and ER records.     Subjective:     Principal Problem:<principal problem not specified>    Chief Complaint:   Chief Complaint   Patient presents with    Shortness of Breath     Low sats on room air at home        HPI: Patient is a 71-year-old  female with known COPD, combined CHF, essential hypertension and ongoing tobacco use presented to the ED by EMS secondary to acute onset shortness of breath.    Symptoms started earlier last night rather acutely.  She developed shortness of breath even while at rest.  She did not experience chest pain.  EMS was alerted and patient was found to be hypoxic and tachypneic.  Initial oxygen saturation was 77%.  She was given nebulized breathing treatments and placed on non-rebreather mask.  She denies chest pain.  No exacerbating or relieving factors.    Hypertensive on arrival.  In the ED she received nebulized breathing treatments, intravenous furosemide and topical nitroglycerin ointment.  Shortness of breath has improved but not resolved.    Admitted for similar symptoms in the end of May earlier this year.  She was discharged home on furosemide which she reports compliance to.  Advised to follow-up with cardiology however she was unable to do so.  Smokes half a pack per day.    Rest of the 10 point review of systems is negative except as mentioned above.      Past Medical History:   Diagnosis Date    Anemia     Biliary obstruction     Cholangitis     Diabetes mellitus     pt denies, does not take meds 11/2016    EtOH dependence     HTN (hypertension) 12/30/2013       Past Surgical History:    Procedure Laterality Date    ARTERIOGRAPHY OF SUBCLAVIAN ARTERY  5/22/2020    Procedure: Arteriogram, Subclavian;  Surgeon: Heather Carbajal MD;  Location: [facility] CATH;  Service: Cardiology;;    COLONOSCOPY      CORONARY ANGIOGRAPHY N/A 5/22/2020    Procedure: ANGIOGRAM, CORONARY ARTERY;  Surgeon: Heather Carbajal MD;  Location: [facility] CATH;  Service: Cardiology;  Laterality: N/A;    CORONARY ARTERY BYPASS GRAFT (CABG) N/A 5/29/2020    Procedure: CORONARY ARTERY BYPASS GRAFT (CABG) x 5 VESSELS;  Surgeon: Dima Laughlin MD;  Location: [facility] OR;  Service: Cardiovascular;  Laterality: N/A;    ENDOSCOPIC HARVEST OF VEIN N/A 5/29/2020    Procedure: SURGICAL PROCUREMENT, VEIN, ENDOSCOPIC;  Surgeon: Dima Laughlin MD;  Location: [facility] OR;  Service: Cardiovascular;  Laterality: N/A;    ERCP W/ PLASTIC STENT PLACEMENT      ERCP W/ SPHICTEROTOMY      ESOPHAGOGASTRODUODENOSCOPY      HYSTERECTOMY      INSERTION OF INTRA-AORTIC BALLOON ASSIST DEVICE N/A 5/29/2020    Procedure: INSERTION, INTRA-AORTIC BALLOON PUMP;  Surgeon: Dima Laughlin MD;  Location: [facility] OR;  Service: Cardiovascular;  Laterality: N/A;    LEFT HEART CATHETERIZATION Left 5/22/2020    Procedure: Left heart cath;  Surgeon: Heather Carbajal MD;  Location: [facility] CATH;  Service: Cardiology;  Laterality: Left;       Review of patient's allergies indicates:  No Known Allergies    No current facility-administered medications on file prior to encounter.     Current Outpatient Medications on File Prior to Encounter   Medication Sig    alcohol swabs PadM Apply 1 each topically as needed.    aspirin (ECOTRIN) 81 MG EC tablet Take 1 tablet (81 mg total) by mouth once daily.    atorvastatin (LIPITOR) 10 MG tablet TAKE 1 TABLET (10 MG TOTAL) BY MOUTH EVERY EVENING.    blood glucose control high,low (ACCU-CHEK KEDAR CONTROL SOLN) Soln Use to test controls per r guidelines/instructions    clopidogreL (PLAVIX) 75 mg tablet Take 1 tablet (75 mg total) by mouth once daily.     furosemide (LASIX) 20 MG tablet Take 1 tablet (20 mg total) by mouth once daily.    lisinopriL 10 MG tablet Take 1 tablet (10 mg total) by mouth once daily.    metFORMIN (GLUCOPHAGE-XR) 500 MG ER 24hr tablet Take 2 tablets (1,000 mg total) by mouth once daily.    metoprolol tartrate (LOPRESSOR) 100 MG tablet Take 1 tablet (100 mg total) by mouth 2 (two) times daily.    spironolactone (ALDACTONE) 25 MG tablet Take 1 tablet (25 mg total) by mouth once daily.     Family History    None       Tobacco Use    Smoking status: Current Every Day Smoker     Packs/day: 0.50     Years: 40.00     Pack years: 20.00     Last attempt to quit: 2020     Years since quittin.1    Smokeless tobacco: Never Used   Substance and Sexual Activity    Alcohol use: Not Currently     Comment: sober 2 years 3 months    Drug use: No    Sexual activity: Not on file       Objective:     Vital Signs (Most Recent):  Temp: 98.2 °F (36.8 °C) (22 0401)  Pulse: 102 (22 0620)  Resp: 13 (22 0550)  BP: (!) 168/95 (22 0620)  SpO2: 99 % (22 0620)   Vital Signs (24h Range):  Temp:  [98.2 °F (36.8 °C)] 98.2 °F (36.8 °C)  Pulse:  [] 102  Resp:  [13-32] 13  SpO2:  [88 %-99 %] 99 %  BP: (167-187)/() 168/95     Weight: 45.4 kg (100 lb)  Body mass index is 18.29 kg/m².    Physical Exam      General: Patient resting comfortably in no acute distress. Appears frail and cachectic  Head: Normocephalic and atraumatic   Eyes:  No conjunctival pallor. No scleral icterus.  Mouth: Oral mucosa moist   Lungs:  No tachypnea or accessory muscle use.  Rales audible  Cor: Regular rate and rhythm. No murmurs. No pedal edema.  Abd: Soft. Nontender  Musculoskeletal: No arthropathy, deformity.  Skin: No rashes, swelling, or erythema.  Neuro: Alert. Moving all extremities equally. Follows commands  Ext: No cyanosis. Peripheral pulses +  Psych: Normal mood and affect. Memory intact     Significant Labs: All pertinent labs within  the past 24 hours have been reviewed.  CBC:   Recent Labs   Lab 07/25/22 0422   WBC 5.73   HGB 11.1*   HCT 35.4*        CMP:   Recent Labs   Lab 07/25/22 0422   *   K 3.3*      CO2 21*   *   BUN 7*   CREATININE 0.9   CALCIUM 8.8   PROT 7.0   ALBUMIN 3.7   BILITOT 0.9   ALKPHOS 91   AST 44*   ALT 19   ANIONGAP 13   EGFRNONAA >60.0     Cardiac Markers:   Recent Labs   Lab 07/25/22 0422   BNP 1,114*     Troponin:   Recent Labs   Lab 07/25/22 0422   TROPONINI <0.030       Significant Imaging: I have reviewed all pertinent imaging results/findings within the past 24 hours.    Imaging Results              X-Ray Chest AP Portable (In process)                   CXR:  Independently interpreted.  Notable for bilateral interstitial edema.     EKG:  Independently interpreted.  Sinus tachycardia with LVH changes noted.      Assessment/Plan:     Active Hospital Problems    Diagnosis  POA    Hypertensive emergency [I16.1]  Yes    MR (mitral regurgitation) [I34.0]  Yes    Acute on chronic combined systolic and diastolic congestive heart failure [I50.43]  Yes    Hypokalemia [E87.6]  Yes    Gastroesophageal reflux disease without esophagitis [K21.9]  Yes    Tobacco abuse [Z72.0]  Yes    Essential hypertension [I10]  Yes      Resolved Hospital Problems   No resolved problems to display.     Plan:  Acute on chronic combined CHF  Acute hypoxic respiratory failure  Presentation likely secondary to flash pulmonary edema from hypertensive crisis in the setting of valvular heart disease  She does not appear to be overtly volume overloaded  She does report compliance to home furosemide  Reviewed echo from May 2022; EF 40% with grade 2 diastolic dysfunction and moderate MR and AI  Repeat another dose of intravenous furosemide later today  Obtain cardiology evaluation  Low-sodium diet    Hypertensive emergency  Improved  Restart home regimen which includes lisinopril, metoprolol tartrate as well as  spironolactone    ASCVD - history of CABG  Continue aspirin, statin and clopidogrel    Ongoing tobacco use  Counseled regarding tobacco cessation    Type 2 DM - well controlled  Last hemoglobin A1c from May 2022 is 5.7%  Hold home metformin    VTE risk medication with enoxaparin  Discussed code status with patient and family members.  Full code status confirmed    Kalpesh Dumont MD  Department of Hospital Medicine   [facility]

## 2022-07-25 NOTE — ED PROVIDER NOTES
Encounter Date: 7/25/2022       History     Chief Complaint   Patient presents with    Shortness of Breath     Low sats on room air at home     Chief complaint is chest pain shortness of breath starting 2 hours prior to arrival.  The patient is a very small slender female.  She states she has CHF as well as COPD.  She on arrival by EMS had a pulse ox 77%.  With a DuoNeb and 100% O2 she arrived with 100% pulse ox.  Here on 2-3 L her pulse ox is 100%.  She is awake alert talking.        Review of patient's allergies indicates:  No Known Allergies  Past Medical History:   Diagnosis Date    Anemia     Biliary obstruction     Cholangitis     Diabetes mellitus     pt denies, does not take meds 11/2016    EtOH dependence     HTN (hypertension) 12/30/2013     Past Surgical History:   Procedure Laterality Date    ARTERIOGRAPHY OF SUBCLAVIAN ARTERY  5/22/2020    Procedure: Arteriogram, Subclavian;  Surgeon: Heather Carbajal MD;  Location: [facility] CATH;  Service: Cardiology;;    COLONOSCOPY      CORONARY ANGIOGRAPHY N/A 5/22/2020    Procedure: ANGIOGRAM, CORONARY ARTERY;  Surgeon: Heather Carbajal MD;  Location: [facility] CATH;  Service: Cardiology;  Laterality: N/A;    CORONARY ARTERY BYPASS GRAFT (CABG) N/A 5/29/2020    Procedure: CORONARY ARTERY BYPASS GRAFT (CABG) x 5 VESSELS;  Surgeon: Dima Laughlin MD;  Location: [facility] OR;  Service: Cardiovascular;  Laterality: N/A;    ENDOSCOPIC HARVEST OF VEIN N/A 5/29/2020    Procedure: SURGICAL PROCUREMENT, VEIN, ENDOSCOPIC;  Surgeon: Dima Laughlin MD;  Location: [facility] OR;  Service: Cardiovascular;  Laterality: N/A;    ERCP W/ PLASTIC STENT PLACEMENT      ERCP W/ SPHICTEROTOMY      ESOPHAGOGASTRODUODENOSCOPY      HYSTERECTOMY      INSERTION OF INTRA-AORTIC BALLOON ASSIST DEVICE N/A 5/29/2020    Procedure: INSERTION, INTRA-AORTIC BALLOON PUMP;  Surgeon: Dima Laughlin MD;  Location: [facility] OR;  Service: Cardiovascular;  Laterality: N/A;    LEFT HEART CATHETERIZATION Left 5/22/2020     Procedure: Left heart cath;  Surgeon: Heather Carbajal MD;  Location: [facility] CATH;  Service: Cardiology;  Laterality: Left;     No family history on file.  Social History     Tobacco Use    Smoking status: Current Every Day Smoker     Packs/day: 0.50     Years: 40.00     Pack years: 20.00     Last attempt to quit: 2020     Years since quittin.1    Smokeless tobacco: Never Used   Substance Use Topics    Alcohol use: Not Currently     Comment: sober 2 years 3 months    Drug use: No     Review of Systems   Constitutional: Negative for chills and fever.   HENT: Negative for ear pain, rhinorrhea and sore throat.    Eyes: Negative for pain and visual disturbance.   Respiratory: Positive for shortness of breath. Negative for cough.    Cardiovascular: Positive for chest pain. Negative for palpitations.   Gastrointestinal: Negative for abdominal pain, constipation, diarrhea, nausea and vomiting.   Genitourinary: Negative for dysuria, frequency, hematuria and urgency.   Musculoskeletal: Negative for back pain, joint swelling and myalgias.   Skin: Negative for rash.   Neurological: Negative for dizziness, seizures, weakness and headaches.   Psychiatric/Behavioral: Negative for dysphoric mood. The patient is not nervous/anxious.        Physical Exam     Initial Vitals [22 0401]   BP Pulse Resp Temp SpO2   (!) 187/104 107 (!) 32 98.2 °F (36.8 °C) (!) 88 %      MAP       --         Physical Exam    Nursing note and vitals reviewed.  Constitutional: She appears well-developed and well-nourished.   HENT:   Head: Normocephalic and atraumatic.   Eyes: Conjunctivae, EOM and lids are normal. Pupils are equal, round, and reactive to light.   Neck: Trachea normal. Neck supple. No thyroid mass and no thyromegaly present.   Normal range of motion.  Cardiovascular: Normal rate, regular rhythm and normal heart sounds.   Pulmonary/Chest: Effort normal. She has rales.   Rales in the bases.   Abdominal: Abdomen is soft. There is  no abdominal tenderness.   Musculoskeletal:         General: Normal range of motion.      Cervical back: Normal range of motion and neck supple.     Neurological: She is alert and oriented to person, place, and time. She has normal strength and normal reflexes. No cranial nerve deficit or sensory deficit.   Skin: Skin is warm and dry.   Psychiatric: She has a normal mood and affect. Her speech is normal and behavior is normal. Judgment and thought content normal.         ED Course   Procedures  Labs Reviewed   CBC W/ AUTO DIFFERENTIAL - Abnormal; Notable for the following components:       Result Value    Hemoglobin 11.1 (*)     Hematocrit 35.4 (*)     MCH 26.9 (*)     MCHC 31.4 (*)     RDW 18.6 (*)     Immature Granulocytes 0.7 (*)     Mono # 0.2 (*)     Mono % 3.8 (*)     All other components within normal limits   COMPREHENSIVE METABOLIC PANEL - Abnormal; Notable for the following components:    Sodium 135 (*)     Potassium 3.3 (*)     CO2 21 (*)     Glucose 250 (*)     BUN 7 (*)     AST 44 (*)     All other components within normal limits   B-TYPE NATRIURETIC PEPTIDE - Abnormal; Notable for the following components:    BNP 1,114 (*)     All other components within normal limits   TROPONIN I   MAGNESIUM   MAGNESIUM   SARS-COV-2 RNA AMPLIFICATION, QUAL     EKG Readings: (Independently Interpreted)   Patient has a ventricular rate 110. No ST elevation.  NH interval normal.  T-wave inversion V5 and V6         Imaging Results          X-Ray Chest AP Portable (Final result)  Result time 07/25/22 06:57:46    Final result by Mg Talavera MD (07/25/22 06:57:46)                 Narrative:    Chest single view    CLINICAL DATA: Shortness of breath    FINDINGS: Comparison to May 25. Heart size is normal. There has been previous median sternotomy. There is diffuse abnormal interstitial prominence, with faint bilateral groundglass opacities, left greater than right. Findings are likely on the basis of pulmonary edema,  with pneumonia felt less likely. No significant effusion is identified.    IMPRESSION:  1. Interstitial and faint groundglass opacities suggest pulmonary edema, with atypical pneumonia within the differential but felt less likely.    Electronically signed by:  Mg Talavera MD  7/25/2022 6:57 AM CDT Workstation: [id number]                               Medications   albuterol-ipratropium 2.5 mg-0.5 mg/3 mL nebulizer solution 3 mL ( Nebulization Canceled Entry 7/25/22 0440)   aspirin EC tablet 81 mg (81 mg Oral Given 7/25/22 0900)   atorvastatin tablet 10 mg (10 mg Oral Given 7/25/22 2038)   clopidogreL tablet 75 mg (75 mg Oral Given 7/25/22 0900)   metoprolol tartrate (LOPRESSOR) tablet 100 mg (100 mg Oral Given 7/25/22 2038)   spironolactone tablet 25 mg (25 mg Oral Given 7/25/22 0900)   enoxaparin injection 40 mg (40 mg Subcutaneous Given 7/25/22 1638)   melatonin tablet 6 mg (has no administration in time range)   ondansetron injection 4 mg (has no administration in time range)   polyethylene glycol packet 17 g (has no administration in time range)   acetaminophen tablet 650 mg (has no administration in time range)   naloxone 0.4 mg/mL injection 0.02 mg (has no administration in time range)   potassium bicarbonate disintegrating tablet 50 mEq (has no administration in time range)   potassium bicarbonate disintegrating tablet 35 mEq (has no administration in time range)   potassium bicarbonate disintegrating tablet 60 mEq (has no administration in time range)   magnesium oxide tablet 800 mg (800 mg Oral Given 7/25/22 2037)   magnesium oxide tablet 800 mg (has no administration in time range)   potassium, sodium phosphates 280-160-250 mg packet 2 packet (2 packets Oral Not Given 7/25/22 1634)   potassium, sodium phosphates 280-160-250 mg packet 2 packet (has no administration in time range)   potassium, sodium phosphates 280-160-250 mg packet 2 packet (2 packets Oral Given 7/25/22 1638)   fluticasone  furoate-vilanteroL 100-25 mcg/dose diskus inhaler 1 puff (0 puffs Inhalation Hold 7/25/22 0930)   furosemide injection 20 mg (20 mg Intravenous Given 7/25/22 2037)   sacubitriL-valsartan 24-26 mg per tablet 2 tablet (2 tablets Oral Given 7/25/22 2038)   furosemide injection 20 mg (20 mg Intravenous Given 7/25/22 0446)   nitroGLYCERIN 2% TD oint ointment 0.5 inch (0.5 inches Topical (Top) Given 7/25/22 0444)   furosemide injection 20 mg (20 mg Intravenous Given 7/25/22 1125)     Medical Decision Making:   ED Management:  The patient had her case discussed in detail with the hospitalist.  She appears to have a presentation of CHF/COPD.  Labs reviewed with the hospitalist patient to be admitted in stable condition at the present time.                      Clinical Impression:   Final diagnoses:  [R06.02] Shortness of breath  [J96.01] Acute respiratory failure with hypoxia          ED Disposition Condition    Admit               Shania Truong MD  07/26/22 1552

## 2022-07-25 NOTE — SUBJECTIVE & OBJECTIVE
Interval History:  Patient admitted earlier this morning by overnight team.  See hospital course.  Breathing feels much improved,    Review of Systems   Constitutional:  Negative for fever.   Cardiovascular:  Negative for leg swelling.   Objective:     Vital Signs (Most Recent):  Temp: 98.6 °F (37 °C) (07/25/22 1300)  Pulse: 86 (07/25/22 1300)  Resp: 16 (07/25/22 1300)  BP: (!) 154/75 (07/25/22 1300)  SpO2: 99 % (07/25/22 1300)   Vital Signs (24h Range):  Temp:  [98.2 °F (36.8 °C)-98.6 °F (37 °C)] 98.6 °F (37 °C)  Pulse:  [] 86  Resp:  [13-32] 16  SpO2:  [88 %-100 %] 99 %  BP: (135-187)/() 154/75     Weight: 42.7 kg (94 lb 2.2 oz)  Body mass index is 17.79 kg/m².    Intake/Output Summary (Last 24 hours) at 7/25/2022 1804  Last data filed at 7/25/2022 1255  Gross per 24 hour   Intake --   Output 2650 ml   Net -2650 ml      Physical Exam  Vitals and nursing note reviewed.       Significant Labs: Blood Culture: No results for input(s): LABBLOO in the last 48 hours.  BMP:   Recent Labs   Lab 07/25/22 0422   *   *   K 3.3*      CO2 21*   BUN 7*   CREATININE 0.9   CALCIUM 8.8   MG 1.6     CBC:   Recent Labs   Lab 07/25/22 0422   WBC 5.73   HGB 11.1*   HCT 35.4*        CMP:   Recent Labs   Lab 07/25/22 0422   *   K 3.3*      CO2 21*   *   BUN 7*   CREATININE 0.9   CALCIUM 8.8   PROT 7.0   ALBUMIN 3.7   BILITOT 0.9   ALKPHOS 91   AST 44*   ALT 19   ANIONGAP 13   EGFRNONAA >60.0     Cardiac Markers:   Recent Labs   Lab 07/25/22 0422   BNP 1,114*     Lactic Acid: No results for input(s): LACTATE in the last 48 hours.  Magnesium:   Recent Labs   Lab 07/25/22 0422   MG 1.6       Significant Imaging: I have reviewed all pertinent imaging results/findings within the past 24 hours.    X-Ray Chest AP Portable    Result Date: 7/25/2022  Chest single view CLINICAL DATA: Shortness of breath FINDINGS: Comparison to May 25. Heart size is normal. There has been previous median  sternotomy. There is diffuse abnormal interstitial prominence, with faint bilateral groundglass opacities, left greater than right. Findings are likely on the basis of pulmonary edema, with pneumonia felt less likely. No significant effusion is identified. IMPRESSION: 1. Interstitial and faint groundglass opacities suggest pulmonary edema, with atypical pneumonia within the differential but felt less likely. Electronically signed by:  Mg Talavera MD  7/25/2022 6:57 AM CDT Workstation: [id number]

## 2022-07-26 VITALS
HEART RATE: 68 BPM | DIASTOLIC BLOOD PRESSURE: 70 MMHG | WEIGHT: 101.63 LBS | HEIGHT: 61 IN | OXYGEN SATURATION: 99 % | TEMPERATURE: 98 F | BODY MASS INDEX: 19.19 KG/M2 | SYSTOLIC BLOOD PRESSURE: 144 MMHG | RESPIRATION RATE: 20 BRPM

## 2022-07-26 LAB
ANION GAP SERPL CALC-SCNC: 12 MMOL/L (ref 8–16)
ANION GAP SERPL CALC-SCNC: 9 MMOL/L (ref 8–16)
BUN SERPL-MCNC: 12 MG/DL (ref 8–23)
BUN SERPL-MCNC: 13 MG/DL (ref 8–23)
CALCIUM SERPL-MCNC: 8.9 MG/DL (ref 8.7–10.5)
CALCIUM SERPL-MCNC: 9 MG/DL (ref 8.7–10.5)
CHLORIDE SERPL-SCNC: 97 MMOL/L (ref 95–110)
CHLORIDE SERPL-SCNC: 98 MMOL/L (ref 95–110)
CO2 SERPL-SCNC: 27 MMOL/L (ref 23–29)
CO2 SERPL-SCNC: 28 MMOL/L (ref 23–29)
CREAT SERPL-MCNC: 0.7 MG/DL (ref 0.5–1.4)
CREAT SERPL-MCNC: 0.8 MG/DL (ref 0.5–1.4)
ERYTHROCYTE [DISTWIDTH] IN BLOOD BY AUTOMATED COUNT: 18.2 % (ref 11.5–14.5)
EST. GFR  (AFRICAN AMERICAN): >60 ML/MIN/1.73 M^2
EST. GFR  (AFRICAN AMERICAN): >60 ML/MIN/1.73 M^2
EST. GFR  (NON AFRICAN AMERICAN): >60 ML/MIN/1.73 M^2
EST. GFR  (NON AFRICAN AMERICAN): >60 ML/MIN/1.73 M^2
GLUCOSE SERPL-MCNC: 223 MG/DL (ref 70–110)
GLUCOSE SERPL-MCNC: 237 MG/DL (ref 70–110)
HCT VFR BLD AUTO: 39.9 % (ref 37–48.5)
HGB BLD-MCNC: 13 G/DL (ref 12–16)
MAGNESIUM SERPL-MCNC: 1.7 MG/DL (ref 1.6–2.6)
MCH RBC QN AUTO: 26.7 PG (ref 27–31)
MCHC RBC AUTO-ENTMCNC: 32.6 G/DL (ref 32–36)
MCV RBC AUTO: 82 FL (ref 82–98)
PLATELET # BLD AUTO: 197 K/UL (ref 150–450)
PMV BLD AUTO: 10.6 FL (ref 9.2–12.9)
POTASSIUM SERPL-SCNC: 2.9 MMOL/L (ref 3.5–5.1)
POTASSIUM SERPL-SCNC: 3.7 MMOL/L (ref 3.5–5.1)
RBC # BLD AUTO: 4.86 M/UL (ref 4–5.4)
SODIUM SERPL-SCNC: 134 MMOL/L (ref 136–145)
SODIUM SERPL-SCNC: 137 MMOL/L (ref 136–145)
WBC # BLD AUTO: 5.79 K/UL (ref 3.9–12.7)

## 2022-07-26 PROCEDURE — 25000003 PHARM REV CODE 250: Performed by: INTERNAL MEDICINE

## 2022-07-26 PROCEDURE — 80048 BASIC METABOLIC PNL TOTAL CA: CPT | Mod: 91 | Performed by: INTERNAL MEDICINE

## 2022-07-26 PROCEDURE — 80048 BASIC METABOLIC PNL TOTAL CA: CPT | Performed by: INTERNAL MEDICINE

## 2022-07-26 PROCEDURE — 25000242 PHARM REV CODE 250 ALT 637 W/ HCPCS: Performed by: INTERNAL MEDICINE

## 2022-07-26 PROCEDURE — 36415 COLL VENOUS BLD VENIPUNCTURE: CPT | Performed by: INTERNAL MEDICINE

## 2022-07-26 PROCEDURE — 83735 ASSAY OF MAGNESIUM: CPT | Performed by: INTERNAL MEDICINE

## 2022-07-26 PROCEDURE — 63600175 PHARM REV CODE 636 W HCPCS: Performed by: INTERNAL MEDICINE

## 2022-07-26 PROCEDURE — 85027 COMPLETE CBC AUTOMATED: CPT | Performed by: INTERNAL MEDICINE

## 2022-07-26 PROCEDURE — 63600175 PHARM REV CODE 636 W HCPCS: Performed by: GENERAL PRACTICE

## 2022-07-26 RX ORDER — FLUTICASONE FUROATE AND VILANTEROL 100; 25 UG/1; UG/1
1 POWDER RESPIRATORY (INHALATION) DAILY
Qty: 90 EACH | Refills: 0 | Status: SHIPPED | OUTPATIENT
Start: 2022-07-27 | End: 2023-06-02

## 2022-07-26 RX ORDER — POTASSIUM CHLORIDE 7.45 MG/ML
10 INJECTION INTRAVENOUS ONCE
Status: COMPLETED | OUTPATIENT
Start: 2022-07-26 | End: 2022-07-26

## 2022-07-26 RX ADMIN — FLUTICASONE FUROATE AND VILANTEROL TRIFENATATE 1 PUFF: 100; 25 POWDER RESPIRATORY (INHALATION) at 09:07

## 2022-07-26 RX ADMIN — FUROSEMIDE 20 MG: 10 INJECTION, SOLUTION INTRAVENOUS at 08:07

## 2022-07-26 RX ADMIN — CLOPIDOGREL BISULFATE 75 MG: 75 TABLET, FILM COATED ORAL at 08:07

## 2022-07-26 RX ADMIN — METOPROLOL TARTRATE 100 MG: 50 TABLET, FILM COATED ORAL at 08:07

## 2022-07-26 RX ADMIN — POTASSIUM CHLORIDE 10 MEQ: 7.46 INJECTION, SOLUTION INTRAVENOUS at 08:07

## 2022-07-26 RX ADMIN — ASPIRIN 81 MG: 81 TABLET, COATED ORAL at 08:07

## 2022-07-26 RX ADMIN — POTASSIUM BICARBONATE 20 MEQ: 391 TABLET, EFFERVESCENT ORAL at 08:07

## 2022-07-26 RX ADMIN — SPIRONOLACTONE 25 MG: 25 TABLET ORAL at 08:07

## 2022-07-26 RX ADMIN — SACUBITRIL AND VALSARTAN 2 TABLET: 24; 26 TABLET, FILM COATED ORAL at 08:07

## 2022-07-26 NOTE — DISCHARGE SUMMARY
[facility]  Discharge Summary      Patient Name: Keyona Irene  MRN: [id number]  Patient Class: IP- Inpatient  Admission Date: 7/25/2022  Hospital Length of Stay: 1 days  Discharge Date and Time:  07/26/2022 5:43 PM  Attending Physician: No att. providers found   Discharging Provider: Fco Nunez MD  Primary Care Provider: Howard Gan MD      HPI:   Patient is a 71-year-old  female with known COPD, combined CHF, essential hypertension and ongoing tobacco use presented to the ED by EMS secondary to acute onset shortness of breath.    Symptoms started earlier last night rather acutely.  She developed shortness of breath even while at rest.  She did not experience chest pain.  EMS was alerted and patient was found to be hypoxic and tachypneic.  Initial oxygen saturation was 77%.  She was given nebulized breathing treatments and placed on non-rebreather mask.  She denies chest pain.  No exacerbating or relieving factors.    Hypertensive on arrival.  In the ED she received nebulized breathing treatments, intravenous furosemide and topical nitroglycerin ointment.  Shortness of breath has improved but not resolved.    Admitted for similar symptoms in the end of May earlier this year.  She was discharged home on furosemide which she reports compliance to.  Advised to follow-up with cardiology however she was unable to do so.  Smokes half a pack per day.    Rest of the 10 point review of systems is negative except as mentioned above.      * No surgery found *      Hospital Course:   Patient with a known history of CAD status post CABG by 5, combined congestive heart failure with last echo with grade 2 diastolic dysfunction with EF of 40% with PASP 30, valvular abnormalities (moderate MR/AR, mild to moderate TR/SD), COPD with continued tobacco use, hypertension, diabetes who presented with shortness of breath, hypoxic to 77, hypertensive on arrival (189/107), found  to have clinical and radiological evidence of decompensated heart failure with pulmonary edema and BNP 1 114.  Placed under observation, started on IV diuresis .  Patient appeared to have COPD and continues to smoke as well.  She got diuresis nearly 3 L and feeling better and not on any oxygen support and later discharged stable condition.  She had diuretic-induced hypokalemia and it was replaced IV and p.o. and normalized and discharged in stable condition.  She was on home Lasix already and continued and follow up with her primary care doctor as outpatient.       Goals of Care Treatment Preferences:  Code Status: Full Code      Consults:   Consults (From admission, onward)        Status Ordering Provider     Inpatient consult to Cardiology  Once        Provider:  Wilfredo Woo MD    Completed MARIBEL EDMONDS          No new Assessment & Plan notes have been filed under this hospital service since the last note was generated.  Service: Hospital Medicine    Final Active Diagnoses:    Diagnosis Date Noted POA    PRINCIPAL PROBLEM:  Acute on chronic combined systolic and diastolic congestive heart failure [I50.43]  Yes    COPD (chronic obstructive pulmonary disease) [J44.9] 07/25/2022 Yes     Chronic    Valvular heart disease [I38] 07/25/2022 Yes     Chronic    Anemia [D64.9] 07/25/2022 Yes     Chronic    Hypokalemia [E87.6] 05/20/2020 Yes    Gastroesophageal reflux disease without esophagitis [K21.9] 06/26/2019 Yes    Type 2 diabetes mellitus with diabetic polyneuropathy, without long-term current use of insulin [E11.42] 05/01/2014 Yes    Tobacco abuse [Z72.0] 12/30/2013 Yes    Essential hypertension [I10] 12/30/2013 Yes      Problems Resolved During this Admission:    Diagnosis Date Noted Date Resolved POA    Hypertensive emergency [I16.1] 07/25/2022 07/25/2022 Yes       Discharged Condition: good    Disposition: Home or Self Care    Follow Up:   Follow-up Information     Franklin Garcia MD Follow up  on 8/4/2022.    Specialty: Family Medicine  Why: Hospital follow up and new patient establishment appointment scheduled 8/4 @ 8:20 with Dr. Garcia  Contact information:  [address] [place] [address]  [phone]                       Patient Instructions:      Diet Cardiac     Activity as tolerated       Significant Diagnostic Studies: Labs:   CMP   Recent Labs   Lab 07/25/22  0422 07/26/22  0602 07/26/22  1206   * 137 134*   K 3.3* 2.9* 3.7    98 97   CO2 21* 27 28   * 223* 237*   BUN 7* 12 13   CREATININE 0.9 0.8 0.7   CALCIUM 8.8 9.0 8.9   PROT 7.0  --   --    ALBUMIN 3.7  --   --    BILITOT 0.9  --   --    ALKPHOS 91  --   --    AST 44*  --   --    ALT 19  --   --    ANIONGAP 13 12 9   ESTGFRAFRICA >60.0 >60.0 >60.0   EGFRNONAA >60.0 >60.0 >60.0    and CBC   Recent Labs   Lab 07/25/22 0422 07/26/22  0602   WBC 5.73 5.79   HGB 11.1* 13.0   HCT 35.4* 39.9    197       Pending Diagnostic Studies:     None         Medications:  Reconciled Home Medications:      Medication List      START taking these medications    fluticasone furoate-vilanteroL 100-25 mcg/dose diskus inhaler  Commonly known as: BREO  Inhale 1 puff into the lungs once daily. Controller  Start taking on: July 27, 2022        CONTINUE taking these medications    ACCU-CHEK KEDAR CONTROL SOLN Soln  Generic drug: blood glucose control high,low  Use to test controls per mfr guidelines/instructions     alcohol swabs Padm  Apply 1 each topically as needed.     aspirin 81 MG EC tablet  Commonly known as: ECOTRIN  Take 1 tablet (81 mg total) by mouth once daily.     atorvastatin 10 MG tablet  Commonly known as: LIPITOR  TAKE 1 TABLET (10 MG TOTAL) BY MOUTH EVERY EVENING.     clopidogreL 75 mg tablet  Commonly known as: PLAVIX  Take 1 tablet (75 mg total) by mouth once daily.     furosemide 20 MG tablet  Commonly known as: LASIX  Take 1 tablet (20 mg total) by mouth once daily.     lisinopriL 10 MG tablet  Take 1  tablet (10 mg total) by mouth once daily.     metFORMIN 500 MG ER 24hr tablet  Commonly known as: GLUCOPHAGE-XR  Take 2 tablets (1,000 mg total) by mouth once daily.     metoprolol tartrate 100 MG tablet  Commonly known as: LOPRESSOR  Take 1 tablet (100 mg total) by mouth 2 (two) times daily.     spironolactone 25 MG tablet  Commonly known as: ALDACTONE  Take 1 tablet (25 mg total) by mouth once daily.            Indwelling Lines/Drains at time of discharge:   Lines/Drains/Airways     None               General: Patient resting comfortably in no acute distress. Appears as stated age. Calm  Eyes: EOM intact. No conjunctivae injection. No scleral icterus.  ENT: Hearing grossly intact. No discharge from ears. No nasal discharge.   CVS: RRR. No LE edema BL.  Lungs: CTA BL, no wheezing or crackles. Good breath sounds. No accessory muscle use. No acute respiratory distress  Neuro: non focal , Follows commands. Responds appropriately  Time spent on the discharge of patient: 22 minutes         Fco Nunez MD  Department of Hospital Medicine  [facility]

## 2022-07-26 NOTE — PLAN OF CARE
Discharge orders and chart reviewed. No other discharge needs noted at this time. Pt is clear for discharge from case management. Pt is discharging to home.        07/26/22 1400   Final Note   Assessment Type Final Discharge Note   Anticipated Discharge Disposition Home   What phone number can be called within the next 1-3 days to see how you are doing after discharge? [phone]   Hospital Resources/Appts/Education Provided Appointments scheduled and added to AVS

## 2022-07-26 NOTE — CARE UPDATE
PCP hospital follow up and established patient visit with Dr. Garcia scheduled for 8/4/2022 @ 8:20

## 2022-07-26 NOTE — PLAN OF CARE
[facility]  Initial Discharge Assessment       Primary Care Provider: Howard Gan MD    Admission Diagnosis: Acute respiratory failure with hypoxia [J96.01]    Admission Date: 7/25/2022  Expected Discharge Date: 7/26/2022    Discharge Barriers Identified: None     Assessment completed at bedside with Pt. SW Intern asked Pt if a POA was in place and Pt denied. Pt intends to discharge home once medically cleared. No further CM needs, will continue to follow.      Payor: HU[employer] MANAGED MEDICARE / Plan: HU[employer] SNP (SPECIAL NEEDS PLAN) / Product Type: Medicare Advantage /     Extended Emergency Contact Information  Primary Emergency Contact: Avani Alicia  Address: [address] [address] [facility]  Home Phone: [phone]  Mobile Phone: [phone]  Relation: Daughter  Secondary Emergency Contact: SALVATORE ALICIA  Mobile Phone: [phone]  Relation: Daughter  Preferred language: English    Discharge Plan A: Home  Discharge Plan B: Home with family      [facility] (Now [facility]) - [facility] [address] [facility]  [address] [facility] [address]  Phone: [phone] Fax: [phone]    [facility] - [facility] [address] [facility] [address]  [address] [address] [address]  Phone: [phone] Fax: [phone]      Initial Assessment (most recent)     Adult Discharge Assessment - 07/26/22 1353        Discharge Assessment    Assessment Type Discharge Planning Assessment     Confirmed/corrected address, phone number and insurance Yes     Confirmed Demographics Correct on Facesheet     Source of Information patient     When was your last doctors appointment? 07/04/22     Reason For Admission Shortness of breath     Lives With spouse;grandchild(damari)     Facility Arrived From: Home     Do you expect to return to your current living situation? Yes     Do you have help at home or someone to help you manage your care  at home? Yes     Who are your caregiver(s) and their phone number(s)? Avani Luis (Daughter)   [phone]     Prior to hospitilization cognitive status: Alert/Oriented     Current cognitive status: Alert/Oriented     Walking or Climbing Stairs Difficulty none     Dressing/Bathing Difficulty none     Home Accessibility not wheelchair accessible     Home Layout Able to live on 1st floor     Equipment Currently Used at Home none     Readmission within 30 days? No     Patient currently being followed by outpatient case management? No     Do you currently have service(s) that help you manage your care at home? Yes     How Many hours does patient receive services 1     Name and Contact number of agency Unknown     Is the pt/caregiver preference to resume services with current agency Yes     Do you take prescription medications? Yes     Do you have prescription coverage? Yes     Coverage [employer] SNP (SPECIAL NEEDS PLAN)     Do you have any problems affording any of your prescribed medications? No     Is the patient taking medications as prescribed? yes     Who is going to help you get home at discharge? Avani Luis (Daughter)   [phone]     How do you get to doctors appointments? family or friend will provide     Are you on dialysis? No     Do you take coumadin? No     Discharge Plan A Home     Discharge Plan B Home with family     DME Needed Upon Discharge  none     Discharge Plan discussed with: Patient     Discharge Barriers Identified None

## 2022-07-26 NOTE — CARE UPDATE
07/26/22 0947   Patient Assessment/Suction   Level of Consciousness (AVPU) alert   All Lung Fields Breath Sounds crackles   PRE-TX-O2   O2 Device (Oxygen Therapy) room air   SpO2 99 %   Pulse Oximetry Type Intermittent   $ Pulse Oximetry - Multiple Charge Pulse Oximetry - Multiple   Pulse 77   Resp 15   Inhaler   $ Inhaler Charges MDI (Metered Dose Inahler) Treatment   Daily Review of Necessity (Inhaler) completed   Respiratory Treatment Status (Inhaler) given   Treatment Route (Inhaler) mouthpiece   Patient Position (Inhaler) semi-Odell's   Post Treatment Assessment (Inhaler) breath sounds unchanged   Signs of Intolerance (Inhaler) none   Breath Sounds Post-Respiratory Treatment   Post-treatment Heart Rate (beats/min) 77   Post-treatment Resp Rate (breaths/min) 15   Education   $ Education Bronchodilator;15 min   Respiratory Evaluation   $ Care Plan Tech Time 15 min

## 2022-07-26 NOTE — PLAN OF CARE
Problem: Adult Inpatient Plan of Care  Goal: Plan of Care Review  Outcome: Adequate for Care Transition  Goal: Patient-Specific Goal (Individualized)  Outcome: Adequate for Care Transition  Goal: Absence of Hospital-Acquired Illness or Injury  Outcome: Adequate for Care Transition  Goal: Optimal Comfort and Wellbeing  Outcome: Adequate for Care Transition  Goal: Readiness for Transition of Care  Outcome: Adequate for Care Transition     Problem: Diabetes Comorbidity  Goal: Blood Glucose Level Within Targeted Range  Outcome: Adequate for Care Transition     Problem: Fall Injury Risk  Goal: Absence of Fall and Fall-Related Injury  Outcome: Adequate for Care Transition     Problem: Skin Injury Risk Increased  Goal: Skin Health and Integrity  Outcome: Adequate for Care Transition

## 2022-08-04 ENCOUNTER — OFFICE VISIT (OUTPATIENT)
Dept: FAMILY MEDICINE | Facility: CLINIC | Age: 72
End: 2022-08-04
Payer: MEDICARE

## 2022-08-04 VITALS
BODY MASS INDEX: 19.11 KG/M2 | HEIGHT: 61 IN | TEMPERATURE: 99 F | WEIGHT: 101.19 LBS | SYSTOLIC BLOOD PRESSURE: 148 MMHG | HEART RATE: 70 BPM | DIASTOLIC BLOOD PRESSURE: 62 MMHG

## 2022-08-04 DIAGNOSIS — Z78.0 POSTMENOPAUSAL: Primary | ICD-10-CM

## 2022-08-04 DIAGNOSIS — I25.119 ATHEROSCLEROSIS OF NATIVE CORONARY ARTERY OF NATIVE HEART WITH ANGINA PECTORIS: ICD-10-CM

## 2022-08-04 DIAGNOSIS — F17.210 CIGARETTE NICOTINE DEPENDENCE WITHOUT COMPLICATION: ICD-10-CM

## 2022-08-04 DIAGNOSIS — I65.23 BILATERAL CAROTID ARTERY STENOSIS: ICD-10-CM

## 2022-08-04 DIAGNOSIS — E11.42 TYPE 2 DIABETES MELLITUS WITH DIABETIC POLYNEUROPATHY, WITHOUT LONG-TERM CURRENT USE OF INSULIN: ICD-10-CM

## 2022-08-04 DIAGNOSIS — Z23 IMMUNIZATION DUE: ICD-10-CM

## 2022-08-04 DIAGNOSIS — Z12.11 COLON CANCER SCREENING: ICD-10-CM

## 2022-08-04 DIAGNOSIS — I50.43 ACUTE ON CHRONIC COMBINED SYSTOLIC AND DIASTOLIC CONGESTIVE HEART FAILURE: ICD-10-CM

## 2022-08-04 DIAGNOSIS — K21.9 GASTROESOPHAGEAL REFLUX DISEASE WITHOUT ESOPHAGITIS: ICD-10-CM

## 2022-08-04 DIAGNOSIS — J44.9 CHRONIC OBSTRUCTIVE PULMONARY DISEASE, UNSPECIFIED COPD TYPE: ICD-10-CM

## 2022-08-04 DIAGNOSIS — I38 VALVULAR HEART DISEASE: ICD-10-CM

## 2022-08-04 DIAGNOSIS — I10 ESSENTIAL HYPERTENSION: ICD-10-CM

## 2022-08-04 PROCEDURE — 3288F PR FALLS RISK ASSESSMENT DOCUMENTED: ICD-10-PCS | Mod: CPTII,S$GLB,, | Performed by: FAMILY MEDICINE

## 2022-08-04 PROCEDURE — 3044F PR MOST RECENT HEMOGLOBIN A1C LEVEL <7.0%: ICD-10-PCS | Mod: CPTII,S$GLB,, | Performed by: FAMILY MEDICINE

## 2022-08-04 PROCEDURE — 3078F DIAST BP <80 MM HG: CPT | Mod: CPTII,S$GLB,, | Performed by: FAMILY MEDICINE

## 2022-08-04 PROCEDURE — 90677 PCV20 VACCINE IM: CPT | Mod: S$GLB,,, | Performed by: FAMILY MEDICINE

## 2022-08-04 PROCEDURE — 99999 PR PBB SHADOW E&M-EST. PATIENT-LVL IV: ICD-10-PCS | Mod: PBBFAC,,, | Performed by: FAMILY MEDICINE

## 2022-08-04 PROCEDURE — 3008F PR BODY MASS INDEX (BMI) DOCUMENTED: ICD-10-PCS | Mod: CPTII,S$GLB,, | Performed by: FAMILY MEDICINE

## 2022-08-04 PROCEDURE — 1159F PR MEDICATION LIST DOCUMENTED IN MEDICAL RECORD: ICD-10-PCS | Mod: CPTII,S$GLB,, | Performed by: FAMILY MEDICINE

## 2022-08-04 PROCEDURE — 3008F BODY MASS INDEX DOCD: CPT | Mod: CPTII,S$GLB,, | Performed by: FAMILY MEDICINE

## 2022-08-04 PROCEDURE — 4010F ACE/ARB THERAPY RXD/TAKEN: CPT | Mod: CPTII,S$GLB,, | Performed by: FAMILY MEDICINE

## 2022-08-04 PROCEDURE — 1111F PR DISCHARGE MEDS RECONCILED W/ CURRENT OUTPATIENT MED LIST: ICD-10-PCS | Mod: CPTII,S$GLB,, | Performed by: FAMILY MEDICINE

## 2022-08-04 PROCEDURE — 1126F AMNT PAIN NOTED NONE PRSNT: CPT | Mod: CPTII,S$GLB,, | Performed by: FAMILY MEDICINE

## 2022-08-04 PROCEDURE — 3066F NEPHROPATHY DOC TX: CPT | Mod: CPTII,S$GLB,, | Performed by: FAMILY MEDICINE

## 2022-08-04 PROCEDURE — 99214 OFFICE O/P EST MOD 30 MIN: CPT | Mod: S$GLB,,, | Performed by: FAMILY MEDICINE

## 2022-08-04 PROCEDURE — 3061F PR NEG MICROALBUMINURIA RESULT DOCUMENTED/REVIEW: ICD-10-PCS | Mod: CPTII,S$GLB,, | Performed by: FAMILY MEDICINE

## 2022-08-04 PROCEDURE — 3077F PR MOST RECENT SYSTOLIC BLOOD PRESSURE >= 140 MM HG: ICD-10-PCS | Mod: CPTII,S$GLB,, | Performed by: FAMILY MEDICINE

## 2022-08-04 PROCEDURE — 3078F PR MOST RECENT DIASTOLIC BLOOD PRESSURE < 80 MM HG: ICD-10-PCS | Mod: CPTII,S$GLB,, | Performed by: FAMILY MEDICINE

## 2022-08-04 PROCEDURE — 3077F SYST BP >= 140 MM HG: CPT | Mod: CPTII,S$GLB,, | Performed by: FAMILY MEDICINE

## 2022-08-04 PROCEDURE — 3044F HG A1C LEVEL LT 7.0%: CPT | Mod: CPTII,S$GLB,, | Performed by: FAMILY MEDICINE

## 2022-08-04 PROCEDURE — 90677 PNEUMOCOCCAL CONJUGATE VACCINE 20-VALENT: ICD-10-PCS | Mod: S$GLB,,, | Performed by: FAMILY MEDICINE

## 2022-08-04 PROCEDURE — 3061F NEG MICROALBUMINURIA REV: CPT | Mod: CPTII,S$GLB,, | Performed by: FAMILY MEDICINE

## 2022-08-04 PROCEDURE — 99999 PR PBB SHADOW E&M-EST. PATIENT-LVL IV: CPT | Mod: PBBFAC,,, | Performed by: FAMILY MEDICINE

## 2022-08-04 PROCEDURE — 1101F PR PT FALLS ASSESS DOC 0-1 FALLS W/OUT INJ PAST YR: ICD-10-PCS | Mod: CPTII,S$GLB,, | Performed by: FAMILY MEDICINE

## 2022-08-04 PROCEDURE — G0009 PNEUMOCOCCAL CONJUGATE VACCINE 20-VALENT: ICD-10-PCS | Mod: S$GLB,,, | Performed by: FAMILY MEDICINE

## 2022-08-04 PROCEDURE — 99214 PR OFFICE/OUTPT VISIT, EST, LEVL IV, 30-39 MIN: ICD-10-PCS | Mod: S$GLB,,, | Performed by: FAMILY MEDICINE

## 2022-08-04 PROCEDURE — 4010F PR ACE/ARB THEARPY RXD/TAKEN: ICD-10-PCS | Mod: CPTII,S$GLB,, | Performed by: FAMILY MEDICINE

## 2022-08-04 PROCEDURE — 1111F DSCHRG MED/CURRENT MED MERGE: CPT | Mod: CPTII,S$GLB,, | Performed by: FAMILY MEDICINE

## 2022-08-04 PROCEDURE — 3288F FALL RISK ASSESSMENT DOCD: CPT | Mod: CPTII,S$GLB,, | Performed by: FAMILY MEDICINE

## 2022-08-04 PROCEDURE — 1101F PT FALLS ASSESS-DOCD LE1/YR: CPT | Mod: CPTII,S$GLB,, | Performed by: FAMILY MEDICINE

## 2022-08-04 PROCEDURE — 1159F MED LIST DOCD IN RCRD: CPT | Mod: CPTII,S$GLB,, | Performed by: FAMILY MEDICINE

## 2022-08-04 PROCEDURE — G0009 ADMIN PNEUMOCOCCAL VACCINE: HCPCS | Mod: S$GLB,,, | Performed by: FAMILY MEDICINE

## 2022-08-04 PROCEDURE — 1126F PR PAIN SEVERITY QUANTIFIED, NO PAIN PRESENT: ICD-10-PCS | Mod: CPTII,S$GLB,, | Performed by: FAMILY MEDICINE

## 2022-08-04 PROCEDURE — 3066F PR DOCUMENTATION OF TREATMENT FOR NEPHROPATHY: ICD-10-PCS | Mod: CPTII,S$GLB,, | Performed by: FAMILY MEDICINE

## 2022-08-04 RX ORDER — SACUBITRIL AND VALSARTAN 24; 26 MG/1; MG/1
1 TABLET, FILM COATED ORAL 2 TIMES DAILY
Qty: 60 TABLET | Refills: 3 | Status: SHIPPED | OUTPATIENT
Start: 2022-08-04 | End: 2022-11-04 | Stop reason: DRUGHIGH

## 2022-08-04 NOTE — PROGRESS NOTES
THIS DOCUMENT WAS MADE IN PART WITH VOICE RECOGNITION SOFTWARE.  OCCASIONALLY THIS SOFTWARE WILL MISINTERPRET WORDS OR PHRASES.    Assessment and Plan:    1. Postmenopausal  DXA Bone Density Spine And Hip   2. Immunization due  (In Office Administered) Pneumococcal Conjugate Vaccine (20 Valent) (IM)   3. Colon cancer screening  Fecal Immunochemical Test (iFOBT)   4. Cigarette nicotine dependence without complication  CT Chest Lung Screening Low Dose   5. Gastroesophageal reflux disease without esophagitis     6. Type 2 diabetes mellitus with diabetic polyneuropathy, without long-term current use of insulin     7. Atherosclerosis of native coronary artery of native heart with angina pectoris  Ambulatory referral/consult to Cardiology   8. Essential hypertension     9. Acute on chronic combined systolic and diastolic congestive heart failure  Ambulatory referral/consult to Cardiology    sacubitriL-valsartan (ENTRESTO) 24-26 mg per tablet   10. Bilateral carotid artery stenosis     11. Chronic obstructive pulmonary disease, unspecified COPD type     12. Valvular heart disease         PLAN    Set up with Cardiology    Trade lisinopril for Entresto; hopefully will have better blood pressure control  Recommended monitoring weight on a daily basis, medication compliance    DEXA bone scan, CT chest for lung cancer screening purposes    Fit kit for colon cancer screening    Pneumonia vaccine today    Follow up in 4 weeks for re-evaluation      ______________________________________________________________________  Subjective:    Chief Complaint:  Chief Complaint   Patient presents with    Establish Care     Est PCP, f/u hosp        HPI:  Keyona is a 71 y.o. year old     Hospital stay-[facility] -1 day  Diagnosis acute on chronic heart failure  Presented complaining of shortness of breath, oxygen saturation 77%.  Also noted to be hypertensive on arrival.  Treated with topical nitroglycerin and nebulizer,  furosemide as well in the emergency department    Diuresed 3 L and discharged    Today  New patient to me, presenting to [facility] care, hospital follow-up, health maintenance    Complains of chest tightness today. Symptoms intermit.   Has no follow up with cardiology         ======================    COPD  Rx-Breo  Just started Breo    Combined systolic, diastolic congestive heart failure  Previous echocardiogram 05/26/2022-grade 2 diastolic dysfunction, ejection fraction 40%  Cardiology- none currently  Rx-Aldactone 25 mg, beta-blocker, ACE-inhibitor, Lasix 20 mg     Valvular heart disease  05/26/2022-echocardiogram shows moderate aortic regurgitation, tricuspid regurgitation, pulmonic regurgitation, mitral regurgitation    Coronary artery disease status post CABG x5 (5/29/2020), dyslipidemia  Cardiology- none currently   Rx-aspirin 81 mg, atorvastatin 10 mg, Plavix 75 mg  Previous lipid panel acceptable  Reports occasional chest tightness with walking     Essential hypertension  Rx-lisinopril 10 mg, metoprolol 100 mg, spironolactone 25 mg    Bilateral carotid artery stenosis  Currently on statin  05/27/2020-carotid ultrasound shows less than 50% stenosis right carotid artery, left internal carotid artery with 50-69% stenosis    Type 2 diabetes mellitus with associated polyneuropathy  Previous A1c-5.7%  Rx-metformin extended release 1000 mg by mouth daily    GERD  No current treatment    Nicotine dependence  Prev rx : Patches (didn't work)           Past Medical History:  Past Medical History:   Diagnosis Date    Anemia     Biliary obstruction     Cholangitis     Diabetes mellitus     pt denies, does not take meds 11/2016    EtOH dependence     HTN (hypertension) 12/30/2013       Past Surgical History:  Past Surgical History:   Procedure Laterality Date    ARTERIOGRAPHY OF SUBCLAVIAN ARTERY  5/22/2020    Procedure: Arteriogram, Subclavian;  Surgeon: Heather Carbajal MD;  Location: [facility] CATH;  Service:  Cardiology;;    COLONOSCOPY      CORONARY ANGIOGRAPHY N/A 2020    Procedure: ANGIOGRAM, CORONARY ARTERY;  Surgeon: Heather Carbajal MD;  Location: [facility] CATH;  Service: Cardiology;  Laterality: N/A;    CORONARY ARTERY BYPASS GRAFT (CABG) N/A 2020    Procedure: CORONARY ARTERY BYPASS GRAFT (CABG) x 5 VESSELS;  Surgeon: Dima Laughlin MD;  Location: [facility] OR;  Service: Cardiovascular;  Laterality: N/A;    ENDOSCOPIC HARVEST OF VEIN N/A 2020    Procedure: SURGICAL PROCUREMENT, VEIN, ENDOSCOPIC;  Surgeon: Dima Laughlin MD;  Location: [facility] OR;  Service: Cardiovascular;  Laterality: N/A;    ERCP W/ PLASTIC STENT PLACEMENT      ERCP W/ SPHICTEROTOMY      ESOPHAGOGASTRODUODENOSCOPY      HYSTERECTOMY      INSERTION OF INTRA-AORTIC BALLOON ASSIST DEVICE N/A 2020    Procedure: INSERTION, INTRA-AORTIC BALLOON PUMP;  Surgeon: Dima Laughlin MD;  Location: [facility] OR;  Service: Cardiovascular;  Laterality: N/A;    LEFT HEART CATHETERIZATION Left 2020    Procedure: Left heart cath;  Surgeon: Heather Carbajal MD;  Location: [facility] CATH;  Service: Cardiology;  Laterality: Left;       Family History:  History reviewed. No pertinent family history.    Social History:  Social History     Socioeconomic History    Marital status:    Tobacco Use    Smoking status: Current Every Day Smoker     Packs/day: 0.50     Years: 40.00     Pack years: 20.00     Last attempt to quit: 2020     Years since quittin.2    Smokeless tobacco: Never Used   Substance and Sexual Activity    Alcohol use: Not Currently     Comment: sober 2 years 3 months    Drug use: No       Medications:  Current Outpatient Medications on File Prior to Visit   Medication Sig Dispense Refill    alcohol swabs PadM Apply 1 each topically as needed. 100 each 2    aspirin (ECOTRIN) 81 MG EC tablet Take 1 tablet (81 mg total) by mouth once daily. 30 tablet 0    atorvastatin (LIPITOR) 10 MG tablet TAKE 1 TABLET (10 MG TOTAL) BY MOUTH EVERY  "EVENING. 90 tablet 3    blood glucose control high,low (ACCU-CHEK KEDAR CONTROL SOLN) Soln Use to test controls per r guidelines/instructions 1 each 1    clopidogreL (PLAVIX) 75 mg tablet Take 1 tablet (75 mg total) by mouth once daily. 90 tablet 3    fluticasone furoate-vilanteroL (BREO) 100-25 mcg/dose diskus inhaler Inhale 1 puff into the lungs once daily. Controller 90 each 0    furosemide (LASIX) 20 MG tablet Take 1 tablet (20 mg total) by mouth once daily. 30 tablet 11    metFORMIN (GLUCOPHAGE-XR) 500 MG ER 24hr tablet TAKE 2 TABLETS (1,000 MG TOTAL) BY MOUTH ONCE DAILY. 180 tablet 1    metoprolol tartrate (LOPRESSOR) 100 MG tablet Take 1 tablet (100 mg total) by mouth 2 (two) times daily. 60 tablet 11    spironolactone (ALDACTONE) 25 MG tablet Take 1 tablet (25 mg total) by mouth once daily. 90 tablet 3    [DISCONTINUED] lisinopriL 10 MG tablet Take 1 tablet (10 mg total) by mouth once daily. 90 tablet 3     No current facility-administered medications on file prior to visit.       Allergies:  Patient has no known allergies.    Immunizations:  Immunization History   Administered Date(s) Administered    COVID-19, MRNA, LN-S, PF (Pfizer) (Gray Cap) 06/20/2022    COVID-19, MRNA, LN-S, PF (Pfizer) (Purple Cap) 05/28/2022       Review of Systems:  Review of Systems   All other systems reviewed and are negative.      Objective:    Vitals:  Vitals:    08/04/22 0840   BP: (!) 148/62   Pulse: 70   Temp: 98.8 °F (37.1 °C)   TempSrc: Oral   Weight: 45.9 kg (101 lb 3.1 oz)   Height: 5' 1" (1.549 m)   PainSc: 0-No pain       Physical Exam  Vitals reviewed.   Constitutional:       General: She is not in acute distress.  HENT:      Head: Normocephalic and atraumatic.   Eyes:      Pupils: Pupils are equal, round, and reactive to light.   Cardiovascular:      Rate and Rhythm: Normal rate and regular rhythm.      Heart sounds: No murmur heard.    No friction rub.   Pulmonary:      Effort: Pulmonary effort is " normal.      Breath sounds: Normal breath sounds.   Abdominal:      General: Bowel sounds are normal. There is no distension.      Palpations: Abdomen is soft.      Tenderness: There is no abdominal tenderness.   Musculoskeletal:      Cervical back: Neck supple.   Skin:     General: Skin is warm and dry.      Findings: No rash.   Psychiatric:         Behavior: Behavior normal.             Franklin Garcia MD  Family Medicine

## 2022-08-05 ENCOUNTER — PATIENT MESSAGE (OUTPATIENT)
Dept: FAMILY MEDICINE | Facility: CLINIC | Age: 72
End: 2022-08-05
Payer: MEDICARE

## 2022-08-08 NOTE — TELEPHONE ENCOUNTER
Call Dr. Manning's office. I placed referral.    FAMILY HISTORY:  Mother  Still living? Unknown  Family history of blood disorder, Age at diagnosis: Age Unknown

## 2022-08-11 ENCOUNTER — DOCUMENT SCAN (OUTPATIENT)
Dept: HOME HEALTH SERVICES | Facility: HOSPITAL | Age: 72
End: 2022-08-11
Payer: MEDICARE

## 2022-09-20 ENCOUNTER — OFFICE VISIT (OUTPATIENT)
Dept: CARDIOLOGY | Facility: CLINIC | Age: 72
End: 2022-09-20
Payer: MEDICARE

## 2022-09-20 VITALS
WEIGHT: 94.38 LBS | HEART RATE: 70 BPM | DIASTOLIC BLOOD PRESSURE: 83 MMHG | HEIGHT: 61 IN | BODY MASS INDEX: 17.82 KG/M2 | SYSTOLIC BLOOD PRESSURE: 174 MMHG

## 2022-09-20 DIAGNOSIS — E11.42 TYPE 2 DIABETES MELLITUS WITH DIABETIC POLYNEUROPATHY, WITHOUT LONG-TERM CURRENT USE OF INSULIN: ICD-10-CM

## 2022-09-20 DIAGNOSIS — I50.43 ACUTE ON CHRONIC COMBINED SYSTOLIC AND DIASTOLIC CONGESTIVE HEART FAILURE: ICD-10-CM

## 2022-09-20 DIAGNOSIS — I25.119 ATHEROSCLEROSIS OF NATIVE CORONARY ARTERY OF NATIVE HEART WITH ANGINA PECTORIS: ICD-10-CM

## 2022-09-20 DIAGNOSIS — I65.23 BILATERAL CAROTID ARTERY STENOSIS: ICD-10-CM

## 2022-09-20 DIAGNOSIS — I10 ESSENTIAL HYPERTENSION: ICD-10-CM

## 2022-09-20 DIAGNOSIS — J44.9 CHRONIC OBSTRUCTIVE PULMONARY DISEASE, UNSPECIFIED COPD TYPE: Primary | Chronic | ICD-10-CM

## 2022-09-20 DIAGNOSIS — Z72.0 TOBACCO ABUSE: ICD-10-CM

## 2022-09-20 PROCEDURE — 3288F FALL RISK ASSESSMENT DOCD: CPT | Mod: CPTII,S$GLB,, | Performed by: INTERNAL MEDICINE

## 2022-09-20 PROCEDURE — 1126F PR PAIN SEVERITY QUANTIFIED, NO PAIN PRESENT: ICD-10-PCS | Mod: CPTII,S$GLB,, | Performed by: INTERNAL MEDICINE

## 2022-09-20 PROCEDURE — 99499 RISK ADDL DX/OHS AUDIT: ICD-10-PCS | Mod: S$GLB,,, | Performed by: INTERNAL MEDICINE

## 2022-09-20 PROCEDURE — 3288F PR FALLS RISK ASSESSMENT DOCUMENTED: ICD-10-PCS | Mod: CPTII,S$GLB,, | Performed by: INTERNAL MEDICINE

## 2022-09-20 PROCEDURE — 1126F AMNT PAIN NOTED NONE PRSNT: CPT | Mod: CPTII,S$GLB,, | Performed by: INTERNAL MEDICINE

## 2022-09-20 PROCEDURE — 99214 PR OFFICE/OUTPT VISIT, EST, LEVL IV, 30-39 MIN: ICD-10-PCS | Mod: S$GLB,,, | Performed by: INTERNAL MEDICINE

## 2022-09-20 PROCEDURE — 1101F PR PT FALLS ASSESS DOC 0-1 FALLS W/OUT INJ PAST YR: ICD-10-PCS | Mod: CPTII,S$GLB,, | Performed by: INTERNAL MEDICINE

## 2022-09-20 PROCEDURE — 3077F PR MOST RECENT SYSTOLIC BLOOD PRESSURE >= 140 MM HG: ICD-10-PCS | Mod: CPTII,S$GLB,, | Performed by: INTERNAL MEDICINE

## 2022-09-20 PROCEDURE — 4010F ACE/ARB THERAPY RXD/TAKEN: CPT | Mod: CPTII,S$GLB,, | Performed by: INTERNAL MEDICINE

## 2022-09-20 PROCEDURE — 3008F PR BODY MASS INDEX (BMI) DOCUMENTED: ICD-10-PCS | Mod: CPTII,S$GLB,, | Performed by: INTERNAL MEDICINE

## 2022-09-20 PROCEDURE — 1101F PT FALLS ASSESS-DOCD LE1/YR: CPT | Mod: CPTII,S$GLB,, | Performed by: INTERNAL MEDICINE

## 2022-09-20 PROCEDURE — 3044F PR MOST RECENT HEMOGLOBIN A1C LEVEL <7.0%: ICD-10-PCS | Mod: CPTII,S$GLB,, | Performed by: INTERNAL MEDICINE

## 2022-09-20 PROCEDURE — 99999 PR PBB SHADOW E&M-EST. PATIENT-LVL III: CPT | Mod: PBBFAC,,, | Performed by: INTERNAL MEDICINE

## 2022-09-20 PROCEDURE — 3079F PR MOST RECENT DIASTOLIC BLOOD PRESSURE 80-89 MM HG: ICD-10-PCS | Mod: CPTII,S$GLB,, | Performed by: INTERNAL MEDICINE

## 2022-09-20 PROCEDURE — 1159F PR MEDICATION LIST DOCUMENTED IN MEDICAL RECORD: ICD-10-PCS | Mod: CPTII,S$GLB,, | Performed by: INTERNAL MEDICINE

## 2022-09-20 PROCEDURE — 3079F DIAST BP 80-89 MM HG: CPT | Mod: CPTII,S$GLB,, | Performed by: INTERNAL MEDICINE

## 2022-09-20 PROCEDURE — 3066F NEPHROPATHY DOC TX: CPT | Mod: CPTII,S$GLB,, | Performed by: INTERNAL MEDICINE

## 2022-09-20 PROCEDURE — 3044F HG A1C LEVEL LT 7.0%: CPT | Mod: CPTII,S$GLB,, | Performed by: INTERNAL MEDICINE

## 2022-09-20 PROCEDURE — 3061F NEG MICROALBUMINURIA REV: CPT | Mod: CPTII,S$GLB,, | Performed by: INTERNAL MEDICINE

## 2022-09-20 PROCEDURE — 99999 PR PBB SHADOW E&M-EST. PATIENT-LVL III: ICD-10-PCS | Mod: PBBFAC,,, | Performed by: INTERNAL MEDICINE

## 2022-09-20 PROCEDURE — 1160F PR REVIEW ALL MEDS BY PRESCRIBER/CLIN PHARMACIST DOCUMENTED: ICD-10-PCS | Mod: CPTII,S$GLB,, | Performed by: INTERNAL MEDICINE

## 2022-09-20 PROCEDURE — 1160F RVW MEDS BY RX/DR IN RCRD: CPT | Mod: CPTII,S$GLB,, | Performed by: INTERNAL MEDICINE

## 2022-09-20 PROCEDURE — 3077F SYST BP >= 140 MM HG: CPT | Mod: CPTII,S$GLB,, | Performed by: INTERNAL MEDICINE

## 2022-09-20 PROCEDURE — 99214 OFFICE O/P EST MOD 30 MIN: CPT | Mod: S$GLB,,, | Performed by: INTERNAL MEDICINE

## 2022-09-20 PROCEDURE — 4010F PR ACE/ARB THEARPY RXD/TAKEN: ICD-10-PCS | Mod: CPTII,S$GLB,, | Performed by: INTERNAL MEDICINE

## 2022-09-20 PROCEDURE — 1159F MED LIST DOCD IN RCRD: CPT | Mod: CPTII,S$GLB,, | Performed by: INTERNAL MEDICINE

## 2022-09-20 PROCEDURE — 3061F PR NEG MICROALBUMINURIA RESULT DOCUMENTED/REVIEW: ICD-10-PCS | Mod: CPTII,S$GLB,, | Performed by: INTERNAL MEDICINE

## 2022-09-20 PROCEDURE — 3008F BODY MASS INDEX DOCD: CPT | Mod: CPTII,S$GLB,, | Performed by: INTERNAL MEDICINE

## 2022-09-20 PROCEDURE — 99499 UNLISTED E&M SERVICE: CPT | Mod: S$GLB,,, | Performed by: INTERNAL MEDICINE

## 2022-09-20 PROCEDURE — 3066F PR DOCUMENTATION OF TREATMENT FOR NEPHROPATHY: ICD-10-PCS | Mod: CPTII,S$GLB,, | Performed by: INTERNAL MEDICINE

## 2022-09-20 NOTE — PROGRESS NOTES
Subjective:    Patient ID:  Keyona Irene is a 71 y.o. female patient here for evaluation Establish Care and Coronary Artery Disease      History of Present Illness:  New patient cardiac evaluation.  History of CABG 2020 five vessel.  Ejection fraction both pre and postop 40% with moderate MR.  Last echo 5/22.  Patient presents with recent hospitalization for congestive heart failure decompensated.  Some confusion over diuretics.  Other than that patient remains on goal-directed medical management.  No chronic kidney disease.  No hepatic disease.  No history of paroxysmal atrial fibrillation.  No DVT PE.  No CVA Ca.  Ongoing tobacco use, hypertension, diabetes mellitus, dyslipidemia.             Review of patient's allergies indicates:  No Known Allergies    Past Medical History:   Diagnosis Date    Anemia     Biliary obstruction     Cholangitis     Diabetes mellitus     pt denies, does not take meds 11/2016    EtOH dependence     HTN (hypertension) 12/30/2013     Past Surgical History:   Procedure Laterality Date    ARTERIOGRAPHY OF SUBCLAVIAN ARTERY  5/22/2020    Procedure: Arteriogram, Subclavian;  Surgeon: Heather Carbajal MD;  Location: [facility] CATH;  Service: Cardiology;;    COLONOSCOPY      CORONARY ANGIOGRAPHY N/A 5/22/2020    Procedure: ANGIOGRAM, CORONARY ARTERY;  Surgeon: Heather Carbajal MD;  Location: [facility] CATH;  Service: Cardiology;  Laterality: N/A;    CORONARY ARTERY BYPASS GRAFT (CABG) N/A 5/29/2020    Procedure: CORONARY ARTERY BYPASS GRAFT (CABG) x 5 VESSELS;  Surgeon: Dima Laughlin MD;  Location: [facility] OR;  Service: Cardiovascular;  Laterality: N/A;    ENDOSCOPIC HARVEST OF VEIN N/A 5/29/2020    Procedure: SURGICAL PROCUREMENT, VEIN, ENDOSCOPIC;  Surgeon: Dima Laughlin MD;  Location: [facility] OR;  Service: Cardiovascular;  Laterality: N/A;    ERCP W/ PLASTIC STENT PLACEMENT      ERCP W/ SPHICTEROTOMY      ESOPHAGOGASTRODUODENOSCOPY      HYSTERECTOMY      INSERTION OF INTRA-AORTIC BALLOON ASSIST  DEVICE N/A 2020    Procedure: INSERTION, INTRA-AORTIC BALLOON PUMP;  Surgeon: Dima Laughlin MD;  Location: [facility] OR;  Service: Cardiovascular;  Laterality: N/A;    LEFT HEART CATHETERIZATION Left 2020    Procedure: Left heart cath;  Surgeon: Heather Carbajal MD;  Location: [facility] CATH;  Service: Cardiology;  Laterality: Left;     Social History     Tobacco Use    Smoking status: Every Day     Packs/day: 0.50     Years: 40.00     Pack years: 20.00     Types: Cigarettes     Last attempt to quit: 2020     Years since quittin.3    Smokeless tobacco: Never   Substance Use Topics    Alcohol use: Not Currently     Comment: sober 2 years 3 months    Drug use: No        Review of Systems:    As noted in HPI in addition         REVIEW OF SYSTEMS  Review of Systems   Constitutional: Negative for decreased appetite, diaphoresis, night sweats, weight gain and weight loss.   HENT:  Negative for nosebleeds and odynophagia.    Eyes:  Negative for double vision and photophobia.   Cardiovascular:  Negative for chest pain, claudication, cyanosis, dyspnea on exertion, irregular heartbeat, leg swelling, near-syncope, orthopnea, palpitations, paroxysmal nocturnal dyspnea and syncope.   Respiratory:  Negative for cough, hemoptysis, shortness of breath and wheezing.    Hematologic/Lymphatic: Negative for adenopathy.   Skin:  Negative for flushing, skin cancer and suspicious lesions.   Musculoskeletal:  Negative for gout, myalgias and neck pain.   Gastrointestinal:  Negative for abdominal pain, heartburn, hematemesis and hematochezia.   Genitourinary:  Negative for bladder incontinence, hesitancy and nocturia.   Neurological:  Negative for focal weakness, headaches, light-headedness and paresthesias.   Psychiatric/Behavioral:  Negative for memory loss and substance abuse.      Objective:        Vitals:    22 1059   BP: (!) 174/83   Pulse: 70       Lab Results   Component Value Date    WBC 5.79 2022    HGB 13.0  07/26/2022    HCT 39.9 07/26/2022     07/26/2022    CHOL 158 01/12/2022    TRIG 65 01/12/2022    HDL 53 01/12/2022    ALT 19 07/25/2022    AST 44 (H) 07/25/2022     (L) 07/26/2022    K 3.7 07/26/2022    CL 97 07/26/2022    CREATININE 0.7 07/26/2022    BUN 13 07/26/2022    CO2 28 07/26/2022    TSH 2.000 06/04/2020    INR 1.8 05/29/2020    HGBA1C 5.7 (H) 05/25/2022    HGBA1C 5.7 (H) 05/25/2022    MICROALBUR 0.2 02/05/2018      CARDIOGRAM RESULTS  Results for orders placed during the hospital encounter of 05/25/22    Echo    Interpretation Summary  · The left ventricle is normal in size with mild concentric hypertrophy and mildly decreased systolic function.  · The estimated PA systolic pressure is 30 mmHg.  · Grade II left ventricular diastolic dysfunction.  · Normal right ventricular size with normal right ventricular systolic function.  · Severe left atrial enlargement.  · Normal central venous pressure (3 mmHg).  · Moderate aortic regurgitation.  · Mild to moderate tricuspid regurgitation.  · Mild to moderate pulmonic regurgitation.  · The estimated ejection fraction is 40%.  · Moderate mitral regurgitation.        CURRENT/PREVIOUS VISIT EKG  Results for orders placed or performed during the hospital encounter of 05/25/22   EKG 12-lead    Collection Time: 07/25/22  3:58 AM    Narrative    Test Reason : R06.02,    Vent. Rate : 110 BPM     Atrial Rate : 110 BPM     P-R Int : 116 ms          QRS Dur : 084 ms      QT Int : 360 ms       P-R-T Axes : 024 006 110 degrees     QTc Int : 487 ms    Sinus tachycardia with occasional Premature ventricular complexes  Possible Left atrial enlargement  LVH with repolarization abnormality ( [place] product )  Anteroseptal infarct (cited on or before 07-JUN-2020)  Abnormal ECG  When compared with ECG of 25-MAY-2022 19:17,  Serial changes of Anteroseptal infarct Present  Confirmed by Consuelo HOWARD, Pepe ([id number]) on 7/28/2022 5:49:20 PM    Referred By: ESTELA   SELF            Confirmed By:Pepe Cordero MD     No valid procedures specified.   No results found for this or any previous visit.    No valid procedures specified.    PHYSICAL EXAM  CONSTITUTIONAL: Well built, well nourished in no apparent distress  NECK: no carotid bruit, no JVD  LUNGS: CTA  CHEST WALL: no tenderness,  HEART: regular rate and rhythm, S1, S2 normal, no murmur, click, rub or gallop   ABDOMEN: soft, non-tender; bowel sounds normal; no masses,  no organomegaly  EXTREMITIES: Extremities normal, no edema, no calf tenderness noted  VASCULAR EXAM: 2 PLUS UPPER AND LOWER EXT PULSES  NEURO: AAO X 3, NO ACUTE FOCAL OR LATERALIZING FINDINGS    I HAVE REVIEWED :    The vital signs, nurses notes, and all the pertinent radiology and labs.         Current Outpatient Medications   Medication Instructions    alcohol swabs PadM 1 each, Topical (Top), As needed (PRN)    aspirin (ECOTRIN) 81 mg, Oral, Daily    atorvastatin (LIPITOR) 10 mg, Oral, Nightly    blood glucose control high,low (ACCU-CHEK KEDAR CONTROL SOLN) Soln Use to test controls per r guidelines/instructions    clopidogreL (PLAVIX) 75 mg, Oral, Daily    fluticasone furoate-vilanteroL (BREO) 100-25 mcg/dose diskus inhaler 1 puff, Inhalation, Daily, Controller    furosemide (LASIX) 20 mg, Oral, Daily    metFORMIN (GLUCOPHAGE-XR) 1,000 mg, Oral, Daily    metoprolol tartrate (LOPRESSOR) 100 mg, Oral, 2 times daily    sacubitriL-valsartan (ENTRESTO) 24-26 mg per tablet 1 tablet, Oral, 2 times daily    spironolactone (ALDACTONE) 25 mg, Oral, Daily          Assessment:   CABG x5 2020.  EF 40% with moderate MR.  Echo 5/22.  Congestive heart failure acute on chronic reduced ejection fraction.  Ongoing tobacco use, diabetes mellitus, dyslipidemia, hypertension.  Suspect underlying noncompliance.    Blood pressure elevated today.    Plan:   Add Lasix 40 mg daily with present medications.  Continue and possibly titrate up dose of Entresto.  Continue Aldactone 25 daily.   Continue aspirin and Plavix.  Continue Lipitor.  Continue Lopressor 100 mg b.i.d..  Review and reconcile meds.  Return to clinic in 6 weeks with labs.        No follow-ups on file.

## 2022-10-26 ENCOUNTER — LAB VISIT (OUTPATIENT)
Dept: LAB | Facility: HOSPITAL | Age: 72
End: 2022-10-26
Attending: INTERNAL MEDICINE
Payer: MEDICARE

## 2022-10-26 DIAGNOSIS — I10 ESSENTIAL HYPERTENSION: ICD-10-CM

## 2022-10-26 DIAGNOSIS — I65.23 BILATERAL CAROTID ARTERY STENOSIS: ICD-10-CM

## 2022-10-26 DIAGNOSIS — I25.119 ATHEROSCLEROSIS OF NATIVE CORONARY ARTERY OF NATIVE HEART WITH ANGINA PECTORIS: ICD-10-CM

## 2022-10-26 DIAGNOSIS — Z72.0 TOBACCO ABUSE: ICD-10-CM

## 2022-10-26 DIAGNOSIS — J44.9 CHRONIC OBSTRUCTIVE PULMONARY DISEASE, UNSPECIFIED COPD TYPE: Chronic | ICD-10-CM

## 2022-10-26 DIAGNOSIS — I50.43 ACUTE ON CHRONIC COMBINED SYSTOLIC AND DIASTOLIC CONGESTIVE HEART FAILURE: ICD-10-CM

## 2022-10-26 DIAGNOSIS — E11.42 TYPE 2 DIABETES MELLITUS WITH DIABETIC POLYNEUROPATHY, WITHOUT LONG-TERM CURRENT USE OF INSULIN: ICD-10-CM

## 2022-10-26 LAB
ANION GAP SERPL CALC-SCNC: 10 MMOL/L (ref 8–16)
BNP SERPL-MCNC: 244 PG/ML (ref 0–99)
BUN SERPL-MCNC: 11 MG/DL (ref 8–23)
CALCIUM SERPL-MCNC: 9.6 MG/DL (ref 8.7–10.5)
CHLORIDE SERPL-SCNC: 100 MMOL/L (ref 95–110)
CO2 SERPL-SCNC: 28 MMOL/L (ref 23–29)
CREAT SERPL-MCNC: 0.8 MG/DL (ref 0.5–1.4)
ERYTHROCYTE [DISTWIDTH] IN BLOOD BY AUTOMATED COUNT: 16.7 % (ref 11.5–14.5)
EST. GFR  (NO RACE VARIABLE): >60 ML/MIN/1.73 M^2
GLUCOSE SERPL-MCNC: 172 MG/DL (ref 70–110)
HCT VFR BLD AUTO: 38.3 % (ref 37–48.5)
HGB BLD-MCNC: 12 G/DL (ref 12–16)
MCH RBC QN AUTO: 28 PG (ref 27–31)
MCHC RBC AUTO-ENTMCNC: 31.3 G/DL (ref 32–36)
MCV RBC AUTO: 89 FL (ref 82–98)
PLATELET # BLD AUTO: 257 K/UL (ref 150–450)
PMV BLD AUTO: 11.4 FL (ref 9.2–12.9)
POTASSIUM SERPL-SCNC: 3.8 MMOL/L (ref 3.5–5.1)
RBC # BLD AUTO: 4.29 M/UL (ref 4–5.4)
SODIUM SERPL-SCNC: 138 MMOL/L (ref 136–145)
WBC # BLD AUTO: 5.38 K/UL (ref 3.9–12.7)

## 2022-10-26 PROCEDURE — 83880 ASSAY OF NATRIURETIC PEPTIDE: CPT | Performed by: INTERNAL MEDICINE

## 2022-10-26 PROCEDURE — 36415 COLL VENOUS BLD VENIPUNCTURE: CPT | Mod: PN | Performed by: INTERNAL MEDICINE

## 2022-10-26 PROCEDURE — 80048 BASIC METABOLIC PNL TOTAL CA: CPT | Performed by: INTERNAL MEDICINE

## 2022-10-26 PROCEDURE — 85027 COMPLETE CBC AUTOMATED: CPT | Performed by: INTERNAL MEDICINE

## 2022-10-28 RX ORDER — LISINOPRIL 10 MG/1
TABLET ORAL
COMMUNITY
Start: 2022-10-05 | End: 2023-06-02

## 2022-11-04 ENCOUNTER — OFFICE VISIT (OUTPATIENT)
Dept: CARDIOLOGY | Facility: CLINIC | Age: 72
End: 2022-11-04
Payer: MEDICARE

## 2022-11-04 VITALS
WEIGHT: 95.88 LBS | HEART RATE: 70 BPM | DIASTOLIC BLOOD PRESSURE: 77 MMHG | HEIGHT: 61 IN | BODY MASS INDEX: 18.1 KG/M2 | SYSTOLIC BLOOD PRESSURE: 182 MMHG

## 2022-11-04 DIAGNOSIS — E11.42 TYPE 2 DIABETES MELLITUS WITH DIABETIC POLYNEUROPATHY, WITHOUT LONG-TERM CURRENT USE OF INSULIN: ICD-10-CM

## 2022-11-04 DIAGNOSIS — I50.43 ACUTE ON CHRONIC COMBINED SYSTOLIC AND DIASTOLIC CONGESTIVE HEART FAILURE: ICD-10-CM

## 2022-11-04 DIAGNOSIS — I10 ESSENTIAL HYPERTENSION: ICD-10-CM

## 2022-11-04 DIAGNOSIS — Z72.0 TOBACCO ABUSE: ICD-10-CM

## 2022-11-04 DIAGNOSIS — I25.119 ATHEROSCLEROSIS OF NATIVE CORONARY ARTERY OF NATIVE HEART WITH ANGINA PECTORIS: ICD-10-CM

## 2022-11-04 DIAGNOSIS — I38 VALVULAR HEART DISEASE: Primary | Chronic | ICD-10-CM

## 2022-11-04 PROCEDURE — 3061F PR NEG MICROALBUMINURIA RESULT DOCUMENTED/REVIEW: ICD-10-PCS | Mod: CPTII,S$GLB,, | Performed by: INTERNAL MEDICINE

## 2022-11-04 PROCEDURE — 3077F PR MOST RECENT SYSTOLIC BLOOD PRESSURE >= 140 MM HG: ICD-10-PCS | Mod: CPTII,S$GLB,, | Performed by: INTERNAL MEDICINE

## 2022-11-04 PROCEDURE — 99214 PR OFFICE/OUTPT VISIT, EST, LEVL IV, 30-39 MIN: ICD-10-PCS | Mod: S$GLB,,, | Performed by: INTERNAL MEDICINE

## 2022-11-04 PROCEDURE — 3061F NEG MICROALBUMINURIA REV: CPT | Mod: CPTII,S$GLB,, | Performed by: INTERNAL MEDICINE

## 2022-11-04 PROCEDURE — 3078F DIAST BP <80 MM HG: CPT | Mod: CPTII,S$GLB,, | Performed by: INTERNAL MEDICINE

## 2022-11-04 PROCEDURE — 99214 OFFICE O/P EST MOD 30 MIN: CPT | Mod: S$GLB,,, | Performed by: INTERNAL MEDICINE

## 2022-11-04 PROCEDURE — 3066F NEPHROPATHY DOC TX: CPT | Mod: CPTII,S$GLB,, | Performed by: INTERNAL MEDICINE

## 2022-11-04 PROCEDURE — 3288F FALL RISK ASSESSMENT DOCD: CPT | Mod: CPTII,S$GLB,, | Performed by: INTERNAL MEDICINE

## 2022-11-04 PROCEDURE — 1159F MED LIST DOCD IN RCRD: CPT | Mod: CPTII,S$GLB,, | Performed by: INTERNAL MEDICINE

## 2022-11-04 PROCEDURE — 1101F PT FALLS ASSESS-DOCD LE1/YR: CPT | Mod: CPTII,S$GLB,, | Performed by: INTERNAL MEDICINE

## 2022-11-04 PROCEDURE — 3078F PR MOST RECENT DIASTOLIC BLOOD PRESSURE < 80 MM HG: ICD-10-PCS | Mod: CPTII,S$GLB,, | Performed by: INTERNAL MEDICINE

## 2022-11-04 PROCEDURE — 3008F BODY MASS INDEX DOCD: CPT | Mod: CPTII,S$GLB,, | Performed by: INTERNAL MEDICINE

## 2022-11-04 PROCEDURE — 3288F PR FALLS RISK ASSESSMENT DOCUMENTED: ICD-10-PCS | Mod: CPTII,S$GLB,, | Performed by: INTERNAL MEDICINE

## 2022-11-04 PROCEDURE — 99999 PR PBB SHADOW E&M-EST. PATIENT-LVL III: CPT | Mod: PBBFAC,,, | Performed by: INTERNAL MEDICINE

## 2022-11-04 PROCEDURE — 3066F PR DOCUMENTATION OF TREATMENT FOR NEPHROPATHY: ICD-10-PCS | Mod: CPTII,S$GLB,, | Performed by: INTERNAL MEDICINE

## 2022-11-04 PROCEDURE — 3044F PR MOST RECENT HEMOGLOBIN A1C LEVEL <7.0%: ICD-10-PCS | Mod: CPTII,S$GLB,, | Performed by: INTERNAL MEDICINE

## 2022-11-04 PROCEDURE — 3077F SYST BP >= 140 MM HG: CPT | Mod: CPTII,S$GLB,, | Performed by: INTERNAL MEDICINE

## 2022-11-04 PROCEDURE — 4010F ACE/ARB THERAPY RXD/TAKEN: CPT | Mod: CPTII,S$GLB,, | Performed by: INTERNAL MEDICINE

## 2022-11-04 PROCEDURE — 4010F PR ACE/ARB THEARPY RXD/TAKEN: ICD-10-PCS | Mod: CPTII,S$GLB,, | Performed by: INTERNAL MEDICINE

## 2022-11-04 PROCEDURE — 1159F PR MEDICATION LIST DOCUMENTED IN MEDICAL RECORD: ICD-10-PCS | Mod: CPTII,S$GLB,, | Performed by: INTERNAL MEDICINE

## 2022-11-04 PROCEDURE — 99499 RISK ADDL DX/OHS AUDIT: ICD-10-PCS | Mod: HCNC,S$GLB,, | Performed by: INTERNAL MEDICINE

## 2022-11-04 PROCEDURE — 1126F AMNT PAIN NOTED NONE PRSNT: CPT | Mod: CPTII,S$GLB,, | Performed by: INTERNAL MEDICINE

## 2022-11-04 PROCEDURE — 3044F HG A1C LEVEL LT 7.0%: CPT | Mod: CPTII,S$GLB,, | Performed by: INTERNAL MEDICINE

## 2022-11-04 PROCEDURE — 1126F PR PAIN SEVERITY QUANTIFIED, NO PAIN PRESENT: ICD-10-PCS | Mod: CPTII,S$GLB,, | Performed by: INTERNAL MEDICINE

## 2022-11-04 PROCEDURE — 3008F PR BODY MASS INDEX (BMI) DOCUMENTED: ICD-10-PCS | Mod: CPTII,S$GLB,, | Performed by: INTERNAL MEDICINE

## 2022-11-04 PROCEDURE — 99499 UNLISTED E&M SERVICE: CPT | Mod: HCNC,S$GLB,, | Performed by: INTERNAL MEDICINE

## 2022-11-04 PROCEDURE — 1101F PR PT FALLS ASSESS DOC 0-1 FALLS W/OUT INJ PAST YR: ICD-10-PCS | Mod: CPTII,S$GLB,, | Performed by: INTERNAL MEDICINE

## 2022-11-04 PROCEDURE — 99999 PR PBB SHADOW E&M-EST. PATIENT-LVL III: ICD-10-PCS | Mod: PBBFAC,,, | Performed by: INTERNAL MEDICINE

## 2022-11-04 RX ORDER — SACUBITRIL AND VALSARTAN 49; 51 MG/1; MG/1
1 TABLET, FILM COATED ORAL 2 TIMES DAILY
Qty: 180 TABLET | Refills: 3 | Status: SHIPPED | OUTPATIENT
Start: 2022-11-04 | End: 2023-06-02

## 2022-11-04 NOTE — PROGRESS NOTES
Subjective:    Patient ID:  Keyona Irene is a 72 y.o. female patient here for evaluation Follow-up (6wk)      History of Present Illness:  Cardiology follow-up.  History of coronary disease status post 5 vessel CABG 2020.  EF 40%.  Moderate MR.  Last echo 5/22.  No recurrent congestive heart failure on present medication.  Blood pressure is elevated.  Ongoing tobacco use hypertension diabetes mellitus dyslipidemia.      Sedentary lifestyle, limited ambulation.   No angina, stable IBRAHIM, no PND orthopnea.          Review of patient's allergies indicates:  No Known Allergies    Past Medical History:   Diagnosis Date    Anemia     Biliary obstruction     Cholangitis     Diabetes mellitus     pt denies, does not take meds 11/2016    EtOH dependence     HTN (hypertension) 12/30/2013     Past Surgical History:   Procedure Laterality Date    ARTERIOGRAPHY OF SUBCLAVIAN ARTERY  5/22/2020    Procedure: Arteriogram, Subclavian;  Surgeon: Heather Carbajal MD;  Location: [facility] CATH;  Service: Cardiology;;    COLONOSCOPY      CORONARY ANGIOGRAPHY N/A 5/22/2020    Procedure: ANGIOGRAM, CORONARY ARTERY;  Surgeon: Heather Carbajal MD;  Location: [facility] CATH;  Service: Cardiology;  Laterality: N/A;    CORONARY ARTERY BYPASS GRAFT (CABG) N/A 5/29/2020    Procedure: CORONARY ARTERY BYPASS GRAFT (CABG) x 5 VESSELS;  Surgeon: Dima Laughlin MD;  Location: [facility] OR;  Service: Cardiovascular;  Laterality: N/A;    ENDOSCOPIC HARVEST OF VEIN N/A 5/29/2020    Procedure: SURGICAL PROCUREMENT, VEIN, ENDOSCOPIC;  Surgeon: Dima Laughlin MD;  Location: [facility] OR;  Service: Cardiovascular;  Laterality: N/A;    ERCP W/ PLASTIC STENT PLACEMENT      ERCP W/ SPHICTEROTOMY      ESOPHAGOGASTRODUODENOSCOPY      HYSTERECTOMY      INSERTION OF INTRA-AORTIC BALLOON ASSIST DEVICE N/A 5/29/2020    Procedure: INSERTION, INTRA-AORTIC BALLOON PUMP;  Surgeon: Dima Laughlin MD;  Location: [facility] OR;  Service: Cardiovascular;  Laterality: N/A;    LEFT HEART  CATHETERIZATION Left 2020    Procedure: Left heart cath;  Surgeon: Heather Carbajal MD;  Location: [facility] CATH;  Service: Cardiology;  Laterality: Left;     Social History     Tobacco Use    Smoking status: Every Day     Packs/day: 0.50     Years: 40.00     Pack years: 20.00     Types: Cigarettes     Last attempt to quit: 2020     Years since quittin.4    Smokeless tobacco: Never   Substance Use Topics    Alcohol use: Not Currently     Comment: sober 2 years 3 months    Drug use: No        Review of Systems:    As noted in HPI in addition      REVIEW OF SYSTEMS  Review of Systems   Constitutional: Negative for decreased appetite, diaphoresis, night sweats, weight gain and weight loss.   HENT:  Negative for nosebleeds and odynophagia.    Eyes:  Negative for double vision and photophobia.   Cardiovascular:  Negative for chest pain, claudication, cyanosis, dyspnea on exertion, irregular heartbeat, leg swelling, near-syncope, orthopnea, palpitations, paroxysmal nocturnal dyspnea and syncope.   Respiratory:  Negative for cough, hemoptysis, shortness of breath and wheezing.    Hematologic/Lymphatic: Negative for adenopathy.   Skin:  Negative for flushing, skin cancer and suspicious lesions.   Musculoskeletal:  Negative for gout, myalgias and neck pain.   Gastrointestinal:  Negative for abdominal pain, heartburn, hematemesis and hematochezia.   Genitourinary:  Negative for bladder incontinence, hesitancy and nocturia.   Neurological:  Negative for focal weakness, headaches, light-headedness and paresthesias.   Psychiatric/Behavioral:  Negative for memory loss and substance abuse.             Objective:        Vitals:    22 0949   BP: (!) 182/77   Pulse: 70       Lab Results   Component Value Date    WBC 5.38 10/26/2022    HGB 12.0 10/26/2022    HCT 38.3 10/26/2022     10/26/2022    CHOL 158 2022    TRIG 65 2022    HDL 53 2022    ALT 19 2022    AST 44 (H) 2022      10/26/2022    K 3.8 10/26/2022     10/26/2022    CREATININE 0.8 10/26/2022    BUN 11 10/26/2022    CO2 28 10/26/2022    TSH 2.000 06/04/2020    INR 1.8 05/29/2020    HGBA1C 5.7 (H) 05/25/2022    HGBA1C 5.7 (H) 05/25/2022    MICROALBUR 0.2 02/05/2018        ECHOCARDIOGRAM RESULTS  Results for orders placed during the hospital encounter of 05/25/22    Echo    Interpretation Summary  · The left ventricle is normal in size with mild concentric hypertrophy and mildly decreased systolic function.  · The estimated PA systolic pressure is 30 mmHg.  · Grade II left ventricular diastolic dysfunction.  · Normal right ventricular size with normal right ventricular systolic function.  · Severe left atrial enlargement.  · Normal central venous pressure (3 mmHg).  · Moderate aortic regurgitation.  · Mild to moderate tricuspid regurgitation.  · Mild to moderate pulmonic regurgitation.  · The estimated ejection fraction is 40%.  · Moderate mitral regurgitation.        CURRENT/PREVIOUS VISIT EKG  Results for orders placed or performed during the hospital encounter of 05/25/22   EKG 12-lead    Collection Time: 07/25/22  3:58 AM    Narrative    Test Reason : R06.02,    Vent. Rate : 110 BPM     Atrial Rate : 110 BPM     P-R Int : 116 ms          QRS Dur : 084 ms      QT Int : 360 ms       P-R-T Axes : 024 006 110 degrees     QTc Int : 487 ms    Sinus tachycardia with occasional Premature ventricular complexes  Possible Left atrial enlargement  LVH with repolarization abnormality ( Puma product )  Anteroseptal infarct (cited on or before 07-JUN-2020)  Abnormal ECG  When compared with ECG of 25-MAY-2022 19:17,  Serial changes of Anteroseptal infarct Present  Confirmed by Pepe Cordero MD ([id number]) on 7/28/2022 5:49:20 PM    Referred By: [email or web address]   SELF           Confirmed By:Pepe Cordero MD     No valid procedures specified.   No results found for this or any previous visit.    No valid procedures specified.    PHYSICAL  EXAM  CONSTITUTIONAL: Well built, well nourished in no apparent distress  NECK: no carotid bruit, no JVD  LUNGS: CTA, decreased breath sounds bilaterally.  CHEST WALL: no tenderness,  HEART: regular rate and rhythm, S1, S2 distant, grade 1/6 holosystolic murmur left lower lower sternal border cardiac apex.  ABDOMEN: soft, non-tender; bowel sounds normal; no masses,  no organomegaly  EXTREMITIES: Extremities normal, no edema, no calf tenderness noted  NEURO: AAO X 3    I HAVE REVIEWED :    The vital signs, nurses notes, and all the pertinent radiology and labs.         Current Outpatient Medications   Medication Instructions    alcohol swabs PadM 1 each, Topical (Top), As needed (PRN)    aspirin (ECOTRIN) 81 mg, Oral, Daily    atorvastatin (LIPITOR) 10 mg, Oral, Nightly    blood glucose control high,low (ACCU-CHEK KEDAR CONTROL SOLN) Soln Use to test controls per r guidelines/instructions    clopidogreL (PLAVIX) 75 mg, Oral, Daily    fluticasone furoate-vilanteroL (BREO) 100-25 mcg/dose diskus inhaler 1 puff, Inhalation, Daily, Controller    furosemide (LASIX) 20 mg, Oral, Daily    lisinopriL 10 MG tablet No dose, route, or frequency recorded.    metFORMIN (GLUCOPHAGE-XR) 1,000 mg, Oral, Daily    metoprolol tartrate (LOPRESSOR) 100 mg, Oral, 2 times daily    sacubitriL-valsartan (ENTRESTO) 24-26 mg per tablet 1 tablet, Oral, 2 times daily    spironolactone (ALDACTONE) 25 mg, Oral, Daily          Assessment:   CAD.  Ischemic cardiomyopathy EF 40% with moderate MR.  History of CABG 2020 , multivessel vessel.  History of congestive heart failure decompensated acute on chronic reduced ejection fraction.  Hypertension, diabetes mellitus, dyslipidemia, ongoing tobacco use.  COPD      Plan:   Blood pressure remains elevated today, increase Entresto, follow-up labs stable, return to clinic 4 months.  Tobacco cessation.        No follow-ups on file.

## 2022-12-07 DIAGNOSIS — E11.9 TYPE 2 DIABETES MELLITUS WITHOUT COMPLICATION: ICD-10-CM

## 2022-12-12 ENCOUNTER — PATIENT MESSAGE (OUTPATIENT)
Dept: ADMINISTRATIVE | Facility: HOSPITAL | Age: 72
End: 2022-12-12
Payer: MEDICARE

## 2023-01-17 ENCOUNTER — PATIENT MESSAGE (OUTPATIENT)
Dept: ADMINISTRATIVE | Facility: HOSPITAL | Age: 73
End: 2023-01-17
Payer: MEDICARE

## 2023-01-17 NOTE — PLAN OF CARE
Discharge instructions given and explained to patient and family by JOSE Nieves with verbalization of understanding all instructions. Explained next time and doses of each medication. Patients v/s stable, denies n/v and tolerating po, rates pain level tolerable, IV removed, and family at bedside for patient discharge home.    Name band;

## 2023-01-18 DIAGNOSIS — E11.9 TYPE 2 DIABETES MELLITUS WITHOUT COMPLICATION: ICD-10-CM

## 2023-02-07 DIAGNOSIS — Z00.00 ENCOUNTER FOR MEDICARE ANNUAL WELLNESS EXAM: ICD-10-CM

## 2023-02-09 DIAGNOSIS — Z00.00 ENCOUNTER FOR MEDICARE ANNUAL WELLNESS EXAM: ICD-10-CM

## 2023-02-23 ENCOUNTER — PATIENT MESSAGE (OUTPATIENT)
Dept: ADMINISTRATIVE | Facility: HOSPITAL | Age: 73
End: 2023-02-23
Payer: MEDICARE

## 2023-03-06 ENCOUNTER — OFFICE VISIT (OUTPATIENT)
Dept: CARDIOLOGY | Facility: CLINIC | Age: 73
End: 2023-03-06
Payer: MEDICARE

## 2023-03-06 ENCOUNTER — HOSPITAL ENCOUNTER (OUTPATIENT)
Dept: RADIOLOGY | Facility: HOSPITAL | Age: 73
Discharge: HOME | End: 2023-03-06
Attending: INTERNAL MEDICINE
Payer: MEDICARE

## 2023-03-06 VITALS
DIASTOLIC BLOOD PRESSURE: 75 MMHG | BODY MASS INDEX: 17.9 KG/M2 | WEIGHT: 94.81 LBS | HEIGHT: 61 IN | SYSTOLIC BLOOD PRESSURE: 176 MMHG | HEART RATE: 70 BPM

## 2023-03-06 DIAGNOSIS — F17.210 CIGARETTE NICOTINE DEPENDENCE WITHOUT COMPLICATION: ICD-10-CM

## 2023-03-06 DIAGNOSIS — I38 VALVULAR HEART DISEASE: ICD-10-CM

## 2023-03-06 DIAGNOSIS — E43 SEVERE MALNUTRITION: ICD-10-CM

## 2023-03-06 DIAGNOSIS — I38 VALVULAR HEART DISEASE: Chronic | ICD-10-CM

## 2023-03-06 DIAGNOSIS — Z72.0 TOBACCO ABUSE: ICD-10-CM

## 2023-03-06 DIAGNOSIS — E11.42 TYPE 2 DIABETES MELLITUS WITH DIABETIC POLYNEUROPATHY, WITHOUT LONG-TERM CURRENT USE OF INSULIN: ICD-10-CM

## 2023-03-06 DIAGNOSIS — I25.119 ATHEROSCLEROSIS OF NATIVE CORONARY ARTERY OF NATIVE HEART WITH ANGINA PECTORIS: ICD-10-CM

## 2023-03-06 DIAGNOSIS — I10 ESSENTIAL HYPERTENSION: ICD-10-CM

## 2023-03-06 DIAGNOSIS — I50.43 ACUTE ON CHRONIC COMBINED SYSTOLIC AND DIASTOLIC CONGESTIVE HEART FAILURE: ICD-10-CM

## 2023-03-06 DIAGNOSIS — I10 ESSENTIAL HYPERTENSION: Primary | ICD-10-CM

## 2023-03-06 PROCEDURE — 71046 X-RAY EXAM CHEST 2 VIEWS: CPT | Mod: TC,HCNC,FY,PO

## 2023-03-06 PROCEDURE — 71046 XR CHEST PA AND LATERAL: ICD-10-PCS | Mod: 26,HCNC,, | Performed by: RADIOLOGY

## 2023-03-06 PROCEDURE — 99999 PR PBB SHADOW E&M-EST. PATIENT-LVL III: CPT | Mod: PBBFAC,HCNC,, | Performed by: INTERNAL MEDICINE

## 2023-03-06 PROCEDURE — 3008F BODY MASS INDEX DOCD: CPT | Mod: HCNC,CPTII,S$GLB, | Performed by: INTERNAL MEDICINE

## 2023-03-06 PROCEDURE — 1126F PR PAIN SEVERITY QUANTIFIED, NO PAIN PRESENT: ICD-10-PCS | Mod: HCNC,CPTII,S$GLB, | Performed by: INTERNAL MEDICINE

## 2023-03-06 PROCEDURE — 99214 PR OFFICE/OUTPT VISIT, EST, LEVL IV, 30-39 MIN: ICD-10-PCS | Mod: HCNC,S$GLB,, | Performed by: INTERNAL MEDICINE

## 2023-03-06 PROCEDURE — 4010F PR ACE/ARB THEARPY RXD/TAKEN: ICD-10-PCS | Mod: HCNC,CPTII,S$GLB, | Performed by: INTERNAL MEDICINE

## 2023-03-06 PROCEDURE — 1101F PT FALLS ASSESS-DOCD LE1/YR: CPT | Mod: HCNC,CPTII,S$GLB, | Performed by: INTERNAL MEDICINE

## 2023-03-06 PROCEDURE — 3078F PR MOST RECENT DIASTOLIC BLOOD PRESSURE < 80 MM HG: ICD-10-PCS | Mod: HCNC,CPTII,S$GLB, | Performed by: INTERNAL MEDICINE

## 2023-03-06 PROCEDURE — 3077F SYST BP >= 140 MM HG: CPT | Mod: HCNC,CPTII,S$GLB, | Performed by: INTERNAL MEDICINE

## 2023-03-06 PROCEDURE — 1159F PR MEDICATION LIST DOCUMENTED IN MEDICAL RECORD: ICD-10-PCS | Mod: HCNC,CPTII,S$GLB, | Performed by: INTERNAL MEDICINE

## 2023-03-06 PROCEDURE — 99214 OFFICE O/P EST MOD 30 MIN: CPT | Mod: HCNC,S$GLB,, | Performed by: INTERNAL MEDICINE

## 2023-03-06 PROCEDURE — 3077F PR MOST RECENT SYSTOLIC BLOOD PRESSURE >= 140 MM HG: ICD-10-PCS | Mod: HCNC,CPTII,S$GLB, | Performed by: INTERNAL MEDICINE

## 2023-03-06 PROCEDURE — 3008F PR BODY MASS INDEX (BMI) DOCUMENTED: ICD-10-PCS | Mod: HCNC,CPTII,S$GLB, | Performed by: INTERNAL MEDICINE

## 2023-03-06 PROCEDURE — 1101F PR PT FALLS ASSESS DOC 0-1 FALLS W/OUT INJ PAST YR: ICD-10-PCS | Mod: HCNC,CPTII,S$GLB, | Performed by: INTERNAL MEDICINE

## 2023-03-06 PROCEDURE — 99999 PR PBB SHADOW E&M-EST. PATIENT-LVL III: ICD-10-PCS | Mod: PBBFAC,HCNC,, | Performed by: INTERNAL MEDICINE

## 2023-03-06 PROCEDURE — 71046 X-RAY EXAM CHEST 2 VIEWS: CPT | Mod: 26,HCNC,, | Performed by: RADIOLOGY

## 2023-03-06 PROCEDURE — 1126F AMNT PAIN NOTED NONE PRSNT: CPT | Mod: HCNC,CPTII,S$GLB, | Performed by: INTERNAL MEDICINE

## 2023-03-06 PROCEDURE — 3288F PR FALLS RISK ASSESSMENT DOCUMENTED: ICD-10-PCS | Mod: HCNC,CPTII,S$GLB, | Performed by: INTERNAL MEDICINE

## 2023-03-06 PROCEDURE — 4010F ACE/ARB THERAPY RXD/TAKEN: CPT | Mod: HCNC,CPTII,S$GLB, | Performed by: INTERNAL MEDICINE

## 2023-03-06 PROCEDURE — 1159F MED LIST DOCD IN RCRD: CPT | Mod: HCNC,CPTII,S$GLB, | Performed by: INTERNAL MEDICINE

## 2023-03-06 PROCEDURE — 3078F DIAST BP <80 MM HG: CPT | Mod: HCNC,CPTII,S$GLB, | Performed by: INTERNAL MEDICINE

## 2023-03-06 PROCEDURE — 3288F FALL RISK ASSESSMENT DOCD: CPT | Mod: HCNC,CPTII,S$GLB, | Performed by: INTERNAL MEDICINE

## 2023-03-06 NOTE — PROGRESS NOTES
Subjective:    Patient ID:  Keyona Irene is a 72 y.o. female patient here for evaluation Follow-up      History of Present Illness:  Cardiology follow-up.  History of coronary disease status post CABG 2020, EF 40% moderate MR, moderate AI.  History of congestive heart failure decompensated in the past, chronic with reduced ejection fraction.  Multiple CV factors including ongoing tobacco use.    Problems of COPD, diabetes mellitus, dyslipidemia.   Ongoing tobacco, poor nutritional habits.    Ongoing random left-sided chest pain sometimes sharp or dull lasting minutes, nonexertional.  Some progression since last visit.      Review of patient's allergies indicates:  No Known Allergies    Past Medical History:   Diagnosis Date    Anemia     Biliary obstruction     Cholangitis     Diabetes mellitus     pt denies, does not take meds 11/2016    EtOH dependence     HTN (hypertension) 12/30/2013     Past Surgical History:   Procedure Laterality Date    ARTERIOGRAPHY OF SUBCLAVIAN ARTERY  5/22/2020    Procedure: Arteriogram, Subclavian;  Surgeon: Heather Carbajal MD;  Location: [facility] CATH;  Service: Cardiology;;    COLONOSCOPY      CORONARY ANGIOGRAPHY N/A 5/22/2020    Procedure: ANGIOGRAM, CORONARY ARTERY;  Surgeon: Heather Carbajal MD;  Location: [facility] CATH;  Service: Cardiology;  Laterality: N/A;    CORONARY ARTERY BYPASS GRAFT (CABG) N/A 5/29/2020    Procedure: CORONARY ARTERY BYPASS GRAFT (CABG) x 5 VESSELS;  Surgeon: Dima Laughlin MD;  Location: [facility] OR;  Service: Cardiovascular;  Laterality: N/A;    ENDOSCOPIC HARVEST OF VEIN N/A 5/29/2020    Procedure: SURGICAL PROCUREMENT, VEIN, ENDOSCOPIC;  Surgeon: Dima Laughlin MD;  Location: [facility] OR;  Service: Cardiovascular;  Laterality: N/A;    ERCP W/ PLASTIC STENT PLACEMENT      ERCP W/ SPHICTEROTOMY      ESOPHAGOGASTRODUODENOSCOPY      HYSTERECTOMY      INSERTION OF INTRA-AORTIC BALLOON ASSIST DEVICE N/A 5/29/2020    Procedure: INSERTION, INTRA-AORTIC BALLOON PUMP;   Surgeon: Dima Laughlin MD;  Location: [facility] OR;  Service: Cardiovascular;  Laterality: N/A;    LEFT HEART CATHETERIZATION Left 2020    Procedure: Left heart cath;  Surgeon: Heather Carbajal MD;  Location: [facility] CATH;  Service: Cardiology;  Laterality: Left;     Social History     Tobacco Use    Smoking status: Every Day     Packs/day: 0.50     Years: 40.00     Pack years: 20.00     Types: Cigarettes     Last attempt to quit: 2020     Years since quittin.7    Smokeless tobacco: Never   Substance Use Topics    Alcohol use: Not Currently     Comment: sober 2 years 3 months    Drug use: No        Review of Systems:    As noted in HPI in addition      REVIEW OF SYSTEMS  Review of Systems   Constitutional: Negative for decreased appetite, diaphoresis, night sweats, weight gain and weight loss.   HENT:  Negative for nosebleeds and odynophagia.    Eyes:  Negative for double vision and photophobia.   Cardiovascular:  Negative for chest pain, claudication, cyanosis, dyspnea on exertion, irregular heartbeat, leg swelling, near-syncope, orthopnea, palpitations, paroxysmal nocturnal dyspnea and syncope.   Respiratory:  Negative for cough, hemoptysis, shortness of breath and wheezing.    Hematologic/Lymphatic: Negative for adenopathy.   Skin:  Negative for flushing, skin cancer and suspicious lesions.   Musculoskeletal:  Negative for gout, myalgias and neck pain.   Gastrointestinal:  Negative for abdominal pain, heartburn, hematemesis and hematochezia.   Genitourinary:  Negative for bladder incontinence, hesitancy and nocturia.   Neurological:  Negative for focal weakness, headaches, light-headedness and paresthesias.   Psychiatric/Behavioral:  Negative for memory loss and substance abuse.             Objective:        Vitals:    23 0918   BP: (!) 176/75   Pulse: 70       Lab Results   Component Value Date    WBC 5.38 10/26/2022    HGB 12.0 10/26/2022    HCT 38.3 10/26/2022     10/26/2022    CHOL 158  01/12/2022    TRIG 65 01/12/2022    HDL 53 01/12/2022    ALT 19 07/25/2022    AST 44 (H) 07/25/2022     10/26/2022    K 3.8 10/26/2022     10/26/2022    CREATININE 0.8 10/26/2022    BUN 11 10/26/2022    CO2 28 10/26/2022    TSH 2.000 06/04/2020    INR 1.8 05/29/2020    HGBA1C 5.7 (H) 05/25/2022    HGBA1C 5.7 (H) 05/25/2022    MICROALBUR 0.2 02/05/2018        ECHOCARDIOGRAM RESULTS  Results for orders placed during the hospital encounter of 05/25/22    Echo    Interpretation Summary  · The left ventricle is normal in size with mild concentric hypertrophy and mildly decreased systolic function.  · The estimated PA systolic pressure is 30 mmHg.  · Grade II left ventricular diastolic dysfunction.  · Normal right ventricular size with normal right ventricular systolic function.  · Severe left atrial enlargement.  · Normal central venous pressure (3 mmHg).  · Moderate aortic regurgitation.  · Mild to moderate tricuspid regurgitation.  · Mild to moderate pulmonic regurgitation.  · The estimated ejection fraction is 40%.  · Moderate mitral regurgitation.        CURRENT/PREVIOUS VISIT EKG  Results for orders placed or performed during the hospital encounter of 05/25/22   EKG 12-lead    Collection Time: 07/25/22  3:58 AM    Narrative    Test Reason : R06.02,    Vent. Rate : 110 BPM     Atrial Rate : 110 BPM     P-R Int : 116 ms          QRS Dur : 084 ms      QT Int : 360 ms       P-R-T Axes : 024 006 110 degrees     QTc Int : 487 ms    Sinus tachycardia with occasional Premature ventricular complexes  Possible Left atrial enlargement  LVH with repolarization abnormality ( Puma product )  Anteroseptal infarct (cited on or before 07-JUN-2020)  Abnormal ECG  When compared with ECG of 25-MAY-2022 19:17,  Serial changes of Anteroseptal infarct Present  Confirmed by Pepe Cordero MD ([id number]) on 7/28/2022 5:49:20 PM    Referred By: [email or web address]   SELF           Confirmed By:Pepe Cordero MD     No valid procedures  specified.   No results found for this or any previous visit.    No valid procedures specified.    PHYSICAL EXAM  CONSTITUTIONAL: Well built, well nourished in no apparent distress  NECK: no carotid bruit, no JVD  LUNGS: CTA, decreased breath sounds bilaterally  CHEST WALL: no tenderness,  HEART: regular rate and rhythm, S1, S2 normal, grade 1/6 systolic murmur lower left sternal border cardiac apex.  ABDOMEN: soft, non-tender; bowel sounds normal; no masses,  no organomegaly  EXTREMITIES: Extremities normal, no edema, no calf tenderness noted  NEURO: AAO X 3    I HAVE REVIEWED :    The vital signs, nurses notes, and all the pertinent radiology and labs.         Current Outpatient Medications   Medication Instructions    alcohol swabs PadM 1 each, Topical (Top), As needed (PRN)    aspirin (ECOTRIN) 81 mg, Oral, Daily    atorvastatin (LIPITOR) 10 mg, Oral, Nightly    blood glucose control high,low (ACCU-CHEK KEDAR CONTROL SOLN) Soln Use to test controls per [facility] guidelines/instructions    clopidogreL (PLAVIX) 75 mg tablet TAKE 1 TABLET EVERY DAY    fluticasone furoate-vilanteroL (BREO) 100-25 mcg/dose diskus inhaler 1 puff, Inhalation, Daily, Controller    furosemide (LASIX) 20 mg, Oral, Daily    lisinopriL 10 MG tablet No dose, route, or frequency recorded.    metFORMIN (GLUCOPHAGE-XR) 1,000 mg, Oral, Daily    metoprolol tartrate (LOPRESSOR) 100 mg, Oral, 2 times daily    sacubitriL-valsartan (ENTRESTO) 49-51 mg per tablet 1 tablet, Oral, 2 times daily    spironolactone (ALDACTONE) 25 MG tablet TAKE 1 TABLET ONE TIME DAILY          Assessment:   CAD, ischemic cardiomyopathy EF 40%.  CABG 2020.  Moderate MR, AI.  Diabetes mellitus, dyslipidemia, hypertension, ongoing tobacco use.  COPD  Chronic intermittent atypical chest pain        Plan:   Encouraged tobacco cessation.  Courage medical plans, for nutrition.  No change current medications.  Call chest pain, abdominal pain somewhat progressive since last  visit.  Suggest labs, imaging studies return clinic results.        No follow-ups on file.

## 2023-03-29 ENCOUNTER — PATIENT MESSAGE (OUTPATIENT)
Dept: CARDIOLOGY | Facility: HOSPITAL | Age: 73
End: 2023-03-29
Payer: MEDICARE

## 2023-03-30 ENCOUNTER — TELEPHONE (OUTPATIENT)
Dept: CARDIOLOGY | Facility: CLINIC | Age: 73
End: 2023-03-30
Payer: MEDICARE

## 2023-03-30 ENCOUNTER — HOSPITAL ENCOUNTER (OUTPATIENT)
Dept: RADIOLOGY | Facility: HOSPITAL | Age: 73
Discharge: HOME | End: 2023-03-30
Attending: INTERNAL MEDICINE
Payer: MEDICARE

## 2023-03-30 NOTE — TELEPHONE ENCOUNTER
----- Message from Peyton Nolasco, Patient Care Assistant sent at 3/30/2023  7:47 AM CDT -----  Regarding: appointment  Contact: pt  Type: Needs Medical Advice    Who Called:  pt     Best Call Back Number: [phone]    Additional Information: pt states she would like a callback regarding cancelling her appointment on 3/30/23. Please call pt to advise. Thanks!

## 2023-04-11 ENCOUNTER — PATIENT MESSAGE (OUTPATIENT)
Dept: ADMINISTRATIVE | Facility: HOSPITAL | Age: 73
End: 2023-04-11
Payer: MEDICARE

## 2023-04-17 ENCOUNTER — PATIENT MESSAGE (OUTPATIENT)
Dept: CARDIOLOGY | Facility: HOSPITAL | Age: 73
End: 2023-04-17
Payer: MEDICARE

## 2023-04-18 ENCOUNTER — CLINICAL SUPPORT (OUTPATIENT)
Dept: CARDIOLOGY | Facility: HOSPITAL | Age: 73
End: 2023-04-18
Attending: INTERNAL MEDICINE
Payer: MEDICARE

## 2023-04-18 ENCOUNTER — HOSPITAL ENCOUNTER (OUTPATIENT)
Dept: RADIOLOGY | Facility: HOSPITAL | Age: 73
Discharge: HOME | End: 2023-04-18
Attending: INTERNAL MEDICINE
Payer: MEDICARE

## 2023-04-18 VITALS
WEIGHT: 94 LBS | BODY MASS INDEX: 17.75 KG/M2 | SYSTOLIC BLOOD PRESSURE: 176 MMHG | DIASTOLIC BLOOD PRESSURE: 75 MMHG | HEIGHT: 61 IN

## 2023-04-18 DIAGNOSIS — I38 VALVULAR HEART DISEASE: ICD-10-CM

## 2023-04-18 DIAGNOSIS — E11.42 TYPE 2 DIABETES MELLITUS WITH DIABETIC POLYNEUROPATHY, WITHOUT LONG-TERM CURRENT USE OF INSULIN: ICD-10-CM

## 2023-04-18 DIAGNOSIS — Z72.0 TOBACCO ABUSE: ICD-10-CM

## 2023-04-18 DIAGNOSIS — F17.210 CIGARETTE NICOTINE DEPENDENCE WITHOUT COMPLICATION: ICD-10-CM

## 2023-04-18 DIAGNOSIS — I25.119 ATHEROSCLEROSIS OF NATIVE CORONARY ARTERY OF NATIVE HEART WITH ANGINA PECTORIS: ICD-10-CM

## 2023-04-18 DIAGNOSIS — I10 ESSENTIAL HYPERTENSION: ICD-10-CM

## 2023-04-18 DIAGNOSIS — I50.43 ACUTE ON CHRONIC COMBINED SYSTOLIC AND DIASTOLIC CONGESTIVE HEART FAILURE: ICD-10-CM

## 2023-04-18 DIAGNOSIS — E43 SEVERE MALNUTRITION: ICD-10-CM

## 2023-04-18 DIAGNOSIS — I38 VALVULAR HEART DISEASE: Chronic | ICD-10-CM

## 2023-04-18 LAB
ASCENDING AORTA: 2.94 CM
AV INDEX (PROSTH): 0.63
AV MEAN GRADIENT: 6 MMHG
AV PEAK GRADIENT: 12 MMHG
AV REGURGITATION PRESSURE HALF TIME: 436.42 MS
AV VALVE AREA: 2.15 CM2
AV VELOCITY RATIO: 0.67
BSA FOR ECHO PROCEDURE: 1.35 M2
CV ECHO LV RWT: 0.44 CM
DOP CALC AO PEAK VEL: 1.75 M/S
DOP CALC AO VTI: 42.1 CM
DOP CALC LVOT AREA: 3.4 CM2
DOP CALC LVOT DIAMETER: 2.09 CM
DOP CALC LVOT PEAK VEL: 1.18 M/S
DOP CALC LVOT STROKE VOLUME: 90.52 CM3
DOP CALCLVOT PEAK VEL VTI: 26.4 CM
E WAVE DECELERATION TIME: 200.06 MSEC
E/A RATIO: 1.18
E/E' RATIO: 19 M/S
ECHO LV POSTERIOR WALL: 1.06 CM (ref 0.6–1.1)
EJECTION FRACTION: 55 %
FRACTIONAL SHORTENING: 24 % (ref 28–44)
INTERVENTRICULAR SEPTUM: 1.06 CM (ref 0.6–1.1)
LA MAJOR: 3.98 CM
LA MINOR: 5.37 CM
LA WIDTH: 4.3 CM
LEFT ATRIUM SIZE: 3.29 CM
LEFT ATRIUM VOLUME INDEX: 40.1 ML/M2
LEFT ATRIUM VOLUME: 54.97 CM3
LEFT INTERNAL DIMENSION IN SYSTOLE: 3.69 CM (ref 2.1–4)
LEFT VENTRICLE DIASTOLIC VOLUME INDEX: 79.52 ML/M2
LEFT VENTRICLE DIASTOLIC VOLUME: 108.94 ML
LEFT VENTRICLE MASS INDEX: 136 G/M2
LEFT VENTRICLE SYSTOLIC VOLUME INDEX: 42.1 ML/M2
LEFT VENTRICLE SYSTOLIC VOLUME: 57.61 ML
LEFT VENTRICULAR INTERNAL DIMENSION IN DIASTOLE: 4.83 CM (ref 3.5–6)
LEFT VENTRICULAR MASS: 186.16 G
LV LATERAL E/E' RATIO: 14.25 M/S
LV SEPTAL E/E' RATIO: 28.5 M/S
LVOT MG: 2.64 MMHG
LVOT MV: 0.77 CM/S
MV PEAK A VEL: 0.97 M/S
MV PEAK E VEL: 1.14 M/S
PISA AR MAX VEL: 3.82 M/S
PISA TR MAX VEL: 2.04 M/S
RA MAJOR: 3.54 CM
RA PRESSURE: 3 MMHG
RIGHT VENTRICULAR END-DIASTOLIC DIMENSION: 3.1 CM
RIGHT VENTRICULAR LENGTH IN DIASTOLE (APICAL 4-CHAMBER VIEW): 5.75 CM
RV MID DIAMA: 1.85 CM
RV TISSUE DOPPLER FREE WALL SYSTOLIC VELOCITY 1 (APICAL 4 CHAMBER VIEW): 0.01 CM/S
SINUS: 2.97 CM
STJ: 2.62 CM
TDI LATERAL: 0.08 M/S
TDI SEPTAL: 0.04 M/S
TDI: 0.06 M/S
TR MAX PG: 17 MMHG
TRICUSPID ANNULAR PLANE SYSTOLIC EXCURSION: 1.69 CM
TV REST PULMONARY ARTERY PRESSURE: 20 MMHG

## 2023-04-18 PROCEDURE — 93306 TTE W/DOPPLER COMPLETE: CPT | Mod: 26,HCNC,, | Performed by: INTERNAL MEDICINE

## 2023-04-18 PROCEDURE — 93306 ECHO (CUPID ONLY): ICD-10-PCS | Mod: 26,HCNC,, | Performed by: INTERNAL MEDICINE

## 2023-04-18 PROCEDURE — 93306 TTE W/DOPPLER COMPLETE: CPT | Mod: HCNC,PO

## 2023-04-19 ENCOUNTER — HOSPITAL ENCOUNTER (OUTPATIENT)
Dept: RADIOLOGY | Facility: HOSPITAL | Age: 73
Discharge: HOME | End: 2023-04-19
Attending: INTERNAL MEDICINE
Payer: MEDICARE

## 2023-04-19 ENCOUNTER — CLINICAL SUPPORT (OUTPATIENT)
Dept: CARDIOLOGY | Facility: HOSPITAL | Age: 73
End: 2023-04-19
Attending: INTERNAL MEDICINE
Payer: MEDICARE

## 2023-04-19 VITALS — WEIGHT: 94 LBS | HEIGHT: 61 IN | BODY MASS INDEX: 17.75 KG/M2

## 2023-04-19 LAB
CV PHARM DOSE: 0.4 MG
CV STRESS BASE HR: 63 BPM
DIASTOLIC BLOOD PRESSURE: 73 MMHG
NUC STRESS EJECTION FRACTION: 55 %
OHS CV CPX 1 MINUTE RECOVERY HEART RATE: 104 BPM
OHS CV CPX 85 PERCENT MAX PREDICTED HEART RATE MALE: 123
OHS CV CPX MAX PREDICTED HEART RATE: 144
OHS CV CPX PATIENT IS FEMALE: 1
OHS CV CPX PATIENT IS MALE: 0
OHS CV CPX PEAK DIASTOLIC BLOOD PRESSURE: 73 MMHG
OHS CV CPX PEAK HEAR RATE: 111 BPM
OHS CV CPX PEAK RATE PRESSURE PRODUCT: NORMAL
OHS CV CPX PEAK SYSTOLIC BLOOD PRESSURE: 171 MMHG
OHS CV CPX PERCENT MAX PREDICTED HEART RATE ACHIEVED: 77
OHS CV CPX RATE PRESSURE PRODUCT PRESENTING: NORMAL
OHS CV PHARM TIME: 841 MIN
SYSTOLIC BLOOD PRESSURE: 171 MMHG

## 2023-04-19 PROCEDURE — 93016 CV STRESS TEST SUPVJ ONLY: CPT | Mod: HCNC,,, | Performed by: INTERNAL MEDICINE

## 2023-04-19 PROCEDURE — 93018 CV STRESS TEST I&R ONLY: CPT | Mod: HCNC,,, | Performed by: INTERNAL MEDICINE

## 2023-04-19 PROCEDURE — 78452 HT MUSCLE IMAGE SPECT MULT: CPT | Mod: HCNC,PO

## 2023-04-19 PROCEDURE — 93018 PR CARDIAC STRESS TST,INTERP/REPT ONLY: ICD-10-PCS | Mod: HCNC,,, | Performed by: INTERNAL MEDICINE

## 2023-04-19 PROCEDURE — 78452 NUCLEAR STRESS - CARDIOLOGY INTERPRETED (CUPID ONLY): ICD-10-PCS | Mod: 26,HCNC,, | Performed by: INTERNAL MEDICINE

## 2023-04-19 PROCEDURE — 63600175 PHARM REV CODE 636 W HCPCS: Mod: HCNC,PO | Performed by: INTERNAL MEDICINE

## 2023-04-19 PROCEDURE — 93016 NUCLEAR STRESS - CARDIOLOGY INTERPRETED (CUPID ONLY): ICD-10-PCS | Mod: HCNC,,, | Performed by: INTERNAL MEDICINE

## 2023-04-19 PROCEDURE — 93017 CV STRESS TEST TRACING ONLY: CPT | Mod: HCNC,PO

## 2023-04-19 PROCEDURE — A9502 TC99M TETROFOSMIN: HCPCS | Mod: HCNC,PO

## 2023-04-19 PROCEDURE — 78452 HT MUSCLE IMAGE SPECT MULT: CPT | Mod: 26,HCNC,, | Performed by: INTERNAL MEDICINE

## 2023-04-19 RX ORDER — REGADENOSON 0.08 MG/ML
0.4 INJECTION, SOLUTION INTRAVENOUS
Status: COMPLETED | OUTPATIENT
Start: 2023-04-19 | End: 2023-04-19

## 2023-04-19 RX ADMIN — REGADENOSON 0.4 MG: 0.08 INJECTION, SOLUTION INTRAVENOUS at 08:04

## 2023-05-23 ENCOUNTER — TELEPHONE (OUTPATIENT)
Dept: FAMILY MEDICINE | Facility: CLINIC | Age: 73
End: 2023-05-23
Payer: MEDICARE

## 2023-05-23 NOTE — TELEPHONE ENCOUNTER
----- Message from Tammie Wild sent at 5/22/2023  4:59 PM CDT -----  Type:  Sooner Apoointment Request    Caller is requesting a sooner appointment.  Caller declined first available appointment listed below.  Caller will not accept being placed on the waitlist and is requesting a message be sent to doctor.  Name of Caller:pt  When is the first available appointment?8/1  Symptoms:had tests done needs sooner appt to get results  Would the patient rather a call back or a response via [facility]? call  Best Call Back Number:[phone]  Additional Information:

## 2023-05-31 RX ORDER — ATORVASTATIN CALCIUM 10 MG/1
TABLET, FILM COATED ORAL
Qty: 90 TABLET | Refills: 3 | Status: SHIPPED | OUTPATIENT
Start: 2023-05-31

## 2023-05-31 NOTE — TELEPHONE ENCOUNTER
Care Due:                  Date            Visit Type   Department     Provider  --------------------------------------------------------------------------------                                EP -                              PRIMARY      [facility] FAMILY  Last Visit: 08-      CARE ([facility])   MEDICINE       Franklin Garcia                               -                              [facility]  Next Visit: 06-      CARE ([facility])   MEDICINE       Franklin Garcia                                                            Last  Test          Frequency    Reason                     Performed    Due Date  --------------------------------------------------------------------------------    HBA1C.......  6 months...  metFORMIN................  05- 11-    Lipid Panel.  12 months..  atorvastatin.............  01- 01-    [facility] Embedded Care Due Messages. Reference number: [id number].   5/31/2023 2:16:22 AM CDT  
Refill Routing Note   Medication(s) are not appropriate for processing by [facility] for the following reason(s):      Required labs outdated    [facility] action(s):  Defer Labs due            Appointments  past 12m or future 3m with PCP    Date Provider   Last Visit   8/4/2022 Franklin Garcia MD   Next Visit   6/2/2023 Franklin Garcia MD   ED visits in past 90 days: 0        Note composed:9:51 AM 05/31/2023          
32yo female , 19weeks gravid presenting with right stent colic s/p partial stent removal yesterday.

## 2023-06-01 ENCOUNTER — OFFICE VISIT (OUTPATIENT)
Dept: CARDIOLOGY | Facility: CLINIC | Age: 73
End: 2023-06-01
Payer: MEDICARE

## 2023-06-01 VITALS
SYSTOLIC BLOOD PRESSURE: 178 MMHG | HEIGHT: 61 IN | HEART RATE: 58 BPM | WEIGHT: 93.69 LBS | DIASTOLIC BLOOD PRESSURE: 69 MMHG | BODY MASS INDEX: 17.69 KG/M2

## 2023-06-01 DIAGNOSIS — I50.43 ACUTE ON CHRONIC COMBINED SYSTOLIC AND DIASTOLIC CONGESTIVE HEART FAILURE: ICD-10-CM

## 2023-06-01 DIAGNOSIS — I25.119 ATHEROSCLEROSIS OF NATIVE CORONARY ARTERY OF NATIVE HEART WITH ANGINA PECTORIS: ICD-10-CM

## 2023-06-01 DIAGNOSIS — J43.1 PANLOBULAR EMPHYSEMA: Chronic | ICD-10-CM

## 2023-06-01 DIAGNOSIS — Z72.0 TOBACCO ABUSE: ICD-10-CM

## 2023-06-01 DIAGNOSIS — F17.210 CIGARETTE NICOTINE DEPENDENCE WITHOUT COMPLICATION: ICD-10-CM

## 2023-06-01 DIAGNOSIS — I38 VALVULAR HEART DISEASE: Primary | Chronic | ICD-10-CM

## 2023-06-01 DIAGNOSIS — E11.42 TYPE 2 DIABETES MELLITUS WITH DIABETIC POLYNEUROPATHY, WITHOUT LONG-TERM CURRENT USE OF INSULIN: ICD-10-CM

## 2023-06-01 DIAGNOSIS — I10 ESSENTIAL HYPERTENSION: ICD-10-CM

## 2023-06-01 DIAGNOSIS — I65.23 BILATERAL CAROTID ARTERY STENOSIS: ICD-10-CM

## 2023-06-01 PROCEDURE — 1101F PR PT FALLS ASSESS DOC 0-1 FALLS W/OUT INJ PAST YR: ICD-10-PCS | Mod: CPTII,S$GLB,, | Performed by: INTERNAL MEDICINE

## 2023-06-01 PROCEDURE — 1101F PT FALLS ASSESS-DOCD LE1/YR: CPT | Mod: CPTII,S$GLB,, | Performed by: INTERNAL MEDICINE

## 2023-06-01 PROCEDURE — 3061F PR NEG MICROALBUMINURIA RESULT DOCUMENTED/REVIEW: ICD-10-PCS | Mod: CPTII,S$GLB,, | Performed by: INTERNAL MEDICINE

## 2023-06-01 PROCEDURE — 99214 PR OFFICE/OUTPT VISIT, EST, LEVL IV, 30-39 MIN: ICD-10-PCS | Mod: S$GLB,,, | Performed by: INTERNAL MEDICINE

## 2023-06-01 PROCEDURE — 3078F DIAST BP <80 MM HG: CPT | Mod: CPTII,S$GLB,, | Performed by: INTERNAL MEDICINE

## 2023-06-01 PROCEDURE — 4010F ACE/ARB THERAPY RXD/TAKEN: CPT | Mod: CPTII,S$GLB,, | Performed by: INTERNAL MEDICINE

## 2023-06-01 PROCEDURE — 3077F PR MOST RECENT SYSTOLIC BLOOD PRESSURE >= 140 MM HG: ICD-10-PCS | Mod: CPTII,S$GLB,, | Performed by: INTERNAL MEDICINE

## 2023-06-01 PROCEDURE — 3078F PR MOST RECENT DIASTOLIC BLOOD PRESSURE < 80 MM HG: ICD-10-PCS | Mod: CPTII,S$GLB,, | Performed by: INTERNAL MEDICINE

## 2023-06-01 PROCEDURE — 3044F HG A1C LEVEL LT 7.0%: CPT | Mod: CPTII,S$GLB,, | Performed by: INTERNAL MEDICINE

## 2023-06-01 PROCEDURE — 99999 PR PBB SHADOW E&M-EST. PATIENT-LVL III: ICD-10-PCS | Mod: PBBFAC,,, | Performed by: INTERNAL MEDICINE

## 2023-06-01 PROCEDURE — 3008F PR BODY MASS INDEX (BMI) DOCUMENTED: ICD-10-PCS | Mod: CPTII,S$GLB,, | Performed by: INTERNAL MEDICINE

## 2023-06-01 PROCEDURE — 1126F AMNT PAIN NOTED NONE PRSNT: CPT | Mod: CPTII,S$GLB,, | Performed by: INTERNAL MEDICINE

## 2023-06-01 PROCEDURE — 4010F PR ACE/ARB THEARPY RXD/TAKEN: ICD-10-PCS | Mod: CPTII,S$GLB,, | Performed by: INTERNAL MEDICINE

## 2023-06-01 PROCEDURE — 99214 OFFICE O/P EST MOD 30 MIN: CPT | Mod: S$GLB,,, | Performed by: INTERNAL MEDICINE

## 2023-06-01 PROCEDURE — 99999 PR PBB SHADOW E&M-EST. PATIENT-LVL III: CPT | Mod: PBBFAC,,, | Performed by: INTERNAL MEDICINE

## 2023-06-01 PROCEDURE — 3288F PR FALLS RISK ASSESSMENT DOCUMENTED: ICD-10-PCS | Mod: CPTII,S$GLB,, | Performed by: INTERNAL MEDICINE

## 2023-06-01 PROCEDURE — 1126F PR PAIN SEVERITY QUANTIFIED, NO PAIN PRESENT: ICD-10-PCS | Mod: CPTII,S$GLB,, | Performed by: INTERNAL MEDICINE

## 2023-06-01 PROCEDURE — 3066F NEPHROPATHY DOC TX: CPT | Mod: CPTII,S$GLB,, | Performed by: INTERNAL MEDICINE

## 2023-06-01 PROCEDURE — 3066F PR DOCUMENTATION OF TREATMENT FOR NEPHROPATHY: ICD-10-PCS | Mod: CPTII,S$GLB,, | Performed by: INTERNAL MEDICINE

## 2023-06-01 PROCEDURE — 3061F NEG MICROALBUMINURIA REV: CPT | Mod: CPTII,S$GLB,, | Performed by: INTERNAL MEDICINE

## 2023-06-01 PROCEDURE — 3008F BODY MASS INDEX DOCD: CPT | Mod: CPTII,S$GLB,, | Performed by: INTERNAL MEDICINE

## 2023-06-01 PROCEDURE — 3044F PR MOST RECENT HEMOGLOBIN A1C LEVEL <7.0%: ICD-10-PCS | Mod: CPTII,S$GLB,, | Performed by: INTERNAL MEDICINE

## 2023-06-01 PROCEDURE — 3077F SYST BP >= 140 MM HG: CPT | Mod: CPTII,S$GLB,, | Performed by: INTERNAL MEDICINE

## 2023-06-01 PROCEDURE — 3288F FALL RISK ASSESSMENT DOCD: CPT | Mod: CPTII,S$GLB,, | Performed by: INTERNAL MEDICINE

## 2023-06-01 NOTE — PROGRESS NOTES
Subjective:    Patient ID:  Keyona Irene is a 72 y.o. female patient here for evaluation Follow-up      History of Present Illness:  Cardiology follow-up test results.  Nuclear study negative for ischemia.  Echo EF 55% with mild moderate AI MR.  CABG 2020.    Ongoing risk factors include tobacco use, diabetes mellitus hypertension dyslipidemia.  COPD.    Continued intermittent atypical chest pain, chronic cough, IBRAHIM.  No PND orthopnea.  No edema.             Review of patient's allergies indicates:  No Known Allergies    Past Medical History:   Diagnosis Date    Anemia     Biliary obstruction     Cholangitis     Diabetes mellitus     pt denies, does not take meds 11/2016    EtOH dependence     HTN (hypertension) 12/30/2013     Past Surgical History:   Procedure Laterality Date    ARTERIOGRAPHY OF SUBCLAVIAN ARTERY  5/22/2020    Procedure: Arteriogram, Subclavian;  Surgeon: Heather Carbajal MD;  Location: [facility] CATH;  Service: Cardiology;;    COLONOSCOPY      CORONARY ANGIOGRAPHY N/A 5/22/2020    Procedure: ANGIOGRAM, CORONARY ARTERY;  Surgeon: Heather Carbajal MD;  Location: [facility] CATH;  Service: Cardiology;  Laterality: N/A;    CORONARY ARTERY BYPASS GRAFT (CABG) N/A 5/29/2020    Procedure: CORONARY ARTERY BYPASS GRAFT (CABG) x 5 VESSELS;  Surgeon: Dima Laughlin MD;  Location: [facility] OR;  Service: Cardiovascular;  Laterality: N/A;    ENDOSCOPIC HARVEST OF VEIN N/A 5/29/2020    Procedure: SURGICAL PROCUREMENT, VEIN, ENDOSCOPIC;  Surgeon: Dima Laughlin MD;  Location: [facility] OR;  Service: Cardiovascular;  Laterality: N/A;    ERCP W/ PLASTIC STENT PLACEMENT      ERCP W/ SPHICTEROTOMY      ESOPHAGOGASTRODUODENOSCOPY      HYSTERECTOMY      INSERTION OF INTRA-AORTIC BALLOON ASSIST DEVICE N/A 5/29/2020    Procedure: INSERTION, INTRA-AORTIC BALLOON PUMP;  Surgeon: Dima Laughlin MD;  Location: [facility] OR;  Service: Cardiovascular;  Laterality: N/A;    LEFT HEART CATHETERIZATION Left 5/22/2020    Procedure: Left heart cath;   Surgeon: Heather Carbajal MD;  Location: [facility];  Service: Cardiology;  Laterality: Left;     Social History     Tobacco Use    Smoking status: Every Day     Packs/day: 0.50     Years: 40.00     Pack years: 20.00     Types: Cigarettes     Last attempt to quit: 5/19/2020     Years since quitting: 3.0    Smokeless tobacco: Never   Substance Use Topics    Alcohol use: Not Currently     Comment: sober 2 years 3 months    Drug use: No        Review of Systems:    As noted in HPI in addition      REVIEW OF SYSTEMS  Review of Systems   Constitutional: Negative for decreased appetite, diaphoresis, night sweats, weight gain and weight loss.   HENT:  Negative for nosebleeds and odynophagia.    Eyes:  Negative for double vision and photophobia.   Cardiovascular:  Positive for chest pain. Negative for claudication, cyanosis, dyspnea on exertion, irregular heartbeat, leg swelling, near-syncope, orthopnea, palpitations, paroxysmal nocturnal dyspnea and syncope.   Respiratory:  Positive for cough and shortness of breath. Negative for hemoptysis and wheezing.    Hematologic/Lymphatic: Negative for adenopathy.   Skin:  Negative for flushing, skin cancer and suspicious lesions.   Musculoskeletal:  Negative for gout, myalgias and neck pain.   Gastrointestinal:  Negative for abdominal pain, heartburn, hematemesis and hematochezia.   Genitourinary:  Negative for bladder incontinence, hesitancy and nocturia.   Neurological:  Negative for focal weakness, headaches, light-headedness and paresthesias.   Psychiatric/Behavioral:  Negative for memory loss and substance abuse.             Objective:        Vitals:    06/01/23 0925   BP: (!) 178/69   Pulse: (!) 58       Lab Results   Component Value Date    WBC 4.71 03/06/2023    HGB 11.4 (L) 03/06/2023    HCT 36.1 (L) 03/06/2023     03/06/2023    CHOL 158 01/12/2022    TRIG 65 01/12/2022    HDL 53 01/12/2022    ALT 14 03/06/2023    AST 19 03/06/2023     03/06/2023    K 4.1  03/06/2023     03/06/2023    CREATININE 0.7 03/06/2023    BUN 9 03/06/2023    CO2 25 03/06/2023    TSH 2.000 06/04/2020    INR 1.8 05/29/2020    HGBA1C 5.7 (H) 05/25/2022    HGBA1C 5.7 (H) 05/25/2022    MICROALBUR 0.2 02/05/2018        ECHOCARDIOGRAM RESULTS  Results for orders placed in visit on 04/18/23    Echo    Interpretation Summary  · Concentric hypertrophy and normal systolic function.  · Mild left atrial enlargement.  · The estimated ejection fraction is 55%.  · Grade II left ventricular diastolic dysfunction.  · Normal right ventricular size with normal right ventricular systolic function.  · Moderate aortic regurgitation.  · Mild mitral regurgitation.  · Mild tricuspid regurgitation.  · Mild pulmonic regurgitation.  · Normal central venous pressure (3 mmHg).  · The estimated PA systolic pressure is 20 mmHg.        CURRENT/PREVIOUS VISIT EKG  Results for orders placed or performed during the hospital encounter of 05/25/22   EKG 12-lead    Collection Time: 07/25/22  3:58 AM    Narrative    Test Reason : R06.02,    Vent. Rate : 110 BPM     Atrial Rate : 110 BPM     P-R Int : 116 ms          QRS Dur : 084 ms      QT Int : 360 ms       P-R-T Axes : 024 006 110 degrees     QTc Int : 487 ms    Sinus tachycardia with occasional Premature ventricular complexes  Possible Left atrial enlargement  LVH with repolarization abnormality ( Puma product )  Anteroseptal infarct (cited on or before 07-JUN-2020)  Abnormal ECG  When compared with ECG of 25-MAY-2022 19:17,  Serial changes of Anteroseptal infarct Present  Confirmed by Pepe Cordero MD ([id number]) on 7/28/2022 5:49:20 PM    Referred By: [email or web address]   SELF           Confirmed By:Pepe Cordero MD     No valid procedures specified.   Results for orders placed during the hospital encounter of 04/18/23    Nuclear Stress - Cardiology Interpreted    Interpretation Summary    Normal myocardial perfusion scan. There is no evidence of myocardial ischemia or  infarction.    The gated perfusion images showed an ejection fraction of 55% post stress.    There is normal wall motion post stress.    LV cavity size is normal at rest and normal at stress.    The ECG portion of the study is abnormal but not diagnostic.    The patient reported chest pain during the stress test.    During stress, occasional PVCs are noted.    No valid procedures specified.    PHYSICAL EXAM  CONSTITUTIONAL: Well built, well nourished in no apparent distress  NECK: no carotid bruit, no JVD  LUNGS:  Diffusely decreased breath sounds.  CHEST WALL: no tenderness,  HEART: regular rate and rhythm, S1, S2 normal, no murmur, click, rub or gallop   ABDOMEN: soft, non-tender; bowel sounds normal; no masses,  no organomegaly  EXTREMITIES: Extremities normal, no edema, no calf tenderness noted  NEURO: AAO X 3    I HAVE REVIEWED :    The vital signs, nurses notes, and all the pertinent radiology and labs.         Current Outpatient Medications   Medication Instructions    alcohol swabs PadM 1 each, Topical (Top), As needed (PRN)    aspirin (ECOTRIN) 81 mg, Oral, Daily    atorvastatin (LIPITOR) 10 MG tablet TAKE 1 TABLET EVERY EVENING    blood glucose control high,low (ACCU-CHEK KEDAR CONTROL SOLN) Soln Use to test controls per r guidelines/instructions    clopidogreL (PLAVIX) 75 mg tablet TAKE 1 TABLET EVERY DAY    fluticasone furoate-vilanteroL (BREO) 100-25 mcg/dose diskus inhaler 1 puff, Inhalation, Daily, Controller    furosemide (LASIX) 20 mg, Oral, Daily    lisinopriL 10 MG tablet No dose, route, or frequency recorded.    metFORMIN (GLUCOPHAGE-XR) 500 MG ER 24hr tablet TAKE 2 TABLETS ONE TIME DAILY    metoprolol tartrate (LOPRESSOR) 100 mg, Oral, 2 times daily    sacubitriL-valsartan (ENTRESTO) 49-51 mg per tablet 1 tablet, Oral, 2 times daily    spironolactone (ALDACTONE) 25 MG tablet TAKE 1 TABLET ONE TIME DAILY          Assessment:   CAD.  CABG 2020.  Updated echo EF 50% with moderate MR AI.  Nuclear  study negative for ischemia this visit.  Ongoing tobacco use, COPD.  Diabetes mellitus, hypertension, dyslipidemia.  Follow-up lab stable.  Chronic intermittent atypical chest pain.  Last hospitalization about a year ago for congestive heart failure decompensated.  Non recurrent.          Plan:   Meds reviewed and reconciled.  Recommend no changes.  Smoking cessation.  Labs follow-up primary care.  Cardiology follow-up 6 months.          No follow-ups on file.

## 2023-06-02 ENCOUNTER — OFFICE VISIT (OUTPATIENT)
Dept: FAMILY MEDICINE | Facility: CLINIC | Age: 73
End: 2023-06-02
Payer: MEDICARE

## 2023-06-02 ENCOUNTER — LAB VISIT (OUTPATIENT)
Dept: LAB | Facility: HOSPITAL | Age: 73
End: 2023-06-02
Attending: FAMILY MEDICINE
Payer: MEDICARE

## 2023-06-02 VITALS
HEART RATE: 77 BPM | BODY MASS INDEX: 17.75 KG/M2 | DIASTOLIC BLOOD PRESSURE: 76 MMHG | SYSTOLIC BLOOD PRESSURE: 108 MMHG | RESPIRATION RATE: 16 BRPM | HEIGHT: 61 IN | WEIGHT: 94 LBS | OXYGEN SATURATION: 98 %

## 2023-06-02 DIAGNOSIS — I70.0 AORTIC ATHEROSCLEROSIS: ICD-10-CM

## 2023-06-02 DIAGNOSIS — K21.9 GASTROESOPHAGEAL REFLUX DISEASE WITHOUT ESOPHAGITIS: ICD-10-CM

## 2023-06-02 DIAGNOSIS — I50.42 CHRONIC COMBINED SYSTOLIC AND DIASTOLIC HEART FAILURE: ICD-10-CM

## 2023-06-02 DIAGNOSIS — Z79.899 DRUG THERAPY: ICD-10-CM

## 2023-06-02 DIAGNOSIS — R10.9 ABDOMINAL DISCOMFORT: ICD-10-CM

## 2023-06-02 DIAGNOSIS — I10 ESSENTIAL HYPERTENSION: ICD-10-CM

## 2023-06-02 DIAGNOSIS — E43 SEVERE MALNUTRITION: ICD-10-CM

## 2023-06-02 DIAGNOSIS — J43.1 PANLOBULAR EMPHYSEMA: Chronic | ICD-10-CM

## 2023-06-02 DIAGNOSIS — Z23 IMMUNIZATION DUE: ICD-10-CM

## 2023-06-02 DIAGNOSIS — I25.119 ATHEROSCLEROSIS OF NATIVE CORONARY ARTERY OF NATIVE HEART WITH ANGINA PECTORIS: ICD-10-CM

## 2023-06-02 DIAGNOSIS — H53.8 BLURRED VISION: ICD-10-CM

## 2023-06-02 DIAGNOSIS — F17.210 CIGARETTE NICOTINE DEPENDENCE WITHOUT COMPLICATION: ICD-10-CM

## 2023-06-02 DIAGNOSIS — Z72.0 TOBACCO ABUSE: ICD-10-CM

## 2023-06-02 DIAGNOSIS — Z79.899 DRUG THERAPY: Primary | ICD-10-CM

## 2023-06-02 DIAGNOSIS — I65.23 BILATERAL CAROTID ARTERY STENOSIS: ICD-10-CM

## 2023-06-02 DIAGNOSIS — I38 VALVULAR HEART DISEASE: Chronic | ICD-10-CM

## 2023-06-02 DIAGNOSIS — E11.42 TYPE 2 DIABETES MELLITUS WITH DIABETIC POLYNEUROPATHY, WITHOUT LONG-TERM CURRENT USE OF INSULIN: ICD-10-CM

## 2023-06-02 LAB
ALBUMIN/CREAT UR: 25 UG/MG (ref 0–30)
BACTERIA #/AREA URNS AUTO: ABNORMAL /HPF
BILIRUB UR QL STRIP: NEGATIVE
CLARITY UR REFRACT.AUTO: CLEAR
COLOR UR AUTO: YELLOW
CREAT UR-MCNC: 68 MG/DL (ref 15–325)
GLUCOSE UR QL STRIP: NEGATIVE
HGB UR QL STRIP: NEGATIVE
KETONES UR QL STRIP: NEGATIVE
LEUKOCYTE ESTERASE UR QL STRIP: ABNORMAL
MICROALBUMIN UR DL<=1MG/L-MCNC: 17 UG/ML
MICROSCOPIC COMMENT: ABNORMAL
NITRITE UR QL STRIP: NEGATIVE
NON-SQ EPI CELLS #/AREA URNS AUTO: 2 /HPF
PH UR STRIP: 6 [PH] (ref 5–8)
PROT UR QL STRIP: NEGATIVE
RBC #/AREA URNS AUTO: 2 /HPF (ref 0–4)
SP GR UR STRIP: 1.01 (ref 1–1.03)
SQUAMOUS #/AREA URNS AUTO: 8 /HPF
URN SPEC COLLECT METH UR: ABNORMAL
WBC #/AREA URNS AUTO: 40 /HPF (ref 0–5)

## 2023-06-02 PROCEDURE — 99999 PR PBB SHADOW E&M-EST. PATIENT-LVL IV: CPT | Mod: PBBFAC,,, | Performed by: FAMILY MEDICINE

## 2023-06-02 PROCEDURE — 3288F FALL RISK ASSESSMENT DOCD: CPT | Mod: CPTII,S$GLB,, | Performed by: FAMILY MEDICINE

## 2023-06-02 PROCEDURE — 4010F ACE/ARB THERAPY RXD/TAKEN: CPT | Mod: CPTII,S$GLB,, | Performed by: FAMILY MEDICINE

## 2023-06-02 PROCEDURE — 99214 PR OFFICE/OUTPT VISIT, EST, LEVL IV, 30-39 MIN: ICD-10-PCS | Mod: S$GLB,,, | Performed by: FAMILY MEDICINE

## 2023-06-02 PROCEDURE — 3074F SYST BP LT 130 MM HG: CPT | Mod: CPTII,S$GLB,, | Performed by: FAMILY MEDICINE

## 2023-06-02 PROCEDURE — 3078F PR MOST RECENT DIASTOLIC BLOOD PRESSURE < 80 MM HG: ICD-10-PCS | Mod: CPTII,S$GLB,, | Performed by: FAMILY MEDICINE

## 2023-06-02 PROCEDURE — 1101F PR PT FALLS ASSESS DOC 0-1 FALLS W/OUT INJ PAST YR: ICD-10-PCS | Mod: CPTII,S$GLB,, | Performed by: FAMILY MEDICINE

## 2023-06-02 PROCEDURE — 87086 URINE CULTURE/COLONY COUNT: CPT | Performed by: FAMILY MEDICINE

## 2023-06-02 PROCEDURE — 81001 URINALYSIS AUTO W/SCOPE: CPT | Performed by: FAMILY MEDICINE

## 2023-06-02 PROCEDURE — 1159F MED LIST DOCD IN RCRD: CPT | Mod: CPTII,S$GLB,, | Performed by: FAMILY MEDICINE

## 2023-06-02 PROCEDURE — 3074F PR MOST RECENT SYSTOLIC BLOOD PRESSURE < 130 MM HG: ICD-10-PCS | Mod: CPTII,S$GLB,, | Performed by: FAMILY MEDICINE

## 2023-06-02 PROCEDURE — 82570 ASSAY OF URINE CREATININE: CPT | Performed by: FAMILY MEDICINE

## 2023-06-02 PROCEDURE — 1101F PT FALLS ASSESS-DOCD LE1/YR: CPT | Mod: CPTII,S$GLB,, | Performed by: FAMILY MEDICINE

## 2023-06-02 PROCEDURE — 4010F PR ACE/ARB THEARPY RXD/TAKEN: ICD-10-PCS | Mod: CPTII,S$GLB,, | Performed by: FAMILY MEDICINE

## 2023-06-02 PROCEDURE — 99214 OFFICE O/P EST MOD 30 MIN: CPT | Mod: S$GLB,,, | Performed by: FAMILY MEDICINE

## 2023-06-02 PROCEDURE — 99999 PR PBB SHADOW E&M-EST. PATIENT-LVL IV: ICD-10-PCS | Mod: PBBFAC,,, | Performed by: FAMILY MEDICINE

## 2023-06-02 PROCEDURE — 1159F PR MEDICATION LIST DOCUMENTED IN MEDICAL RECORD: ICD-10-PCS | Mod: CPTII,S$GLB,, | Performed by: FAMILY MEDICINE

## 2023-06-02 PROCEDURE — 1160F PR REVIEW ALL MEDS BY PRESCRIBER/CLIN PHARMACIST DOCUMENTED: ICD-10-PCS | Mod: CPTII,S$GLB,, | Performed by: FAMILY MEDICINE

## 2023-06-02 PROCEDURE — 3008F BODY MASS INDEX DOCD: CPT | Mod: CPTII,S$GLB,, | Performed by: FAMILY MEDICINE

## 2023-06-02 PROCEDURE — 3288F PR FALLS RISK ASSESSMENT DOCUMENTED: ICD-10-PCS | Mod: CPTII,S$GLB,, | Performed by: FAMILY MEDICINE

## 2023-06-02 PROCEDURE — 3008F PR BODY MASS INDEX (BMI) DOCUMENTED: ICD-10-PCS | Mod: CPTII,S$GLB,, | Performed by: FAMILY MEDICINE

## 2023-06-02 PROCEDURE — 1160F RVW MEDS BY RX/DR IN RCRD: CPT | Mod: CPTII,S$GLB,, | Performed by: FAMILY MEDICINE

## 2023-06-02 PROCEDURE — 3078F DIAST BP <80 MM HG: CPT | Mod: CPTII,S$GLB,, | Performed by: FAMILY MEDICINE

## 2023-06-02 RX ORDER — SACUBITRIL AND VALSARTAN 97; 103 MG/1; MG/1
1 TABLET, FILM COATED ORAL 2 TIMES DAILY
Qty: 180 TABLET | Refills: 3 | Status: SHIPPED | OUTPATIENT
Start: 2023-06-02

## 2023-06-02 RX ORDER — FAMOTIDINE 40 MG/1
40 TABLET, FILM COATED ORAL DAILY
Qty: 30 TABLET | Refills: 11 | Status: SHIPPED | OUTPATIENT
Start: 2023-06-02 | End: 2024-06-01

## 2023-06-02 NOTE — PROGRESS NOTES
THIS DOCUMENT WAS MADE IN PART WITH VOICE RECOGNITION SOFTWARE.  OCCASIONALLY THIS SOFTWARE WILL MISINTERPRET WORDS OR PHRASES.    Assessment and Plan:    1. Drug therapy  CBC Auto Differential    Comprehensive Metabolic Panel    Lipid Panel    Hemoglobin A1C    Urinalysis Microscopic    Urinalysis, Reflex to Urine Culture Urine, Clean Catch    Microalbumin/Creatinine Ratio, Urine      2. Aortic atherosclerosis        3. Type 2 diabetes mellitus with diabetic polyneuropathy, without long-term current use of insulin        4. Bilateral carotid artery stenosis        5. Panlobular emphysema  fluticasone-umeclidin-vilanter (TRELEGY ELLIPTA) 100-62.5-25 mcg DsDv      6. Valvular heart disease        7. Atherosclerosis of native coronary artery of native heart with angina pectoris        8. Severe malnutrition        9. Essential hypertension        10. Tobacco abuse        11. Cigarette nicotine dependence without complication        12. Gastroesophageal reflux disease without esophagitis  famotidine (PEPCID) 40 MG tablet      13. Chronic combined systolic and diastolic heart failure  sacubitriL-valsartan (ENTRESTO)  mg per tablet      14. Immunization due        15. Blurred vision  Ambulatory referral/consult to Ophthalmology      16. Abdominal discomfort  Ambulatory referral/consult to Gastroenterology          New referral to ophthalmology for evaluation of blurred vision     New referral to gastroenterology for chronic abdominal pain     New medication famotidine for reflux symptoms, this may also help her abdominal pain     Wellness labs as above today     Follow-up in 4 weeks  ______________________________________________________________________  Subjective:    Chief Complaint:  Chief Complaint   Patient presents with    Follow-up        HPI:  Keyona is a 72 y.o. year old     Patient here for general health maintenance     Interval history-establish with local cardiologist.  Has had a few cardiovascular  test performed including nuclear stress test which was unremarkable in April 2023    Today, patient complains of blurred vision and left upper abdominal pain that radiates around to her flank.  She endorses a history of hernia which she was supposed to have surgery for but I can not find this in the chart anywhere.  Only hernia noted is a small periumbilical hernia and hiatal hernia.  She does endorse uncontrolled heartburn symptoms that occur multiple times daily.  Not currently taking any medications           COPD  Rx-Breo  Just started Breo     Combined systolic, diastolic congestive heart failure  Previous echocardiogram 05/26/2022-grade 2 diastolic dysfunction, ejection fraction 40%  Cardiology- MD Mary   Rx-Aldactone 25 mg, beta-blocker, Entresto, Lasix 20 mg      Valvular heart disease  05/26/2022-echocardiogram shows moderate aortic regurgitation, tricuspid regurgitation, pulmonic regurgitation, mitral regurgitation     Coronary artery disease status post CABG x5 (5/29/2020), dyslipidemia  CardiologyDaniel Hernandez MD   Rx-aspirin 81 mg, atorvastatin 10 mg, Plavix 75 mg  Negative nuclear stress test 04/2023  Previous lipid panel acceptable     Essential hypertension  Rx-Entresto, metoprolol 100 mg, spironolactone 25 mg     Bilateral carotid artery stenosis  Currently on statin  05/27/2020-carotid ultrasound shows less than 50% stenosis right carotid artery, left internal carotid artery with 50-69% stenosis     Type 2 diabetes mellitus with associated polyneuropathy  Previous A1c-5.7%  Rx-metformin extended release 1000 mg by mouth daily     GERD  No current treatment     Nicotine dependence  Prev rx : Patches (didn't work)       Past Medical History:  Past Medical History:   Diagnosis Date    Anemia     Biliary obstruction     Cholangitis     Diabetes mellitus     pt denies, does not take meds 11/2016    EtOH dependence     HTN (hypertension) 12/30/2013       Past Surgical History:  Past Surgical History:    Procedure Laterality Date    ARTERIOGRAPHY OF SUBCLAVIAN ARTERY  5/22/2020    Procedure: Arteriogram, Subclavian;  Surgeon: Heather Carbajal MD;  Location: [facility] CATH;  Service: Cardiology;;    COLONOSCOPY      CORONARY ANGIOGRAPHY N/A 5/22/2020    Procedure: ANGIOGRAM, CORONARY ARTERY;  Surgeon: Heather Carbajal MD;  Location: [facility] CATH;  Service: Cardiology;  Laterality: N/A;    CORONARY ARTERY BYPASS GRAFT (CABG) N/A 5/29/2020    Procedure: CORONARY ARTERY BYPASS GRAFT (CABG) x 5 VESSELS;  Surgeon: Dima Laughlin MD;  Location: [facility] OR;  Service: Cardiovascular;  Laterality: N/A;    ENDOSCOPIC HARVEST OF VEIN N/A 5/29/2020    Procedure: SURGICAL PROCUREMENT, VEIN, ENDOSCOPIC;  Surgeon: Dima Laughlin MD;  Location: [facility] OR;  Service: Cardiovascular;  Laterality: N/A;    ERCP W/ PLASTIC STENT PLACEMENT      ERCP W/ SPHICTEROTOMY      ESOPHAGOGASTRODUODENOSCOPY      HYSTERECTOMY      INSERTION OF INTRA-AORTIC BALLOON ASSIST DEVICE N/A 5/29/2020    Procedure: INSERTION, INTRA-AORTIC BALLOON PUMP;  Surgeon: Dima Laughlin MD;  Location: [facility] OR;  Service: Cardiovascular;  Laterality: N/A;    LEFT HEART CATHETERIZATION Left 5/22/2020    Procedure: Left heart cath;  Surgeon: Heathre Carbajal MD;  Location: [facility] CATH;  Service: Cardiology;  Laterality: Left;       Family History:  No family history on file.    Social History:  Social History     Socioeconomic History    Marital status:    Tobacco Use    Smoking status: Every Day     Packs/day: 0.50     Years: 40.00     Pack years: 20.00     Types: Cigarettes     Last attempt to quit: 5/19/2020     Years since quitting: 3.0    Smokeless tobacco: Never   Substance and Sexual Activity    Alcohol use: Not Currently     Comment: sober 2 years 3 months    Drug use: No       Medications:  Current Outpatient Medications on File Prior to Visit   Medication Sig Dispense Refill    alcohol swabs PadM Apply 1 each topically as needed. 100 each 2    aspirin (ECOTRIN) 81 MG EC tablet Take  "1 tablet (81 mg total) by mouth once daily. 30 tablet 0    atorvastatin (LIPITOR) 10 MG tablet TAKE 1 TABLET EVERY EVENING 90 tablet 3    blood glucose control high,low (ACCU-CHEK KEDAR CONTROL SOLN) Soln Use to test controls per r guidelines/instructions 1 each 1    clopidogreL (PLAVIX) 75 mg tablet TAKE 1 TABLET EVERY DAY 90 tablet 3    fluticasone furoate-vilanteroL (BREO) 100-25 mcg/dose diskus inhaler Inhale 1 puff into the lungs once daily. Controller 90 each 0    furosemide (LASIX) 20 MG tablet Take 1 tablet (20 mg total) by mouth once daily. 30 tablet 11    lisinopriL 10 MG tablet       metFORMIN (GLUCOPHAGE-XR) 500 MG ER 24hr tablet TAKE 2 TABLETS ONE TIME DAILY 180 tablet 1    metoprolol tartrate (LOPRESSOR) 100 MG tablet Take 1 tablet (100 mg total) by mouth 2 (two) times daily. 60 tablet 11    sacubitriL-valsartan (ENTRESTO) 49-51 mg per tablet Take 1 tablet by mouth 2 (two) times daily. 180 tablet 3    spironolactone (ALDACTONE) 25 MG tablet TAKE 1 TABLET ONE TIME DAILY 90 tablet 3     No current facility-administered medications on file prior to visit.       Allergies:  Patient has no known allergies.    Immunizations:  Immunization History   Administered Date(s) Administered    COVID-19, MRNA, LN-S, PF (Pfizer) (Gray Cap) 06/20/2022    COVID-19, MRNA, LN-S, PF (Pfizer) (Purple Cap) 05/28/2022    Pneumococcal Conjugate - 20 Valent 08/04/2022       Review of Systems:  Review of Systems   All other systems reviewed and are negative.    Objective:    Vitals:  Vitals:    06/02/23 0742   BP: 108/76   Pulse: 77   Resp: 16   SpO2: 98%   Weight: 42.7 kg (94 lb 0.4 oz)   Height: 5' 1" (1.549 m)       Physical Exam  Vitals reviewed.   Constitutional:       General: She is not in acute distress.  HENT:      Head: Normocephalic and atraumatic.   Eyes:      Pupils: Pupils are equal, round, and reactive to light.   Cardiovascular:      Rate and Rhythm: Normal rate and regular rhythm.      Heart sounds: No murmur " heard.    No friction rub.   Pulmonary:      Effort: Pulmonary effort is normal.      Breath sounds: Normal breath sounds.   Abdominal:      General: Bowel sounds are normal. There is no distension.      Palpations: Abdomen is soft.      Tenderness: There is no abdominal tenderness.   Musculoskeletal:      Cervical back: Neck supple.   Skin:     General: Skin is warm and dry.      Findings: No rash.   Psychiatric:         Behavior: Behavior normal.           Franklin Garcia MD  Family Medicine

## 2023-06-04 LAB
BACTERIA UR CULT: NORMAL
BACTERIA UR CULT: NORMAL

## 2023-06-07 DIAGNOSIS — D64.9 NORMOCYTIC ANEMIA: Primary | ICD-10-CM

## 2023-06-07 DIAGNOSIS — R82.81 PYURIA: ICD-10-CM

## 2023-06-15 ENCOUNTER — TELEPHONE (OUTPATIENT)
Dept: FAMILY MEDICINE | Facility: CLINIC | Age: 73
End: 2023-06-15
Payer: MEDICARE

## 2023-06-15 NOTE — TELEPHONE ENCOUNTER
Attempted to contact pt. No answer, unable to leave message.    Portal message sent to pt asking to schedule appt.

## 2023-06-15 NOTE — TELEPHONE ENCOUNTER
----- Message from Franklin Garcia MD sent at 6/7/2023  6:38 AM CDT -----  Call patient, schedule follow-up labs

## 2023-06-28 ENCOUNTER — OFFICE VISIT (OUTPATIENT)
Dept: GASTROENTEROLOGY | Facility: CLINIC | Age: 73
End: 2023-06-28
Payer: MEDICARE

## 2023-06-28 VITALS — BODY MASS INDEX: 18.06 KG/M2 | WEIGHT: 95.69 LBS | HEIGHT: 61 IN

## 2023-06-28 DIAGNOSIS — K80.50 CHOLEDOCHOLITHIASIS: ICD-10-CM

## 2023-06-28 DIAGNOSIS — R10.13 EPIGASTRIC PAIN: Primary | ICD-10-CM

## 2023-06-28 DIAGNOSIS — R11.2 NAUSEA AND VOMITING, UNSPECIFIED VOMITING TYPE: ICD-10-CM

## 2023-06-28 DIAGNOSIS — K21.9 GASTROESOPHAGEAL REFLUX DISEASE, UNSPECIFIED WHETHER ESOPHAGITIS PRESENT: ICD-10-CM

## 2023-06-28 DIAGNOSIS — R63.4 WEIGHT LOSS, UNINTENTIONAL: ICD-10-CM

## 2023-06-28 DIAGNOSIS — K59.09 CHRONIC CONSTIPATION: ICD-10-CM

## 2023-06-28 DIAGNOSIS — Z90.49 S/P CHOLECYSTECTOMY: ICD-10-CM

## 2023-06-28 PROCEDURE — 3008F PR BODY MASS INDEX (BMI) DOCUMENTED: ICD-10-PCS | Mod: CPTII,S$GLB,, | Performed by: NURSE PRACTITIONER

## 2023-06-28 PROCEDURE — 99214 OFFICE O/P EST MOD 30 MIN: CPT | Mod: S$GLB,,, | Performed by: NURSE PRACTITIONER

## 2023-06-28 PROCEDURE — 3061F PR NEG MICROALBUMINURIA RESULT DOCUMENTED/REVIEW: ICD-10-PCS | Mod: CPTII,S$GLB,, | Performed by: NURSE PRACTITIONER

## 2023-06-28 PROCEDURE — 3288F PR FALLS RISK ASSESSMENT DOCUMENTED: ICD-10-PCS | Mod: CPTII,S$GLB,, | Performed by: NURSE PRACTITIONER

## 2023-06-28 PROCEDURE — 3066F PR DOCUMENTATION OF TREATMENT FOR NEPHROPATHY: ICD-10-PCS | Mod: CPTII,S$GLB,, | Performed by: NURSE PRACTITIONER

## 2023-06-28 PROCEDURE — 4010F PR ACE/ARB THEARPY RXD/TAKEN: ICD-10-PCS | Mod: CPTII,S$GLB,, | Performed by: NURSE PRACTITIONER

## 2023-06-28 PROCEDURE — 3008F BODY MASS INDEX DOCD: CPT | Mod: CPTII,S$GLB,, | Performed by: NURSE PRACTITIONER

## 2023-06-28 PROCEDURE — 3288F FALL RISK ASSESSMENT DOCD: CPT | Mod: CPTII,S$GLB,, | Performed by: NURSE PRACTITIONER

## 2023-06-28 PROCEDURE — 1160F PR REVIEW ALL MEDS BY PRESCRIBER/CLIN PHARMACIST DOCUMENTED: ICD-10-PCS | Mod: CPTII,S$GLB,, | Performed by: NURSE PRACTITIONER

## 2023-06-28 PROCEDURE — 99999 PR PBB SHADOW E&M-EST. PATIENT-LVL IV: CPT | Mod: PBBFAC,,, | Performed by: NURSE PRACTITIONER

## 2023-06-28 PROCEDURE — 3066F NEPHROPATHY DOC TX: CPT | Mod: CPTII,S$GLB,, | Performed by: NURSE PRACTITIONER

## 2023-06-28 PROCEDURE — 3044F PR MOST RECENT HEMOGLOBIN A1C LEVEL <7.0%: ICD-10-PCS | Mod: CPTII,S$GLB,, | Performed by: NURSE PRACTITIONER

## 2023-06-28 PROCEDURE — 1101F PR PT FALLS ASSESS DOC 0-1 FALLS W/OUT INJ PAST YR: ICD-10-PCS | Mod: CPTII,S$GLB,, | Performed by: NURSE PRACTITIONER

## 2023-06-28 PROCEDURE — 3044F HG A1C LEVEL LT 7.0%: CPT | Mod: CPTII,S$GLB,, | Performed by: NURSE PRACTITIONER

## 2023-06-28 PROCEDURE — 1101F PT FALLS ASSESS-DOCD LE1/YR: CPT | Mod: CPTII,S$GLB,, | Performed by: NURSE PRACTITIONER

## 2023-06-28 PROCEDURE — 1160F RVW MEDS BY RX/DR IN RCRD: CPT | Mod: CPTII,S$GLB,, | Performed by: NURSE PRACTITIONER

## 2023-06-28 PROCEDURE — 4010F ACE/ARB THERAPY RXD/TAKEN: CPT | Mod: CPTII,S$GLB,, | Performed by: NURSE PRACTITIONER

## 2023-06-28 PROCEDURE — 3061F NEG MICROALBUMINURIA REV: CPT | Mod: CPTII,S$GLB,, | Performed by: NURSE PRACTITIONER

## 2023-06-28 PROCEDURE — 1159F PR MEDICATION LIST DOCUMENTED IN MEDICAL RECORD: ICD-10-PCS | Mod: CPTII,S$GLB,, | Performed by: NURSE PRACTITIONER

## 2023-06-28 PROCEDURE — 99214 PR OFFICE/OUTPT VISIT, EST, LEVL IV, 30-39 MIN: ICD-10-PCS | Mod: S$GLB,,, | Performed by: NURSE PRACTITIONER

## 2023-06-28 PROCEDURE — 1159F MED LIST DOCD IN RCRD: CPT | Mod: CPTII,S$GLB,, | Performed by: NURSE PRACTITIONER

## 2023-06-28 PROCEDURE — 99999 PR PBB SHADOW E&M-EST. PATIENT-LVL IV: ICD-10-PCS | Mod: PBBFAC,,, | Performed by: NURSE PRACTITIONER

## 2023-06-28 RX ORDER — PANTOPRAZOLE SODIUM 40 MG/1
40 TABLET, DELAYED RELEASE ORAL DAILY
Qty: 90 TABLET | Refills: 3 | Status: SHIPPED | OUTPATIENT
Start: 2023-06-28 | End: 2024-02-19

## 2023-06-28 NOTE — PROGRESS NOTES
Subjective:       Patient ID: Keyona Irene is a 72 y.o. female, Body mass index is 18.08 kg/m².    Chief Complaint: Abdominal Pain      Patient is new to me. Established patient of Dr. Kristopher Bennett & Dr. Robison.   Referred by Dr. Garcia for abdominal discomfort.    Here with daughter, whom assisted with interview.     Abdominal Pain  This is a chronic problem. The current episode started more than 1 year ago. The onset quality is sudden. The problem occurs intermittently. The problem has been unchanged. The pain is located in the epigastric region. The quality of the pain is aching and burning (describes as sore). The abdominal pain radiates to the left flank. Associated symptoms include anorexia, constipation, nausea, vomiting and weight loss (eating less due to symptoms). Pertinent negatives include no belching, diarrhea, dysuria, fever, flatus, frequency, hematochezia or melena. The pain is aggravated by eating and palpation. The pain is relieved by Nothing. She has tried H2 blockers (Currently: Famotidine 40 mg once daily- somewhat helps) for the symptoms. Prior diagnostic workup includes GI consult and ultrasound. Her past medical history is significant for abdominal surgery (Hx of hysterectomy), gallstones (Hx of cholecystectomy and choledocholithiasis) and GERD. There is no history of colon cancer, Crohn's disease, irritable bowel syndrome, pancreatitis, PUD or ulcerative colitis.   Review of Systems   Constitutional:  Positive for appetite change (decreased appetite), unexpected weight change and weight loss (eating less due to symptoms). Negative for fever.   HENT:  Negative for trouble swallowing.    Respiratory:  Negative for cough and shortness of breath.    Cardiovascular:  Negative for chest pain.   Gastrointestinal:  Positive for abdominal pain, anorexia, constipation, nausea and vomiting. Negative for abdominal distention, anal bleeding, blood in stool, diarrhea, flatus, hematochezia, melena  and rectal pain.   Genitourinary:  Negative for difficulty urinating, dysuria and frequency.   Musculoskeletal:  Negative for gait problem.   Skin:  Negative for rash.   Neurological:  Negative for speech difficulty.   Psychiatric/Behavioral:  Negative for confusion.      Past Medical History:   Diagnosis Date    Anemia     Biliary obstruction     Cholangitis     Diabetes mellitus     pt denies, does not take meds 11/2016    EtOH dependence     HTN (hypertension) 12/30/2013      Past Surgical History:   Procedure Laterality Date    ARTERIOGRAPHY OF SUBCLAVIAN ARTERY  5/22/2020    Procedure: Arteriogram, Subclavian;  Surgeon: Heather Carbajal MD;  Location: [facility] CATH;  Service: Cardiology;;    COLONOSCOPY      CORONARY ANGIOGRAPHY N/A 5/22/2020    Procedure: ANGIOGRAM, CORONARY ARTERY;  Surgeon: Heather Carbajal MD;  Location: [facility] CATH;  Service: Cardiology;  Laterality: N/A;    CORONARY ARTERY BYPASS GRAFT (CABG) N/A 5/29/2020    Procedure: CORONARY ARTERY BYPASS GRAFT (CABG) x 5 VESSELS;  Surgeon: Dima Laughlin MD;  Location: [facility] OR;  Service: Cardiovascular;  Laterality: N/A;    ENDOSCOPIC HARVEST OF VEIN N/A 5/29/2020    Procedure: SURGICAL PROCUREMENT, VEIN, ENDOSCOPIC;  Surgeon: Dima Laughlin MD;  Location: [facility] OR;  Service: Cardiovascular;  Laterality: N/A;    ERCP W/ PLASTIC STENT PLACEMENT      ERCP W/ SPHICTEROTOMY      ESOPHAGOGASTRODUODENOSCOPY      HYSTERECTOMY      INSERTION OF INTRA-AORTIC BALLOON ASSIST DEVICE N/A 5/29/2020    Procedure: INSERTION, INTRA-AORTIC BALLOON PUMP;  Surgeon: Dima Laughlin MD;  Location: [facility] OR;  Service: Cardiovascular;  Laterality: N/A;    LEFT HEART CATHETERIZATION Left 5/22/2020    Procedure: Left heart cath;  Surgeon: Heather Carbajal MD;  Location: [facility] CATH;  Service: Cardiology;  Laterality: Left;      No family history on file.   Wt Readings from Last 10 Encounters:   06/28/23 43.4 kg (95 lb 10.9 oz)   06/02/23 42.7 kg (94 lb 0.4 oz)   06/01/23 42.5 kg (93 lb 11.1 oz)    04/19/23 42.6 kg (94 lb)   04/18/23 42.6 kg (94 lb)   03/06/23 43 kg (94 lb 12.8 oz)   11/04/22 43.5 kg (95 lb 14.4 oz)   09/20/22 42.8 kg (94 lb 5.7 oz)   08/04/22 45.9 kg (101 lb 3.1 oz)   07/26/22 46.1 kg (101 lb 9.6 oz)     Lab Results   Component Value Date    WBC 4.16 06/02/2023    HGB 10.9 (L) 06/02/2023    HCT 34.3 (L) 06/02/2023    MCV 89 06/02/2023     06/02/2023     CMP  Sodium   Date Value Ref Range Status   06/02/2023 142 136 - 145 mmol/L Final     Potassium   Date Value Ref Range Status   06/02/2023 4.1 3.5 - 5.1 mmol/L Final     Chloride   Date Value Ref Range Status   06/02/2023 105 95 - 110 mmol/L Final     CO2   Date Value Ref Range Status   06/02/2023 28 23 - 29 mmol/L Final     Glucose   Date Value Ref Range Status   06/02/2023 119 (H) 70 - 110 mg/dL Final     BUN   Date Value Ref Range Status   06/02/2023 12 8 - 23 mg/dL Final     Creatinine   Date Value Ref Range Status   06/02/2023 0.8 0.5 - 1.4 mg/dL Final     Calcium   Date Value Ref Range Status   06/02/2023 9.7 8.7 - 10.5 mg/dL Final     Total Protein   Date Value Ref Range Status   06/02/2023 7.2 6.0 - 8.4 g/dL Final     Albumin   Date Value Ref Range Status   06/02/2023 3.8 3.5 - 5.2 g/dL Final     Total Bilirubin   Date Value Ref Range Status   06/02/2023 0.6 0.1 - 1.0 mg/dL Final     Comment:     For infants and newborns, interpretation of results should be based  on gestational age, weight and in agreement with clinical  observations.    Premature Infant recommended reference ranges:  Up to 24 hours.............<8.0 mg/dL  Up to 48 hours............<12.0 mg/dL  3-5 days..................<15.0 mg/dL  6-29 days.................<15.0 mg/dL       Alkaline Phosphatase   Date Value Ref Range Status   06/02/2023 72 55 - 135 U/L Final     AST   Date Value Ref Range Status   06/02/2023 17 10 - 40 U/L Final     ALT   Date Value Ref Range Status   06/02/2023 11 10 - 44 U/L Final     Anion Gap   Date Value Ref Range Status   06/02/2023 9  8 - 16 mmol/L Final     eGFR if    Date Value Ref Range Status   07/26/2022 >60.0 >60 mL/min/1.73 m^2 Final     eGFR if non    Date Value Ref Range Status   07/26/2022 >60.0 >60 mL/min/1.73 m^2 Final     Comment:     Calculation used to obtain the estimated glomerular filtration  rate (eGFR) is the CKD-EPI equation.        Lab Results   Component Value Date    AMYLASE 50 01/12/2022     Lab Results   Component Value Date    LIPASE 9 08/04/2016     Lab Results   Component Value Date    IRON 141 04/29/2014    TRANSFERRIN 168 (L) 04/29/2014    TRANSFERRIN 168 (L) 04/29/2014    TIBC 249 (L) 04/29/2014    FESATURATED 57 (H) 04/29/2014              Reviewed prior medical records including radiology report of US of abdomen 1/18/22 & endoscopy history (see surgical history).     Objective:      Physical Exam  Constitutional:       General: She is not in acute distress.     Appearance: She is underweight.   HENT:      Head: Normocephalic.      Right Ear: Hearing normal.      Left Ear: Hearing normal.      Nose: Nose normal.      Mouth/Throat:      Mouth: No oral lesions.      Pharynx: Uvula midline.   Eyes:      General: Lids are normal.      Conjunctiva/sclera: Conjunctivae normal.      Pupils: Pupils are equal, round, and reactive to light.   Neck:      Trachea: Trachea normal.   Cardiovascular:      Rate and Rhythm: Normal rate and regular rhythm.      Heart sounds: Normal heart sounds. No murmur heard.  Pulmonary:      Effort: Pulmonary effort is normal. No respiratory distress.      Breath sounds: Normal breath sounds. No stridor. No wheezing.   Abdominal:      General: Bowel sounds are normal. There is no distension.      Palpations: Abdomen is soft. There is no mass.      Tenderness: There is abdominal tenderness in the epigastric area. There is no guarding or rebound.   Musculoskeletal:         General: Normal range of motion.      Cervical back: Normal range of motion.   Skin:      General: Skin is warm and dry.      Findings: No rash.      Comments: Non jaundiced   Neurological:      Mental Status: She is alert and oriented to person, place, and time.   Psychiatric:         Speech: Speech normal.         Behavior: Behavior normal. Behavior is cooperative.         Assessment:       1. Epigastric pain    2. Nausea and vomiting, unspecified vomiting type    3. Gastroesophageal reflux disease, unspecified whether esophagitis present    4. Chronic constipation    5. Weight loss, unintentional    6. Body mass index (BMI) less than 19 in adult    7. S/P cholecystectomy    8. History of Choledocholithiasis           Plan:   All diagnoses and orders for this visit:    Epigastric pain, Nausea and vomiting, unspecified vomiting type & Gastroesophageal reflux disease, unspecified whether esophagitis present        - Start: pantoprazole (PROTONIX) 40 MG tablet; Take 1 tablet (40 mg total) by mouth once daily.  Dispense: 90 tablet; Refill: 3  - Continue Famotidine 40 mg nightly  - CT Abdomen Pelvis With Contrast; Future; Expected date: 06/28/2023  - Lipase; Future; Expected date: 06/28/2023  - Recommended EGD but patient declined at this time  - Take PPI in the morning 30 minutes before breakfast  - Recommend to avoid large meals, avoid eating within 3 hours of bedtime, elevate head of bed if nocturnal symptoms are present, smoking cessation (if current smoker), & weight loss (if overweight).   - Recommend minimize/avoid high-fat foods, chocolate, caffeine, citrus, alcohol, & tomato products.  - Advised to avoid/limit use of NSAID's, since they can cause GI upset, bleeding, and/or ulcers. If needed, take with food.      Chronic constipation   - Recommend daily exercise as tolerated, adequate water intake, and high fiber diet.   - Recommend OTC fiber supplement (Benefiber)  - Recommend OTC stool softener   - Recommend OTC MiraLax once daily (17g PO) as directed  - Recommended colonoscopy but patient  declined at this time     Weight loss, unintentional & Body mass index (BMI) less than 19 in adult   - CT Abdomen Pelvis With Contrast; Future; Expected date: 06/28/2023   - Recommended EGD but patient declined at this time   - Recommended colonoscopy but patient declined at this time   - Encouraged PO intake and daily calorie counts to ensure adequate nutrition is taken in, recommend at least 2,000 calories a day  - Recommend nutritional drinks, such as Boost, Ensure or Glucerna, to supplement nutrition needs    S/P cholecystectomy    History of Choledocholithiasis    If no improvement in symptoms or symptoms worsen, call/follow-up at clinic or go to ER

## 2023-07-10 ENCOUNTER — HOSPITAL ENCOUNTER (OUTPATIENT)
Dept: RADIOLOGY | Facility: HOSPITAL | Age: 73
Discharge: HOME | End: 2023-07-10
Attending: NURSE PRACTITIONER
Payer: MEDICARE

## 2023-07-10 DIAGNOSIS — R10.13 EPIGASTRIC PAIN: ICD-10-CM

## 2023-07-10 PROCEDURE — 25500020 PHARM REV CODE 255: Mod: PO | Performed by: NURSE PRACTITIONER

## 2023-07-10 PROCEDURE — 74177 CT ABD & PELVIS W/CONTRAST: CPT | Mod: 26,,, | Performed by: RADIOLOGY

## 2023-07-10 PROCEDURE — 74177 CT ABDOMEN PELVIS WITH CONTRAST: ICD-10-PCS | Mod: 26,,, | Performed by: RADIOLOGY

## 2023-07-10 PROCEDURE — A9698 NON-RAD CONTRAST MATERIALNOC: HCPCS | Mod: PO | Performed by: NURSE PRACTITIONER

## 2023-07-10 PROCEDURE — 74177 CT ABD & PELVIS W/CONTRAST: CPT | Mod: TC,PO

## 2023-07-10 RX ADMIN — IOHEXOL 75 ML: 350 INJECTION, SOLUTION INTRAVENOUS at 03:07

## 2023-07-10 RX ADMIN — IOHEXOL 1000 ML: 9 SOLUTION ORAL at 03:07

## 2023-07-11 ENCOUNTER — TELEPHONE (OUTPATIENT)
Dept: GASTROENTEROLOGY | Facility: CLINIC | Age: 73
End: 2023-07-11
Payer: MEDICARE

## 2023-07-11 DIAGNOSIS — R93.5 ABNORMAL CT OF THE ABDOMEN: ICD-10-CM

## 2023-07-11 DIAGNOSIS — Z90.49 S/P CHOLECYSTECTOMY: ICD-10-CM

## 2023-07-11 DIAGNOSIS — N32.89 DISTENDED BLADDER: Primary | ICD-10-CM

## 2023-07-11 DIAGNOSIS — K83.8 COMMON BILE DUCT DILATION: ICD-10-CM

## 2023-07-11 DIAGNOSIS — R18.8 PELVIC FLUID COLLECTION: ICD-10-CM

## 2023-07-11 NOTE — TELEPHONE ENCOUNTER
Spoke with pt & daughter, Avani. Informed of results & recommendations per NALLELY Nelson NP. Avani verbalized understanding to all & scheduled MRCP. Pt still declined c-scope & EGD at this time

## 2023-07-11 NOTE — TELEPHONE ENCOUNTER
Let patient know her CT of abdomen and pelvis showed a distended bladder, free pelvic fluid, dilated common bile duct, multiple lung nodules, small umbilical hernia, coronary and atherosclerotic calcifications and a moderate amount of stool in the colon suggesting constipation. Recommend a referral to urology for further evaluation of distended bladder and referral to GYN for further evaluation of free pelvic fluid. Recommend a MRCP for further evaluation of dilated common bile duct and colonoscopy for further evaluation of constipation (patient declined EGD and c-scope during office visit). F/U with PCP for further management of lung nodules.

## 2023-07-21 ENCOUNTER — HOSPITAL ENCOUNTER (OUTPATIENT)
Dept: RADIOLOGY | Facility: HOSPITAL | Age: 73
Discharge: HOME | End: 2023-07-21
Attending: NURSE PRACTITIONER
Payer: MEDICARE

## 2023-07-21 DIAGNOSIS — Z90.49 S/P CHOLECYSTECTOMY: ICD-10-CM

## 2023-07-21 DIAGNOSIS — R93.5 ABNORMAL CT OF THE ABDOMEN: ICD-10-CM

## 2023-07-21 DIAGNOSIS — K83.8 COMMON BILE DUCT DILATION: ICD-10-CM

## 2023-07-21 PROCEDURE — 74181 MRI ABDOMEN W/O CONTRAST: CPT | Mod: 26,HCNC,, | Performed by: RADIOLOGY

## 2023-07-21 PROCEDURE — 76376 3D RENDER W/INTRP POSTPROCES: CPT | Mod: 26,HCNC,, | Performed by: RADIOLOGY

## 2023-07-21 PROCEDURE — 74181 MRI ABDOMEN W/O CONTRAST: CPT | Mod: TC,HCNC,PO

## 2023-07-21 PROCEDURE — 76376 3D RENDER W/INTRP POSTPROCES: CPT | Mod: TC,HCNC,PO

## 2023-07-21 PROCEDURE — 74181 MRI ABDOMEN WITHOUT CONTRAST MRCP: ICD-10-PCS | Mod: 26,HCNC,, | Performed by: RADIOLOGY

## 2023-07-21 PROCEDURE — 76376 MRI ABDOMEN WITHOUT CONTRAST MRCP: ICD-10-PCS | Mod: 26,HCNC,, | Performed by: RADIOLOGY

## 2023-07-26 LAB
LEFT EYE DM RETINOPATHY: NEGATIVE
RIGHT EYE DM RETINOPATHY: NEGATIVE

## 2023-07-27 ENCOUNTER — PATIENT OUTREACH (OUTPATIENT)
Dept: ADMINISTRATIVE | Facility: HOSPITAL | Age: 73
End: 2023-07-27
Payer: MEDICARE

## 2023-10-23 NOTE — TELEPHONE ENCOUNTER
Care Due:                  Date            Visit Type   Department     Provider  --------------------------------------------------------------------------------                                EP -                              PRIMARY      [facility]  Last Visit: 06-      CARE ([facility])   MEDICINE       Franklin Garcia  Next Visit: None Scheduled  None         None Found                                                            Last  Test          Frequency    Reason                     Performed    Due Date  --------------------------------------------------------------------------------    HBA1C.......  6 months...  metFORMIN................  06- 11-    Health [facility] Embedded Care Due Messages. Reference number: [id number].   10/23/2023 10:49:14 AM CDT

## 2023-10-24 RX ORDER — METFORMIN HYDROCHLORIDE 500 MG/1
TABLET, EXTENDED RELEASE ORAL
Qty: 180 TABLET | Refills: 0 | Status: SHIPPED | OUTPATIENT
Start: 2023-10-24

## 2023-10-24 NOTE — TELEPHONE ENCOUNTER
Provider Staff:  Action required for this patient     Please see care gap opportunities below in Care Due Message.    Thanks!  [facility]     Appointments      Date Provider   Last Visit   6/2/2023 Franklin Garcia MD   Next Visit   Visit date not found Franklin Garcia MD     Refill Decision Note   Keyona Irene  is requesting a refill authorization.  Brief Assessment and Rationale for Refill:  Approve     Medication Therapy Plan:         Comments:     Note composed:10:01 AM 10/24/2023

## 2023-12-05 RX ORDER — SPIRONOLACTONE 25 MG/1
25 TABLET ORAL DAILY
Qty: 90 TABLET | Refills: 1 | Status: SHIPPED | OUTPATIENT
Start: 2023-12-05

## 2023-12-05 NOTE — TELEPHONE ENCOUNTER
Refill Decision Note   Keyona Washington  is requesting a refill authorization.  Brief Assessment and Rationale for Refill:  Approve     Medication Therapy Plan:         Pharmacist review requested: Yes   Extended chart review required: Yes   Comments:     Note composed:4:13 PM 12/05/2023

## 2023-12-05 NOTE — TELEPHONE ENCOUNTER
Refill Routing Note   Medication(s) are not appropriate for processing by [facility] for the following reason(s):        Drug-drug interaction     [facility] action(s):  Defer             Pharmacist review requested: Yes     Appointments  past 12m or future 3m with PCP    Date Provider   Last Visit   6/2/2023 Franklin Garcia MD   Next Visit   12/7/2023 Franklin Garcia MD   ED visits in past 90 days: 0        Note composed:4:12 PM 12/05/2023

## 2023-12-05 NOTE — TELEPHONE ENCOUNTER
No care due was identified.  Health [facility] Embedded Care Due Messages. Reference number: [id number].   12/05/2023 10:19:08 AM CST

## 2023-12-07 ENCOUNTER — OFFICE VISIT (OUTPATIENT)
Dept: FAMILY MEDICINE | Facility: CLINIC | Age: 73
End: 2023-12-07
Payer: MEDICARE

## 2023-12-07 ENCOUNTER — LAB VISIT (OUTPATIENT)
Dept: LAB | Facility: HOSPITAL | Age: 73
End: 2023-12-07
Attending: FAMILY MEDICINE
Payer: MEDICARE

## 2023-12-07 VITALS
WEIGHT: 90.5 LBS | OXYGEN SATURATION: 99 % | BODY MASS INDEX: 17.1 KG/M2 | SYSTOLIC BLOOD PRESSURE: 144 MMHG | DIASTOLIC BLOOD PRESSURE: 70 MMHG | RESPIRATION RATE: 16 BRPM | HEART RATE: 79 BPM

## 2023-12-07 DIAGNOSIS — Z79.899 DRUG THERAPY: ICD-10-CM

## 2023-12-07 DIAGNOSIS — M54.14 THORACIC RADICULITIS: ICD-10-CM

## 2023-12-07 DIAGNOSIS — E11.42 TYPE 2 DIABETES MELLITUS WITH DIABETIC POLYNEUROPATHY, WITHOUT LONG-TERM CURRENT USE OF INSULIN: ICD-10-CM

## 2023-12-07 DIAGNOSIS — I25.10 CORONARY ARTERY DISEASE, UNSPECIFIED VESSEL OR LESION TYPE, UNSPECIFIED WHETHER ANGINA PRESENT, UNSPECIFIED WHETHER NATIVE OR TRANSPLANTED HEART: ICD-10-CM

## 2023-12-07 DIAGNOSIS — Z12.11 COLON CANCER SCREENING: ICD-10-CM

## 2023-12-07 DIAGNOSIS — I50.43 ACUTE ON CHRONIC COMBINED SYSTOLIC AND DIASTOLIC CONGESTIVE HEART FAILURE: ICD-10-CM

## 2023-12-07 DIAGNOSIS — Z23 IMMUNIZATION DUE: Primary | ICD-10-CM

## 2023-12-07 DIAGNOSIS — I10 ESSENTIAL HYPERTENSION: ICD-10-CM

## 2023-12-07 LAB
ALBUMIN SERPL BCP-MCNC: 3.3 G/DL (ref 3.5–5.2)
ALP SERPL-CCNC: 104 U/L (ref 55–135)
ALT SERPL W/O P-5'-P-CCNC: 13 U/L (ref 10–44)
ANION GAP SERPL CALC-SCNC: 9 MMOL/L (ref 8–16)
AST SERPL-CCNC: 17 U/L (ref 10–40)
BILIRUB SERPL-MCNC: 1 MG/DL (ref 0.1–1)
BUN SERPL-MCNC: 19 MG/DL (ref 8–23)
CALCIUM SERPL-MCNC: 9.8 MG/DL (ref 8.7–10.5)
CHLORIDE SERPL-SCNC: 104 MMOL/L (ref 95–110)
CO2 SERPL-SCNC: 26 MMOL/L (ref 23–29)
CREAT SERPL-MCNC: 0.8 MG/DL (ref 0.5–1.4)
EST. GFR  (NO RACE VARIABLE): >60 ML/MIN/1.73 M^2
ESTIMATED AVG GLUCOSE: 134 MG/DL (ref 68–131)
GLUCOSE SERPL-MCNC: 80 MG/DL (ref 70–110)
HBA1C MFR BLD: 6.3 % (ref 4–5.6)
POTASSIUM SERPL-SCNC: 4.3 MMOL/L (ref 3.5–5.1)
PROT SERPL-MCNC: 7.3 G/DL (ref 6–8.4)
SODIUM SERPL-SCNC: 139 MMOL/L (ref 136–145)

## 2023-12-07 PROCEDURE — 3061F NEG MICROALBUMINURIA REV: CPT | Mod: HCNC,CPTII,S$GLB, | Performed by: FAMILY MEDICINE

## 2023-12-07 PROCEDURE — 83036 HEMOGLOBIN GLYCOSYLATED A1C: CPT | Mod: HCNC | Performed by: FAMILY MEDICINE

## 2023-12-07 PROCEDURE — 3288F PR FALLS RISK ASSESSMENT DOCUMENTED: ICD-10-PCS | Mod: HCNC,CPTII,S$GLB, | Performed by: FAMILY MEDICINE

## 2023-12-07 PROCEDURE — 3044F PR MOST RECENT HEMOGLOBIN A1C LEVEL <7.0%: ICD-10-PCS | Mod: HCNC,CPTII,S$GLB, | Performed by: FAMILY MEDICINE

## 2023-12-07 PROCEDURE — 99999 PR PBB SHADOW E&M-EST. PATIENT-LVL III: ICD-10-PCS | Mod: PBBFAC,HCNC,, | Performed by: FAMILY MEDICINE

## 2023-12-07 PROCEDURE — 3061F PR NEG MICROALBUMINURIA RESULT DOCUMENTED/REVIEW: ICD-10-PCS | Mod: HCNC,CPTII,S$GLB, | Performed by: FAMILY MEDICINE

## 2023-12-07 PROCEDURE — 4010F ACE/ARB THERAPY RXD/TAKEN: CPT | Mod: HCNC,CPTII,S$GLB, | Performed by: FAMILY MEDICINE

## 2023-12-07 PROCEDURE — 3288F FALL RISK ASSESSMENT DOCD: CPT | Mod: HCNC,CPTII,S$GLB, | Performed by: FAMILY MEDICINE

## 2023-12-07 PROCEDURE — 99214 OFFICE O/P EST MOD 30 MIN: CPT | Mod: HCNC,S$GLB,, | Performed by: FAMILY MEDICINE

## 2023-12-07 PROCEDURE — 3008F BODY MASS INDEX DOCD: CPT | Mod: HCNC,CPTII,S$GLB, | Performed by: FAMILY MEDICINE

## 2023-12-07 PROCEDURE — 3078F DIAST BP <80 MM HG: CPT | Mod: HCNC,CPTII,S$GLB, | Performed by: FAMILY MEDICINE

## 2023-12-07 PROCEDURE — 4010F PR ACE/ARB THEARPY RXD/TAKEN: ICD-10-PCS | Mod: HCNC,CPTII,S$GLB, | Performed by: FAMILY MEDICINE

## 2023-12-07 PROCEDURE — 1101F PT FALLS ASSESS-DOCD LE1/YR: CPT | Mod: HCNC,CPTII,S$GLB, | Performed by: FAMILY MEDICINE

## 2023-12-07 PROCEDURE — 2023F DILAT RTA XM W/O RTNOPTHY: CPT | Mod: HCNC,CPTII,S$GLB, | Performed by: FAMILY MEDICINE

## 2023-12-07 PROCEDURE — 3066F NEPHROPATHY DOC TX: CPT | Mod: HCNC,CPTII,S$GLB, | Performed by: FAMILY MEDICINE

## 2023-12-07 PROCEDURE — 1159F PR MEDICATION LIST DOCUMENTED IN MEDICAL RECORD: ICD-10-PCS | Mod: HCNC,CPTII,S$GLB, | Performed by: FAMILY MEDICINE

## 2023-12-07 PROCEDURE — 1101F PR PT FALLS ASSESS DOC 0-1 FALLS W/OUT INJ PAST YR: ICD-10-PCS | Mod: HCNC,CPTII,S$GLB, | Performed by: FAMILY MEDICINE

## 2023-12-07 PROCEDURE — 99999 PR PBB SHADOW E&M-EST. PATIENT-LVL III: CPT | Mod: PBBFAC,HCNC,, | Performed by: FAMILY MEDICINE

## 2023-12-07 PROCEDURE — 36415 COLL VENOUS BLD VENIPUNCTURE: CPT | Mod: HCNC,PN | Performed by: FAMILY MEDICINE

## 2023-12-07 PROCEDURE — 1126F PR PAIN SEVERITY QUANTIFIED, NO PAIN PRESENT: ICD-10-PCS | Mod: HCNC,CPTII,S$GLB, | Performed by: FAMILY MEDICINE

## 2023-12-07 PROCEDURE — 3077F SYST BP >= 140 MM HG: CPT | Mod: HCNC,CPTII,S$GLB, | Performed by: FAMILY MEDICINE

## 2023-12-07 PROCEDURE — 99214 PR OFFICE/OUTPT VISIT, EST, LEVL IV, 30-39 MIN: ICD-10-PCS | Mod: HCNC,S$GLB,, | Performed by: FAMILY MEDICINE

## 2023-12-07 PROCEDURE — 1159F MED LIST DOCD IN RCRD: CPT | Mod: HCNC,CPTII,S$GLB, | Performed by: FAMILY MEDICINE

## 2023-12-07 PROCEDURE — 1126F AMNT PAIN NOTED NONE PRSNT: CPT | Mod: HCNC,CPTII,S$GLB, | Performed by: FAMILY MEDICINE

## 2023-12-07 PROCEDURE — 3008F PR BODY MASS INDEX (BMI) DOCUMENTED: ICD-10-PCS | Mod: HCNC,CPTII,S$GLB, | Performed by: FAMILY MEDICINE

## 2023-12-07 PROCEDURE — 3078F PR MOST RECENT DIASTOLIC BLOOD PRESSURE < 80 MM HG: ICD-10-PCS | Mod: HCNC,CPTII,S$GLB, | Performed by: FAMILY MEDICINE

## 2023-12-07 PROCEDURE — 3044F HG A1C LEVEL LT 7.0%: CPT | Mod: HCNC,CPTII,S$GLB, | Performed by: FAMILY MEDICINE

## 2023-12-07 PROCEDURE — 3066F PR DOCUMENTATION OF TREATMENT FOR NEPHROPATHY: ICD-10-PCS | Mod: HCNC,CPTII,S$GLB, | Performed by: FAMILY MEDICINE

## 2023-12-07 PROCEDURE — 3077F PR MOST RECENT SYSTOLIC BLOOD PRESSURE >= 140 MM HG: ICD-10-PCS | Mod: HCNC,CPTII,S$GLB, | Performed by: FAMILY MEDICINE

## 2023-12-07 PROCEDURE — 80053 COMPREHEN METABOLIC PANEL: CPT | Mod: HCNC | Performed by: FAMILY MEDICINE

## 2023-12-07 PROCEDURE — 2023F PR DILATED RETINAL EXAM W/O EVID OF RETINOPATHY: ICD-10-PCS | Mod: HCNC,CPTII,S$GLB, | Performed by: FAMILY MEDICINE

## 2023-12-07 RX ORDER — TRAMADOL HYDROCHLORIDE 50 MG/1
50 TABLET ORAL EVERY 12 HOURS PRN
Qty: 60 TABLET | Refills: 1 | Status: ON HOLD | OUTPATIENT
Start: 2023-12-07 | End: 2024-03-06 | Stop reason: HOSPADM

## 2023-12-07 NOTE — PROGRESS NOTES
" THIS DOCUMENT WAS MADE IN PART WITH VOICE RECOGNITION SOFTWARE.  OCCASIONALLY THIS SOFTWARE WILL MISINTERPRET WORDS OR PHRASES.    Assessment and Plan:    1. Immunization due        2. Drug therapy        3. Colon cancer screening  Cologuard Screening (Multitarget Stool DNA)    Cologuard Screening (Multitarget Stool DNA)      4. Type 2 diabetes mellitus with diabetic polyneuropathy, without long-term current use of insulin  Hemoglobin A1C    Comprehensive Metabolic Panel      5. Thoracic radiculitis  traMADoL (ULTRAM) 50 mg tablet      6. Coronary artery disease, unspecified vessel or lesion type, unspecified whether angina present, unspecified whether native or transplanted heart        7. Essential hypertension        8. Acute on chronic combined systolic and diastolic congestive heart failure            PLAN    Comorbidities-patient discontinued smoking, cholesterol controlled, recheck A1c.  Patient will start monitoring blood pressure at home, bring updated blood pressure readings to next appointment, titrate medicine as needed    Continue checking daily weights, titrate Lasix based on weight     Defers immunizations     Colon cancer screening with Cologuard     Lab work as above     New medication tramadol to use p.r.n. thoracic radiculopathy      ______________________________________________________________________  Subjective:    Chief Complaint:  Chief Complaint   Patient presents with    Follow-up        HPI:  Keyona is a 73 y.o. year old       Present to ED SOB   CAD : had MAGGIE placed x 1 for "97%" blockage     Presented again with SOB  Treated with Lasix, symptoms improved     Also complains of midthoracic pain that wraps around to her left side for several months.  Pain can be severe at times, typically takes Tylenol p.r.n. no recent traumas.    COPD  Rx-Breo  Just started Breo     Combined systolic, diastolic congestive heart failure  Previous echocardiogram 05/26/2022-grade 2 diastolic dysfunction, " ejection fraction 40%  Cardiology- MD Mary   Rx-Aldactone 25 mg, beta-blocker, Entresto, Lasix 20 mg      Valvular heart disease  05/26/2022-echocardiogram shows moderate aortic regurgitation, tricuspid regurgitation, pulmonic regurgitation, mitral regurgitation     Coronary artery disease status post CABG x5 (5/29/2020), dyslipidemia  Cardiology- MD Mary   Rx-aspirin 81 mg, atorvastatin 10 mg, Plavix 75 mg  Negative nuclear stress test 04/2023  Previous lipid panel acceptable     Essential hypertension  Rx-Entresto, metoprolol 100 mg, spironolactone 25 mg     Bilateral carotid artery stenosis  Currently on statin  05/27/2020-carotid ultrasound shows less than 50% stenosis right carotid artery, left internal carotid artery with 50-69% stenosis     Type 2 diabetes mellitus with associated polyneuropathy  Previous A1c-5.7%  Rx-metformin extended release 1000 mg by mouth daily     GERD  No current treatment     Nicotine dependence  Prev rx : Patches (didn't work)       Past Medical History:  Past Medical History:   Diagnosis Date    Anemia     Biliary obstruction     Cholangitis     Diabetes mellitus     pt denies, does not take meds 11/2016    EtOH dependence     GERD (gastroesophageal reflux disease)     HTN (hypertension) 12/30/2013       Past Surgical History:  Past Surgical History:   Procedure Laterality Date    ARTERIOGRAPHY OF SUBCLAVIAN ARTERY  5/22/2020    Procedure: Arteriogram, Subclavian;  Surgeon: Heather Carbajal MD;  Location: [facility] CATH;  Service: Cardiology;;    COLONOSCOPY      CORONARY ANGIOGRAPHY N/A 5/22/2020    Procedure: ANGIOGRAM, CORONARY ARTERY;  Surgeon: Heather Carbajal MD;  Location: [facility] CATH;  Service: Cardiology;  Laterality: N/A;    CORONARY ARTERY BYPASS GRAFT (CABG) N/A 5/29/2020    Procedure: CORONARY ARTERY BYPASS GRAFT (CABG) x 5 VESSELS;  Surgeon: Dima Laughlin MD;  Location: [facility] OR;  Service: Cardiovascular;  Laterality: N/A;    ENDOSCOPIC HARVEST OF VEIN N/A 5/29/2020     Procedure: SURGICAL PROCUREMENT, VEIN, ENDOSCOPIC;  Surgeon: Dima Laughlin MD;  Location: [facility] OR;  Service: Cardiovascular;  Laterality: N/A;    ERCP W/ PLASTIC STENT PLACEMENT      ERCP W/ SPHICTEROTOMY      ESOPHAGOGASTRODUODENOSCOPY      HYSTERECTOMY      INSERTION OF INTRA-AORTIC BALLOON ASSIST DEVICE N/A 5/29/2020    Procedure: INSERTION, INTRA-AORTIC BALLOON PUMP;  Surgeon: Dima Laughlin MD;  Location: [facility] OR;  Service: Cardiovascular;  Laterality: N/A;    LEFT HEART CATHETERIZATION Left 5/22/2020    Procedure: Left heart cath;  Surgeon: Heather Carbajal MD;  Location: [facility] CATH;  Service: Cardiology;  Laterality: Left;       Family History:  No family history on file.    Social History:  Social History     Socioeconomic History    Marital status:    Tobacco Use    Smoking status: Every Day     Current packs/day: 0.00     Average packs/day: 0.5 packs/day for 40.0 years (20.0 ttl pk-yrs)     Types: Cigarettes     Start date: 5/19/1980     Last attempt to quit: 5/19/2020     Years since quitting: 3.5    Smokeless tobacco: Never   Substance and Sexual Activity    Alcohol use: Not Currently     Comment: sober 2 years 3 months    Drug use: No       Medications:  Current Outpatient Medications on File Prior to Visit   Medication Sig Dispense Refill    alcohol swabs PadM Apply 1 each topically as needed. 100 each 2    atorvastatin (LIPITOR) 10 MG tablet TAKE 1 TABLET EVERY EVENING 90 tablet 3    clopidogreL (PLAVIX) 75 mg tablet TAKE 1 TABLET EVERY DAY 90 tablet 3    famotidine (PEPCID) 40 MG tablet Take 1 tablet (40 mg total) by mouth once daily. 30 tablet 11    fluticasone-umeclidin-vilanter (TRELEGY ELLIPTA) 100-62.5-25 mcg DsDv Inhale 1 puff into the lungs once daily. 90 each 3    metFORMIN (GLUCOPHAGE-XR) 500 MG ER 24hr tablet TAKE 2 TABLETS ONE TIME DAILY 180 tablet 0    pantoprazole (PROTONIX) 40 MG tablet Take 1 tablet (40 mg total) by mouth once daily. 90 tablet 3    sacubitriL-valsartan (ENTRESTO)   mg per tablet Take 1 tablet by mouth 2 (two) times daily. 180 tablet 3    spironolactone (ALDACTONE) 25 MG tablet Take 1 tablet (25 mg total) by mouth once daily. 90 tablet 1    aspirin (ECOTRIN) 81 MG EC tablet Take 1 tablet (81 mg total) by mouth once daily. 30 tablet 0    blood glucose control high,low (ACCU-CHEK KEDAR CONTROL SOLN) Soln Use to test controls per r guidelines/instructions (Patient not taking: Reported on 12/7/2023) 1 each 1    furosemide (LASIX) 20 MG tablet Take 1 tablet (20 mg total) by mouth once daily. 30 tablet 11    metoprolol tartrate (LOPRESSOR) 100 MG tablet Take 1 tablet (100 mg total) by mouth 2 (two) times daily. 60 tablet 11     No current facility-administered medications on file prior to visit.       Allergies:  Patient has no known allergies.    Immunizations:  Immunization History   Administered Date(s) Administered    COVID-19, MRNA, LN-S, PF (Pfizer) (Gray Cap) 06/20/2022    COVID-19, MRNA, LN-S, PF (Pfizer) (Purple Cap) 05/28/2022    Pneumococcal Conjugate - 20 Valent 08/04/2022       Review of Systems:  Review of Systems   All other systems reviewed and are negative.      Objective:    Vitals:  Vitals:    12/07/23 0814   BP: (!) 144/70   Pulse: 79   Resp: 16   SpO2: 99%   Weight: 41 kg (90 lb 8 oz)   PainSc: 0-No pain       Physical Exam  Vitals reviewed.   Constitutional:       General: She is not in acute distress.  HENT:      Head: Normocephalic and atraumatic.   Eyes:      Pupils: Pupils are equal, round, and reactive to light.   Cardiovascular:      Rate and Rhythm: Normal rate and regular rhythm.      Heart sounds: No murmur heard.     No friction rub.   Pulmonary:      Effort: Pulmonary effort is normal.      Breath sounds: Normal breath sounds.   Abdominal:      General: Bowel sounds are normal. There is no distension.      Palpations: Abdomen is soft.      Tenderness: There is no abdominal tenderness.   Musculoskeletal:      Cervical back: Neck supple.       Comments: Thoracic spine unremarkable, no midline tenderness.  No evidence of any overlying rash   Skin:     General: Skin is warm and dry.      Findings: No rash.   Psychiatric:         Behavior: Behavior normal.             Franklin Garcia MD  Family Medicine

## 2024-01-04 LAB — NONINV COLON CA DNA+OCC BLD SCRN STL QL: NEGATIVE

## 2024-01-16 RX ORDER — LISINOPRIL 10 MG/1
TABLET ORAL
Qty: 90 TABLET | Refills: 0 | OUTPATIENT
Start: 2024-01-16

## 2024-01-16 NOTE — TELEPHONE ENCOUNTER
Refill Decision Note   Nadia Irene  is requesting a refill authorization.  Brief Assessment and Rationale for Refill:  Quick Discontinue     Medication Therapy Plan:    Pharmacy is requesting new scripts for the following medications without required information, (sig/ frequency/qty/etc)      Medication Reconciliation Completed: No     Comments: Pharmacies have been requesting medications for patients without required information, (sig, frequency, qty, etc.). In addition, requests are sent for medication(s) pt. are currently not taking, and medications patients have never taken.    We have spoken to the pharmacies about these request types and advised their teams previously that we are unable to assess these New Script requests and require all details for these requests. This is a known issue and has been reported.     Note composed:2:41 PM 01/16/2024

## 2024-01-16 NOTE — TELEPHONE ENCOUNTER
No care due was identified.  Health [facility] Embedded Care Due Messages. Reference number: [id number].   1/16/2024 2:33:07 PM CST

## 2024-01-24 ENCOUNTER — TELEPHONE (OUTPATIENT)
Dept: CARDIOLOGY | Facility: CLINIC | Age: 74
End: 2024-01-24

## 2024-01-24 ENCOUNTER — OFFICE VISIT (OUTPATIENT)
Dept: CARDIOLOGY | Facility: CLINIC | Age: 74
End: 2024-01-24
Payer: MEDICARE

## 2024-01-24 VITALS
DIASTOLIC BLOOD PRESSURE: 76 MMHG | HEART RATE: 72 BPM | HEIGHT: 61 IN | WEIGHT: 95.69 LBS | SYSTOLIC BLOOD PRESSURE: 189 MMHG | BODY MASS INDEX: 18.06 KG/M2

## 2024-01-24 DIAGNOSIS — D50.8 OTHER IRON DEFICIENCY ANEMIA: Chronic | ICD-10-CM

## 2024-01-24 DIAGNOSIS — I65.23 BILATERAL CAROTID ARTERY STENOSIS: ICD-10-CM

## 2024-01-24 DIAGNOSIS — I10 ESSENTIAL HYPERTENSION: Primary | ICD-10-CM

## 2024-01-24 DIAGNOSIS — F17.210 CIGARETTE NICOTINE DEPENDENCE WITHOUT COMPLICATION: ICD-10-CM

## 2024-01-24 DIAGNOSIS — I70.0 AORTIC ATHEROSCLEROSIS: ICD-10-CM

## 2024-01-24 DIAGNOSIS — I38 VALVULAR HEART DISEASE: Chronic | ICD-10-CM

## 2024-01-24 DIAGNOSIS — I25.119 ATHEROSCLEROSIS OF NATIVE CORONARY ARTERY OF NATIVE HEART WITH ANGINA PECTORIS: ICD-10-CM

## 2024-01-24 DIAGNOSIS — E11.42 TYPE 2 DIABETES MELLITUS WITH DIABETIC POLYNEUROPATHY, WITHOUT LONG-TERM CURRENT USE OF INSULIN: ICD-10-CM

## 2024-01-24 DIAGNOSIS — I10 ESSENTIAL HYPERTENSION: ICD-10-CM

## 2024-01-24 DIAGNOSIS — I50.42 CHRONIC COMBINED SYSTOLIC AND DIASTOLIC HEART FAILURE: ICD-10-CM

## 2024-01-24 DIAGNOSIS — J43.1 PANLOBULAR EMPHYSEMA: Primary | Chronic | ICD-10-CM

## 2024-01-24 PROCEDURE — 1160F RVW MEDS BY RX/DR IN RCRD: CPT | Mod: HCNC,CPTII,S$GLB, | Performed by: INTERNAL MEDICINE

## 2024-01-24 PROCEDURE — 1126F AMNT PAIN NOTED NONE PRSNT: CPT | Mod: HCNC,CPTII,S$GLB, | Performed by: INTERNAL MEDICINE

## 2024-01-24 PROCEDURE — 1101F PT FALLS ASSESS-DOCD LE1/YR: CPT | Mod: HCNC,CPTII,S$GLB, | Performed by: INTERNAL MEDICINE

## 2024-01-24 PROCEDURE — 3008F BODY MASS INDEX DOCD: CPT | Mod: HCNC,CPTII,S$GLB, | Performed by: INTERNAL MEDICINE

## 2024-01-24 PROCEDURE — 3077F SYST BP >= 140 MM HG: CPT | Mod: HCNC,CPTII,S$GLB, | Performed by: INTERNAL MEDICINE

## 2024-01-24 PROCEDURE — 99214 OFFICE O/P EST MOD 30 MIN: CPT | Mod: HCNC,S$GLB,, | Performed by: INTERNAL MEDICINE

## 2024-01-24 PROCEDURE — 1159F MED LIST DOCD IN RCRD: CPT | Mod: HCNC,CPTII,S$GLB, | Performed by: INTERNAL MEDICINE

## 2024-01-24 PROCEDURE — 3078F DIAST BP <80 MM HG: CPT | Mod: HCNC,CPTII,S$GLB, | Performed by: INTERNAL MEDICINE

## 2024-01-24 PROCEDURE — 99999 PR PBB SHADOW E&M-EST. PATIENT-LVL III: CPT | Mod: PBBFAC,HCNC,, | Performed by: INTERNAL MEDICINE

## 2024-01-24 PROCEDURE — 3288F FALL RISK ASSESSMENT DOCD: CPT | Mod: HCNC,CPTII,S$GLB, | Performed by: INTERNAL MEDICINE

## 2024-01-24 RX ORDER — HYDRALAZINE HYDROCHLORIDE 25 MG/1
25 TABLET, FILM COATED ORAL 3 TIMES DAILY
Qty: 90 TABLET | Refills: 2 | Status: SHIPPED | OUTPATIENT
Start: 2024-01-24 | End: 2024-04-11

## 2024-01-24 NOTE — PROGRESS NOTES
Subjective:    Patient ID:  Nadia Irene is a 73 y.o. female patient here for evaluation Follow-up      History of Present Illness:  History of coronary disease.  Status post remote CABG 2020.  Last noninvasive cardiac assessment negative for ischemic or structural heart issues 04/2023.  Patient claims angina with resultant visit to [facility], left heart catheterization with PCI stent by history.  No records.  Still with occasional intermittent chest pain relieved with nitroglycerin.  Ongoing risk factors include remote past tobacco use, diabetes mellitus hypertension dyslipidemia.  Underlying COPD.  Blood pressure elevated today             Review of patient's allergies indicates:  No Known Allergies    Past Medical History:   Diagnosis Date    Anemia     Biliary obstruction     Cholangitis     Diabetes mellitus     pt denies, does not take meds 11/2016    EtOH dependence     GERD (gastroesophageal reflux disease)     HTN (hypertension) 12/30/2013     Past Surgical History:   Procedure Laterality Date    ARTERIOGRAPHY OF SUBCLAVIAN ARTERY  5/22/2020    Procedure: Arteriogram, Subclavian;  Surgeon: Heather Carbajal MD;  Location: [facility] CATH;  Service: Cardiology;;    COLONOSCOPY      CORONARY ANGIOGRAPHY N/A 5/22/2020    Procedure: ANGIOGRAM, CORONARY ARTERY;  Surgeon: Heather Carbajal MD;  Location: [facility] CATH;  Service: Cardiology;  Laterality: N/A;    CORONARY ARTERY BYPASS GRAFT (CABG) N/A 5/29/2020    Procedure: CORONARY ARTERY BYPASS GRAFT (CABG) x 5 VESSELS;  Surgeon: Dima Laughlin MD;  Location: [facility] OR;  Service: Cardiovascular;  Laterality: N/A;    ENDOSCOPIC HARVEST OF VEIN N/A 5/29/2020    Procedure: SURGICAL PROCUREMENT, VEIN, ENDOSCOPIC;  Surgeon: Dima Laughlin MD;  Location: [facility] OR;  Service: Cardiovascular;  Laterality: N/A;    ERCP W/ PLASTIC STENT PLACEMENT      ERCP W/ SPHICTEROTOMY      ESOPHAGOGASTRODUODENOSCOPY      HYSTERECTOMY      INSERTION OF INTRA-AORTIC BALLOON  ASSIST DEVICE N/A 5/29/2020    Procedure: INSERTION, INTRA-AORTIC BALLOON PUMP;  Surgeon: Dima Laughlin MD;  Location: [facility] OR;  Service: Cardiovascular;  Laterality: N/A;    LEFT HEART CATHETERIZATION Left 5/22/2020    Procedure: Left heart cath;  Surgeon: Heather Cabrajal MD;  Location: [facility] CATH;  Service: Cardiology;  Laterality: Left;     Social History     Tobacco Use    Smoking status: Former     Current packs/day: 0.00     Average packs/day: 0.5 packs/day for 40.0 years (20.0 ttl pk-yrs)     Types: Cigarettes     Start date: 5/19/1980     Quit date: 5/19/2020     Years since quitting: 3.6    Smokeless tobacco: Never   Substance Use Topics    Alcohol use: Not Currently     Comment: sober 2 years 3 months    Drug use: No        Review of Systems:    As noted in HPI in addition      REVIEW OF SYSTEMS  Review of Systems   Constitutional: Negative for decreased appetite, diaphoresis, night sweats, weight gain and weight loss.   HENT:  Negative for nosebleeds and odynophagia.    Eyes:  Negative for double vision and photophobia.   Cardiovascular:  Positive for chest pain. Negative for claudication, cyanosis, dyspnea on exertion, irregular heartbeat, leg swelling, near-syncope, orthopnea, palpitations, paroxysmal nocturnal dyspnea and syncope.   Respiratory:  Positive for shortness of breath. Negative for cough, hemoptysis and wheezing.    Hematologic/Lymphatic: Negative for adenopathy.   Skin:  Negative for flushing, skin cancer and suspicious lesions.   Musculoskeletal:  Negative for gout, myalgias and neck pain.   Gastrointestinal:  Negative for abdominal pain, heartburn, hematemesis and hematochezia.   Genitourinary:  Negative for bladder incontinence, hesitancy and nocturia.   Neurological:  Negative for focal weakness, headaches, light-headedness and paresthesias.   Psychiatric/Behavioral:  Negative for memory loss and substance abuse.               Objective:        Vitals:    01/24/24 1116   BP: (!) 189/76    Pulse: 72       Lab Results   Component Value Date    WBC 4.16 06/02/2023    HGB 10.9 (L) 06/02/2023    HCT 34.3 (L) 06/02/2023     06/02/2023    CHOL 124 06/02/2023    TRIG 50 06/02/2023    HDL 55 06/02/2023    ALT 13 12/07/2023    AST 17 12/07/2023     12/07/2023    K 4.3 12/07/2023     12/07/2023    CREATININE 0.8 12/07/2023    BUN 19 12/07/2023    CO2 26 12/07/2023    TSH 2.000 06/04/2020    INR 1.8 05/29/2020    HGBA1C 6.3 (H) 12/07/2023    MICROALBUR 0.2 02/05/2018        ECHOCARDIOGRAM RESULTS  Results for orders placed in visit on 04/18/23    Echo    Interpretation Summary  · Concentric hypertrophy and normal systolic function.  · Mild left atrial enlargement.  · The estimated ejection fraction is 55%.  · Grade II left ventricular diastolic dysfunction.  · Normal right ventricular size with normal right ventricular systolic function.  · Moderate aortic regurgitation.  · Mild mitral regurgitation.  · Mild tricuspid regurgitation.  · Mild pulmonic regurgitation.  · Normal central venous pressure (3 mmHg).  · The estimated PA systolic pressure is 20 mmHg.        CURRENT/PREVIOUS VISIT EKG  Results for orders placed or performed during the hospital encounter of 05/25/22   EKG 12-lead    Collection Time: 07/25/22  3:58 AM    Narrative    Test Reason : R06.02,    Vent. Rate : 110 BPM     Atrial Rate : 110 BPM     P-R Int : 116 ms          QRS Dur : 084 ms      QT Int : 360 ms       P-R-T Axes : 024 006 110 degrees     QTc Int : 487 ms    Sinus tachycardia with occasional Premature ventricular complexes  Possible Left atrial enlargement  LVH with repolarization abnormality ( Puma product )  Anteroseptal infarct (cited on or before 07-JUN-2020)  Abnormal ECG  When compared with ECG of 25-MAY-2022 19:17,  Serial changes of Anteroseptal infarct Present  Confirmed by Pepe Cordero MD ([id number]) on 7/28/2022 5:49:20 PM    Referred By: [email or web address]   SELF           Confirmed By:Pepe Cordero MD      No valid procedures specified.   Results for orders placed during the hospital encounter of 04/18/23    Nuclear Stress - Cardiology Interpreted    Interpretation Summary    Normal myocardial perfusion scan. There is no evidence of myocardial ischemia or infarction.    The gated perfusion images showed an ejection fraction of 55% post stress.    There is normal wall motion post stress.    LV cavity size is normal at rest and normal at stress.    The ECG portion of the study is abnormal but not diagnostic.    The patient reported chest pain during the stress test.    During stress, occasional PVCs are noted.    No valid procedures specified.    PHYSICAL EXAM  CONSTITUTIONAL: Well built, well nourished in no apparent distress  NECK: no carotid bruit, no JVD  LUNGS: CTA  CHEST WALL: no tenderness,  HEART: regular rate and rhythm, S1, S2 normal, no murmur, click, rub or gallop   ABDOMEN: soft, non-tender; bowel sounds normal; no masses,  no organomegaly  EXTREMITIES: Extremities normal, no edema, no calf tenderness noted  NEURO: AAO X 3    I HAVE REVIEWED :    The vital signs, nurses notes, and all the pertinent radiology and labs.         Current Outpatient Medications   Medication Instructions    alcohol swabs PadM 1 each, Topical (Top), As needed (PRN)    aspirin (ECOTRIN) 81 mg, Oral, Daily    atorvastatin (LIPITOR) 10 MG tablet TAKE 1 TABLET EVERY EVENING    blood glucose control high,low (ACCU-CHEK KEDAR CONTROL SOLN) Soln Use to test controls per r guidelines/instructions    clopidogreL (PLAVIX) 75 mg tablet TAKE 1 TABLET EVERY DAY    famotidine (PEPCID) 40 mg, Oral, Daily    fluticasone-umeclidin-vilanter (TRELEGY ELLIPTA) 100-62.5-25 mcg DsDv 1 puff, Inhalation, Daily    furosemide (LASIX) 20 mg, Oral, Daily    metFORMIN (GLUCOPHAGE-XR) 500 MG ER 24hr tablet TAKE 2 TABLETS ONE TIME DAILY    metoprolol tartrate (LOPRESSOR) 100 mg, Oral, 2 times daily    pantoprazole (PROTONIX) 40 mg, Oral, Daily     sacubitriL-valsartan (ENTRESTO)  mg per tablet 1 tablet, Oral, 2 times daily    spironolactone (ALDACTONE) 25 mg, Oral, Daily    traMADoL (ULTRAM) 50 mg, Oral, Every 12 hours PRN          Assessment:   CAD.  CABG 2020.  Recent intervention according to history [facility], November 2023.  No records.  Previous noninvasive cardiac assessment 04/2023 with echo and unremarkable nuclear study.  Preserved EF.  Moderate AI.  Occasional atypical angina chest pain    Past tobacco use, hypertension, diabetes mellitus, dyslipidemia.    Plan:   Need to review records.  Need to reconciled medication list.  4m.     Labs 12/2023 stable, GFR greater than 60    Need to titrate BP meds    No follow-ups on file.

## 2024-01-24 NOTE — TELEPHONE ENCOUNTER
Spoke with pt about new prescription ordered for BP and to continue monitoring BP. Reminded pt to stop taking her Lisinopril per Dr Hernandez order. Pt verbalized understanding. Hydralizine 25mg TID sent to [facility] per request.

## 2024-02-19 ENCOUNTER — OFFICE VISIT (OUTPATIENT)
Dept: FAMILY MEDICINE | Facility: CLINIC | Age: 74
End: 2024-02-19
Payer: MEDICARE

## 2024-02-19 VITALS
WEIGHT: 97.13 LBS | OXYGEN SATURATION: 100 % | RESPIRATION RATE: 18 BRPM | DIASTOLIC BLOOD PRESSURE: 74 MMHG | HEART RATE: 64 BPM | HEIGHT: 61 IN | SYSTOLIC BLOOD PRESSURE: 146 MMHG | BODY MASS INDEX: 18.34 KG/M2

## 2024-02-19 DIAGNOSIS — Z79.899 DRUG THERAPY: ICD-10-CM

## 2024-02-19 DIAGNOSIS — I48.0 PAROXYSMAL ATRIAL FIBRILLATION: ICD-10-CM

## 2024-02-19 DIAGNOSIS — Z78.0 POSTMENOPAUSAL: ICD-10-CM

## 2024-02-19 DIAGNOSIS — Z72.0 TOBACCO ABUSE: ICD-10-CM

## 2024-02-19 DIAGNOSIS — I65.23 BILATERAL CAROTID ARTERY STENOSIS: ICD-10-CM

## 2024-02-19 DIAGNOSIS — I10 ESSENTIAL HYPERTENSION: ICD-10-CM

## 2024-02-19 DIAGNOSIS — I50.42 CHRONIC COMBINED SYSTOLIC AND DIASTOLIC HEART FAILURE: ICD-10-CM

## 2024-02-19 DIAGNOSIS — J43.1 PANLOBULAR EMPHYSEMA: Chronic | ICD-10-CM

## 2024-02-19 DIAGNOSIS — Z23 IMMUNIZATION DUE: Primary | ICD-10-CM

## 2024-02-19 DIAGNOSIS — K21.9 GASTROESOPHAGEAL REFLUX DISEASE WITHOUT ESOPHAGITIS: ICD-10-CM

## 2024-02-19 DIAGNOSIS — I25.119 ATHEROSCLEROSIS OF NATIVE CORONARY ARTERY OF NATIVE HEART WITH ANGINA PECTORIS: ICD-10-CM

## 2024-02-19 DIAGNOSIS — E11.42 TYPE 2 DIABETES MELLITUS WITH DIABETIC POLYNEUROPATHY, WITHOUT LONG-TERM CURRENT USE OF INSULIN: ICD-10-CM

## 2024-02-19 DIAGNOSIS — I70.0 AORTIC ATHEROSCLEROSIS: ICD-10-CM

## 2024-02-19 DIAGNOSIS — I49.9 CARDIAC ARRHYTHMIA, UNSPECIFIED CARDIAC ARRHYTHMIA TYPE: ICD-10-CM

## 2024-02-19 PROBLEM — E43 SEVERE MALNUTRITION: Status: RESOLVED | Noted: 2022-05-26 | Resolved: 2024-02-19

## 2024-02-19 PROCEDURE — 3008F BODY MASS INDEX DOCD: CPT | Mod: CPTII,S$GLB,, | Performed by: FAMILY MEDICINE

## 2024-02-19 PROCEDURE — 1126F AMNT PAIN NOTED NONE PRSNT: CPT | Mod: CPTII,S$GLB,, | Performed by: FAMILY MEDICINE

## 2024-02-19 PROCEDURE — 1159F MED LIST DOCD IN RCRD: CPT | Mod: CPTII,S$GLB,, | Performed by: FAMILY MEDICINE

## 2024-02-19 PROCEDURE — 99214 OFFICE O/P EST MOD 30 MIN: CPT | Mod: S$GLB,,, | Performed by: FAMILY MEDICINE

## 2024-02-19 PROCEDURE — 93010 ELECTROCARDIOGRAM REPORT: CPT | Mod: S$GLB,,, | Performed by: INTERNAL MEDICINE

## 2024-02-19 PROCEDURE — 3078F DIAST BP <80 MM HG: CPT | Mod: CPTII,S$GLB,, | Performed by: FAMILY MEDICINE

## 2024-02-19 PROCEDURE — 3288F FALL RISK ASSESSMENT DOCD: CPT | Mod: CPTII,S$GLB,, | Performed by: FAMILY MEDICINE

## 2024-02-19 PROCEDURE — 99999 PR PBB SHADOW E&M-EST. PATIENT-LVL IV: CPT | Mod: PBBFAC,,, | Performed by: FAMILY MEDICINE

## 2024-02-19 PROCEDURE — 93005 ELECTROCARDIOGRAM TRACING: CPT | Mod: S$GLB,,, | Performed by: FAMILY MEDICINE

## 2024-02-19 PROCEDURE — 1101F PT FALLS ASSESS-DOCD LE1/YR: CPT | Mod: CPTII,S$GLB,, | Performed by: FAMILY MEDICINE

## 2024-02-19 PROCEDURE — 3077F SYST BP >= 140 MM HG: CPT | Mod: CPTII,S$GLB,, | Performed by: FAMILY MEDICINE

## 2024-02-19 RX ORDER — METOPROLOL SUCCINATE 50 MG/1
50 TABLET, EXTENDED RELEASE ORAL EVERY MORNING
Status: ON HOLD | COMMUNITY
Start: 2023-11-16 | End: 2024-04-24

## 2024-02-19 RX ORDER — ESOMEPRAZOLE MAGNESIUM 40 MG/1
40 CAPSULE, DELAYED RELEASE ORAL
Qty: 90 CAPSULE | Refills: 3 | Status: SHIPPED | OUTPATIENT
Start: 2024-02-19 | End: 2025-02-18

## 2024-02-19 RX ORDER — LATANOPROST 50 UG/ML
1 SOLUTION/ DROPS OPHTHALMIC NIGHTLY
COMMUNITY
Start: 2024-01-23

## 2024-02-19 RX ORDER — TIMOLOL MALEATE 5 MG/ML
1 SOLUTION/ DROPS OPHTHALMIC 2 TIMES DAILY
COMMUNITY
Start: 2024-01-23

## 2024-02-19 NOTE — PROGRESS NOTES
THIS DOCUMENT WAS MADE IN PART WITH VOICE RECOGNITION SOFTWARE.  OCCASIONALLY THIS SOFTWARE WILL MISINTERPRET WORDS OR PHRASES.    Assessment and Plan:    1. Immunization due        2. Postmenopausal        3. Type 2 diabetes mellitus with diabetic polyneuropathy, without long-term current use of insulin        4. Bilateral carotid artery stenosis        5. Panlobular emphysema  fluticasone-umeclidin-vilanter (TRELEGY ELLIPTA) 100-62.5-25 mcg DsDv      6. Aortic atherosclerosis        7. Atherosclerosis of native coronary artery of native heart with angina pectoris        8. Chronic combined systolic and diastolic heart failure        9. Essential hypertension        10. Gastroesophageal reflux disease without esophagitis  esomeprazole (NEXIUM) 40 MG capsule      11. Tobacco abuse        12. Drug therapy  Comprehensive Metabolic Panel    Lipid Panel    Hemoglobin A1C    Microalbumin/Creatinine Ratio, Urine    Urinalysis Microscopic    Urinalysis, Reflex to Urine Culture Urine, Clean Catch      13. Cardiac arrhythmia, unspecified cardiac arrhythmia type  EKG 12-lead      14. Paroxysmal atrial fibrillation  apixaban (ELIQUIS) 2.5 mg Tab          PLAN    New diagnosis paroxysmal atrial fibrillation   New medication Eliquis 2.5 mg b.i.d.   Currently on beta-blocker, rate controlled  Will help schedule with Cardiology tomorrow, past due for a follow-up appointment any how  May be contributing to her occasional heart palpitations and dizziness complaint     Recommended nasal spray Astepro for possible Eustachian tube dysfunction contributing to some of her tinnitus, vertigo type dizziness.  Consider neuroimaging with CTA or MRA for possible neurovascular AVM contributing to tinnitus/dizziness/right-sided head discomfort.  Follow-up in 2 weeks to see if she improves.    New medication as omeprazole in place of current PPI for epigastric discomfort, possibly  gastritis  ______________________________________________________________________  Subjective:    Chief Complaint:  Chief Complaint   Patient presents with    Follow-up        HPI:  Nadia is a 73 y.o. year old     Vertigo type dizzy + tinnitus   Associated with right-sided head discomfort and heart palpitations  Duration about 2 months   Denies any focal neurologic deficits, does report some sinus symptoms such as congestion, runny nose   Noted to be irregular on cardiac rhythm during exam, no prior history atrial fibrillation.  Does endorse some left-sided chest discomfort occasionally        COPD  Rx-Breo     Combined systolic, diastolic congestive heart failure  Previous echocardiogram 05/26/2022-grade 2 diastolic dysfunction, ejection fraction 40%  Cardiology- MD Mary   Rx-Aldactone 25 mg, beta-blocker, Entresto, Lasix 20 mg      Valvular heart disease  05/26/2022-echocardiogram shows moderate aortic regurgitation, tricuspid regurgitation, pulmonic regurgitation, mitral regurgitation     Coronary artery disease status post CABG x5 (5/29/2020), dyslipidemia  MAGGIE :  X1 placed late 2023  Cardiology- MD Mary   Rx-aspirin 81 mg, atorvastatin 10 mg, Plavix 75 mg  Negative nuclear stress test 04/2023  Previous lipid panel acceptable     Essential hypertension  Rx-Entresto, metoprolol 50 mg, spironolactone 25 mg     Bilateral carotid artery stenosis  Currently on statin  05/27/2020-carotid ultrasound shows less than 50% stenosis right carotid artery, left internal carotid artery with 50-69% stenosis     Type 2 diabetes mellitus with associated polyneuropathy  Previous A1c-6.3%  Rx-metformin extended release 1000 mg by mouth daily     GERD  Rx : pantoprazole      Nicotine dependence  Prev rx : Patches (didn't work)    Chronic back pain   Rx-tramadol            Past Medical History:  Past Medical History:   Diagnosis Date    Anemia     Biliary obstruction     Cholangitis     Diabetes mellitus     pt denies, does  not take meds 11/2016    EtOH dependence     GERD (gastroesophageal reflux disease)     HTN (hypertension) 12/30/2013       Past Surgical History:  Past Surgical History:   Procedure Laterality Date    ARTERIOGRAPHY OF SUBCLAVIAN ARTERY  5/22/2020    Procedure: Arteriogram, Subclavian;  Surgeon: Heather Carbajal MD;  Location: [facility] CATH;  Service: Cardiology;;    COLONOSCOPY      CORONARY ANGIOGRAPHY N/A 5/22/2020    Procedure: ANGIOGRAM, CORONARY ARTERY;  Surgeon: Heather Carbajal MD;  Location: [facility] CATH;  Service: Cardiology;  Laterality: N/A;    CORONARY ARTERY BYPASS GRAFT (CABG) N/A 5/29/2020    Procedure: CORONARY ARTERY BYPASS GRAFT (CABG) x 5 VESSELS;  Surgeon: Dima Laughlin MD;  Location: [facility] OR;  Service: Cardiovascular;  Laterality: N/A;    ENDOSCOPIC HARVEST OF VEIN N/A 5/29/2020    Procedure: SURGICAL PROCUREMENT, VEIN, ENDOSCOPIC;  Surgeon: Dima Laughlin MD;  Location: [facility] OR;  Service: Cardiovascular;  Laterality: N/A;    ERCP W/ PLASTIC STENT PLACEMENT      ERCP W/ SPHICTEROTOMY      ESOPHAGOGASTRODUODENOSCOPY      HYSTERECTOMY      INSERTION OF INTRA-AORTIC BALLOON ASSIST DEVICE N/A 5/29/2020    Procedure: INSERTION, INTRA-AORTIC BALLOON PUMP;  Surgeon: Dima Laughlin MD;  Location: [facility] OR;  Service: Cardiovascular;  Laterality: N/A;    LEFT HEART CATHETERIZATION Left 5/22/2020    Procedure: Left heart cath;  Surgeon: Heather Carbajal MD;  Location: [facility] CATH;  Service: Cardiology;  Laterality: Left;       Family History:  History reviewed. No pertinent family history.    Social History:  Social History     Socioeconomic History    Marital status:    Tobacco Use    Smoking status: Former     Current packs/day: 0.00     Average packs/day: 0.5 packs/day for 40.0 years (20.0 ttl pk-yrs)     Types: Cigarettes     Start date: 5/19/1980     Quit date: 5/19/2020     Years since quitting: 3.7    Smokeless tobacco: Never   Substance and Sexual Activity    Alcohol use: Not Currently     Comment: sober 2  years 3 months    Drug use: No       Medications:  Current Outpatient Medications on File Prior to Visit   Medication Sig Dispense Refill    aspirin (ECOTRIN) 81 MG EC tablet Take 1 tablet (81 mg total) by mouth once daily. 30 tablet 0    atorvastatin (LIPITOR) 10 MG tablet TAKE 1 TABLET EVERY EVENING 90 tablet 3    blood glucose control high,low (ACCU-CHEK KEDAR CONTROL SOLN) Soln Use to test controls per [facility] guidelines/instructions 1 each 1    clopidogreL (PLAVIX) 75 mg tablet TAKE 1 TABLET EVERY DAY 90 tablet 3    famotidine (PEPCID) 40 MG tablet Take 1 tablet (40 mg total) by mouth once daily. 30 tablet 11    furosemide (LASIX) 20 MG tablet Take 1 tablet (20 mg total) by mouth once daily. 30 tablet 11    hydrALAZINE (APRESOLINE) 25 MG tablet Take 1 tablet (25 mg total) by mouth 3 (three) times daily. 90 tablet 2    latanoprost 0.005 % ophthalmic solution Place 1 drop into both eyes every evening.      metFORMIN (GLUCOPHAGE-XR) 500 MG ER 24hr tablet TAKE 2 TABLETS ONE TIME DAILY 180 tablet 0    metoprolol succinate (TOPROL-XL) 50 MG 24 hr tablet Take 50 mg by mouth every morning.      sacubitriL-valsartan (ENTRESTO)  mg per tablet Take 1 tablet by mouth 2 (two) times daily. 180 tablet 3    spironolactone (ALDACTONE) 25 MG tablet Take 1 tablet (25 mg total) by mouth once daily. 90 tablet 1    timolol maleate 0.5% (TIMOPTIC) 0.5 % Drop Place 1 drop into the left eye 2 (two) times daily.      traMADoL (ULTRAM) 50 mg tablet Take 1 tablet (50 mg total) by mouth every 12 (twelve) hours as needed for Pain. 60 tablet 1    [DISCONTINUED] fluticasone-umeclidin-vilanter (TRELEGY ELLIPTA) 100-62.5-25 mcg DsDv Inhale 1 puff into the lungs once daily. 90 each 3    [DISCONTINUED] pantoprazole (PROTONIX) 40 MG tablet Take 1 tablet (40 mg total) by mouth once daily. 90 tablet 3    alcohol swabs PadM Apply 1 each topically as needed. 100 each 2    [DISCONTINUED] metoprolol tartrate (LOPRESSOR) 100 MG tablet Take 1 tablet  "(100 mg total) by mouth 2 (two) times daily. (Patient not taking: Reported on 2/19/2024) 60 tablet 11     No current facility-administered medications on file prior to visit.       Allergies:  Patient has no known allergies.    Immunizations:  Immunization History   Administered Date(s) Administered    COVID-19, MRNA, LN-S, PF (Pfizer) (Gray Cap) 06/20/2022    COVID-19, MRNA, LN-S, PF (Pfizer) (Purple Cap) 05/28/2022    Pneumococcal Conjugate - 20 Valent 08/04/2022       Review of Systems:  Review of Systems   All other systems reviewed and are negative.      Objective:    Vitals:  Vitals:    02/19/24 0922   BP: (!) 146/74   Pulse: 64   Resp: 18   SpO2: 100%   Weight: 44 kg (97 lb 1.8 oz)   Height: 5' 1" (1.549 m)   PainSc: 0-No pain       Physical Exam  Vitals reviewed.   Constitutional:       General: She is not in acute distress.  HENT:      Head: Normocephalic and atraumatic.   Eyes:      Pupils: Pupils are equal, round, and reactive to light.   Cardiovascular:      Rate and Rhythm: Normal rate. Rhythm irregular.      Heart sounds: No murmur heard.     No friction rub.   Pulmonary:      Effort: Pulmonary effort is normal.      Breath sounds: Normal breath sounds.   Abdominal:      General: Bowel sounds are normal. There is no distension.      Palpations: Abdomen is soft.      Tenderness: There is abdominal tenderness.   Musculoskeletal:      Cervical back: Neck supple.   Skin:     General: Skin is warm and dry.      Findings: No rash.   Psychiatric:         Behavior: Behavior normal.             Franklin Garcia MD  Family Medicine      "

## 2024-02-20 ENCOUNTER — OFFICE VISIT (OUTPATIENT)
Dept: CARDIOLOGY | Facility: CLINIC | Age: 74
End: 2024-02-20
Payer: MEDICARE

## 2024-02-20 VITALS
SYSTOLIC BLOOD PRESSURE: 173 MMHG | HEART RATE: 94 BPM | BODY MASS INDEX: 17.94 KG/M2 | DIASTOLIC BLOOD PRESSURE: 75 MMHG | WEIGHT: 95 LBS | HEIGHT: 61 IN

## 2024-02-20 DIAGNOSIS — I38 VALVULAR HEART DISEASE: Primary | Chronic | ICD-10-CM

## 2024-02-20 DIAGNOSIS — I10 ESSENTIAL HYPERTENSION: ICD-10-CM

## 2024-02-20 DIAGNOSIS — Z72.0 TOBACCO ABUSE: ICD-10-CM

## 2024-02-20 DIAGNOSIS — I25.119 ATHEROSCLEROSIS OF NATIVE CORONARY ARTERY OF NATIVE HEART WITH ANGINA PECTORIS: ICD-10-CM

## 2024-02-20 DIAGNOSIS — E11.42 TYPE 2 DIABETES MELLITUS WITH DIABETIC POLYNEUROPATHY, WITHOUT LONG-TERM CURRENT USE OF INSULIN: ICD-10-CM

## 2024-02-20 DIAGNOSIS — I65.23 BILATERAL CAROTID ARTERY STENOSIS: ICD-10-CM

## 2024-02-20 DIAGNOSIS — K80.50 CHOLEDOCHOLITHIASIS: ICD-10-CM

## 2024-02-20 DIAGNOSIS — I70.0 AORTIC ATHEROSCLEROSIS: ICD-10-CM

## 2024-02-20 DIAGNOSIS — I50.42 CHRONIC COMBINED SYSTOLIC AND DIASTOLIC HEART FAILURE: ICD-10-CM

## 2024-02-20 PROBLEM — R07.89 OTHER CHEST PAIN: Status: ACTIVE | Noted: 2024-02-20

## 2024-02-20 PROCEDURE — 3078F DIAST BP <80 MM HG: CPT | Mod: CPTII,S$GLB,, | Performed by: INTERNAL MEDICINE

## 2024-02-20 PROCEDURE — 3077F SYST BP >= 140 MM HG: CPT | Mod: CPTII,S$GLB,, | Performed by: INTERNAL MEDICINE

## 2024-02-20 PROCEDURE — 1101F PT FALLS ASSESS-DOCD LE1/YR: CPT | Mod: CPTII,S$GLB,, | Performed by: INTERNAL MEDICINE

## 2024-02-20 PROCEDURE — 99214 OFFICE O/P EST MOD 30 MIN: CPT | Mod: S$GLB,,, | Performed by: INTERNAL MEDICINE

## 2024-02-20 PROCEDURE — 3008F BODY MASS INDEX DOCD: CPT | Mod: CPTII,S$GLB,, | Performed by: INTERNAL MEDICINE

## 2024-02-20 PROCEDURE — 1125F AMNT PAIN NOTED PAIN PRSNT: CPT | Mod: CPTII,S$GLB,, | Performed by: INTERNAL MEDICINE

## 2024-02-20 PROCEDURE — 99999 PR PBB SHADOW E&M-EST. PATIENT-LVL III: CPT | Mod: PBBFAC,,, | Performed by: INTERNAL MEDICINE

## 2024-02-20 PROCEDURE — 1160F RVW MEDS BY RX/DR IN RCRD: CPT | Mod: CPTII,S$GLB,, | Performed by: INTERNAL MEDICINE

## 2024-02-20 PROCEDURE — 1159F MED LIST DOCD IN RCRD: CPT | Mod: CPTII,S$GLB,, | Performed by: INTERNAL MEDICINE

## 2024-02-20 PROCEDURE — 3288F FALL RISK ASSESSMENT DOCD: CPT | Mod: CPTII,S$GLB,, | Performed by: INTERNAL MEDICINE

## 2024-02-20 RX ORDER — ISOSORBIDE MONONITRATE 30 MG/1
30 TABLET, EXTENDED RELEASE ORAL DAILY
Qty: 30 TABLET | Refills: 11 | Status: ON HOLD | OUTPATIENT
Start: 2024-02-20 | End: 2024-02-24 | Stop reason: HOSPADM

## 2024-02-20 NOTE — PROGRESS NOTES
Subjective:    Patient ID:  Nadia Irene is a 73 y.o. female patient here for evaluation Follow-up      History of Present Illness:  Cardiology follow-up coronary disease.  Patient complains of intermittent chest discomfort, probable angina.  Known coronary disease status post CABG 2020.  Prior negative noninvasive cardiac assessment for ischemic or structural heart disease 04/2023.  Patient developed angina again in 11/23 with resultant left heart catheterization [facility], JEFF to the LAD was occluded patient underwent successful PCI stent ostial proximal LAD with atherectomy.  Moderate disease was noted vein graft to the obtuse marginal.  There was a patent vein graft to the 1st diagonal, patent vein graft to the distal right coronary artery.  Patient has quit smoking.  History of diabetes mellitus, hypertension, dyslipidemia.  Normal sinus rhythm.  LVH, PACs blood pressure remains elevated today at 170 over 76.           Review of patient's allergies indicates:  No Known Allergies    Past Medical History:   Diagnosis Date    Anemia     Biliary obstruction     Cholangitis     Diabetes mellitus     pt denies, does not take meds 11/2016    EtOH dependence     GERD (gastroesophageal reflux disease)     HTN (hypertension) 12/30/2013     Past Surgical History:   Procedure Laterality Date    ARTERIOGRAPHY OF SUBCLAVIAN ARTERY  5/22/2020    Procedure: Arteriogram, Subclavian;  Surgeon: Heather Carbajal MD;  Location: [facility] CATH;  Service: Cardiology;;    COLONOSCOPY      CORONARY ANGIOGRAPHY N/A 5/22/2020    Procedure: ANGIOGRAM, CORONARY ARTERY;  Surgeon: Heather Carbajal MD;  Location: [facility] CATH;  Service: Cardiology;  Laterality: N/A;    CORONARY ARTERY BYPASS GRAFT (CABG) N/A 5/29/2020    Procedure: CORONARY ARTERY BYPASS GRAFT (CABG) x 5 VESSELS;  Surgeon: Dima Laughlin MD;  Location: [facility] OR;  Service: Cardiovascular;  Laterality: N/A;    ENDOSCOPIC HARVEST OF VEIN N/A 5/29/2020    Procedure:  SURGICAL PROCUREMENT, VEIN, ENDOSCOPIC;  Surgeon: Dima Laughlin MD;  Location: [facility] OR;  Service: Cardiovascular;  Laterality: N/A;    ERCP W/ PLASTIC STENT PLACEMENT      ERCP W/ SPHICTEROTOMY      ESOPHAGOGASTRODUODENOSCOPY      HYSTERECTOMY      INSERTION OF INTRA-AORTIC BALLOON ASSIST DEVICE N/A 5/29/2020    Procedure: INSERTION, INTRA-AORTIC BALLOON PUMP;  Surgeon: Dima Laughlin MD;  Location: [facility] OR;  Service: Cardiovascular;  Laterality: N/A;    LEFT HEART CATHETERIZATION Left 5/22/2020    Procedure: Left heart cath;  Surgeon: Heather Carbajal MD;  Location: [facility] CATH;  Service: Cardiology;  Laterality: Left;     Social History     Tobacco Use    Smoking status: Former     Current packs/day: 0.00     Average packs/day: 0.5 packs/day for 40.0 years (20.0 ttl pk-yrs)     Types: Cigarettes     Start date: 5/19/1980     Quit date: 5/19/2020     Years since quitting: 3.7    Smokeless tobacco: Never   Substance Use Topics    Alcohol use: Not Currently     Comment: sober 2 years 3 months    Drug use: No        Review of Systems:    As noted in HPI in addition      REVIEW OF SYSTEMS  Review of Systems   Constitutional: Negative for decreased appetite, diaphoresis, night sweats, weight gain and weight loss.   HENT:  Negative for nosebleeds and odynophagia.    Eyes:  Negative for double vision and photophobia.   Cardiovascular:  Positive for chest pain. Negative for claudication, cyanosis, dyspnea on exertion, irregular heartbeat, leg swelling, near-syncope, orthopnea, palpitations, paroxysmal nocturnal dyspnea and syncope.   Respiratory:  Negative for cough, hemoptysis, shortness of breath and wheezing.    Hematologic/Lymphatic: Negative for adenopathy.   Skin:  Negative for flushing, skin cancer and suspicious lesions.   Musculoskeletal:  Negative for gout, myalgias and neck pain.   Gastrointestinal:  Negative for abdominal pain, heartburn, hematemesis and hematochezia.   Genitourinary:  Negative for bladder  incontinence, hesitancy and nocturia.   Neurological:  Negative for focal weakness, headaches, light-headedness and paresthesias.   Psychiatric/Behavioral:  Negative for memory loss and substance abuse.               Objective:        Vitals:    02/20/24 1136   BP: (!) 173/75   Pulse: 94       Lab Results   Component Value Date    WBC 4.16 06/02/2023    HGB 10.9 (L) 06/02/2023    HCT 34.3 (L) 06/02/2023     06/02/2023    CHOL 124 06/02/2023    TRIG 50 06/02/2023    HDL 55 06/02/2023    ALT 13 12/07/2023    AST 17 12/07/2023     12/07/2023    K 4.3 12/07/2023     12/07/2023    CREATININE 0.8 12/07/2023    BUN 19 12/07/2023    CO2 26 12/07/2023    TSH 2.000 06/04/2020    INR 1.8 05/29/2020    HGBA1C 6.3 (H) 12/07/2023    MICROALBUR 0.2 02/05/2018        ECHOCARDIOGRAM RESULTS  Results for orders placed in visit on 04/18/23    Echo    Interpretation Summary  · Concentric hypertrophy and normal systolic function.  · Mild left atrial enlargement.  · The estimated ejection fraction is 55%.  · Grade II left ventricular diastolic dysfunction.  · Normal right ventricular size with normal right ventricular systolic function.  · Moderate aortic regurgitation.  · Mild mitral regurgitation.  · Mild tricuspid regurgitation.  · Mild pulmonic regurgitation.  · Normal central venous pressure (3 mmHg).  · The estimated PA systolic pressure is 20 mmHg.        CURRENT/PREVIOUS VISIT EKG  Results for orders placed or performed during the hospital encounter of 05/25/22   EKG 12-lead    Collection Time: 07/25/22  3:58 AM    Narrative    Test Reason : R06.02,    Vent. Rate : 110 BPM     Atrial Rate : 110 BPM     P-R Int : 116 ms          QRS Dur : 084 ms      QT Int : 360 ms       P-R-T Axes : 024 006 110 degrees     QTc Int : 487 ms    Sinus tachycardia with occasional Premature ventricular complexes  Possible Left atrial enlargement  LVH with repolarization abnormality ( Puma product )  Anteroseptal infarct (cited  on or before 07-JUN-2020)  Abnormal ECG  When compared with ECG of 25-MAY-2022 19:17,  Serial changes of Anteroseptal infarct Present  Confirmed by Pepe Cordero MD ([id number]) on 7/28/2022 5:49:20 PM    Referred By: ESTELA   SELF           Confirmed By:Pepe Cordero MD     No valid procedures specified.   Results for orders placed during the hospital encounter of 04/18/23    Nuclear Stress - Cardiology Interpreted    Interpretation Summary    Normal myocardial perfusion scan. There is no evidence of myocardial ischemia or infarction.    The gated perfusion images showed an ejection fraction of 55% post stress.    There is normal wall motion post stress.    LV cavity size is normal at rest and normal at stress.    The ECG portion of the study is abnormal but not diagnostic.    The patient reported chest pain during the stress test.    During stress, occasional PVCs are noted.    No valid procedures specified.    PHYSICAL EXAM  CONSTITUTIONAL: Well built, well nourished in no apparent distress  NECK: no carotid bruit, no JVD  LUNGS: CTA  CHEST WALL: no tenderness,  HEART: regular rate and rhythm, S1, S2 normal, no murmur, click, rub or gallop   ABDOMEN: soft, non-tender; bowel sounds normal; no masses,  no organomegaly  EXTREMITIES: Extremities normal, no edema, no calf tenderness noted  NEURO: AAO X 3    I HAVE REVIEWED :    The vital signs, nurses notes, and all the pertinent radiology and labs.         Current Outpatient Medications   Medication Instructions    alcohol swabs PadM 1 each, Topical (Top), As needed (PRN)    apixaban (ELIQUIS) 2.5 mg, Oral, 2 times daily    aspirin (ECOTRIN) 81 mg, Oral, Daily    atorvastatin (LIPITOR) 10 MG tablet TAKE 1 TABLET EVERY EVENING    blood glucose control high,low (ACCU-CHEK KEDAR CONTROL SOLN) Soln Use to test controls per r guidelines/instructions    clopidogreL (PLAVIX) 75 mg tablet TAKE 1 TABLET EVERY DAY    esomeprazole (NEXIUM) 40 mg, Oral, Before breakfast,  Take on empty stomach    famotidine (PEPCID) 40 mg, Oral, Daily    fluticasone-umeclidin-vilanter (TRELEGY ELLIPTA) 100-62.5-25 mcg DsDv 1 puff, Inhalation, Daily    furosemide (LASIX) 20 mg, Oral, Daily    hydrALAZINE (APRESOLINE) 25 mg, Oral, 3 times daily    latanoprost 0.005 % ophthalmic solution 1 drop, Both Eyes, Nightly    metFORMIN (GLUCOPHAGE-XR) 500 MG ER 24hr tablet TAKE 2 TABLETS ONE TIME DAILY    metoprolol succinate (TOPROL-XL) 50 mg, Oral, Every morning    sacubitriL-valsartan (ENTRESTO)  mg per tablet 1 tablet, Oral, 2 times daily    spironolactone (ALDACTONE) 25 mg, Oral, Daily    timolol maleate 0.5% (TIMOPTIC) 0.5 % Drop 1 drop, Left Eye, 2 times daily    traMADoL (ULTRAM) 50 mg, Oral, Every 12 hours PRN          Assessment:   ASCVD.  CAD.  CABG 2020.  Recurrent angina 11/2023 with resultant PCI stent LAD with remote ablation.  JEFF to the LAD occluded.  Patent vein graft to obtuse marginal, 1st diagonal, distal right coronary artery.    Hypertension, moderately elevated today.  History of diabetes mellitus, dyslipidemia.  Quit tobacco use.    Plan:   Echo 04/2023 with preserved ejection fraction.  Add Imdur 30 mg daily to treat presumed angina and elevated blood pressure.  Scheduled repeat left heart catheterization.          No follow-ups on file.

## 2024-02-21 PROBLEM — I25.10 CORONARY ARTERY DISEASE: Status: ACTIVE | Noted: 2020-05-23

## 2024-02-21 PROBLEM — K92.2 GASTROINTESTINAL HEMORRHAGE: Status: ACTIVE | Noted: 2024-02-21

## 2024-02-21 LAB
OHS QRS DURATION: 90 MS
OHS QTC CALCULATION: 455 MS

## 2024-02-22 PROBLEM — K92.2 GASTROINTESTINAL HEMORRHAGE: Status: RESOLVED | Noted: 2024-02-21 | Resolved: 2024-02-22

## 2024-02-26 ENCOUNTER — TELEPHONE (OUTPATIENT)
Dept: CARDIOLOGY | Facility: CLINIC | Age: 74
End: 2024-02-26
Payer: MEDICARE

## 2024-02-26 ENCOUNTER — TELEPHONE (OUTPATIENT)
Dept: GASTROENTEROLOGY | Facility: CLINIC | Age: 74
End: 2024-02-26
Payer: MEDICARE

## 2024-02-26 NOTE — TELEPHONE ENCOUNTER
----- Message from Peng Orellana sent at 2/26/2024 10:43 AM CST -----  Type:  Sooner Appointment Request    Caller is requesting a sooner appointment.  Caller declined first available appointment listed below.  Caller will not accept being placed on the waitlist and is requesting a message be sent to doctor.    Name of Caller:  [facility]  When is the first available appointment?   3/26  Symptoms:   hospital f/u  Would the patient rather a call back or a response via [facility]?  Call back  Best Call Back Number:   [phone]  Additional Information:  pl call bk to advise thanks

## 2024-03-04 ENCOUNTER — LAB VISIT (OUTPATIENT)
Dept: LAB | Facility: HOSPITAL | Age: 74
End: 2024-03-04
Payer: MEDICARE

## 2024-03-04 ENCOUNTER — OFFICE VISIT (OUTPATIENT)
Dept: FAMILY MEDICINE | Facility: CLINIC | Age: 74
End: 2024-03-04
Payer: MEDICARE

## 2024-03-04 VITALS
SYSTOLIC BLOOD PRESSURE: 128 MMHG | DIASTOLIC BLOOD PRESSURE: 60 MMHG | HEART RATE: 90 BPM | HEIGHT: 61 IN | BODY MASS INDEX: 18.26 KG/M2 | OXYGEN SATURATION: 99 % | WEIGHT: 96.69 LBS

## 2024-03-04 DIAGNOSIS — E11.42 TYPE 2 DIABETES MELLITUS WITH DIABETIC POLYNEUROPATHY, WITHOUT LONG-TERM CURRENT USE OF INSULIN: ICD-10-CM

## 2024-03-04 DIAGNOSIS — Z13.820 ENCOUNTER FOR OSTEOPOROSIS SCREENING IN ASYMPTOMATIC POSTMENOPAUSAL PATIENT: ICD-10-CM

## 2024-03-04 DIAGNOSIS — D50.9 IRON DEFICIENCY ANEMIA, UNSPECIFIED IRON DEFICIENCY ANEMIA TYPE: ICD-10-CM

## 2024-03-04 DIAGNOSIS — R42 DIZZINESS AND GIDDINESS: ICD-10-CM

## 2024-03-04 DIAGNOSIS — Z09 HOSPITAL DISCHARGE FOLLOW-UP: Primary | ICD-10-CM

## 2024-03-04 DIAGNOSIS — Z78.0 ENCOUNTER FOR OSTEOPOROSIS SCREENING IN ASYMPTOMATIC POSTMENOPAUSAL PATIENT: ICD-10-CM

## 2024-03-04 LAB
ERYTHROCYTE [DISTWIDTH] IN BLOOD BY AUTOMATED COUNT: 25 % (ref 11.5–14.5)
HCT VFR BLD AUTO: 20 % (ref 37–48.5)
HGB BLD-MCNC: 6 G/DL (ref 12–16)
MCH RBC QN AUTO: 25.5 PG (ref 27–31)
MCHC RBC AUTO-ENTMCNC: 30 G/DL (ref 32–36)
MCV RBC AUTO: 85 FL (ref 82–98)
PLATELET # BLD AUTO: 244 K/UL (ref 150–450)
PMV BLD AUTO: 11.4 FL (ref 9.2–12.9)
RBC # BLD AUTO: 2.35 M/UL (ref 4–5.4)
WBC # BLD AUTO: 5.17 K/UL (ref 3.9–12.7)

## 2024-03-04 PROCEDURE — 3008F BODY MASS INDEX DOCD: CPT | Mod: CPTII,S$GLB,,

## 2024-03-04 PROCEDURE — 85027 COMPLETE CBC AUTOMATED: CPT

## 2024-03-04 PROCEDURE — 1101F PT FALLS ASSESS-DOCD LE1/YR: CPT | Mod: CPTII,S$GLB,,

## 2024-03-04 PROCEDURE — 36415 COLL VENOUS BLD VENIPUNCTURE: CPT | Mod: PN

## 2024-03-04 PROCEDURE — 1126F AMNT PAIN NOTED NONE PRSNT: CPT | Mod: CPTII,S$GLB,,

## 2024-03-04 PROCEDURE — 3074F SYST BP LT 130 MM HG: CPT | Mod: CPTII,S$GLB,,

## 2024-03-04 PROCEDURE — 1159F MED LIST DOCD IN RCRD: CPT | Mod: CPTII,S$GLB,,

## 2024-03-04 PROCEDURE — 99999 PR PBB SHADOW E&M-EST. PATIENT-LVL V: CPT | Mod: PBBFAC,,,

## 2024-03-04 PROCEDURE — 3078F DIAST BP <80 MM HG: CPT | Mod: CPTII,S$GLB,,

## 2024-03-04 PROCEDURE — 99214 OFFICE O/P EST MOD 30 MIN: CPT | Mod: S$GLB,,,

## 2024-03-04 PROCEDURE — 3288F FALL RISK ASSESSMENT DOCD: CPT | Mod: CPTII,S$GLB,,

## 2024-03-04 NOTE — PROGRESS NOTES
"[facility]  [address]  [place] [address]    Subjective    Chief Complaint:   Chief Complaint   Patient presents with    Follow-up       History of Present Illness:     Nadia Irene is a(n) 73 y.o. female with past medical history as noted below who presents to the clinic today for follow up.    LOV reported dizziness (room spinning) and left-sided head fullness/discomfort. Recommended that she try flonase and nasal saline for peripheral vertigo, which she has been doing without relief. Head discomfort has improved but still reporting dizziness.     Also was switched to alternate PPI for gastritis. Recent EGD shows mild gastritis, she is to f/u with Dr. Maguire outpatient. Famotidine was added to regimen. Still having epigastric discomfort but feels this has improved.     She also has seen her cardiologist, Joel added to regimen. Scheduled for outpatient LHC with recent history chest pain.    Recent hospital stay/observation for chest pain, see below:       "HPI (from H&P):   Patient is a 72 yo AAF that has a PMH significant for anemia, DM, GERD, and HTN. She presented to the ED for complaints of worsening chest pain. She states she was seen by her cardiologist today for intermittent chest pain that began about a week ago and he had scheduled her for an outpatient angiogram. He told her if the pain got worse to go straight to the ED. The pain became more constant and worsened in intensity after her appointment so she presented to the ED. She described it as left-sided chest pain she describes as throbbing. She has associated SOB. She denies N/V. She denies diaphoresis. ED MD called cardiology and they asked hospital medicine to admit and have her NPO after MN for a LHC in AM. Her EKG in ED was NSR with no ST changes.   Chest x-ray with no pneumothorax or large lobar infiltrate. SOB is relieved with rest and worsened with exertion. Chest pain is not " "relieved by anything at this point. The past week was relieved by rest but now it is more constant.      Procedure(s) (LRB):  ESOPHAGOGASTRODUODENOSCOPY (EGD) (N/A)  COLONOSCOPY (N/A)       Hospital Course:   Cardiology was consulted. Her chest pain quickly resolved. Troponins x3 were negative, EKG did not show any ST elevations, 2/21/24 TTE showed normal systolic and diastolic function (PASP was slightly elevated at 30 mmHg). She was noted to have worsened anemia than usual (Hb 10's-11's in 2023, 8's-9's here) and GI was consulted. Hb remained stable inpatient with no bleeding events (even FOBT was negative, though Ferritin was very low at 12). 2/23/24 EGD showed only "minimal antritis" which was biopsied, and colonoscopy showed non-bleeding internal hemorrhoids, a tortuous colon and one 2 to 3 mm polyp in the distal descending colon which was resected.      I assumed care for, and personally evaluated and examined pt on 2/24/24. She was resting comfortably in bed, breathing on room air. She said that she felt "great" and denied chest pain or shortness of breath. She was ambulatory in her room. She was hopeful for discharge home today. I discussed with Cardiology and they said that outpatient f/u for elective LHC (or other diagnostic test per Cardiology recommendations) was reasonable. Pt was medically appropriate for, and agreeable to, discharge today. She will f/u in Cardiology clinic (Dr. Hernandez) as well as GI clinic (Dr. Maguire) for biopsy pathology results. She will be prescribed oral iron supplementation at discharge. DAPT and Eliquis will be resumed due to stable Hb, lack of identified bleeding and indications for both (CABG; PAF). "    COPD  Rx-Breo     Combined systolic, diastolic congestive heart failure  Previous echocardiogram 05/26/2022-grade 2 diastolic dysfunction, ejection fraction 40%  Cardiology- MD Mary   Rx-Aldactone 25 mg, beta-blocker, Entresto, Lasix 20 mg      Valvular heart " disease  05/26/2022-echocardiogram shows moderate aortic regurgitation, tricuspid regurgitation, pulmonic regurgitation, mitral regurgitation     Coronary artery disease status post CABG x5 (5/29/2020), dyslipidemia  MAGGIE :  X1 placed late 2023  Cardiology- MD Mary   Rx-aspirin 81 mg, atorvastatin 10 mg, Plavix 75 mg  Negative nuclear stress test 04/2023  Previous lipid panel acceptable     Essential hypertension  Rx-Entresto, metoprolol 50 mg, spironolactone 25 mg,  Imdur      Bilateral carotid artery stenosis  Currently on statin  05/27/2020-carotid ultrasound shows less than 50% stenosis right carotid artery, left internal carotid artery with 50-69% stenosis     Type 2 diabetes mellitus with associated polyneuropathy  Previous A1c-6.3%  Rx-metformin extended release 1000 mg by mouth daily     GERD  Rx : pantoprazole      Nicotine dependence  Prev rx : Patches (didn't work)     Chronic back pain   Rx-tramadol     Problem List:   Patient Active Problem List   Diagnosis    Essential hypertension    Tobacco abuse    Choledocholithiasis    Type 2 diabetes mellitus with diabetic polyneuropathy, without long-term current use of insulin    Calculus of bile duct without cholecystitis and without obstruction    Dilated bile duct    Gastroesophageal reflux disease without esophagitis    Hypokalemia    Coronary artery disease    Chronic combined systolic and diastolic heart failure    Bilateral carotid artery stenosis    S/P CABG (coronary artery bypass graft)    COPD (chronic obstructive pulmonary disease)    Valvular heart disease    Anemia    Cigarette nicotine dependence without complication    Aortic atherosclerosis    Other chest pain       Current Outpatient Medications:   Current Outpatient Medications   Medication Instructions    alcohol swabs PadM 1 each, Topical (Top), As needed (PRN)    apixaban (ELIQUIS) 2.5 mg, Oral, 2 times daily    aspirin (ECOTRIN) 81 mg, Oral, Daily    atorvastatin (LIPITOR) 10 MG tablet  TAKE 1 TABLET EVERY EVENING    blood glucose control high,low (ACCU-CHEK KEDAR CONTROL SOLN) Soln Use to test controls per [facility] guidelines/instructions    clopidogreL (PLAVIX) 75 mg tablet TAKE 1 TABLET EVERY DAY    esomeprazole (NEXIUM) 40 mg, Oral, Before breakfast, Take on empty stomach    famotidine (PEPCID) 40 mg, Oral, Daily    ferrous sulfate (FEOSOL) 325 mg, Oral, Daily    fluticasone-umeclidin-vilanter (TRELEGY ELLIPTA) 100-62.5-25 mcg DsDv 1 puff, Inhalation, Daily    furosemide (LASIX) 20 mg, Oral, Daily    hydrALAZINE (APRESOLINE) 25 mg, Oral, 3 times daily    latanoprost 0.005 % ophthalmic solution 1 drop, Both Eyes, Nightly    metFORMIN (GLUCOPHAGE-XR) 500 MG ER 24hr tablet TAKE 2 TABLETS ONE TIME DAILY    metoprolol succinate (TOPROL-XL) 50 mg, Oral, Every morning    sacubitriL-valsartan (ENTRESTO)  mg per tablet 1 tablet, Oral, 2 times daily    spironolactone (ALDACTONE) 25 mg, Oral, Daily    timolol maleate 0.5% (TIMOPTIC) 0.5 % Drop 1 drop, Left Eye, 2 times daily    traMADoL (ULTRAM) 50 mg, Oral, Every 12 hours PRN       Surgical History:   Past Surgical History:   Procedure Laterality Date    ARTERIOGRAPHY OF SUBCLAVIAN ARTERY  5/22/2020    Procedure: Arteriogram, Subclavian;  Surgeon: Heather Carbajal MD;  Location: [facility] CATH;  Service: Cardiology;;    COLONOSCOPY      COLONOSCOPY N/A 2/23/2024    Procedure: COLONOSCOPY;  Surgeon: Murphy Maguire Jr., MD;  Location: [facility] ENDO;  Service: Endoscopy;  Laterality: N/A;    CORONARY ANGIOGRAPHY N/A 5/22/2020    Procedure: ANGIOGRAM, CORONARY ARTERY;  Surgeon: Heather Carbajal MD;  Location: [facility] CATH;  Service: Cardiology;  Laterality: N/A;    CORONARY ARTERY BYPASS GRAFT (CABG) N/A 5/29/2020    Procedure: CORONARY ARTERY BYPASS GRAFT (CABG) x 5 VESSELS;  Surgeon: Dima Laughlin MD;  Location: [facility] OR;  Service: Cardiovascular;  Laterality: N/A;    ENDOSCOPIC HARVEST OF VEIN N/A 5/29/2020    Procedure: SURGICAL PROCUREMENT, VEIN, ENDOSCOPIC;   "Surgeon: Dima Laughlin MD;  Location: [facility] OR;  Service: Cardiovascular;  Laterality: N/A;    ERCP W/ PLASTIC STENT PLACEMENT      ERCP W/ SPHICTEROTOMY      ESOPHAGOGASTRODUODENOSCOPY      ESOPHAGOGASTRODUODENOSCOPY N/A 2/23/2024    Procedure: ESOPHAGOGASTRODUODENOSCOPY (EGD);  Surgeon: Murphy Maguire Jr., MD;  Location: [facility] ENDO;  Service: Endoscopy;  Laterality: N/A;    HYSTERECTOMY      INSERTION OF INTRA-AORTIC BALLOON ASSIST DEVICE N/A 5/29/2020    Procedure: INSERTION, INTRA-AORTIC BALLOON PUMP;  Surgeon: Dima Laughlin MD;  Location: [facility] OR;  Service: Cardiovascular;  Laterality: N/A;    LEFT HEART CATHETERIZATION Left 5/22/2020    Procedure: Left heart cath;  Surgeon: Heather Carbajal MD;  Location: [facility] CATH;  Service: Cardiology;  Laterality: Left;       Family History: No family history on file.    Allergies: Review of patient's allergies indicates:  No Known Allergies    Tobacco Status:   Tobacco Use: Medium Risk (3/4/2024)    Patient History     Smoking Tobacco Use: Former     Smokeless Tobacco Use: Never     Passive Exposure: Not on file       Sexual Activity:   Social History     Substance and Sexual Activity   Sexual Activity Not on file       Alcohol Use:   Social History     Substance and Sexual Activity   Alcohol Use Not Currently    Comment: sober 2 years 3 months         Objective       Vitals:    03/04/24 1103   BP: 128/60   Pulse: 90   SpO2: 99%   Weight: 43.9 kg (96 lb 10.8 oz)   Height: 5' 1" (1.549 m)       Review of Systems   Constitutional:  Negative for chills and fever.   Respiratory:  Negative for cough and shortness of breath.    Cardiovascular:  Negative for chest pain.   Gastrointestinal:  Positive for abdominal pain. Negative for blood in stool, constipation, diarrhea, heartburn, melena, nausea and vomiting.   Neurological:  Positive for dizziness. Negative for tingling, tremors, sensory change, speech change, focal weakness, seizures, loss of consciousness, weakness and headaches. "       Physical Exam  Constitutional:       General: She is not in acute distress.     Appearance: Normal appearance.   HENT:      Head: Normocephalic and atraumatic.   Cardiovascular:      Rate and Rhythm: Normal rate and regular rhythm.      Pulses:           Dorsalis pedis pulses are 2+ on the right side and 2+ on the left side.        Posterior tibial pulses are 2+ on the right side and 2+ on the left side.      Heart sounds: Normal heart sounds. No murmur heard.  Pulmonary:      Effort: Pulmonary effort is normal. No respiratory distress.      Breath sounds: Normal breath sounds. No wheezing.   Abdominal:      General: Abdomen is flat. Bowel sounds are normal. There is no distension.      Palpations: Abdomen is soft.      Tenderness: There is abdominal tenderness (epigastric area, mild). There is no guarding or rebound.   Musculoskeletal:      Right foot: Normal range of motion.      Left foot: Normal range of motion.   Feet:      Right foot:      Protective Sensation: 10 sites tested.  10 sites sensed.      Left foot:      Protective Sensation: 10 sites tested.  10 sites sensed.   Skin:     General: Skin is warm.   Neurological:      General: No focal deficit present.      Mental Status: She is alert and oriented to person, place, and time.      Sensory: No sensory deficit.      Motor: No weakness.      Coordination: Coordination normal.   Psychiatric:         Behavior: Behavior normal.           Assessment and Plan:    1. Hospital discharge follow-up    2. Type 2 diabetes mellitus with diabetic polyneuropathy, without long-term current use of insulin  -     Foot Exam Performed    3. Encounter for osteoporosis screening in asymptomatic postmenopausal patient  -     DXA Bone Density Axial Skeleton 1 or more sites; Future; Expected date: 03/04/2024    4. Dizziness and giddiness  -     MRA Brain without contrast; Future; Expected date: 03/04/2024    5. Iron deficiency anemia, unspecified iron deficiency anemia  type  -     CBC Without Differential; Future; Expected date: 03/04/2024        Visit summary:    Nadia Irene presented today for f/u and hospital f/u.    R/c CBC in setting of worsening anemia, she has started iron replacement therapy. Recent EGD and colonoscopy without clear cause of anemia. If persistent would consider hematology/ oncology referral. This could also be contributing to her dizziness    Schedule MRA brain for unresolved vertigo despite conservative measures    Abdominal pain improving after addition of famotidine. Maintain f/u with GI outpatient.    HTN controlled today.     Maintain f/u with cardiology.     Patient was instructed to report to ER if symptoms become severe.        María Ordaz PA-C    This note was created partially with voice dictation software and is prone to errors. This note has been reviewed by me but some errors are inevitable.

## 2024-03-05 ENCOUNTER — TELEPHONE (OUTPATIENT)
Dept: FAMILY MEDICINE | Facility: CLINIC | Age: 74
End: 2024-03-05
Payer: MEDICARE

## 2024-03-05 ENCOUNTER — HOSPITAL ENCOUNTER (OUTPATIENT)
Facility: HOSPITAL | Age: 74
Discharge: HOME | End: 2024-03-06
Attending: EMERGENCY MEDICINE | Admitting: INTERNAL MEDICINE
Payer: MEDICARE

## 2024-03-05 DIAGNOSIS — R07.9 CHEST PAIN: ICD-10-CM

## 2024-03-05 DIAGNOSIS — D64.9 SYMPTOMATIC ANEMIA: ICD-10-CM

## 2024-03-05 PROBLEM — I48.91 ATRIAL FIBRILLATION: Status: ACTIVE | Noted: 2024-03-05

## 2024-03-05 LAB
ABO + RH BLD: NORMAL
ALBUMIN SERPL BCP-MCNC: 4 G/DL (ref 3.5–5.2)
ALP SERPL-CCNC: 65 U/L (ref 55–135)
ALT SERPL W/O P-5'-P-CCNC: 14 U/L (ref 10–44)
ANION GAP SERPL CALC-SCNC: 6 MMOL/L (ref 8–16)
APTT PPP: 22.2 SEC (ref 21–32)
AST SERPL-CCNC: 23 U/L (ref 10–40)
BASOPHILS # BLD AUTO: 0.03 K/UL (ref 0–0.2)
BASOPHILS NFR BLD: 0.7 % (ref 0–1.9)
BILIRUB SERPL-MCNC: 0.4 MG/DL (ref 0.1–1)
BILIRUB UR QL STRIP: NEGATIVE
BLD GP AB SCN CELLS X3 SERPL QL: NORMAL
BLD PROD TYP BPU: NORMAL
BLOOD UNIT EXPIRATION DATE: NORMAL
BLOOD UNIT TYPE CODE: 6200
BLOOD UNIT TYPE: NORMAL
BUN SERPL-MCNC: 11 MG/DL (ref 8–23)
CALCIUM SERPL-MCNC: 9 MG/DL (ref 8.7–10.5)
CHLORIDE SERPL-SCNC: 109 MMOL/L (ref 95–110)
CLARITY UR: CLEAR
CO2 SERPL-SCNC: 25 MMOL/L (ref 23–29)
CODING SYSTEM: NORMAL
COLOR UR: COLORLESS
CREAT SERPL-MCNC: 0.8 MG/DL (ref 0.5–1.4)
CROSSMATCH INTERPRETATION: NORMAL
DIFFERENTIAL METHOD BLD: ABNORMAL
DISPENSE STATUS: NORMAL
EOSINOPHIL # BLD AUTO: 0 K/UL (ref 0–0.5)
EOSINOPHIL NFR BLD: 0.9 % (ref 0–8)
ERYTHROCYTE [DISTWIDTH] IN BLOOD BY AUTOMATED COUNT: 25.1 % (ref 11.5–14.5)
EST. GFR  (NO RACE VARIABLE): >60 ML/MIN/1.73 M^2
FERRITIN SERPL-MCNC: 9 NG/ML (ref 20–300)
GLUCOSE SERPL-MCNC: 109 MG/DL (ref 70–110)
GLUCOSE SERPL-MCNC: 164 MG/DL (ref 70–110)
GLUCOSE UR QL STRIP: ABNORMAL
HCT VFR BLD AUTO: 19.8 % (ref 37–48.5)
HGB BLD-MCNC: 6 G/DL (ref 12–16)
HGB UR QL STRIP: NEGATIVE
IMM GRANULOCYTES # BLD AUTO: 0.02 K/UL (ref 0–0.04)
IMM GRANULOCYTES NFR BLD AUTO: 0.5 % (ref 0–0.5)
INR PPP: 1 (ref 0.8–1.2)
IRON SERPL-MCNC: 229 UG/DL (ref 30–160)
KETONES UR QL STRIP: NEGATIVE
LEUKOCYTE ESTERASE UR QL STRIP: NEGATIVE
LYMPHOCYTES # BLD AUTO: 1.4 K/UL (ref 1–4.8)
LYMPHOCYTES NFR BLD: 32.6 % (ref 18–48)
MAGNESIUM SERPL-MCNC: 1.8 MG/DL (ref 1.6–2.6)
MCH RBC QN AUTO: 26.4 PG (ref 27–31)
MCHC RBC AUTO-ENTMCNC: 30.3 G/DL (ref 32–36)
MCV RBC AUTO: 87 FL (ref 82–98)
MONOCYTES # BLD AUTO: 0.4 K/UL (ref 0.3–1)
MONOCYTES NFR BLD: 8.3 % (ref 4–15)
NEUTROPHILS # BLD AUTO: 2.5 K/UL (ref 1.8–7.7)
NEUTROPHILS NFR BLD: 57 % (ref 38–73)
NITRITE UR QL STRIP: NEGATIVE
NRBC BLD-RTO: 0 /100 WBC
NUM UNITS TRANS PACKED RBC: NORMAL
PH UR STRIP: 7 [PH] (ref 5–8)
PLATELET # BLD AUTO: 265 K/UL (ref 150–450)
PMV BLD AUTO: 10.3 FL (ref 9.2–12.9)
POTASSIUM SERPL-SCNC: 3.9 MMOL/L (ref 3.5–5.1)
PROT SERPL-MCNC: 6.8 G/DL (ref 6–8.4)
PROT UR QL STRIP: NEGATIVE
PROTHROMBIN TIME: 11.1 SEC (ref 9–12.5)
RBC # BLD AUTO: 2.27 M/UL (ref 4–5.4)
RETICS/RBC NFR AUTO: 5.2 % (ref 0.5–2.5)
SATURATED IRON: 60 % (ref 20–50)
SODIUM SERPL-SCNC: 140 MMOL/L (ref 136–145)
SP GR UR STRIP: 1 (ref 1–1.03)
SPECIMEN OUTDATE: NORMAL
TOTAL IRON BINDING CAPACITY: 381 UG/DL (ref 250–450)
TRANSFERRIN SERPL-MCNC: 272 MG/DL (ref 200–375)
TROPONIN I SERPL HS-MCNC: 7.9 PG/ML (ref 0–14.9)
URN SPEC COLLECT METH UR: ABNORMAL
UROBILINOGEN UR STRIP-ACNC: NEGATIVE EU/DL
WBC # BLD AUTO: 4.35 K/UL (ref 3.9–12.7)

## 2024-03-05 PROCEDURE — 84484 ASSAY OF TROPONIN QUANT: CPT | Performed by: EMERGENCY MEDICINE

## 2024-03-05 PROCEDURE — 85610 PROTHROMBIN TIME: CPT | Performed by: INTERNAL MEDICINE

## 2024-03-05 PROCEDURE — 82962 GLUCOSE BLOOD TEST: CPT

## 2024-03-05 PROCEDURE — C9113 INJ PANTOPRAZOLE SODIUM, VIA: HCPCS | Performed by: NURSE PRACTITIONER

## 2024-03-05 PROCEDURE — 93010 ELECTROCARDIOGRAM REPORT: CPT | Mod: 76,,, | Performed by: INTERNAL MEDICINE

## 2024-03-05 PROCEDURE — 85730 THROMBOPLASTIN TIME PARTIAL: CPT | Performed by: INTERNAL MEDICINE

## 2024-03-05 PROCEDURE — 93005 ELECTROCARDIOGRAM TRACING: CPT | Performed by: INTERNAL MEDICINE

## 2024-03-05 PROCEDURE — 85025 COMPLETE CBC W/AUTO DIFF WBC: CPT | Performed by: EMERGENCY MEDICINE

## 2024-03-05 PROCEDURE — G0378 HOSPITAL OBSERVATION PER HR: HCPCS

## 2024-03-05 PROCEDURE — 25000003 PHARM REV CODE 250: Performed by: NURSE PRACTITIONER

## 2024-03-05 PROCEDURE — 93010 ELECTROCARDIOGRAM REPORT: CPT | Mod: ,,, | Performed by: INTERNAL MEDICINE

## 2024-03-05 PROCEDURE — 86850 RBC ANTIBODY SCREEN: CPT | Performed by: EMERGENCY MEDICINE

## 2024-03-05 PROCEDURE — 36430 TRANSFUSION BLD/BLD COMPNT: CPT

## 2024-03-05 PROCEDURE — 63600175 PHARM REV CODE 636 W HCPCS: Performed by: NURSE PRACTITIONER

## 2024-03-05 PROCEDURE — 81003 URINALYSIS AUTO W/O SCOPE: CPT | Performed by: EMERGENCY MEDICINE

## 2024-03-05 PROCEDURE — 36415 COLL VENOUS BLD VENIPUNCTURE: CPT | Performed by: INTERNAL MEDICINE

## 2024-03-05 PROCEDURE — 99285 EMERGENCY DEPT VISIT HI MDM: CPT | Mod: 25

## 2024-03-05 PROCEDURE — 86920 COMPATIBILITY TEST SPIN: CPT | Performed by: EMERGENCY MEDICINE

## 2024-03-05 PROCEDURE — 80053 COMPREHEN METABOLIC PANEL: CPT | Performed by: EMERGENCY MEDICINE

## 2024-03-05 PROCEDURE — 83735 ASSAY OF MAGNESIUM: CPT | Performed by: EMERGENCY MEDICINE

## 2024-03-05 PROCEDURE — 85045 AUTOMATED RETICULOCYTE COUNT: CPT | Performed by: EMERGENCY MEDICINE

## 2024-03-05 PROCEDURE — 82728 ASSAY OF FERRITIN: CPT | Performed by: EMERGENCY MEDICINE

## 2024-03-05 PROCEDURE — 96374 THER/PROPH/DIAG INJ IV PUSH: CPT

## 2024-03-05 PROCEDURE — P9016 RBC LEUKOCYTES REDUCED: HCPCS | Performed by: EMERGENCY MEDICINE

## 2024-03-05 PROCEDURE — 83540 ASSAY OF IRON: CPT | Performed by: EMERGENCY MEDICINE

## 2024-03-05 RX ORDER — INSULIN ASPART 100 [IU]/ML
0-5 INJECTION, SOLUTION INTRAVENOUS; SUBCUTANEOUS
Status: DISCONTINUED | OUTPATIENT
Start: 2024-03-05 | End: 2024-03-06 | Stop reason: HOSPADM

## 2024-03-05 RX ORDER — IBUPROFEN 200 MG
16 TABLET ORAL
Status: DISCONTINUED | OUTPATIENT
Start: 2024-03-05 | End: 2024-03-06 | Stop reason: HOSPADM

## 2024-03-05 RX ORDER — LANOLIN ALCOHOL/MO/W.PET/CERES
800 CREAM (GRAM) TOPICAL
Status: DISCONTINUED | OUTPATIENT
Start: 2024-03-05 | End: 2024-03-06 | Stop reason: HOSPADM

## 2024-03-05 RX ORDER — PANTOPRAZOLE SODIUM 40 MG/10ML
40 INJECTION, POWDER, LYOPHILIZED, FOR SOLUTION INTRAVENOUS EVERY 12 HOURS
Status: DISCONTINUED | OUTPATIENT
Start: 2024-03-05 | End: 2024-03-05

## 2024-03-05 RX ORDER — ATORVASTATIN CALCIUM 10 MG/1
10 TABLET, FILM COATED ORAL NIGHTLY
Status: DISCONTINUED | OUTPATIENT
Start: 2024-03-05 | End: 2024-03-06 | Stop reason: HOSPADM

## 2024-03-05 RX ORDER — ONDANSETRON HYDROCHLORIDE 2 MG/ML
4 INJECTION, SOLUTION INTRAVENOUS EVERY 6 HOURS PRN
Status: DISCONTINUED | OUTPATIENT
Start: 2024-03-05 | End: 2024-03-06 | Stop reason: HOSPADM

## 2024-03-05 RX ORDER — SODIUM,POTASSIUM PHOSPHATES 280-250MG
2 POWDER IN PACKET (EA) ORAL
Status: DISCONTINUED | OUTPATIENT
Start: 2024-03-05 | End: 2024-03-06 | Stop reason: HOSPADM

## 2024-03-05 RX ORDER — GLUCAGON 1 MG
1 KIT INJECTION
Status: DISCONTINUED | OUTPATIENT
Start: 2024-03-05 | End: 2024-03-06 | Stop reason: HOSPADM

## 2024-03-05 RX ORDER — HYDRALAZINE HYDROCHLORIDE 25 MG/1
25 TABLET, FILM COATED ORAL 3 TIMES DAILY
Status: DISCONTINUED | OUTPATIENT
Start: 2024-03-05 | End: 2024-03-06 | Stop reason: HOSPADM

## 2024-03-05 RX ORDER — HYDROCODONE BITARTRATE AND ACETAMINOPHEN 500; 5 MG/1; MG/1
TABLET ORAL
Status: DISCONTINUED | OUTPATIENT
Start: 2024-03-05 | End: 2024-03-06 | Stop reason: HOSPADM

## 2024-03-05 RX ORDER — SPIRONOLACTONE 25 MG/1
25 TABLET ORAL DAILY
Status: DISCONTINUED | OUTPATIENT
Start: 2024-03-06 | End: 2024-03-06 | Stop reason: HOSPADM

## 2024-03-05 RX ORDER — NALOXONE HCL 0.4 MG/ML
0.02 VIAL (ML) INJECTION
Status: DISCONTINUED | OUTPATIENT
Start: 2024-03-05 | End: 2024-03-06 | Stop reason: HOSPADM

## 2024-03-05 RX ORDER — PANTOPRAZOLE SODIUM 40 MG/10ML
40 INJECTION, POWDER, LYOPHILIZED, FOR SOLUTION INTRAVENOUS 2 TIMES DAILY
Status: DISCONTINUED | OUTPATIENT
Start: 2024-03-05 | End: 2024-03-06 | Stop reason: HOSPADM

## 2024-03-05 RX ORDER — ALUMINUM HYDROXIDE, MAGNESIUM HYDROXIDE, AND SIMETHICONE 1200; 120; 1200 MG/30ML; MG/30ML; MG/30ML
30 SUSPENSION ORAL 4 TIMES DAILY PRN
Status: DISCONTINUED | OUTPATIENT
Start: 2024-03-05 | End: 2024-03-06 | Stop reason: HOSPADM

## 2024-03-05 RX ORDER — HYDROCODONE BITARTRATE AND ACETAMINOPHEN 5; 325 MG/1; MG/1
1 TABLET ORAL EVERY 6 HOURS PRN
Status: DISCONTINUED | OUTPATIENT
Start: 2024-03-05 | End: 2024-03-06 | Stop reason: HOSPADM

## 2024-03-05 RX ORDER — IBUPROFEN 200 MG
24 TABLET ORAL
Status: DISCONTINUED | OUTPATIENT
Start: 2024-03-05 | End: 2024-03-06 | Stop reason: HOSPADM

## 2024-03-05 RX ORDER — LATANOPROST 50 UG/ML
1 SOLUTION/ DROPS OPHTHALMIC NIGHTLY
Status: DISCONTINUED | OUTPATIENT
Start: 2024-03-05 | End: 2024-03-06 | Stop reason: HOSPADM

## 2024-03-05 RX ORDER — ACETAMINOPHEN 325 MG/1
650 TABLET ORAL EVERY 4 HOURS PRN
Status: DISCONTINUED | OUTPATIENT
Start: 2024-03-05 | End: 2024-03-06 | Stop reason: HOSPADM

## 2024-03-05 RX ORDER — SODIUM CHLORIDE 0.9 % (FLUSH) 0.9 %
10 SYRINGE (ML) INJECTION
Status: DISCONTINUED | OUTPATIENT
Start: 2024-03-05 | End: 2024-03-06 | Stop reason: HOSPADM

## 2024-03-05 RX ORDER — TIMOLOL MALEATE 5 MG/ML
1 SOLUTION/ DROPS OPHTHALMIC 2 TIMES DAILY
Status: DISCONTINUED | OUTPATIENT
Start: 2024-03-05 | End: 2024-03-06 | Stop reason: HOSPADM

## 2024-03-05 RX ORDER — AMOXICILLIN 250 MG
1 CAPSULE ORAL 2 TIMES DAILY PRN
Status: DISCONTINUED | OUTPATIENT
Start: 2024-03-05 | End: 2024-03-06 | Stop reason: HOSPADM

## 2024-03-05 RX ORDER — LANOLIN ALCOHOL/MO/W.PET/CERES
1 CREAM (GRAM) TOPICAL DAILY
Status: DISCONTINUED | OUTPATIENT
Start: 2024-03-06 | End: 2024-03-06 | Stop reason: HOSPADM

## 2024-03-05 RX ADMIN — ATORVASTATIN CALCIUM 10 MG: 10 TABLET, FILM COATED ORAL at 08:03

## 2024-03-05 RX ADMIN — ACETAMINOPHEN 650 MG: 325 TABLET ORAL at 10:03

## 2024-03-05 RX ADMIN — PANTOPRAZOLE SODIUM 40 MG: 40 INJECTION, POWDER, FOR SOLUTION INTRAVENOUS at 08:03

## 2024-03-05 NOTE — ASSESSMENT & PLAN NOTE
Chronic, controlled. Latest blood pressure and vitals reviewed-     Temp:  [97.8 °F (36.6 °C)-98.8 °F (37.1 °C)]   Pulse:  [90-95]   Resp:  [19-20]   BP: (141-186)/(65-91)   SpO2:  [97 %-100 %] .   Home meds for hypertension were reviewed and noted below.   Hypertension Medications               furosemide (LASIX) 20 MG tablet Take 1 tablet (20 mg total) by mouth once daily.    hydrALAZINE (APRESOLINE) 25 MG tablet Take 1 tablet (25 mg total) by mouth 3 (three) times daily.    metoprolol succinate (TOPROL-XL) 50 MG 24 hr tablet Take 50 mg by mouth every morning.    sacubitriL-valsartan (ENTRESTO)  mg per tablet Take 1 tablet by mouth 2 (two) times daily.    spironolactone (ALDACTONE) 25 MG tablet Take 1 tablet (25 mg total) by mouth once daily.            While in the hospital, will manage blood pressure as follows; Continue home antihypertensive regimen    Will utilize p.r.n. blood pressure medication only if patient's blood pressure greater than 180/110 and she develops symptoms such as worsening chest pain or shortness of breath.

## 2024-03-05 NOTE — ASSESSMENT & PLAN NOTE
Patient's COPD is controlled currently.  Patient is currently off COPD Pathway. Continue scheduled inhalers  continue home meds  and monitor respiratory status closely.

## 2024-03-05 NOTE — HPI
"73 year old female with a past medical history of significant for anemia, DM, GERD, HTN, Gi hemorrhage, combined systolic and diastolic heart failure, and ATTILA whom presents to the emergency room with shortness of breath at rest, worsening with walking, weakness, fatigue. She denies chest pain, dizziness, abdominal pain, nausea, vomiting, bleeding from stool. She was recently admitted for GI hemorrhage and chest pain. See chart review below. In ER, hemoglobin is 6, CMP unremarkable,  reticulocyte count is 5.2%, EKGs sinus rhythm with no significant ST segment changes, chest x-ray with no acute cardiopulmonary process. Here patient does have confirmed symptomatic anemia on our lab tests. Blood transfusion initiated here. Consent obtained. Admitted to hospital medicine for further medical management. Trend h/h, consult gi for further recommendations.       Chart Review: Last Hospitalization: 3/23/24:  Chest pain and anemia: Cardiology evaluated patient, Troponins x3 were negative, EKG did not show any ST elevations, 2/21/24 TTE showed normal systolic and diastolic function (PASP was slightly elevated at 30 mmHg). Cardiology and they said that outpatient f/u for elective LHC (or other diagnostic test per Cardiology recommendations) was reasonable.    She was noted to have worsened anemia than usual (Hb 10's-11's in 2023, 8's-9's here) and GI was consulted. Hb remained stable inpatient with no bleeding events (even FOBT was negative, though Ferritin was very low at 12). 2/23/24 EGD showed only "minimal antritis" which was biopsied, and colonoscopy showed non-bleeding internal hemorrhoids, a tortuous colon and one 2 to 3 mm polyp in the distal descending colon which was resected. oral iron supplementation at discharge. DAPT and Eliquis will be resumed due to stable Hb, lack of identified bleeding and indications for both (CABG; PAF).    "

## 2024-03-05 NOTE — TELEPHONE ENCOUNTER
----- Message from Jalen Marsh sent at 3/5/2024 10:33 AM CST -----  Regarding: [employer]  Type: Needs Medical Advice    Who Called:  charlie nurse care manager with [facility]    Best Call Back Number: [phone] / direct to charlie    Additional Information: [facility] trying to verify if pt has f/u appt scheduled yet b/c pt was discharged 2/22/24 from [facility]. They have been trying to contact pt but they do not have working number. Please call to discuss.    If pt has appt please have pt call [facility] have them update contact info and she can let them know she is getting care she needs.

## 2024-03-05 NOTE — ASSESSMENT & PLAN NOTE
Patient with Paroxysmal (<7 days) atrial fibrillation which is controlled currently with Beta Blocker. Patient is currently in sinus rhythm.[id number] Score: 4. HASBLED Score: 3. Anticoagulation not indicated due to active bleed .    Remains in NSR with Rate relatively stable, Continue Toprol XL   Holding Eliquis, Aspirin, and Plavix due to active bleed.   Last TSH WNL 0.719

## 2024-03-05 NOTE — TELEPHONE ENCOUNTER
[facility]  [address]  [place] [address]      Orders Only Encounter     Patient: Nadia Irene  YOB: 1950    Summary:    Called patient, recommend she report to ER for acutely worsening anemia. She is unsure where she plans to go. Will get a ride with family.       María Ordaz PA-C

## 2024-03-05 NOTE — ED PROVIDER NOTES
Encounter Date: 3/5/2024       History     Chief Complaint   Patient presents with    abnormal labs     States had labs drawn yesterday and was called this am to come for blood transfusion      Patient with a history of iron-deficiency anemia.  Patient recent workup for chest pain anemia patient EGD colonoscopy that was unremarkable.  Patient reports continued and weakness.  Dyspnea on exertion with walking.  No fever chills.  Patient labs yesterday with of 6.  Told by her provider to get blood transfusion.  She denies any gastrointestinal bleeding.  She is compliant iron supplementation      Review of patient's allergies indicates:  No Known Allergies  Past Medical History:   Diagnosis Date    Anemia     Biliary obstruction     Cholangitis     Diabetes mellitus     pt denies, does not take meds 11/2016    EtOH dependence     GERD (gastroesophageal reflux disease)     HTN (hypertension) 12/30/2013     Past Surgical History:   Procedure Laterality Date    ARTERIOGRAPHY OF SUBCLAVIAN ARTERY  5/22/2020    Procedure: Arteriogram, Subclavian;  Surgeon: Heather Carbajal MD;  Location: [facility] CATH;  Service: Cardiology;;    COLONOSCOPY      COLONOSCOPY N/A 2/23/2024    Procedure: COLONOSCOPY;  Surgeon: Murphy Maguire Jr., MD;  Location: [facility] ENDO;  Service: Endoscopy;  Laterality: N/A;    CORONARY ANGIOGRAPHY N/A 5/22/2020    Procedure: ANGIOGRAM, CORONARY ARTERY;  Surgeon: Heather Carbajal MD;  Location: [facility] CATH;  Service: Cardiology;  Laterality: N/A;    CORONARY ARTERY BYPASS GRAFT (CABG) N/A 5/29/2020    Procedure: CORONARY ARTERY BYPASS GRAFT (CABG) x 5 VESSELS;  Surgeon: Dima Laughlin MD;  Location: [facility] OR;  Service: Cardiovascular;  Laterality: N/A;    ENDOSCOPIC HARVEST OF VEIN N/A 5/29/2020    Procedure: SURGICAL PROCUREMENT, VEIN, ENDOSCOPIC;  Surgeon: Dima Laughlin MD;  Location: [facility] OR;  Service: Cardiovascular;  Laterality: N/A;    ERCP W/ PLASTIC STENT PLACEMENT      ERCP W/ SPHICTEROTOMY       ESOPHAGOGASTRODUODENOSCOPY      ESOPHAGOGASTRODUODENOSCOPY N/A 2/23/2024    Procedure: ESOPHAGOGASTRODUODENOSCOPY (EGD);  Surgeon: Murphy Maguire Jr., MD;  Location: [facility] ENDO;  Service: Endoscopy;  Laterality: N/A;    HYSTERECTOMY      INSERTION OF INTRA-AORTIC BALLOON ASSIST DEVICE N/A 5/29/2020    Procedure: INSERTION, INTRA-AORTIC BALLOON PUMP;  Surgeon: Dima Laughlin MD;  Location: [facility] OR;  Service: Cardiovascular;  Laterality: N/A;    LEFT HEART CATHETERIZATION Left 5/22/2020    Procedure: Left heart cath;  Surgeon: Heather Carbajal MD;  Location: [facility] CATH;  Service: Cardiology;  Laterality: Left;     No family history on file.  Social History     Tobacco Use    Smoking status: Former     Current packs/day: 0.00     Average packs/day: 0.5 packs/day for 40.0 years (20.0 ttl pk-yrs)     Types: Cigarettes     Start date: 5/19/1980     Quit date: 5/19/2020     Years since quitting: 3.7    Smokeless tobacco: Never   Substance Use Topics    Alcohol use: Not Currently     Comment: sober 2 years 3 months    Drug use: No     Review of Systems   Constitutional:  Negative for chills and fever.   HENT:  Negative for congestion.    Eyes:  Negative for visual disturbance.   Respiratory:  Positive for shortness of breath.    Cardiovascular:  Negative for chest pain and palpitations.   Gastrointestinal:  Negative for abdominal pain and vomiting.   Genitourinary:  Negative for dysuria.   Musculoskeletal:  Negative for joint swelling.   Neurological:  Positive for weakness. Negative for seizures, syncope and headaches.   Psychiatric/Behavioral:  Negative for confusion.        Physical Exam     Initial Vitals [03/05/24 1304]   BP Pulse Resp Temp SpO2   (!) 186/91 90 20 98.8 °F (37.1 °C) 97 %      MAP       --         Physical Exam    Nursing note and vitals reviewed.  Constitutional: She is not diaphoretic. No distress.   HENT:   Head: Normocephalic and atraumatic.   Eyes: Conjunctivae are normal.   Neck:   Normal range of  motion.  Cardiovascular:  Normal rate.           Pulmonary/Chest: Breath sounds normal.   Abdominal: Abdomen is soft. There is no abdominal tenderness.   Musculoskeletal:         General: Normal range of motion.      Cervical back: Normal range of motion.     Neurological: She is alert. She has normal strength. No cranial nerve deficit or sensory deficit.   No gross deficits   Skin: No rash noted.   Psychiatric:   Frail elderly appearing female.  Patient's daughter answers most questions for patient.         ED Course   Critical Care    Date/Time: 3/5/2024 3:19 PM    Performed by: Juma Mccray MD  Authorized by: Juma Mccray MD  Direct patient critical care time: 15 minutes  Additional history critical care time: 5 minutes  Ordering / reviewing critical care time: 5 minutes  Documentation critical care time: 5 minutes  Consulting other physicians critical care time: 5 minutes  Total critical care time (exclusive of procedural time) : 35 minutes        Labs Reviewed   CBC W/ AUTO DIFFERENTIAL - Abnormal; Notable for the following components:       Result Value    RBC 2.27 (*)     Hemoglobin 6.0 (*)     Hematocrit 19.8 (*)     MCH 26.4 (*)     MCHC 30.3 (*)     RDW 25.1 (*)     All other components within normal limits    Narrative:     hgb,hct   critical result(s) called and verbal readback obtained from   dr mccray by CAF 03/05/2024 14:36   COMPREHENSIVE METABOLIC PANEL - Abnormal; Notable for the following components:    Glucose 164 (*)     Anion Gap 6 (*)     All other components within normal limits   RETICULOCYTES - Abnormal; Notable for the following components:    Retic 5.2 (*)     All other components within normal limits   IRON AND TIBC - Abnormal; Notable for the following components:    Iron 229 (*)     Saturated Iron 60 (*)     All other components within normal limits   FERRITIN - Abnormal; Notable for the following components:    Ferritin 9.0 (*)     All other components within normal  limits   MAGNESIUM   TROPONIN I HIGH SENSITIVITY   URINALYSIS, REFLEX TO URINE CULTURE   TYPE & SCREEN   PREPARE RBC SOFT        ECG Results              EKG 12-lead (In process)        Collection Time Result Time QRS Duration O[facility] QTC Calculation    03/05/24 13:17:18 03/05/24 13:20:27 86 445                     In process by Interface, Lab In [facility] (03/05/24 13:20:38)                   Narrative:    Test Reason : D64.9,    Vent. Rate : 091 BPM     Atrial Rate : 091 BPM     P-R Int : 134 ms          QRS Dur : 086 ms      QT Int : 362 ms       P-R-T Axes : 048 043 094 degrees     QTc Int : 445 ms    Normal sinus rhythm  Possible Anterior infarct (cited on or before 19-FEB-2024)  Abnormal ECG  When compared with ECG of 20-FEB-2024 12:44,  No significant change was found    Referred By:             Confirmed By:                                   Imaging Results    None          Medications   0.9%  NaCl infusion (for blood administration) (has no administration in time range)     Medical Decision Making  Patient presents with weakness fatigue.  Differential diagnosis includes symptomatic anemia, gastrointestinal bleeding, myelodysplastic syndrome, acute coronary syndrome, electrolyte abnormality.  Here hemoglobin is 6, chemistries unremarkable reticulocyte count is 5.2%, EKGs sinus rhythm with no significant ST segment changes, chest x-ray no acute cardiopulmonary process.  Here patient does have confirmed symptomatic anemia on our lab tests.  She denies any gastrointestinal bleeding.  Recent history of iron-deficiency anemia with GI workup including EGD and colonoscopy no clear bleeding etiology.  Patient will need further evaluation.  Blood transfusion initiated here.  Consent obtained.    Amount and/or Complexity of Data Reviewed  Labs: ordered. Decision-making details documented in ED Course.  Radiology:  Decision-making details documented in ED Course.  ECG/medicine tests:  Decision-making details documented in ED  Course.    Risk  Prescription drug management.                                      Clinical Impression:  Final diagnoses:  [D64.9] Symptomatic anemia          ED Disposition Condition    Admit Stable                Juma Pizarro MD  03/05/24 [id number]

## 2024-03-05 NOTE — ED NOTES
Bed: [facility]  Expected date:   Expected time:   Means of arrival:   Comments:  Triage 3

## 2024-03-05 NOTE — H&P
[facility] - Emergency Dept  Hospital Medicine  History & Physical    Patient Name: Nadia Irene  MRN: [id number]  Patient Class: OP- Observation  Admission Date: 3/5/2024  Attending Physician: Juan Boucher MD   Primary Care Provider: Franklin Garcia MD         Patient information was obtained from patient, relative(s), and ER records.     Subjective:     Principal Problem:Symptomatic anemia    Chief Complaint:   Chief Complaint   Patient presents with    abnormal labs     States had labs drawn yesterday and was called this am to come for blood transfusion         HPI: 73 year old female with a past medical history of significant for anemia, DM, GERD, HTN, Gi hemorrhage, combined systolic and diastolic heart failure, and ATTILA whom presents to the emergency room with shortness of breath at rest, worsening with walking, weakness, fatigue. She denies chest pain, dizziness, abdominal pain, nausea, vomiting, bleeding from stool. She was recently admitted for GI hemorrhage and chest pain. See chart review below. In ER, hemoglobin is 6, CMP unremarkable,  reticulocyte count is 5.2%, EKGs sinus rhythm with no significant ST segment changes, chest x-ray with no acute cardiopulmonary process. Here patient does have confirmed symptomatic anemia on our lab tests. Blood transfusion initiated here. Consent obtained. Admitted to hospital medicine for further medical management. Trend h/h, consult gi for further recommendations.       Chart Review: Last Hospitalization: 3/23/24:  Chest pain and anemia: Cardiology evaluated patient, Troponins x3 were negative, EKG did not show any ST elevations, 2/21/24 TTE showed normal systolic and diastolic function (PASP was slightly elevated at 30 mmHg). Cardiology and they said that outpatient f/u for elective LHC (or other diagnostic test per Cardiology recommendations) was reasonable.    She was noted to have worsened anemia than usual (Hb 10's-11's in 2023, 8's-9's  "here) and GI was consulted. Hb remained stable inpatient with no bleeding events (even FOBT was negative, though Ferritin was very low at 12). 2/23/24 EGD showed only "minimal antritis" which was biopsied, and colonoscopy showed non-bleeding internal hemorrhoids, a tortuous colon and one 2 to 3 mm polyp in the distal descending colon which was resected. oral iron supplementation at discharge. DAPT and Eliquis will be resumed due to stable Hb, lack of identified bleeding and indications for both (CABG; PAF).      Past Medical History:   Diagnosis Date    Anemia     Biliary obstruction     Cholangitis     Diabetes mellitus     pt denies, does not take meds 11/2016    EtOH dependence     GERD (gastroesophageal reflux disease)     HTN (hypertension) 12/30/2013       Past Surgical History:   Procedure Laterality Date    ARTERIOGRAPHY OF SUBCLAVIAN ARTERY  5/22/2020    Procedure: Arteriogram, Subclavian;  Surgeon: Heather Carbajal MD;  Location: [facility] CATH;  Service: Cardiology;;    COLONOSCOPY      COLONOSCOPY N/A 2/23/2024    Procedure: COLONOSCOPY;  Surgeon: Murphy Maguire Jr., MD;  Location: [facility] ENDO;  Service: Endoscopy;  Laterality: N/A;    CORONARY ANGIOGRAPHY N/A 5/22/2020    Procedure: ANGIOGRAM, CORONARY ARTERY;  Surgeon: Heather Carbajal MD;  Location: [facility] CATH;  Service: Cardiology;  Laterality: N/A;    CORONARY ARTERY BYPASS GRAFT (CABG) N/A 5/29/2020    Procedure: CORONARY ARTERY BYPASS GRAFT (CABG) x 5 VESSELS;  Surgeon: Dima Laughlin MD;  Location: [facility] OR;  Service: Cardiovascular;  Laterality: N/A;    ENDOSCOPIC HARVEST OF VEIN N/A 5/29/2020    Procedure: SURGICAL PROCUREMENT, VEIN, ENDOSCOPIC;  Surgeon: Dima Laughlin MD;  Location: [facility] OR;  Service: Cardiovascular;  Laterality: N/A;    ERCP W/ PLASTIC STENT PLACEMENT      ERCP W/ SPHICTEROTOMY      ESOPHAGOGASTRODUODENOSCOPY      ESOPHAGOGASTRODUODENOSCOPY N/A 2/23/2024    Procedure: ESOPHAGOGASTRODUODENOSCOPY (EGD);  Surgeon: Murphy Maguire Jr., MD;  " Location: [facility] ENDO;  Service: Endoscopy;  Laterality: N/A;    HYSTERECTOMY      INSERTION OF INTRA-AORTIC BALLOON ASSIST DEVICE N/A 5/29/2020    Procedure: INSERTION, INTRA-AORTIC BALLOON PUMP;  Surgeon: Dima Laughlin MD;  Location: [facility] OR;  Service: Cardiovascular;  Laterality: N/A;    LEFT HEART CATHETERIZATION Left 5/22/2020    Procedure: Left heart cath;  Surgeon: Heather Carbajal MD;  Location: [facility] CATH;  Service: Cardiology;  Laterality: Left;       Review of patient's allergies indicates:  No Known Allergies    No current facility-administered medications on file prior to encounter.     Current Outpatient Medications on File Prior to Encounter   Medication Sig    apixaban (ELIQUIS) 2.5 mg Tab Take 1 tablet (2.5 mg total) by mouth 2 (two) times daily.    aspirin (ECOTRIN) 81 MG EC tablet Take 1 tablet (81 mg total) by mouth once daily.    atorvastatin (LIPITOR) 10 MG tablet TAKE 1 TABLET EVERY EVENING (Patient taking differently: Take 10 mg by mouth every evening.)    clopidogreL (PLAVIX) 75 mg tablet TAKE 1 TABLET EVERY DAY (Patient taking differently: Take 75 mg by mouth once daily.)    esomeprazole (NEXIUM) 40 MG capsule Take 1 capsule (40 mg total) by mouth before breakfast. Take on empty stomach    ferrous sulfate (FEOSOL) 325 mg (65 mg iron) Tab tablet Take 1 tablet (325 mg total) by mouth once daily.    fluticasone-umeclidin-vilanter (TRELEGY ELLIPTA) 100-62.5-25 mcg DsDv Inhale 1 puff into the lungs once daily.    hydrALAZINE (APRESOLINE) 25 MG tablet Take 1 tablet (25 mg total) by mouth 3 (three) times daily.    latanoprost 0.005 % ophthalmic solution Place 1 drop into both eyes every evening.    metFORMIN (GLUCOPHAGE-XR) 500 MG ER 24hr tablet TAKE 2 TABLETS ONE TIME DAILY (Patient taking differently: Take 500 mg by mouth 2 (two) times daily with meals.)    sacubitriL-valsartan (ENTRESTO)  mg per tablet Take 1 tablet by mouth 2 (two) times daily.    spironolactone (ALDACTONE) 25 MG tablet Take 1  tablet (25 mg total) by mouth once daily.    timolol maleate 0.5% (TIMOPTIC) 0.5 % Drop Place 1 drop into the left eye 2 (two) times daily.    alcohol swabs PadM Apply 1 each topically as needed. (Patient not taking: Reported on 3/4/2024)    blood glucose control high,low (ACCU-CHEK KEDAR CONTROL SOLN) Soln Use to test controls per r guidelines/instructions (Patient not taking: Reported on 3/4/2024)    famotidine (PEPCID) 40 MG tablet Take 1 tablet (40 mg total) by mouth once daily.    furosemide (LASIX) 20 MG tablet Take 1 tablet (20 mg total) by mouth once daily.    metoprolol succinate (TOPROL-XL) 50 MG 24 hr tablet Take 50 mg by mouth every morning.    traMADoL (ULTRAM) 50 mg tablet Take 1 tablet (50 mg total) by mouth every 12 (twelve) hours as needed for Pain. (Patient not taking: Reported on 3/4/2024)     Family History    None       Tobacco Use    Smoking status: Former     Current packs/day: 0.00     Average packs/day: 0.5 packs/day for 40.0 years (20.0 ttl pk-yrs)     Types: Cigarettes     Start date: 5/19/1980     Quit date: 5/19/2020     Years since quitting: 3.7    Smokeless tobacco: Never   Substance and Sexual Activity    Alcohol use: Not Currently     Comment: sober 2 years 3 months    Drug use: No    Sexual activity: Not on file     Review of Systems   Constitutional:  Positive for activity change and fatigue. Negative for chills and fever.   HENT:  Negative for congestion, sore throat and trouble swallowing.    Respiratory:  Positive for shortness of breath. Negative for cough, chest tightness and wheezing.    Cardiovascular:  Negative for chest pain, palpitations and leg swelling.   Gastrointestinal:  Negative for abdominal pain, blood in stool, diarrhea, nausea and vomiting.   Genitourinary:  Negative for difficulty urinating, dysuria and urgency.   Musculoskeletal:  Negative for arthralgias and myalgias.   Skin:  Positive for pallor.   Neurological:  Positive for weakness. Negative for  dizziness, seizures and syncope.   Psychiatric/Behavioral:  Negative for confusion and suicidal ideas.      Objective:     Vital Signs (Most Recent):  Temp: 98.8 °F (37.1 °C) (03/05/24 1304)  Pulse: 90 (03/05/24 1304)  Resp: 20 (03/05/24 1304)  BP: (!) 186/91 (03/05/24 1304)  SpO2: 97 % (03/05/24 1304) Vital Signs (24h Range):  Temp:  [98.8 °F (37.1 °C)] 98.8 °F (37.1 °C)  Pulse:  [90] 90  Resp:  [20] 20  SpO2:  [97 %] 97 %  BP: (186)/(91) 186/91     Weight: 44.5 kg (98 lb)  Body mass index is 18.52 kg/m².     Physical Exam  Vitals reviewed.   Constitutional:       General: She is awake.      Appearance: Normal appearance.      Comments: Frail. elderly   HENT:      Head: Normocephalic and atraumatic.      Mouth/Throat:      Mouth: Mucous membranes are moist.   Eyes:      Extraocular Movements: Extraocular movements intact.      Pupils: Pupils are equal, round, and reactive to light.   Cardiovascular:      Rate and Rhythm: Normal rate and regular rhythm.      Pulses: Normal pulses.      Heart sounds: Normal heart sounds. No murmur heard.     No gallop.   Pulmonary:      Effort: Pulmonary effort is normal.      Breath sounds: Normal breath sounds. No wheezing.   Chest:      Chest wall: No tenderness.   Abdominal:      General: Bowel sounds are normal.      Palpations: Abdomen is soft.   Musculoskeletal:         General: Normal range of motion.      Cervical back: Neck supple.   Skin:     General: Skin is warm and dry.      Coloration: Skin is pale.             Comments: Fatty pad to right upper chest wall   Neurological:      General: No focal deficit present.      Mental Status: She is alert. Mental status is at baseline.   Psychiatric:         Mood and Affect: Mood normal.         Behavior: Behavior is cooperative.              CRANIAL NERVES     CN III, IV, VI   Pupils are equal, round, and reactive to light.       Significant Labs: All pertinent labs within the past 24 hours have been reviewed.  Recent Lab Results          03/05/24  1518   03/05/24  1354        Unit Blood Type Code   6200  [P]       Unit Expiration   [id number]  [P]       Unit Blood Type   A POS  [P]       Albumin   4.0       ALP   65       ALT   14       Anion Gap   6       Appearance, UA Clear         AST   23       Baso #   0.03       Basophil %   0.7       Bilirubin (UA) Negative         BILIRUBIN TOTAL   0.4  Comment: For infants and newborns, interpretation of results should be based  on gestational age, weight and in agreement with clinical  observations.    Premature Infant recommended reference ranges:  Up to 24 hours.............<8.0 mg/dL  Up to 48 hours............<12.0 mg/dL  3-5 days..................<15.0 mg/dL  6-29 days.................<15.0 mg/dL         BUN   11       Calcium   9.0       Chloride   109       CO2   25       CODING SYSTEM   [id number]  [P]       Color, UA Colorless         Creatinine   0.8       Crossmatch Interpretation   Compatible  [P]       Differential Method   Automated       DISPENSE STATUS   ISSUED  [P]       eGFR   >60.0       Eos #   0.0       Eos %   0.9       Ferritin   9.0       Glucose   164       Glucose, UA 1+         Gran # (ANC)   2.5       Gran %   57.0       Group & Rh   A POS       Hematocrit   19.8  Comment: hgb,hct   critical result(s) called and verbal readback obtained from   dr mccray by [facility] 03/05/2024 14:36         Hemoglobin   6.0  Comment: hgb,hct   critical result(s) called and verbal readback obtained from   dr mccray by [facility] 03/05/2024 14:36         Immature Grans (Abs)   0.02  Comment: Mild elevation in immature granulocytes is non specific and   can be seen in a variety of conditions including stress response,   acute inflammation, trauma and pregnancy. Correlation with other   laboratory and clinical findings is essential.         Immature Granulocytes   0.5       INDIRECT RENE   NEG       Iron   229       Ketones, UA Negative         Leukocyte Esterase, UA Negative         Lymph #   1.4        Lymph %   32.6       Magnesium    1.8       MCH   26.4       MCHC   30.3       MCV   87       Mono #   0.4       Mono %   8.3       MPV   10.3       NITRITE UA Negative         nRBC   0       Blood, UA Negative         pH, UA 7.0         Platelet Count   265       Potassium   3.9       Product Code   [id number]  [P]       PROTEIN TOTAL   6.8       Protein, UA Negative  Comment: Recommend a 24 hour urine protein or a urine   protein/creatinine ratio if globulin induced proteinuria is  clinically suspected.           RBC   2.27       RDW   25.1       Retic   5.2       Saturated Iron   60       Sodium   140       Specific [place], UA 1.005         Specimen Outdate   03/08/2024 23:59       Specimen UA Urine, Clean Catch         TIBC   381       Transferrin   272       Troponin I High Sensitivity   7.9  Comment: Troponin results differ between methods. Do not use   results between Troponin methods interchangeably.    Alkaline Phospatase levels above 400 U/L may   cause false positive results.    Access hsTnI should not be used for patients taking   Asfotase arsh (Strensiq).         UNIT NUMBER   [id number]  [P]       UROBILINOGEN UA Negative         WBC   4.35                [P] - Preliminary Result               Significant Imaging: I have reviewed all pertinent imaging results/findings within the past 24 hours.    EKG 12-lead (In process)           Collection Time Result Time QRS Duration OHS QTC Calculation     03/05/24 13:17:18 03/05/24 13:20:27 86 445                               In process by Interface, Lab In [facility] (03/05/24 13:20:38)                           Narrative:     Test Reason : D64.9,     Vent. Rate : 091 BPM     Atrial Rate : 091 BPM     P-R Int : 134 ms          QRS Dur : 086 ms      QT Int : 362 ms       P-R-T Axes : 048 043 094 degrees     QTc Int : 445 ms     Normal sinus rhythm  Possible Anterior infarct (cited on or before 19-FEB-2024)  Abnormal ECG  When compared with ECG of 20-FEB-2024  12:44,  No significant change was found     Referred By:             Confirmed By:               Assessment/Plan:     * Symptomatic anemia  Patient's anemia is currently uncontrolled. Has received 1 units of PRBCs on 03/05/24 . Etiology likely d/t Iron deficiency and unknown etiology  Current CBC reviewed-   Lab Results   Component Value Date    HGB 6.0 (LL) 03/05/2024    HCT 19.8 (LL) 03/05/2024     Monitor serial CBC and transfuse if patient becomes hemodynamically unstable, symptomatic or H/H drops below 7/21.    Reconsult GI    Atrial fibrillation  Patient with Paroxysmal (<7 days) atrial fibrillation which is controlled currently with Beta Blocker. Patient is currently in sinus rhythm.[id number] Score: 4. HASBLED Score: 3. Anticoagulation not indicated due to active bleed .    Remains in NSR with Rate relatively stable, Continue Toprol XL   Holding Eliquis, Aspirin, and Plavix due to active bleed.   Last TSH WNL 0.719    COPD (chronic obstructive pulmonary disease)  Patient's COPD is controlled currently.  Patient is currently off COPD Pathway. Continue scheduled inhalers  continue home meds  and monitor respiratory status closely.     Gastroesophageal reflux disease without esophagitis  Continue protonix      Type 2 diabetes mellitus with diabetic polyneuropathy, without long-term current use of insulin  SSI, Accuchecks. Diabetic diet and education      Essential hypertension  Chronic, controlled. Latest blood pressure and vitals reviewed-     Temp:  [97.8 °F (36.6 °C)-98.8 °F (37.1 °C)]   Pulse:  [90-95]   Resp:  [19-20]   BP: (141-186)/(65-91)   SpO2:  [97 %-100 %] .   Home meds for hypertension were reviewed and noted below.   Hypertension Medications               furosemide (LASIX) 20 MG tablet Take 1 tablet (20 mg total) by mouth once daily.    hydrALAZINE (APRESOLINE) 25 MG tablet Take 1 tablet (25 mg total) by mouth 3 (three) times daily.    metoprolol succinate (TOPROL-XL) 50 MG 24 hr tablet Take 50  mg by mouth every morning.    sacubitriL-valsartan (ENTRESTO)  mg per tablet Take 1 tablet by mouth 2 (two) times daily.    spironolactone (ALDACTONE) 25 MG tablet Take 1 tablet (25 mg total) by mouth once daily.            While in the hospital, will manage blood pressure as follows; Continue home antihypertensive regimen    Will utilize p.r.n. blood pressure medication only if patient's blood pressure greater than 180/110 and she develops symptoms such as worsening chest pain or shortness of breath.      VTE Risk Mitigation (From admission, onward)           Ordered     Place sequential compression device  Until discontinued         03/05/24 1631     Reason for No Pharmacological VTE Prophylaxis  Once        Question:  Reasons:  Answer:  Active Bleeding    03/05/24 1631     IP VTE HIGH RISK PATIENT  Once         03/05/24 1630                         On 03/05/2024, patient should be placed in hospital observation services under my care in collaboration with Dr. Boucher.       Consult GI, 1U pRBC, trend H/h, consider giving iron transfusion    Teodora Kaye NP  Department of Hospital Medicine  [facility] - Emergency Dept

## 2024-03-05 NOTE — SUBJECTIVE & OBJECTIVE
Past Medical History:   Diagnosis Date    Anemia     Biliary obstruction     Cholangitis     Diabetes mellitus     pt denies, does not take meds 11/2016    EtOH dependence     GERD (gastroesophageal reflux disease)     HTN (hypertension) 12/30/2013       Past Surgical History:   Procedure Laterality Date    ARTERIOGRAPHY OF SUBCLAVIAN ARTERY  5/22/2020    Procedure: Arteriogram, Subclavian;  Surgeon: Heather Carbajal MD;  Location: [facility] CATH;  Service: Cardiology;;    COLONOSCOPY      COLONOSCOPY N/A 2/23/2024    Procedure: COLONOSCOPY;  Surgeon: Murphy Maguire Jr., MD;  Location: [facility] ENDO;  Service: Endoscopy;  Laterality: N/A;    CORONARY ANGIOGRAPHY N/A 5/22/2020    Procedure: ANGIOGRAM, CORONARY ARTERY;  Surgeon: Heather Carbajal MD;  Location: [facility] CATH;  Service: Cardiology;  Laterality: N/A;    CORONARY ARTERY BYPASS GRAFT (CABG) N/A 5/29/2020    Procedure: CORONARY ARTERY BYPASS GRAFT (CABG) x 5 VESSELS;  Surgeon: Dima Laughlin MD;  Location: [facility] OR;  Service: Cardiovascular;  Laterality: N/A;    ENDOSCOPIC HARVEST OF VEIN N/A 5/29/2020    Procedure: SURGICAL PROCUREMENT, VEIN, ENDOSCOPIC;  Surgeon: Dima Laughlin MD;  Location: [facility] OR;  Service: Cardiovascular;  Laterality: N/A;    ERCP W/ PLASTIC STENT PLACEMENT      ERCP W/ SPHICTEROTOMY      ESOPHAGOGASTRODUODENOSCOPY      ESOPHAGOGASTRODUODENOSCOPY N/A 2/23/2024    Procedure: ESOPHAGOGASTRODUODENOSCOPY (EGD);  Surgeon: Murphy Maguire Jr., MD;  Location: [facility] ENDO;  Service: Endoscopy;  Laterality: N/A;    HYSTERECTOMY      INSERTION OF INTRA-AORTIC BALLOON ASSIST DEVICE N/A 5/29/2020    Procedure: INSERTION, INTRA-AORTIC BALLOON PUMP;  Surgeon: Dima Laughlin MD;  Location: [facility] OR;  Service: Cardiovascular;  Laterality: N/A;    LEFT HEART CATHETERIZATION Left 5/22/2020    Procedure: Left heart cath;  Surgeon: Heather Carbajal MD;  Location: [facility] CATH;  Service: Cardiology;  Laterality: Left;       Review of patient's allergies indicates:  No Known  Allergies    No current facility-administered medications on file prior to encounter.     Current Outpatient Medications on File Prior to Encounter   Medication Sig    apixaban (ELIQUIS) 2.5 mg Tab Take 1 tablet (2.5 mg total) by mouth 2 (two) times daily.    aspirin (ECOTRIN) 81 MG EC tablet Take 1 tablet (81 mg total) by mouth once daily.    atorvastatin (LIPITOR) 10 MG tablet TAKE 1 TABLET EVERY EVENING (Patient taking differently: Take 10 mg by mouth every evening.)    clopidogreL (PLAVIX) 75 mg tablet TAKE 1 TABLET EVERY DAY (Patient taking differently: Take 75 mg by mouth once daily.)    esomeprazole (NEXIUM) 40 MG capsule Take 1 capsule (40 mg total) by mouth before breakfast. Take on empty stomach    ferrous sulfate (FEOSOL) 325 mg (65 mg iron) Tab tablet Take 1 tablet (325 mg total) by mouth once daily.    fluticasone-umeclidin-vilanter (TRELEGY ELLIPTA) 100-62.5-25 mcg DsDv Inhale 1 puff into the lungs once daily.    hydrALAZINE (APRESOLINE) 25 MG tablet Take 1 tablet (25 mg total) by mouth 3 (three) times daily.    latanoprost 0.005 % ophthalmic solution Place 1 drop into both eyes every evening.    metFORMIN (GLUCOPHAGE-XR) 500 MG ER 24hr tablet TAKE 2 TABLETS ONE TIME DAILY (Patient taking differently: Take 500 mg by mouth 2 (two) times daily with meals.)    sacubitriL-valsartan (ENTRESTO)  mg per tablet Take 1 tablet by mouth 2 (two) times daily.    spironolactone (ALDACTONE) 25 MG tablet Take 1 tablet (25 mg total) by mouth once daily.    timolol maleate 0.5% (TIMOPTIC) 0.5 % Drop Place 1 drop into the left eye 2 (two) times daily.    alcohol swabs PadM Apply 1 each topically as needed. (Patient not taking: Reported on 3/4/2024)    blood glucose control high,low (ACCU-CHEK KEDAR CONTROL SOLN) Soln Use to test controls per mfr guidelines/instructions (Patient not taking: Reported on 3/4/2024)    famotidine (PEPCID) 40 MG tablet Take 1 tablet (40 mg total) by mouth once daily.    furosemide  (LASIX) 20 MG tablet Take 1 tablet (20 mg total) by mouth once daily.    metoprolol succinate (TOPROL-XL) 50 MG 24 hr tablet Take 50 mg by mouth every morning.    traMADoL (ULTRAM) 50 mg tablet Take 1 tablet (50 mg total) by mouth every 12 (twelve) hours as needed for Pain. (Patient not taking: Reported on 3/4/2024)     Family History    None       Tobacco Use    Smoking status: Former     Current packs/day: 0.00     Average packs/day: 0.5 packs/day for 40.0 years (20.0 ttl pk-yrs)     Types: Cigarettes     Start date: 5/19/1980     Quit date: 5/19/2020     Years since quitting: 3.7    Smokeless tobacco: Never   Substance and Sexual Activity    Alcohol use: Not Currently     Comment: sober 2 years 3 months    Drug use: No    Sexual activity: Not on file     Review of Systems   Constitutional:  Positive for activity change and fatigue. Negative for chills and fever.   HENT:  Negative for congestion, sore throat and trouble swallowing.    Respiratory:  Positive for shortness of breath. Negative for cough, chest tightness and wheezing.    Cardiovascular:  Negative for chest pain, palpitations and leg swelling.   Gastrointestinal:  Negative for abdominal pain, blood in stool, diarrhea, nausea and vomiting.   Genitourinary:  Negative for difficulty urinating, dysuria and urgency.   Musculoskeletal:  Negative for arthralgias and myalgias.   Skin:  Positive for pallor.   Neurological:  Positive for weakness. Negative for dizziness, seizures and syncope.   Psychiatric/Behavioral:  Negative for confusion and suicidal ideas.      Objective:     Vital Signs (Most Recent):  Temp: 98.8 °F (37.1 °C) (03/05/24 1304)  Pulse: 90 (03/05/24 1304)  Resp: 20 (03/05/24 1304)  BP: (!) 186/91 (03/05/24 1304)  SpO2: 97 % (03/05/24 1304) Vital Signs (24h Range):  Temp:  [98.8 °F (37.1 °C)] 98.8 °F (37.1 °C)  Pulse:  [90] 90  Resp:  [20] 20  SpO2:  [97 %] 97 %  BP: (186)/(91) 186/91     Weight: 44.5 kg (98 lb)  Body mass index is 18.52  kg/m².     Physical Exam  Vitals reviewed.   Constitutional:       General: She is awake.      Appearance: Normal appearance.      Comments: Frail. elderly   HENT:      Head: Normocephalic and atraumatic.      Mouth/Throat:      Mouth: Mucous membranes are moist.   Eyes:      Extraocular Movements: Extraocular movements intact.      Pupils: Pupils are equal, round, and reactive to light.   Cardiovascular:      Rate and Rhythm: Normal rate and regular rhythm.      Pulses: Normal pulses.      Heart sounds: Normal heart sounds. No murmur heard.     No gallop.   Pulmonary:      Effort: Pulmonary effort is normal.      Breath sounds: Normal breath sounds. No wheezing.   Chest:      Chest wall: No tenderness.   Abdominal:      General: Bowel sounds are normal.      Palpations: Abdomen is soft.   Musculoskeletal:         General: Normal range of motion.      Cervical back: Neck supple.   Skin:     General: Skin is warm and dry.      Coloration: Skin is pale.             Comments: Fatty pad to right upper chest wall   Neurological:      General: No focal deficit present.      Mental Status: She is alert. Mental status is at baseline.   Psychiatric:         Mood and Affect: Mood normal.         Behavior: Behavior is cooperative.              CRANIAL NERVES     CN III, IV, VI   Pupils are equal, round, and reactive to light.       Significant Labs: All pertinent labs within the past 24 hours have been reviewed.  Recent Lab Results         03/05/24  1518   03/05/24  1354        Unit Blood Type Code   6200  [P]       Unit Expiration   [id number]  [P]       Unit Blood Type   A POS  [P]       Albumin   4.0       ALP   65       ALT   14       Anion Gap   6       Appearance, UA Clear         AST   23       Baso #   0.03       Basophil %   0.7       Bilirubin (UA) Negative         BILIRUBIN TOTAL   0.4  Comment: For infants and newborns, interpretation of results should be based  on gestational age, weight and in agreement with  clinical  observations.    Premature Infant recommended reference ranges:  Up to 24 hours.............<8.0 mg/dL  Up to 48 hours............<12.0 mg/dL  3-5 days..................<15.0 mg/dL  6-29 days.................<15.0 mg/dL         BUN   11       Calcium   9.0       Chloride   109       CO2   25       CODING SYSTEM   PPVQ387  [P]       Color, UA Colorless         Creatinine   0.8       Crossmatch Interpretation   Compatible  [P]       Differential Method   Automated       DISPENSE STATUS   ISSUED  [P]       eGFR   >60.0       Eos #   0.0       Eos %   0.9       Ferritin   9.0       Glucose   164       Glucose, UA 1+         Gran # (ANC)   2.5       Gran %   57.0       Group & Rh   A POS       Hematocrit   19.8  Comment: hgb,hct   critical result(s) called and verbal readback obtained from   dr mccray by [facility] 03/05/2024 14:36         Hemoglobin   6.0  Comment: hgb,hct   critical result(s) called and verbal readback obtained from   dr mccray by [facility] 03/05/2024 14:36         Immature Grans (Abs)   0.02  Comment: Mild elevation in immature granulocytes is non specific and   can be seen in a variety of conditions including stress response,   acute inflammation, trauma and pregnancy. Correlation with other   laboratory and clinical findings is essential.         Immature Granulocytes   0.5       INDIRECT RENE   NEG       Iron   229       Ketones, UA Negative         Leukocyte Esterase, UA Negative         Lymph #   1.4       Lymph %   32.6       Magnesium    1.8       MCH   26.4       MCHC   30.3       MCV   87       Mono #   0.4       Mono %   8.3       MPV   10.3       NITRITE UA Negative         nRBC   0       Blood, UA Negative         pH, UA 7.0         Platelet Count   265       Potassium   3.9       Product Code   [id number]  [P]       PROTEIN TOTAL   6.8       Protein, UA Negative  Comment: Recommend a 24 hour urine protein or a urine   protein/creatinine ratio if globulin induced proteinuria is  clinically  suspected.           RBC   2.27       RDW   25.1       Retic   5.2       Saturated Iron   60       Sodium   140       Specific [place], UA 1.005         Specimen Outdate   03/08/2024 23:59       Specimen UA Urine, Clean Catch         TIBC   381       Transferrin   272       Troponin I High Sensitivity   7.9  Comment: Troponin results differ between methods. Do not use   results between Troponin methods interchangeably.    Alkaline Phospatase levels above 400 U/L may   cause false positive results.    Access hsTnI should not be used for patients taking   Asfotase arsh (Strensiq).         UNIT NUMBER   [id number]  [P]       UROBILINOGEN UA Negative         WBC   4.35                [P] - Preliminary Result               Significant Imaging: I have reviewed all pertinent imaging results/findings within the past 24 hours.    EKG 12-lead (In process)           Collection Time Result Time QRS Duration OHS QTC Calculation     03/05/24 13:17:18 03/05/24 13:20:27 86 445                               In process by Interface, Lab In [facility] (03/05/24 13:20:38)                           Narrative:     Test Reason : D64.9,     Vent. Rate : 091 BPM     Atrial Rate : 091 BPM     P-R Int : 134 ms          QRS Dur : 086 ms      QT Int : 362 ms       P-R-T Axes : 048 043 094 degrees     QTc Int : 445 ms     Normal sinus rhythm  Possible Anterior infarct (cited on or before 19-FEB-2024)  Abnormal ECG  When compared with ECG of 20-FEB-2024 12:44,  No significant change was found     Referred By:             Confirmed By:

## 2024-03-05 NOTE — ASSESSMENT & PLAN NOTE
Patient's anemia is currently uncontrolled. Has received 1 units of PRBCs on 03/05/24 . Etiology likely d/t Iron deficiency and unknown etiology  Current CBC reviewed-   Lab Results   Component Value Date    HGB 6.0 (LL) 03/05/2024    HCT 19.8 (LL) 03/05/2024     Monitor serial CBC and transfuse if patient becomes hemodynamically unstable, symptomatic or H/H drops below 7/21.    Reconsult GI

## 2024-03-05 NOTE — TELEPHONE ENCOUNTER
Left message that pt was seen yesterday in the office for hosp follow up and has another follow up scheduled in 4 weeks .--lp

## 2024-03-06 ENCOUNTER — ANESTHESIA EVENT (OUTPATIENT)
Dept: SURGERY | Facility: HOSPITAL | Age: 74
End: 2024-03-06
Payer: MEDICARE

## 2024-03-06 ENCOUNTER — ANESTHESIA (OUTPATIENT)
Dept: SURGERY | Facility: HOSPITAL | Age: 74
End: 2024-03-06
Payer: MEDICARE

## 2024-03-06 VITALS
SYSTOLIC BLOOD PRESSURE: 168 MMHG | WEIGHT: 93 LBS | TEMPERATURE: 98 F | DIASTOLIC BLOOD PRESSURE: 79 MMHG | HEIGHT: 61 IN | RESPIRATION RATE: 16 BRPM | HEART RATE: 90 BPM | BODY MASS INDEX: 17.56 KG/M2 | OXYGEN SATURATION: 98 %

## 2024-03-06 PROBLEM — I47.29 NONSUSTAINED VENTRICULAR TACHYCARDIA: Status: ACTIVE | Noted: 2024-03-06

## 2024-03-06 PROBLEM — J44.9 COPD (CHRONIC OBSTRUCTIVE PULMONARY DISEASE): Status: ACTIVE | Noted: 2022-07-25

## 2024-03-06 LAB
ANION GAP SERPL CALC-SCNC: 4 MMOL/L (ref 8–16)
BASOPHILS # BLD AUTO: 0.03 K/UL (ref 0–0.2)
BASOPHILS NFR BLD: 0.6 % (ref 0–1.9)
BUN SERPL-MCNC: 9 MG/DL (ref 8–23)
CALCIUM SERPL-MCNC: 8.7 MG/DL (ref 8.7–10.5)
CHLORIDE SERPL-SCNC: 109 MMOL/L (ref 95–110)
CO2 SERPL-SCNC: 27 MMOL/L (ref 23–29)
CREAT SERPL-MCNC: 0.8 MG/DL (ref 0.5–1.4)
DIFFERENTIAL METHOD BLD: ABNORMAL
EOSINOPHIL # BLD AUTO: 0.1 K/UL (ref 0–0.5)
EOSINOPHIL NFR BLD: 1 % (ref 0–8)
ERYTHROCYTE [DISTWIDTH] IN BLOOD BY AUTOMATED COUNT: 21.5 % (ref 11.5–14.5)
EST. GFR  (NO RACE VARIABLE): >60 ML/MIN/1.73 M^2
GLUCOSE SERPL-MCNC: 119 MG/DL (ref 70–110)
GLUCOSE SERPL-MCNC: 120 MG/DL (ref 70–110)
GLUCOSE SERPL-MCNC: 124 MG/DL (ref 70–110)
GLUCOSE SERPL-MCNC: 87 MG/DL (ref 70–110)
GLUCOSE SERPL-MCNC: 99 MG/DL (ref 70–110)
HCT VFR BLD AUTO: 25.9 % (ref 37–48.5)
HGB BLD-MCNC: 8.1 G/DL (ref 12–16)
IMM GRANULOCYTES # BLD AUTO: 0.01 K/UL (ref 0–0.04)
IMM GRANULOCYTES NFR BLD AUTO: 0.2 % (ref 0–0.5)
LYMPHOCYTES # BLD AUTO: 1.6 K/UL (ref 1–4.8)
LYMPHOCYTES NFR BLD: 31.4 % (ref 18–48)
MCH RBC QN AUTO: 27.4 PG (ref 27–31)
MCHC RBC AUTO-ENTMCNC: 31.3 G/DL (ref 32–36)
MCV RBC AUTO: 88 FL (ref 82–98)
MONOCYTES # BLD AUTO: 0.4 K/UL (ref 0.3–1)
MONOCYTES NFR BLD: 7.6 % (ref 4–15)
NEUTROPHILS # BLD AUTO: 3 K/UL (ref 1.8–7.7)
NEUTROPHILS NFR BLD: 59.2 % (ref 38–73)
NRBC BLD-RTO: 0 /100 WBC
PLATELET # BLD AUTO: 244 K/UL (ref 150–450)
PMV BLD AUTO: 10 FL (ref 9.2–12.9)
POTASSIUM SERPL-SCNC: 3.6 MMOL/L (ref 3.5–5.1)
RBC # BLD AUTO: 2.96 M/UL (ref 4–5.4)
SODIUM SERPL-SCNC: 140 MMOL/L (ref 136–145)
WBC # BLD AUTO: 5.03 K/UL (ref 3.9–12.7)

## 2024-03-06 PROCEDURE — 63600175 PHARM REV CODE 636 W HCPCS: Performed by: INTERNAL MEDICINE

## 2024-03-06 PROCEDURE — 25000003 PHARM REV CODE 250: Performed by: INTERNAL MEDICINE

## 2024-03-06 PROCEDURE — 37000009 HC ANESTHESIA EA ADD 15 MINS: Performed by: INTERNAL MEDICINE

## 2024-03-06 PROCEDURE — 82962 GLUCOSE BLOOD TEST: CPT

## 2024-03-06 PROCEDURE — D9220A PRA ANESTHESIA: Mod: CRNA,,, | Performed by: STUDENT IN AN ORGANIZED HEALTH CARE EDUCATION/TRAINING PROGRAM

## 2024-03-06 PROCEDURE — 25000003 PHARM REV CODE 250: Performed by: STUDENT IN AN ORGANIZED HEALTH CARE EDUCATION/TRAINING PROGRAM

## 2024-03-06 PROCEDURE — 80048 BASIC METABOLIC PNL TOTAL CA: CPT | Performed by: NURSE PRACTITIONER

## 2024-03-06 PROCEDURE — G0378 HOSPITAL OBSERVATION PER HR: HCPCS

## 2024-03-06 PROCEDURE — 44360 SMALL BOWEL ENDOSCOPY: CPT | Performed by: INTERNAL MEDICINE

## 2024-03-06 PROCEDURE — 25000003 PHARM REV CODE 250: Performed by: NURSE PRACTITIONER

## 2024-03-06 PROCEDURE — 63600175 PHARM REV CODE 636 W HCPCS: Performed by: STUDENT IN AN ORGANIZED HEALTH CARE EDUCATION/TRAINING PROGRAM

## 2024-03-06 PROCEDURE — 36415 COLL VENOUS BLD VENIPUNCTURE: CPT | Performed by: NURSE PRACTITIONER

## 2024-03-06 PROCEDURE — 96366 THER/PROPH/DIAG IV INF ADDON: CPT

## 2024-03-06 PROCEDURE — 85025 COMPLETE CBC W/AUTO DIFF WBC: CPT | Performed by: NURSE PRACTITIONER

## 2024-03-06 PROCEDURE — 94761 N-INVAS EAR/PLS OXIMETRY MLT: CPT

## 2024-03-06 PROCEDURE — 96376 TX/PRO/DX INJ SAME DRUG ADON: CPT

## 2024-03-06 PROCEDURE — C9113 INJ PANTOPRAZOLE SODIUM, VIA: HCPCS | Performed by: NURSE PRACTITIONER

## 2024-03-06 PROCEDURE — 96365 THER/PROPH/DIAG IV INF INIT: CPT | Mod: 59

## 2024-03-06 PROCEDURE — 63600175 PHARM REV CODE 636 W HCPCS: Performed by: NURSE PRACTITIONER

## 2024-03-06 PROCEDURE — 37000008 HC ANESTHESIA 1ST 15 MINUTES: Performed by: INTERNAL MEDICINE

## 2024-03-06 PROCEDURE — D9220A PRA ANESTHESIA: Mod: ANES,,, | Performed by: ANESTHESIOLOGY

## 2024-03-06 PROCEDURE — 94760 N-INVAS EAR/PLS OXIMETRY 1: CPT

## 2024-03-06 RX ORDER — METOPROLOL SUCCINATE 50 MG/1
50 TABLET, EXTENDED RELEASE ORAL EVERY MORNING
Status: DISCONTINUED | OUTPATIENT
Start: 2024-03-06 | End: 2024-03-06 | Stop reason: HOSPADM

## 2024-03-06 RX ORDER — MAGNESIUM SULFATE HEPTAHYDRATE 40 MG/ML
2 INJECTION, SOLUTION INTRAVENOUS ONCE
Status: COMPLETED | OUTPATIENT
Start: 2024-03-06 | End: 2024-03-06

## 2024-03-06 RX ORDER — PROPOFOL 10 MG/ML
VIAL (ML) INTRAVENOUS
Status: DISCONTINUED | OUTPATIENT
Start: 2024-03-06 | End: 2024-03-06

## 2024-03-06 RX ADMIN — PROPOFOL 20 MG: 10 INJECTION, EMULSION INTRAVENOUS at 01:03

## 2024-03-06 RX ADMIN — TIMOLOL MALEATE 1 DROP: 5 SOLUTION OPHTHALMIC at 10:03

## 2024-03-06 RX ADMIN — METOPROLOL SUCCINATE 50 MG: 50 TABLET, EXTENDED RELEASE ORAL at 02:03

## 2024-03-06 RX ADMIN — MAGNESIUM SULFATE HEPTAHYDRATE 2 G: 40 INJECTION, SOLUTION INTRAVENOUS at 11:03

## 2024-03-06 RX ADMIN — SODIUM CHLORIDE: 0.9 INJECTION, SOLUTION INTRAVENOUS at 01:03

## 2024-03-06 RX ADMIN — PROPOFOL 50 MG: 10 INJECTION, EMULSION INTRAVENOUS at 01:03

## 2024-03-06 RX ADMIN — PANTOPRAZOLE SODIUM 40 MG: 40 INJECTION, POWDER, FOR SOLUTION INTRAVENOUS at 09:03

## 2024-03-06 RX ADMIN — HYDRALAZINE HYDROCHLORIDE 25 MG: 25 TABLET ORAL at 02:03

## 2024-03-06 NOTE — NURSING
Patient D/C via MD order. Telemetry and PIV removed. Discharge instructions reviewed with patient. Patient transported to vehicle via W/C.

## 2024-03-06 NOTE — ED NOTES
Pt having chest pain. EKG done and MD notified. Pt states the pain is midsternal and starting to subside at this time.

## 2024-03-06 NOTE — PROGRESS NOTES
"[facility] Medicine  Progress Note    Patient Name: Nadia Irene  MRN: [id number]  Patient Class: OP- Observation   Admission Date: 3/5/2024  Length of Stay: 0 days  Attending Physician: Moo Alejandre MD  Primary Care Provider: Franklin Garcia MD        Subjective:     Principal Problem:Symptomatic anemia        HPI:  73 year old female with a past medical history of significant for anemia, DM, GERD, HTN, Gi hemorrhage, combined systolic and diastolic heart failure, and ATTILA whom presents to the emergency room with shortness of breath at rest, worsening with walking, weakness, fatigue. She denies chest pain, dizziness, abdominal pain, nausea, vomiting, bleeding from stool. She was recently admitted for GI hemorrhage and chest pain. See chart review below. In ER, hemoglobin is 6, CMP unremarkable,  reticulocyte count is 5.2%, EKGs sinus rhythm with no significant ST segment changes, chest x-ray with no acute cardiopulmonary process. Here patient does have confirmed symptomatic anemia on our lab tests. Blood transfusion initiated here. Consent obtained. Admitted to hospital medicine for further medical management. Trend h/h, consult gi for further recommendations.       Chart Review: Last Hospitalization: 3/23/24:  Chest pain and anemia: Cardiology evaluated patient, Troponins x3 were negative, EKG did not show any ST elevations, 2/21/24 TTE showed normal systolic and diastolic function (PASP was slightly elevated at 30 mmHg). Cardiology and they said that outpatient f/u for elective LHC (or other diagnostic test per Cardiology recommendations) was reasonable.    She was noted to have worsened anemia than usual (Hb 10's-11's in 2023, 8's-9's here) and GI was consulted. Hb remained stable inpatient with no bleeding events (even FOBT was negative, though Ferritin was very low at 12). 2/23/24 EGD showed only "minimal antritis" which was biopsied, and colonoscopy showed " non-bleeding internal hemorrhoids, a tortuous colon and one 2 to 3 mm polyp in the distal descending colon which was resected. oral iron supplementation at discharge. DAPT and Eliquis will be resumed due to stable Hb, lack of identified bleeding and indications for both (CABG; PAF).      Overview/Hospital Course:  Patient is a 73 year old female admitted as referred from PCP office for anemia. Patient went for her hospital follow up appt and blood work shows Hb dropped again to 6. Patient states her stools has been dark since she was started on iron. Patient was transfused 1 unit and Hb corrected appropriately. Patient is on Eliquis and aspirin from home, which are on hold. GI was consulted again. Work up this time shows normal iron studies.     Interval History: Patient is feeling well, denies any symptoms.     Review of Systems  Objective:     Vital Signs (Most Recent):  Temp: 98.6 °F (37 °C) (03/06/24 1146)  Pulse: 90 (03/06/24 1146)  Resp: 18 (03/06/24 1146)  BP: (!) 155/74 (03/06/24 1146)  SpO2: 100 % (03/06/24 1146) Vital Signs (24h Range):  Temp:  [97.8 °F (36.6 °C)-98.8 °F (37.1 °C)] 98.6 °F (37 °C)  Pulse:  [] 90  Resp:  [16-21] 18  SpO2:  [97 %-100 %] 100 %  BP: (110-186)/(63-91) 155/74     Weight: 42.2 kg (93 lb)  Body mass index is 17.57 kg/m².    Intake/Output Summary (Last 24 hours) at 3/6/2024 1153  Last data filed at 3/6/2024 0830  Gross per 24 hour   Intake 321 ml   Output --   Net 321 ml         Physical Exam  Vitals and nursing note reviewed.   Constitutional:       General: She is not in acute distress.     Appearance: She is not toxic-appearing.   HENT:      Head: Atraumatic.      Mouth/Throat:      Mouth: Mucous membranes are moist.      Pharynx: Oropharynx is clear.   Eyes:      General: No scleral icterus.     Conjunctiva/sclera: Conjunctivae normal.      Pupils: Pupils are equal, round, and reactive to light.   Cardiovascular:      Rate and Rhythm: Normal rate and regular rhythm.       Heart sounds: No murmur heard.  Pulmonary:      Effort: No respiratory distress.      Breath sounds: No wheezing, rhonchi or rales.   Abdominal:      General: Abdomen is flat. Bowel sounds are normal.      Palpations: Abdomen is soft.   Musculoskeletal:         General: No swelling or deformity.      Cervical back: No rigidity or tenderness.   Skin:     Coloration: Skin is not jaundiced or pale.      Findings: No bruising, erythema or rash.   Neurological:      General: No focal deficit present.      Mental Status: She is alert and oriented to person, place, and time.      Cranial Nerves: No cranial nerve deficit.      Sensory: No sensory deficit.      Motor: No weakness.   Psychiatric:         Mood and Affect: Mood normal.         Behavior: Behavior normal.         Thought Content: Thought content normal.         Judgment: Judgment normal.             Significant Labs: All pertinent labs within the past 24 hours have been reviewed.  CBC:   Recent Labs   Lab 03/04/24  1159 03/05/24  1354 03/06/24  0317   WBC 5.17 4.35 5.03   HGB 6.0* 6.0* 8.1*   HCT 20.0* 19.8* 25.9*    265 244     CMP:   Recent Labs   Lab 03/05/24  1354 03/06/24  0317    140   K 3.9 3.6    109   CO2 25 27   * 119*   BUN 11 9   CREATININE 0.8 0.8   CALCIUM 9.0 8.7   PROT 6.8  --    ALBUMIN 4.0  --    BILITOT 0.4  --    ALKPHOS 65  --    AST 23  --    ALT 14  --    ANIONGAP 6* 4*       Significant Imaging: I have reviewed all pertinent imaging results/findings within the past 24 hours.    Assessment/Plan:      * Symptomatic anemia  Patient's anemia is currently uncontrolled. Has received 1 units of PRBCs on 03/05/24 . Etiology likely d/t Iron deficiency and unknown etiology  Current CBC reviewed-   Lab Results   Component Value Date    HGB 8.1 (L) 03/06/2024    HCT 25.9 (L) 03/06/2024     Monitor serial CBC and transfuse if patient becomes hemodynamically unstable, symptomatic or H/H drops below 7/21.    Hb corrected  appropriately after transfusion   Monitor H/H  EGD and colonoscopy Feb 2024 is normal  Re consulted GI >> may be push enteroscopy today   Holding aspirin, Plavix and Eliquis    Atrial fibrillation  Secondary hypercoagulable state  Patient with Paroxysmal (<7 days) atrial fibrillation which is controlled currently with Beta Blocker. Patient is currently in sinus rhythm.[id number] Score: 4. HASBLED Score: 3. Anticoagulation not indicated due to active bleed .    Remains in NSR with Rate relatively stable, Continue Toprol XL  Holding Eliquis, Aspirin, and Plavix due to active bleed.   Last TSH WNL 0.719    COPD (chronic obstructive pulmonary disease)  Patient's COPD is controlled currently.  Patient is currently off COPD Pathway. Continue scheduled inhalers  continue home meds  and monitor respiratory status closely.     Chronic combined systolic and diastolic heart failure  Compensated  Resume home meds       Gastroesophageal reflux disease without esophagitis  Continue protonix      Type 2 diabetes mellitus with diabetic polyneuropathy, without long-term current use of insulin  SSI, Accuchecks. Diabetic diet and education      Essential hypertension  Chronic, controlled. Latest blood pressure and vitals reviewed-     Temp:  [97.8 °F (36.6 °C)-98.8 °F (37.1 °C)]   Pulse:  []   Resp:  [16-21]   BP: (110-186)/(63-91)   SpO2:  [97 %-100 %] .   Home meds for hypertension were reviewed and noted below.   Hypertension Medications               furosemide (LASIX) 20 MG tablet Take 1 tablet (20 mg total) by mouth once daily.    hydrALAZINE (APRESOLINE) 25 MG tablet Take 1 tablet (25 mg total) by mouth 3 (three) times daily.    metoprolol succinate (TOPROL-XL) 50 MG 24 hr tablet Take 50 mg by mouth every morning.    sacubitriL-valsartan (ENTRESTO)  mg per tablet Take 1 tablet by mouth 2 (two) times daily.    spironolactone (ALDACTONE) 25 MG tablet Take 1 tablet (25 mg total) by mouth once daily.            While in  the hospital, will manage blood pressure as follows; Continue home antihypertensive regimen    Will utilize p.r.n. blood pressure medication only if patient's blood pressure greater than 180/110 and she develops symptoms such as worsening chest pain or shortness of breath.      VTE Risk Mitigation (From admission, onward)           Ordered     Place sequential compression device  Until discontinued         03/05/24 1631     Reason for No Pharmacological VTE Prophylaxis  Once        Question:  Reasons:  Answer:  Active Bleeding    03/05/24 1631     IP VTE HIGH RISK PATIENT  Once         03/05/24 1630                    Discharge Planning   MIKE: 3/7/2024     Code Status: Full Code   Is the patient medically ready for discharge?:     Reason for patient still in hospital (select all that apply): Consult recommendations  Discharge Plan A: Home with family                  Moo Alejandre MD  Department of Hospital Medicine   [facility]

## 2024-03-06 NOTE — ANESTHESIA PREPROCEDURE EVALUATION
03/06/2024  aNdia Irene is a 73 y.o., female.    Results for orders placed or performed during the hospital encounter of 03/05/24   EKG 12-lead    Collection Time: 03/05/24 10:49 PM   Result Value Ref Range    QRS Duration 84 ms    OHS QTC Calculation 484 ms    Narrative    Test Reason : R07.9,    Vent. Rate : 101 BPM     Atrial Rate : 101 BPM     P-R Int : 160 ms          QRS Dur : 084 ms      QT Int : 374 ms       P-R-T Axes : 052 035 093 degrees     QTc Int : 484 ms    Sinus tachycardia with Premature atrial complexes  Possible Anterior infarct (cited on or before 19-FEB-2024)  Abnormal ECG  When compared with ECG of 05-MAR-2024 13:17,  Premature atrial complexes are now Present    Referred By: AAAREFERR   SELF           Confirmed By:         Imaging Results    None          Lab Results   Component Value Date    WBC 5.03 03/06/2024    HGB 8.1 (L) 03/06/2024    HCT 25.9 (L) 03/06/2024    MCV 88 03/06/2024     03/06/2024     BMP  Lab Results   Component Value Date     03/06/2024    K 3.6 03/06/2024     03/06/2024    CO2 27 03/06/2024    BUN 9 03/06/2024    CREATININE 0.8 03/06/2024    CALCIUM 8.7 03/06/2024    ANIONGAP 4 (L) 03/06/2024     (H) 03/06/2024     (H) 03/05/2024     (H) 02/24/2024       Results for orders placed during the hospital encounter of 02/20/24    Echo Saline Bubble? No    Interpretation Summary    Left Ventricle: There is mild concentric hypertrophy. There is normal systolic function with a visually estimated ejection fraction of 60 - 65%. There is normal diastolic function.    Right Ventricle: Normal right ventricular cavity size. Wall thickness is normal. Right ventricle wall motion  is normal. Systolic function is normal.    Aortic Valve: There is mild stenosis. Aortic valve area by VTI is 1.66 cm². Aortic valve peak velocity is  2.17 m/s. Mean gradient is 9 mmHg. The dimensionless index is 0.58. There is mild aortic regurgitation.    Pulmonary Artery: The estimated pulmonary artery systolic pressure is 30 mmHg.    IVC/SVC: Intermediate venous pressure at 8 mmHg.              Pre-op Assessment    I have reviewed the Patient Summary Reports.     I have reviewed the Nursing Notes. I have reviewed the NPO Status.   I have reviewed the Medications.     Review of Systems  Anesthesia Hx:  No problems with previous Anesthesia   History of prior surgery of interest to airway management or planning: heart surgery.         Denies Family Hx of Anesthesia complications.    Denies Personal Hx of Anesthesia complications.                    Social:  Former Smoker, No Alcohol Use History of  EtOH dependence      Hematology/Oncology:    Oncology Normal    -- Anemia:               Hematology Comments: On eliquis therapy    Plavix Therapy                     EENT/Dental:  EENT/Dental Normal           Cardiovascular:     Hypertension, poorly controlled Valvular problems/Murmurs, AS  CAD  asymptomatic CABG/stent (2020 CABG, stent in 2023) Dysrhythmias (hx A fib, sinus on most recent EKG, hx of non-sustained VT) atrial fibrillation  CHF   PVD hyperlipidemia   ECG has been reviewed. Chronic combined systolic and diastolic heart failure - Entresto use     History of Nonsustained ventricular tachycardia    Patient followed by Dr. Cohen - seen 2 weeks ago     Aortic Stenosis (AS), mild             Carotoid Artery Disease, bilateral               Pulmonary:   COPD, mild      Patient denies inhaler use or home oxygen use   No hospitalizations for exacerbations                Renal/:  Renal/ Normal                 Hepatic/GI:     GERD, well controlled             Musculoskeletal:  Musculoskeletal Normal                Neurological:    Neuromuscular Disease,       Type 2 diabetes mellitus with diabetic polyneuropathy,      Peripheral Neuropathy                           Endocrine:  Diabetes, poorly controlled, type 2           Dermatological:  Skin Normal    Psych:     Hx EtOH                Physical Exam  General: Well nourished, Cooperative, Alert and Oriented    Airway:  Mallampati: III / II  Mouth Opening: Normal  TM Distance: Normal  Tongue: Normal  Neck ROM: Normal ROM    Dental:  Edentulous    Chest/Lungs:  Clear to auscultation, Normal Respiratory Rate    Heart:  Rate: Normal  Rhythm: Regular Rhythm  Sounds: Normal    Abdomen:  Normal, Soft, Nontender        Anesthesia Plan  Type of Anesthesia, risks & benefits discussed:    Anesthesia Type: Gen Natural Airway  Intra-op Monitoring Plan: Standard ASA Monitors  Post Op Pain Control Plan:   (medical reason for not using multimodal pain management)  Induction:  IV  Informed Consent: Informed consent signed with the Patient and all parties understand the risks and agree with anesthesia plan.  All questions answered. Patient consented to blood products? No  ASA Score: 3 Emergent  Anesthesia Plan Notes:   General Natural Airway  Propofol  Zofran  Monitor IVF administration closely patient with history of CHF     Ready For Surgery From Anesthesia Perspective.     .

## 2024-03-06 NOTE — ASSESSMENT & PLAN NOTE
Chronic, controlled. Latest blood pressure and vitals reviewed-     Temp:  [97.8 °F (36.6 °C)-98.8 °F (37.1 °C)]   Pulse:  []   Resp:  [16-21]   BP: (110-186)/(63-91)   SpO2:  [97 %-100 %] .   Home meds for hypertension were reviewed and noted below.   Hypertension Medications               furosemide (LASIX) 20 MG tablet Take 1 tablet (20 mg total) by mouth once daily.    hydrALAZINE (APRESOLINE) 25 MG tablet Take 1 tablet (25 mg total) by mouth 3 (three) times daily.    metoprolol succinate (TOPROL-XL) 50 MG 24 hr tablet Take 50 mg by mouth every morning.    sacubitriL-valsartan (ENTRESTO)  mg per tablet Take 1 tablet by mouth 2 (two) times daily.    spironolactone (ALDACTONE) 25 MG tablet Take 1 tablet (25 mg total) by mouth once daily.            While in the hospital, will manage blood pressure as follows; Continue home antihypertensive regimen    Will utilize p.r.n. blood pressure medication only if patient's blood pressure greater than 180/110 and she develops symptoms such as worsening chest pain or shortness of breath.

## 2024-03-06 NOTE — ASSESSMENT & PLAN NOTE
Secondary hypercoagulable state  Patient with Paroxysmal (<7 days) atrial fibrillation which is controlled currently with Beta Blocker. Patient is currently in sinus rhythm.[id number] Score: 4. HASBLED Score: 3. Anticoagulation not indicated due to active bleed .    Remains in NSR with Rate relatively stable, Continue Toprol XL  Holding Eliquis, Aspirin, and Plavix due to active bleed.   Last TSH WNL 0.719

## 2024-03-06 NOTE — PLAN OF CARE
Problem: Adult Inpatient Plan of Care  Goal: Plan of Care Review  Outcome: Ongoing, Progressing  Goal: Patient-Specific Goal (Individualized)  Outcome: Ongoing, Progressing  Goal: Absence of Hospital-Acquired Illness or Injury  Outcome: Ongoing, Progressing  Goal: Optimal Comfort and Wellbeing  Outcome: Ongoing, Progressing  Goal: Readiness for Transition of Care  Outcome: Ongoing, Progressing     Problem: Adjustment to Illness (Gastrointestinal Bleeding)  Goal: Optimal Coping with Acute Illness  Outcome: Ongoing, Progressing     Problem: Bleeding (Gastrointestinal Bleeding)  Goal: Hemostasis  Outcome: Ongoing, Progressing     Problem: Diabetes Comorbidity  Goal: Blood Glucose Level Within Targeted Range  Outcome: Ongoing, Progressing

## 2024-03-06 NOTE — HOSPITAL COURSE
Patient is a 73 year old female admitted as referred from PCP office for anemia. Patient went for her hospital follow up appt and blood work shows Hb dropped again to 6. Patient states her stools has been dark since she was started on iron. Patient was transfused 1 unit and Hb corrected appropriately. Patient is on Eliquis and aspirin from home, which are on hold. GI was consulted again. Work up this time shows normal iron studies. Small bowel enteroscopy was negative. Eliquis was held. I discussed the with the patient and the daughter about being on three blood thinners and bleeding risk and risk of stroke. Daughter and patient understand the risk of stroke being off off Eliquis. Decided to hold off Eliquis for one week and continue aspirin, Plavix, rpt blood work in 1 week.

## 2024-03-06 NOTE — ASSESSMENT & PLAN NOTE
Patient's anemia is currently uncontrolled. Has received 1 units of PRBCs on 03/05/24 . Etiology likely d/t Iron deficiency and unknown etiology  Current CBC reviewed-   Lab Results   Component Value Date    HGB 8.1 (L) 03/06/2024    HCT 25.9 (L) 03/06/2024     Monitor serial CBC and transfuse if patient becomes hemodynamically unstable, symptomatic or H/H drops below 7/21.    Hb corrected appropriately after transfusion   Monitor H/H  EGD and colonoscopy Feb 2024 is normal  Re consulted GI >> may be push enteroscopy today   Holding aspirin, Plavix and Eliquis

## 2024-03-06 NOTE — TRANSFER OF CARE
"Anesthesia Transfer of Care Note    Patient: Nadia Irene    Procedure(s) Performed: Procedure(s) (LRB):  ENTEROSCOPY (N/A)    Patient location: GI    Anesthesia Type: general    Transport from OR: Transported from OR on room air with adequate spontaneous ventilation    Post pain: adequate analgesia    Post assessment: no apparent anesthetic complications and tolerated procedure well    Post vital signs: stable    Level of consciousness: lethargic    Nausea/Vomiting: no nausea/vomiting    Complications: none    Transfer of care protocol was followed      Last vitals: Visit Vitals  BP (!) 182/88 (BP Location: Right arm, Patient Position: Lying)   Pulse 88   Temp 36.7 °C (98 °F) (Oral)   Resp 16   Ht 5' 1" (1.549 m)   Wt 42.2 kg (93 lb)   LMP  (LMP Unknown)   SpO2 100%   BMI 17.57 kg/m²     "

## 2024-03-06 NOTE — DISCHARGE SUMMARY
[facility]  Discharge Summary      Patient Name: Nadia Irene  MRN: [id number]  SHARON: [id number]  Patient Class: OP- Observation  Admission Date: 3/5/2024  Hospital Length of Stay: 0 days  Discharge Date and Time:  03/06/2024 3:01 PM  Attending Physician: Moo Alejandre MD   Discharging Provider: Moo Alejandre MD  Primary Care Provider: Franklin Garcia MD    Primary Care Team: Networked reference to record PCT     HPI:   73 year old female with a past medical history of significant for anemia, DM, GERD, HTN, Gi hemorrhage, combined systolic and diastolic heart failure, and ATTILA whom presents to the emergency room with shortness of breath at rest, worsening with walking, weakness, fatigue. She denies chest pain, dizziness, abdominal pain, nausea, vomiting, bleeding from stool. She was recently admitted for GI hemorrhage and chest pain. See chart review below. In ER, hemoglobin is 6, CMP unremarkable,  reticulocyte count is 5.2%, EKGs sinus rhythm with no significant ST segment changes, chest x-ray with no acute cardiopulmonary process. Here patient does have confirmed symptomatic anemia on our lab tests. Blood transfusion initiated here. Consent obtained. Admitted to hospital medicine for further medical management. Trend h/h, consult gi for further recommendations.       Chart Review: Last Hospitalization: 3/23/24:  Chest pain and anemia: Cardiology evaluated patient, Troponins x3 were negative, EKG did not show any ST elevations, 2/21/24 TTE showed normal systolic and diastolic function (PASP was slightly elevated at 30 mmHg). Cardiology and they said that outpatient f/u for elective LHC (or other diagnostic test per Cardiology recommendations) was reasonable.    She was noted to have worsened anemia than usual (Hb 10's-11's in 2023, 8's-9's here) and GI was consulted. Hb remained stable inpatient with no bleeding events (even FOBT was negative, though  "Ferritin was very low at 12). 2/23/24 EGD showed only "minimal antritis" which was biopsied, and colonoscopy showed non-bleeding internal hemorrhoids, a tortuous colon and one 2 to 3 mm polyp in the distal descending colon which was resected. oral iron supplementation at discharge. DAPT and Eliquis will be resumed due to stable Hb, lack of identified bleeding and indications for both (CABG; PAF).      Procedure(s) (LRB):  ENTEROSCOPY (N/A)      Hospital Course:   Patient is a 73 year old female admitted as referred from PCP office for anemia. Patient went for her hospital follow up appt and blood work shows Hb dropped again to 6. Patient states her stools has been dark since she was started on iron. Patient was transfused 1 unit and Hb corrected appropriately. Patient is on Eliquis and aspirin from home, which are on hold. GI was consulted again. Work up this time shows normal iron studies. Small bowel enteroscopy was negative. Eliquis was held. I discussed the with the patient and the daughter about being on three blood thinners and bleeding risk and risk of stroke. Daughter and patient understand the risk of stroke being off off Eliquis. Decided to hold off Eliquis for one week and continue aspirin, Plavix, rpt blood work in 1 week.      Goals of Care Treatment Preferences:  Code Status: Full Code      Physical Exam  Vitals and nursing note reviewed.   Constitutional:       General: She is not in acute distress.     Appearance: She is not toxic-appearing.   HENT:      Head: Atraumatic.      Mouth/Throat:      Mouth: Mucous membranes are moist.      Pharynx: Oropharynx is clear.   Eyes:      General: No scleral icterus.     Conjunctiva/sclera: Conjunctivae normal.      Pupils: Pupils are equal, round, and reactive to light.   Cardiovascular:      Rate and Rhythm: Normal rate and regular rhythm.      Heart sounds: No murmur heard.  Pulmonary:      Effort: No respiratory distress.      Breath sounds: No wheezing, " rhonchi or rales.   Abdominal:      General: Abdomen is flat. Bowel sounds are normal.      Palpations: Abdomen is soft.   Musculoskeletal:         General: No swelling or deformity.      Cervical back: No rigidity or tenderness.   Skin:     Coloration: Skin is not jaundiced or pale.      Findings: No bruising, erythema or rash.   Neurological:      General: No focal deficit present.      Mental Status: She is alert and oriented to person, place, and time.      Cranial Nerves: No cranial nerve deficit.      Sensory: No sensory deficit.      Motor: No weakness.   Psychiatric:         Mood and Affect: Mood normal.         Behavior: Behavior normal.         Thought Content: Thought content normal.         Judgment: Judgment normal.      Consults:   Consults (From admission, onward)          Status Ordering Provider     Inpatient consult to Gastroenterology  Once        Provider:  NALLELY Guevara MD Completed MIKNAITIS, LORI            No new Assessment & Plan notes have been filed under this hospital service since the last note was generated.  Service: Hospital Medicine    Final Active Diagnoses:    Diagnosis Date Noted POA    PRINCIPAL PROBLEM:  Symptomatic anemia [D64.9] 07/25/2022 Yes    Nonsustained ventricular tachycardia [I47.29] 03/06/2024 Yes    Atrial fibrillation [I48.91] 03/05/2024 Yes    COPD (chronic obstructive pulmonary disease) [J44.9] 07/25/2022 Yes    Chronic combined systolic and diastolic heart failure [I50.42]  Yes    Gastroesophageal reflux disease without esophagitis [K21.9] 06/26/2019 Yes    Type 2 diabetes mellitus with diabetic polyneuropathy, without long-term current use of insulin [E11.42] 05/01/2014 Yes    Essential hypertension [I10] 12/30/2013 Yes      Problems Resolved During this Admission:       Discharged Condition: good    Disposition: Home or Self Care    Follow Up:   Follow-up Information       Franklin Garcia MD Follow up in 1 week(s).    Specialty: Family  Medicine  Contact information:  [address] [place] [address]  [phone]                           Patient Instructions:      CBC auto differential   Standing Status: Future Standing Exp. Date: 06/04/25       Significant Diagnostic Studies: Labs: CMP   Recent Labs   Lab 03/05/24  1354 03/06/24  0317    140   K 3.9 3.6    109   CO2 25 27   * 119*   BUN 11 9   CREATININE 0.8 0.8   CALCIUM 9.0 8.7   PROT 6.8  --    ALBUMIN 4.0  --    BILITOT 0.4  --    ALKPHOS 65  --    AST 23  --    ALT 14  --    ANIONGAP 6* 4*    and CBC   Recent Labs   Lab 03/05/24  1354 03/06/24 0317   WBC 4.35 5.03   HGB 6.0* 8.1*   HCT 19.8* 25.9*    244       Pending Diagnostic Studies:       Procedure Component Value Units Date/Time    EKG 12-lead [[id number]] Collected: 03/05/24 2249    Order Status: Sent Lab Status: In process Updated: 03/06/24 0518     QRS Duration 84 ms      OHS QTC Calculation 484 ms     Narrative:      Test Reason : R07.9,    Vent. Rate : 101 BPM     Atrial Rate : 101 BPM     P-R Int : 160 ms          QRS Dur : 084 ms      QT Int : 374 ms       P-R-T Axes : 052 035 093 degrees     QTc Int : 484 ms    Sinus tachycardia with Premature atrial complexes  Possible Anterior infarct (cited on or before 19-FEB-2024)  Abnormal ECG  When compared with ECG of 05-MAR-2024 13:17,  Premature atrial complexes are now Present    Referred By: AAAREFVICTOR HUGO   SELF           Confirmed By:            Medications:  Reconciled Home Medications:      Medication List        CHANGE how you take these medications      clopidogreL 75 mg tablet  Commonly known as: PLAVIX  TAKE 1 TABLET EVERY DAY  What changed: when to take this     metFORMIN 500 MG ER 24hr tablet  Commonly known as: GLUCOPHAGE-XR  TAKE 2 TABLETS ONE TIME DAILY  What changed:   how much to take  when to take this            CONTINUE taking these medications      aspirin 81 MG EC tablet  Commonly known as: ECOTRIN  Take 1 tablet (81 mg total)  by mouth once daily.     atorvastatin 10 MG tablet  Commonly known as: LIPITOR  TAKE 1 TABLET EVERY EVENING     ENTRESTO  mg per tablet  Generic drug: sacubitriL-valsartan  Take 1 tablet by mouth 2 (two) times daily.     esomeprazole 40 MG capsule  Commonly known as: NEXIUM  Take 1 capsule (40 mg total) by mouth before breakfast. Take on empty stomach     famotidine 40 MG tablet  Commonly known as: PEPCID  Take 1 tablet (40 mg total) by mouth once daily.     ferrous sulfate 325 mg (65 mg iron) Tab tablet  Commonly known as: FEOSOL  Take 1 tablet (325 mg total) by mouth once daily.     fluticasone-umeclidin-vilanter 100-62.5-25 mcg Dsdv  Commonly known as: TRELEGY ELLIPTA  Inhale 1 puff into the lungs once daily.     hydrALAZINE 25 MG tablet  Commonly known as: APRESOLINE  Take 1 tablet (25 mg total) by mouth 3 (three) times daily.     latanoprost 0.005 % ophthalmic solution  Place 1 drop into both eyes every evening.     metoprolol succinate 50 MG 24 hr tablet  Commonly known as: TOPROL-XL  Take 50 mg by mouth every morning.     spironolactone 25 MG tablet  Commonly known as: ALDACTONE  Take 1 tablet (25 mg total) by mouth once daily.     timolol maleate 0.5% 0.5 % Drop  Commonly known as: TIMOPTIC  Place 1 drop into the left eye 2 (two) times daily.            STOP taking these medications      ACCU-CHEK KEDAR CONTROL SOLN Soln  Generic drug: blood glucose control high,low     alcohol swabs Padm     apixaban 2.5 mg Tab  Commonly known as: ELIQUIS     furosemide 20 MG tablet  Commonly known as: LASIX     traMADoL 50 mg tablet  Commonly known as: ULTRAM              Indwelling Lines/Drains at time of discharge:   Lines/Drains/Airways       Drain  Duration             Female External Urinary Catheter w/ Suction 03/05/24 1816 <1 day                    Time spent on the discharge of patient: 40 minutes         Moo Alejandre MD  Department of Hospital Medicine  [facility]

## 2024-03-06 NOTE — SUBJECTIVE & OBJECTIVE
Interval History: Patient is feeling well, denies any symptoms.     Review of Systems  Objective:     Vital Signs (Most Recent):  Temp: 98.6 °F (37 °C) (03/06/24 1146)  Pulse: 90 (03/06/24 1146)  Resp: 18 (03/06/24 1146)  BP: (!) 155/74 (03/06/24 1146)  SpO2: 100 % (03/06/24 1146) Vital Signs (24h Range):  Temp:  [97.8 °F (36.6 °C)-98.8 °F (37.1 °C)] 98.6 °F (37 °C)  Pulse:  [] 90  Resp:  [16-21] 18  SpO2:  [97 %-100 %] 100 %  BP: (110-186)/(63-91) 155/74     Weight: 42.2 kg (93 lb)  Body mass index is 17.57 kg/m².    Intake/Output Summary (Last 24 hours) at 3/6/2024 1153  Last data filed at 3/6/2024 0830  Gross per 24 hour   Intake 321 ml   Output --   Net 321 ml         Physical Exam  Vitals and nursing note reviewed.   Constitutional:       General: She is not in acute distress.     Appearance: She is not toxic-appearing.   HENT:      Head: Atraumatic.      Mouth/Throat:      Mouth: Mucous membranes are moist.      Pharynx: Oropharynx is clear.   Eyes:      General: No scleral icterus.     Conjunctiva/sclera: Conjunctivae normal.      Pupils: Pupils are equal, round, and reactive to light.   Cardiovascular:      Rate and Rhythm: Normal rate and regular rhythm.      Heart sounds: No murmur heard.  Pulmonary:      Effort: No respiratory distress.      Breath sounds: No wheezing, rhonchi or rales.   Abdominal:      General: Abdomen is flat. Bowel sounds are normal.      Palpations: Abdomen is soft.   Musculoskeletal:         General: No swelling or deformity.      Cervical back: No rigidity or tenderness.   Skin:     Coloration: Skin is not jaundiced or pale.      Findings: No bruising, erythema or rash.   Neurological:      General: No focal deficit present.      Mental Status: She is alert and oriented to person, place, and time.      Cranial Nerves: No cranial nerve deficit.      Sensory: No sensory deficit.      Motor: No weakness.   Psychiatric:         Mood and Affect: Mood normal.         Behavior:  Behavior normal.         Thought Content: Thought content normal.         Judgment: Judgment normal.             Significant Labs: All pertinent labs within the past 24 hours have been reviewed.  CBC:   Recent Labs   Lab 03/04/24  1159 03/05/24  1354 03/06/24  0317   WBC 5.17 4.35 5.03   HGB 6.0* 6.0* 8.1*   HCT 20.0* 19.8* 25.9*    265 244     CMP:   Recent Labs   Lab 03/05/24  1354 03/06/24  0317    140   K 3.9 3.6    109   CO2 25 27   * 119*   BUN 11 9   CREATININE 0.8 0.8   CALCIUM 9.0 8.7   PROT 6.8  --    ALBUMIN 4.0  --    BILITOT 0.4  --    ALKPHOS 65  --    AST 23  --    ALT 14  --    ANIONGAP 6* 4*       Significant Imaging: I have reviewed all pertinent imaging results/findings within the past 24 hours.

## 2024-03-06 NOTE — PLAN OF CARE
[facility]  Initial Discharge Assessment       Primary Care Provider: Franklin Garcia MD    Admission Diagnosis: Symptomatic anemia [D64.9]    Admission Date: 3/5/2024  Expected Discharge Date: 3/7/2024     completed discharge assessment with pt at bedside. Pt AAOx4s. Pt lives at home with minor granddaughter. Demographics, PCP, and insurance verified. No home health. No dialysis. Pt completes ADLs without assistance, but if assistance is needed pt has a straight cane to use at home prn. Pt to discharge home via family transport. Pt has no other needs to be addressed at this time.        Payor: [employer] MEDICARE / Plan: [employer] H[employer] PPO SPECIAL NEEDS / Product Type: Medicare Advantage /     Extended Emergency Contact Information  Primary Emergency Contact: Avani Alicia  Address: [address] [address] [facility]  Home Phone: [phone]  Mobile Phone: [phone]  Relation: Daughter  Secondary Emergency Contact: SALVATORE ALICIA  Mobile Phone: [phone]  Relation: Daughter  Preferred language: English    Discharge Plan A: Home with family  Discharge Plan B: Home with family      [facility] - [facility] [address]  [address] [facility] [address]  Phone: [phone] Fax: [phone]    [facility] - [facility] [address] [address] [facility] [address]  [address] [address]  [facility] [address]  Phone: [phone] Fax: [phone]      Initial Assessment (most recent)       Adult Discharge Assessment - 03/06/24 0945          Discharge Assessment    Assessment Type Discharge Planning Assessment     Confirmed/corrected address, phone number and insurance No     Reason Other (see comment)   Salvatore Alicia Daughter- [phone]    Source of Information patient     When was your last doctors appointment? --   Last Week    Does patient/caregiver understand observation status Yes     Communicated MIKE with  patient/caregiver Yes     Reason For Admission Anemia     People in Home grandchild(damari)     Facility Arrived From: Home     Do you expect to return to your current living situation? Yes     Do you have help at home or someone to help you manage your care at home? Yes     Who are your caregiver(s) and their phone number(s)? Minor granddaughter     Prior to hospitilization cognitive status: Alert/Oriented     Current cognitive status: Alert/Oriented     Walking or Climbing Stairs Difficulty no     Dressing/Bathing Difficulty no     Home Accessibility stairs to enter home     Number of Stairs, Main Entrance five     Equipment Currently Used at Home cane, straight     Readmission within 30 days? No     Patient currently being followed by outpatient case management? No     Do you currently have service(s) that help you manage your care at home? No     Do you take prescription medications? Yes     Do you have prescription coverage? Yes     Coverage HUMANA MANAGED MEDICARE - [employer]NA [facility] HMO PPO SPECIAL NEEDS -     Do you have any problems affording any of your prescribed medications? No     Is the patient taking medications as prescribed? yes     Who is going to help you get home at discharge? Avani Luis [phone] or Gena Luis [phone]     How do you get to doctors appointments? family or friend will provide   Avani Luis [phone] or Gena Luis [phone]    Are you on dialysis? No     Do you take coumadin? No   ASA prn    Discharge Plan A Home with family     Discharge Plan B Home with family     DME Needed Upon Discharge  none     Discharge Plan discussed with: Patient        OTHER    Name(s) of People in Home Pt and minor graddaughter
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
## 2024-03-06 NOTE — PLAN OF CARE
Discharge orders and chart reviewed. No other discharge needs noted at this time. Pt is clear for discharge from case management. Pt is discharging to home.    [employer] message sent to Dr. Hernandez's clinic to notify of eliquis being discontinued. Ambulatory referral to hematology placed per GI recommendations.     03/06/24 1518   Final Note   Assessment Type Final Discharge Note   Anticipated Discharge Disposition Home   What phone number can be called within the next 1-3 days to see how you are doing after discharge? [phone]   Hospital Resources/Appts/Education Provided Appointment suggestion unavailable   Post-Acute Status   Discharge Delays None known at this time

## 2024-03-06 NOTE — CARE UPDATE
03/06/24 0736   Patient Assessment/Suction   Level of Consciousness (AVPU) alert   Respiratory Effort Normal;Unlabored   Expansion/Accessory Muscles/Retractions no use of accessory muscles   PRE-TX-O2   Device (Oxygen Therapy) room air   SpO2 97 %   Pulse Oximetry Type Intermittent   $ Pulse Oximetry - Single Charge Pulse Oximetry - Single   Pulse 102   Resp 16

## 2024-03-06 NOTE — CONSULTS
"GASTROENTEROLOGY INPATIENT CONSULT NOTE  Patient Name: Nadia Irene  Patient MRN: [id number]  Patient : 1950    Admit Date: 3/5/2024  Service date: 3/6/2024    Reason for Consult:  Anemia    PCP: Franklin Garcia MD    Chief Complaint   Patient presents with    abnormal labs     States had labs drawn yesterday and was called this am to come for blood transfusion        HPI: Patient is a 73 year old female with a past medical history of significant for anemia, DM, GERD, HTN, Gi hemorrhage, combined systolic and diastolic heart failure, and ATTILA whom presents to the emergency room with shortness of breath at rest, worsening with walking, weakness, fatigue. She denies chest pain, dizziness, abdominal pain, nausea, vomiting, bleeding from stool. She was recently admitted for GI hemorrhage and chest pain. See chart review below. In ER, hemoglobin is 6, CMP unremarkable,  reticulocyte count is 5.2%, EKGs sinus rhythm with no significant ST segment changes, chest x-ray with no acute cardiopulmonary process. Here patient does have confirmed symptomatic anemia on our lab tests. Blood transfusion initiated here. Consent obtained. Admitted to hospital medicine for further medical management. Trend h/h, consult gi for further recommendations.         Chart Review: Last Hospitalization: 3/23/24:  Chest pain and anemia: Cardiology evaluated patient, Troponins x3 were negative, EKG did not show any ST elevations, 24 TTE showed normal systolic and diastolic function (PASP was slightly elevated at 30 mmHg). Cardiology and they said that outpatient f/u for elective LHC (or other diagnostic test per Cardiology recommendations) was reasonable.    She was noted to have worsened anemia than usual (Hb 10's-11's in , 8's-9's here) and GI was consulted. Hb remained stable inpatient with no bleeding events (even FOBT was negative, though Ferritin was very low at 12). 24 EGD showed only "minimal antritis" which " was biopsied, and colonoscopy showed non-bleeding internal hemorrhoids, a tortuous colon and one 2 to 3 mm polyp in the distal descending colon which was resected. oral iron supplementation at discharge. DAPT and Eliquis will be resumed due to stable Hb, lack of identified bleeding and indications for both (CABG; PAF).      Patient underwent EGD and colonoscopy with Dr. Lr February 23, 2024 for occult blood loss anemia.  EGD was essentially normal.  Colonoscopy finding a small colon polyp that was removed.  Patient denies any melena or hematochezia.     Past Medical History:  Past Medical History:   Diagnosis Date    Anemia     Biliary obstruction     Cholangitis     Diabetes mellitus     pt denies, does not take meds 11/2016    EtOH dependence     GERD (gastroesophageal reflux disease)     HTN (hypertension) 12/30/2013        Past Surgical History:  Past Surgical History:   Procedure Laterality Date    ARTERIOGRAPHY OF SUBCLAVIAN ARTERY  5/22/2020    Procedure: Arteriogram, Subclavian;  Surgeon: Heather Carbajal MD;  Location: [facility] CATH;  Service: Cardiology;;    COLONOSCOPY      COLONOSCOPY N/A 2/23/2024    Procedure: COLONOSCOPY;  Surgeon: Murphy Maguire Jr., MD;  Location: [facility] ENDO;  Service: Endoscopy;  Laterality: N/A;    CORONARY ANGIOGRAPHY N/A 5/22/2020    Procedure: ANGIOGRAM, CORONARY ARTERY;  Surgeon: Heather Carbajal MD;  Location: [facility] CATH;  Service: Cardiology;  Laterality: N/A;    CORONARY ARTERY BYPASS GRAFT (CABG) N/A 5/29/2020    Procedure: CORONARY ARTERY BYPASS GRAFT (CABG) x 5 VESSELS;  Surgeon: Dima Laughlin MD;  Location: [facility] OR;  Service: Cardiovascular;  Laterality: N/A;    ENDOSCOPIC HARVEST OF VEIN N/A 5/29/2020    Procedure: SURGICAL PROCUREMENT, VEIN, ENDOSCOPIC;  Surgeon: Diam Laughlin MD;  Location: [facility] OR;  Service: Cardiovascular;  Laterality: N/A;    ERCP W/ PLASTIC STENT PLACEMENT      ERCP W/ SPHICTEROTOMY      ESOPHAGOGASTRODUODENOSCOPY      ESOPHAGOGASTRODUODENOSCOPY N/A  2/23/2024    Procedure: ESOPHAGOGASTRODUODENOSCOPY (EGD);  Surgeon: Murphy Maguire Jr., MD;  Location: [facility] ENDO;  Service: Endoscopy;  Laterality: N/A;    HYSTERECTOMY      INSERTION OF INTRA-AORTIC BALLOON ASSIST DEVICE N/A 5/29/2020    Procedure: INSERTION, INTRA-AORTIC BALLOON PUMP;  Surgeon: Dima Laughlin MD;  Location: [facility] OR;  Service: Cardiovascular;  Laterality: N/A;    LEFT HEART CATHETERIZATION Left 5/22/2020    Procedure: Left heart cath;  Surgeon: Heather Carbajal MD;  Location: [facility] CATH;  Service: Cardiology;  Laterality: Left;        Home Medications:  Medications Prior to Admission   Medication Sig Dispense Refill Last Dose    apixaban (ELIQUIS) 2.5 mg Tab Take 1 tablet (2.5 mg total) by mouth 2 (two) times daily. 60 tablet 2 3/5/2024 at 09:00    aspirin (ECOTRIN) 81 MG EC tablet Take 1 tablet (81 mg total) by mouth once daily. 30 tablet 0 3/5/2024 at 09:00    atorvastatin (LIPITOR) 10 MG tablet TAKE 1 TABLET EVERY EVENING (Patient taking differently: Take 10 mg by mouth every evening.) 90 tablet 3 3/4/2024 at 20:00    clopidogreL (PLAVIX) 75 mg tablet TAKE 1 TABLET EVERY DAY (Patient taking differently: Take 75 mg by mouth once daily.) 90 tablet 3 3/5/2024 at 09:00    esomeprazole (NEXIUM) 40 MG capsule Take 1 capsule (40 mg total) by mouth before breakfast. Take on empty stomach 90 capsule 3 3/5/2024 at 09:00    ferrous sulfate (FEOSOL) 325 mg (65 mg iron) Tab tablet Take 1 tablet (325 mg total) by mouth once daily. 60 tablet 0 3/5/2024 at 09:00    fluticasone-umeclidin-vilanter (TRELEGY ELLIPTA) 100-62.5-25 mcg DsDv Inhale 1 puff into the lungs once daily. 90 each 3 3/5/2024    hydrALAZINE (APRESOLINE) 25 MG tablet Take 1 tablet (25 mg total) by mouth 3 (three) times daily. 90 tablet 2 3/5/2024 at 09:00    latanoprost 0.005 % ophthalmic solution Place 1 drop into both eyes every evening.   3/4/2024 at 21:00    metFORMIN (GLUCOPHAGE-XR) 500 MG ER 24hr tablet TAKE 2 TABLETS ONE TIME DAILY (Patient  taking differently: Take 500 mg by mouth 2 (two) times daily with meals.) 180 tablet 0 3/5/2024 at 09:00    sacubitriL-valsartan (ENTRESTO)  mg per tablet Take 1 tablet by mouth 2 (two) times daily. 180 tablet 3 3/5/2024 at 09:00    spironolactone (ALDACTONE) 25 MG tablet Take 1 tablet (25 mg total) by mouth once daily. 90 tablet 1 3/5/2024 at 09:00    timolol maleate 0.5% (TIMOPTIC) 0.5 % Drop Place 1 drop into the left eye 2 (two) times daily.   3/5/2024 at 09:00    alcohol swabs PadM Apply 1 each topically as needed. (Patient not taking: Reported on 3/4/2024) 100 each 2     blood glucose control high,low (ACCU-CHEK KEDAR CONTROL SOLN) Soln Use to test controls per r guidelines/instructions (Patient not taking: Reported on 3/4/2024) 1 each 1     famotidine (PEPCID) 40 MG tablet Take 1 tablet (40 mg total) by mouth once daily. 30 tablet 11 Unknown    furosemide (LASIX) 20 MG tablet Take 1 tablet (20 mg total) by mouth once daily. 30 tablet 11     metoprolol succinate (TOPROL-XL) 50 MG 24 hr tablet Take 50 mg by mouth every morning.       traMADoL (ULTRAM) 50 mg tablet Take 1 tablet (50 mg total) by mouth every 12 (twelve) hours as needed for Pain. (Patient not taking: Reported on 3/4/2024) 60 tablet 1        Inpatient Medications:   atorvastatin  10 mg Oral QHS    ferrous sulfate  1 tablet Oral Daily    hydrALAZINE  25 mg Oral TID    latanoprost  1 drop Both Eyes QHS    metoprolol succinate  50 mg Oral QAM    pantoprazole  40 mg Intravenous BID    sacubitriL-valsartan  2 tablet Oral BID    spironolactone  25 mg Oral Daily    timolol maleate 0.5%  1 drop Left Eye BID     0.9%  NaCl infusion (for blood administration), acetaminophen, aluminum-magnesium hydroxide-simethicone, dextrose 50% in water (D50W), dextrose 50% in water (D50W), glucagon (human recombinant), glucose, glucose, HYDROcodone-acetaminophen, insulin aspart U-100, magnesium oxide, magnesium oxide, naloxone, ondansetron, potassium bicarbonate,  "potassium bicarbonate, potassium bicarbonate, potassium, sodium phosphates, potassium, sodium phosphates, potassium, sodium phosphates, senna-docusate 8.6-50 mg, sodium chloride 0.9%    Review of patient's allergies indicates:  No Known Allergies    Social History:   Social History     Occupational History    Not on file   Tobacco Use    Smoking status: Former     Current packs/day: 0.00     Average packs/day: 0.5 packs/day for 40.0 years (20.0 ttl pk-yrs)     Types: Cigarettes     Start date: 5/19/1980     Quit date: 5/19/2020     Years since quitting: 3.8    Smokeless tobacco: Never   Substance and Sexual Activity    Alcohol use: Not Currently     Comment: sober 2 years 3 months    Drug use: No    Sexual activity: Not on file       Family History:   History reviewed. No pertinent family history.    Review of Systems:  A 10 point review of systems was performed and was normal, except as mentioned in the HPI, including constitutional, HEENT, heme, lymph, cardiovascular, respiratory, gastrointestinal, genitourinary, neurologic, endocrine, psychiatric and musculoskeletal.      OBJECTIVE:    Physical Exam:  24 Hour Vital Sign Ranges: Temp:  [97.8 °F (36.6 °C)-98.7 °F (37.1 °C)] 98 °F (36.7 °C)  Pulse:  [] 88  Resp:  [16-21] 16  SpO2:  [97 %-100 %] 100 %  BP: (110-182)/(63-88) 182/88  Most recent vitals: BP (!) 182/88 (BP Location: Right arm, Patient Position: Lying)   Pulse 88   Temp 98 °F (36.7 °C) (Oral)   Resp 16   Ht 5' 1" (1.549 m)   Wt 42.2 kg (93 lb)   LMP  (LMP Unknown)   SpO2 100%   BMI 17.57 kg/m²    GEN: well-developed, well-nourished, awake and alert, non-toxic appearing adult  HEENT: PERRL, sclera anicteric, oral mucosa pink and moist without lesion  NECK: trachea midline; Good ROM  CV: regular rate and rhythm, no murmurs or gallops  RESP: clear to auscultation bilaterally, no wheezes, rhonci or rales  ABD: soft, non-tender, non-distended, normal bowel sounds  EXT: no swelling or edema, 2+ " "pulses distally  SKIN: no rashes or jaundice  PSYCH: normal affect    Labs:   Recent Labs     03/04/24  1159 03/05/24  1354 03/06/24  0317   WBC 5.17 4.35 5.03   MCV 85 87 88    265 244     Recent Labs     03/05/24  1354 03/06/24  0317    140   K 3.9 3.6    109   CO2 25 27   BUN 11 9   * 119*     No results for input(s): "ALB" in the last 72 hours.    Invalid input(s): "ALKP", "SGOT", "SGPT", "TBIL", "DBIL", "TPRO"  Recent Labs     03/05/24  1711   INR 1.0         Radiology Review:  No orders to display         IMPRESSION / RECOMMENDATIONS:  73-year-old female patient with history of paroxysmal atrial fibrillation currently in sinus rhythm on Toprol.  Previous Saima on Eliquis aspirin and Plavix.  Patient with symptomatic anemia without overt GI blood loss.  We will pursue push enteroscopy today but also needs follow up with hematology evaluation as well  Thank you for this consult.    Bria Hand  3/6/2024  1:25 PM       "

## 2024-03-11 ENCOUNTER — TELEPHONE (OUTPATIENT)
Dept: FAMILY MEDICINE | Facility: CLINIC | Age: 74
End: 2024-03-11
Payer: MEDICARE

## 2024-03-11 NOTE — TELEPHONE ENCOUNTER
----- Message from Franklin Garcia MD sent at 3/11/2024 10:06 AM CDT -----  Call patient, let her know the bone scan did show osteoporosis  Needs to be using Caltrate or Oscal supplements over-the-counter to strengthen her bones.  Weight-bearing exercise can help.  Patient also needs a nursing visit to follow-up blood pressure if no appointments already made in the next month.

## 2024-03-13 ENCOUNTER — PATIENT OUTREACH (OUTPATIENT)
Dept: ADMINISTRATIVE | Facility: CLINIC | Age: 74
End: 2024-03-13
Payer: MEDICARE

## 2024-03-13 DIAGNOSIS — D64.9 SYMPTOMATIC ANEMIA: Primary | ICD-10-CM

## 2024-03-13 NOTE — PROGRESS NOTES
C3 nurse attempted to contact Nadia Irene for a TCC post hospital discharge follow up call. The patient is unable to conduct the call @ this time and requested a callback tomorrow, 3/14/24.    The patient has a previously scheduled followup with her PCP, Franklin Garcia MD on 4/1/24 at 0840 but does not have a HOSFU within the next 5-7 days post discharge date of 3/6/24. No messages routed at this time.

## 2024-03-14 NOTE — PROGRESS NOTES
C3 nurse spoke with Nadiatodd Irene for a TCC post hospital discharge follow up call. The patient states she is still having upset stomach, dizziness, and rapid heartbeat but states those are not new symptoms. She denies any new symptoms and wrote down the OOC number to call for any new or worsening symptoms.    The patient has a previously scheduled followup with her PCP, Franklin Garcia MD on 4/1/24 at 0840 but does not have a HOSFU within the next 5-7 days post discharge date of 3/6/24. C3 nurse was unable to schedule a HOSFU with Franklin Garcia MD but did make a referral for [facility] per the pts request, due to her lack of transportation. Message routed to Franklin Garcia MD.

## 2024-03-14 NOTE — PROGRESS NOTES
C3 nurse attempted to contact Nadia Luis [place] for a TCC post hospital discharge follow up call. No answer, left a voicemail with callback information on the pts phone.     The patient has a previously scheduled followup with her PCP, Franklin Garcia MD on 4/1/24 at 0840 but does not have a HOSFU within the next 5-7 days post discharge date of 3/6/24. Message routed to Franklin Garcia MD.

## 2024-03-18 ENCOUNTER — OFFICE VISIT (OUTPATIENT)
Dept: CARDIOLOGY | Facility: CLINIC | Age: 74
End: 2024-03-18
Payer: MEDICARE

## 2024-03-18 VITALS
BODY MASS INDEX: 18.19 KG/M2 | DIASTOLIC BLOOD PRESSURE: 76 MMHG | HEIGHT: 61 IN | HEART RATE: 63 BPM | SYSTOLIC BLOOD PRESSURE: 163 MMHG | WEIGHT: 96.31 LBS

## 2024-03-18 DIAGNOSIS — I70.0 AORTIC ATHEROSCLEROSIS: Primary | ICD-10-CM

## 2024-03-18 DIAGNOSIS — I65.23 BILATERAL CAROTID ARTERY STENOSIS: ICD-10-CM

## 2024-03-18 DIAGNOSIS — F17.210 CIGARETTE NICOTINE DEPENDENCE WITHOUT COMPLICATION: ICD-10-CM

## 2024-03-18 DIAGNOSIS — Z95.1 S/P CABG (CORONARY ARTERY BYPASS GRAFT): ICD-10-CM

## 2024-03-18 PROCEDURE — 1159F MED LIST DOCD IN RCRD: CPT | Mod: CPTII,S$GLB,, | Performed by: INTERNAL MEDICINE

## 2024-03-18 PROCEDURE — 3008F BODY MASS INDEX DOCD: CPT | Mod: CPTII,S$GLB,, | Performed by: INTERNAL MEDICINE

## 2024-03-18 PROCEDURE — 99999 PR PBB SHADOW E&M-EST. PATIENT-LVL III: CPT | Mod: PBBFAC,,, | Performed by: INTERNAL MEDICINE

## 2024-03-18 PROCEDURE — 3077F SYST BP >= 140 MM HG: CPT | Mod: CPTII,S$GLB,, | Performed by: INTERNAL MEDICINE

## 2024-03-18 PROCEDURE — 1126F AMNT PAIN NOTED NONE PRSNT: CPT | Mod: CPTII,S$GLB,, | Performed by: INTERNAL MEDICINE

## 2024-03-18 PROCEDURE — 1101F PT FALLS ASSESS-DOCD LE1/YR: CPT | Mod: CPTII,S$GLB,, | Performed by: INTERNAL MEDICINE

## 2024-03-18 PROCEDURE — 3078F DIAST BP <80 MM HG: CPT | Mod: CPTII,S$GLB,, | Performed by: INTERNAL MEDICINE

## 2024-03-18 PROCEDURE — 99214 OFFICE O/P EST MOD 30 MIN: CPT | Mod: S$GLB,,, | Performed by: INTERNAL MEDICINE

## 2024-03-18 PROCEDURE — 3288F FALL RISK ASSESSMENT DOCD: CPT | Mod: CPTII,S$GLB,, | Performed by: INTERNAL MEDICINE

## 2024-03-18 NOTE — PROGRESS NOTES
Subjective:    Patient ID:  Nadia Irene is a 73 y.o. female patient here for evaluation Follow-up (hosp)      History of Present Illness:  Hospital follow-up.  Known coronary artery disease.  CABG.  Recent GI blood loss with negative workup per endoscopy.  Patient was taken off Eliquis for week, told to be on aspirin only.     History of CABG 2020.  Recurrent angina in November of 2023 with resultant left heart catheterization revealing occluded JEFF to the LAD, patient underwent successful PCI stent ostial LAD with atherectomy.  Moderate disease noted in the vein graft to obtuse marginal.  Patent vein graft to 1st diagonal, patent vein graft to distal right coronary artery.    Diabetes mellitus, hypertension, dyslipidemia.  Past tobacco use.          Review of patient's allergies indicates:  No Known Allergies    Past Medical History:   Diagnosis Date    Anemia     Biliary obstruction     Cholangitis     Diabetes mellitus     pt denies, does not take meds 11/2016    EtOH dependence     GERD (gastroesophageal reflux disease)     HTN (hypertension) 12/30/2013     Past Surgical History:   Procedure Laterality Date    ARTERIOGRAPHY OF SUBCLAVIAN ARTERY  5/22/2020    Procedure: Arteriogram, Subclavian;  Surgeon: Heather Carbajal MD;  Location: [facility] CATH;  Service: Cardiology;;    COLONOSCOPY      COLONOSCOPY N/A 2/23/2024    Procedure: COLONOSCOPY;  Surgeon: Murphy Maguire Jr., MD;  Location: [facility] ENDO;  Service: Endoscopy;  Laterality: N/A;    CORONARY ANGIOGRAPHY N/A 5/22/2020    Procedure: ANGIOGRAM, CORONARY ARTERY;  Surgeon: Heather Carbajal MD;  Location: [facility] CATH;  Service: Cardiology;  Laterality: N/A;    CORONARY ARTERY BYPASS GRAFT (CABG) N/A 5/29/2020    Procedure: CORONARY ARTERY BYPASS GRAFT (CABG) x 5 VESSELS;  Surgeon: Dima Laughlin MD;  Location: [facility] OR;  Service: Cardiovascular;  Laterality: N/A;    ENDOSCOPIC HARVEST OF VEIN N/A 5/29/2020    Procedure: SURGICAL PROCUREMENT, VEIN,  ENDOSCOPIC;  Surgeon: Dima Laughlin MD;  Location: [facility] OR;  Service: Cardiovascular;  Laterality: N/A;    ERCP W/ PLASTIC STENT PLACEMENT      ERCP W/ SPHICTEROTOMY      ESOPHAGOGASTRODUODENOSCOPY      ESOPHAGOGASTRODUODENOSCOPY N/A 2/23/2024    Procedure: ESOPHAGOGASTRODUODENOSCOPY (EGD);  Surgeon: Murphy Maguire Jr., MD;  Location: [facility] ENDO;  Service: Endoscopy;  Laterality: N/A;    HYSTERECTOMY      INSERTION OF INTRA-AORTIC BALLOON ASSIST DEVICE N/A 5/29/2020    Procedure: INSERTION, INTRA-AORTIC BALLOON PUMP;  Surgeon: Dima Laughlin MD;  Location: [facility] OR;  Service: Cardiovascular;  Laterality: N/A;    LEFT HEART CATHETERIZATION Left 5/22/2020    Procedure: Left heart cath;  Surgeon: Heather Carbajal MD;  Location: [facility] CATH;  Service: Cardiology;  Laterality: Left;    SMALL BOWEL ENTEROSCOPY N/A 3/6/2024    Procedure: ENTEROSCOPY;  Surgeon: Bria Hand MD;  Location: [facility] ENDO;  Service: Endoscopy;  Laterality: N/A;     Social History     Tobacco Use    Smoking status: Former     Current packs/day: 0.00     Average packs/day: 0.5 packs/day for 40.0 years (20.0 ttl pk-yrs)     Types: Cigarettes     Start date: 5/19/1980     Quit date: 5/19/2020     Years since quitting: 3.8    Smokeless tobacco: Never   Substance Use Topics    Alcohol use: Not Currently     Comment: sober 2 years 3 months    Drug use: No        Review of Systems:    As noted in HPI in addition      REVIEW OF SYSTEMS  Review of Systems   Constitutional: Negative for decreased appetite, diaphoresis, night sweats, weight gain and weight loss.   HENT:  Negative for nosebleeds and odynophagia.    Eyes:  Negative for double vision and photophobia.   Cardiovascular:  Negative for chest pain, claudication, cyanosis, dyspnea on exertion, irregular heartbeat, leg swelling, near-syncope, orthopnea, palpitations, paroxysmal nocturnal dyspnea and syncope.   Respiratory:  Negative for cough, hemoptysis, shortness of breath and wheezing.     Hematologic/Lymphatic: Negative for adenopathy.   Skin:  Negative for flushing, skin cancer and suspicious lesions.   Musculoskeletal:  Negative for gout, myalgias and neck pain.   Gastrointestinal:  Negative for abdominal pain, heartburn, hematemesis and hematochezia.   Genitourinary:  Negative for bladder incontinence, hesitancy and nocturia.   Neurological:  Negative for focal weakness, headaches, light-headedness and paresthesias.   Psychiatric/Behavioral:  Negative for memory loss and substance abuse.               Objective:        Vitals:    03/18/24 0950   BP: (!) 163/76   Pulse: 63       Lab Results   Component Value Date    WBC 5.03 03/06/2024    HGB 8.1 (L) 03/06/2024    HCT 25.9 (L) 03/06/2024     03/06/2024    CHOL 135 02/21/2024    TRIG 47 02/21/2024    HDL 57 02/21/2024    ALT 14 03/05/2024    AST 23 03/05/2024     03/06/2024    K 3.6 03/06/2024     03/06/2024    CREATININE 0.8 03/06/2024    BUN 9 03/06/2024    CO2 27 03/06/2024    TSH 0.719 02/21/2024    INR 1.0 03/05/2024    HGBA1C 6.3 (H) 12/07/2023    MICROALBUR 0.2 02/05/2018        ECHOCARDIOGRAM RESULTS  Results for orders placed during the hospital encounter of 02/20/24    Echo Saline Bubble? No    Interpretation Summary    Left Ventricle: There is mild concentric hypertrophy. There is normal systolic function with a visually estimated ejection fraction of 60 - 65%. There is normal diastolic function.    Right Ventricle: Normal right ventricular cavity size. Wall thickness is normal. Right ventricle wall motion  is normal. Systolic function is normal.    Aortic Valve: There is mild stenosis. Aortic valve area by VTI is 1.66 cm². Aortic valve peak velocity is 2.17 m/s. Mean gradient is 9 mmHg. The dimensionless index is 0.58. There is mild aortic regurgitation.    Pulmonary Artery: The estimated pulmonary artery systolic pressure is 30 mmHg.    IVC/SVC: Intermediate venous pressure at 8 mmHg.        CURRENT/PREVIOUS VISIT  EKG  Results for orders placed or performed during the hospital encounter of 03/05/24   EKG 12-lead    Collection Time: 03/05/24 10:49 PM   Result Value Ref Range    QRS Duration 84 ms    [facility] QTC Calculation 484 ms    Narrative    Test Reason : R07.9,    Vent. Rate : 101 BPM     Atrial Rate : 101 BPM     P-R Int : 160 ms          QRS Dur : 084 ms      QT Int : 374 ms       P-R-T Axes : 052 035 093 degrees     QTc Int : 484 ms    Sinus tachycardia with Premature atrial complexes  Possible Anterior infarct (cited on or before 19-FEB-2024)  Abnormal ECG  When compared with ECG of 05-MAR-2024 13:17,  Premature atrial complexes are now Present    Referred By: [email or web address]   SELF           Confirmed By:      No valid procedures specified.   Results for orders placed during the hospital encounter of 04/18/23    Nuclear Stress - Cardiology Interpreted    Interpretation Summary    Normal myocardial perfusion scan. There is no evidence of myocardial ischemia or infarction.    The gated perfusion images showed an ejection fraction of 55% post stress.    There is normal wall motion post stress.    LV cavity size is normal at rest and normal at stress.    The ECG portion of the study is abnormal but not diagnostic.    The patient reported chest pain during the stress test.    During stress, occasional PVCs are noted.    No valid procedures specified.    PHYSICAL EXAM  CONSTITUTIONAL: no apparent distress  NECK: no carotid bruit, no JVD  LUNGS: CTA  CHEST WALL: no tenderness,  HEART: regular rate and rhythm, S1, S2 grade 1/6 crescendo decrescendo murmur aortic area  ABDOMEN: soft, non-tender; bowel sounds normal; no masses,  no organomegaly  EXTREMITIES: Extremities normal, no edema, no calf tenderness noted  NEURO: AAO X 3    I HAVE REVIEWED :    The vital signs, nurses notes, and all the pertinent radiology and labs.         Current Outpatient Medications   Medication Instructions    aspirin (ECOTRIN) 81 mg, Oral, Daily     atorvastatin (LIPITOR) 10 MG tablet TAKE 1 TABLET EVERY EVENING    clopidogreL (PLAVIX) 75 mg tablet TAKE 1 TABLET EVERY DAY    esomeprazole (NEXIUM) 40 mg, Oral, Before breakfast, Take on empty stomach    famotidine (PEPCID) 40 mg, Oral, Daily    ferrous sulfate (FEOSOL) 325 mg, Oral, Daily    fluticasone-umeclidin-vilanter (TRELEGY ELLIPTA) 100-62.5-25 mcg DsDv 1 puff, Inhalation, Daily    hydrALAZINE (APRESOLINE) 25 mg, Oral, 3 times daily    latanoprost 0.005 % ophthalmic solution 1 drop, Both Eyes, Nightly    metFORMIN (GLUCOPHAGE-XR) 500 MG ER 24hr tablet TAKE 2 TABLETS ONE TIME DAILY    metoprolol succinate (TOPROL-XL) 50 mg, Oral, Every morning    sacubitriL-valsartan (ENTRESTO)  mg per tablet 1 tablet, Oral, 2 times daily    spironolactone (ALDACTONE) 25 mg, Oral, Daily    timolol maleate 0.5% (TIMOPTIC) 0.5 % Drop 1 drop, Left Eye, 2 times daily          Assessment:   GI bleed with blood product replacement during hospitalization 02/2024.  Negative endoscopy evaluation for source of GI bleed.  Patient currently off of Eliquis, remains on aspirin only by hospital discharge summary.    Coronary disease.  CABG 2020.  Hospitalized at [facility] 11/2023 with occluded JEFF to the LAD with resultant PCI stent ostial proximal LAD.  Vein graft patent x3 echo EF 65%.  Mild AS.    Hypertension, diabetes mellitus, dyslipidemia, past tobacco use      History of PAF but has been maintain in normal sinus rhythm with PACs.    Plan:   Restart Plavix 75 daily.  Labs follow-up primary care.  Three-month    Three months.          No follow-ups on file.

## 2024-03-25 ENCOUNTER — HOSPITAL ENCOUNTER (OUTPATIENT)
Dept: RADIOLOGY | Facility: HOSPITAL | Age: 74
Discharge: HOME | End: 2024-03-25
Payer: MEDICARE

## 2024-03-25 DIAGNOSIS — R42 DIZZINESS AND GIDDINESS: ICD-10-CM

## 2024-03-25 LAB
OHS QRS DURATION: 84 MS
OHS QRS DURATION: 86 MS
OHS QTC CALCULATION: 445 MS
OHS QTC CALCULATION: 484 MS

## 2024-03-25 PROCEDURE — 70544 MR ANGIOGRAPHY HEAD W/O DYE: CPT | Mod: TC,PO

## 2024-03-25 PROCEDURE — 70544 MR ANGIOGRAPHY HEAD W/O DYE: CPT | Mod: 26,,, | Performed by: RADIOLOGY

## 2024-03-26 ENCOUNTER — TELEPHONE (OUTPATIENT)
Dept: FAMILY MEDICINE | Facility: CLINIC | Age: 74
End: 2024-03-26
Payer: MEDICARE

## 2024-03-26 ENCOUNTER — INITIAL CONSULT (OUTPATIENT)
Dept: VASCULAR SURGERY | Facility: CLINIC | Age: 74
End: 2024-03-26
Payer: MEDICARE

## 2024-03-26 VITALS
WEIGHT: 96.31 LBS | BODY MASS INDEX: 18.19 KG/M2 | DIASTOLIC BLOOD PRESSURE: 76 MMHG | HEART RATE: 93 BPM | SYSTOLIC BLOOD PRESSURE: 161 MMHG | HEIGHT: 61 IN

## 2024-03-26 DIAGNOSIS — I77.9 CAROTID ARTERY DISEASE, UNSPECIFIED LATERALITY, UNSPECIFIED TYPE: Primary | ICD-10-CM

## 2024-03-26 DIAGNOSIS — I77.9 CAROTID ARTERY DISEASE, UNSPECIFIED LATERALITY, UNSPECIFIED TYPE: ICD-10-CM

## 2024-03-26 PROCEDURE — 99999 PR PBB SHADOW E&M-EST. PATIENT-LVL III: CPT | Mod: PBBFAC,,, | Performed by: STUDENT IN AN ORGANIZED HEALTH CARE EDUCATION/TRAINING PROGRAM

## 2024-03-26 PROCEDURE — 99214 OFFICE O/P EST MOD 30 MIN: CPT | Mod: S$GLB,,, | Performed by: STUDENT IN AN ORGANIZED HEALTH CARE EDUCATION/TRAINING PROGRAM

## 2024-03-26 NOTE — PROGRESS NOTES
[facility] - Cardiovascular Surg  [facility]  History & Physical    SUBJECTIVE:     Chief Complaint:  Dizziness      History of Present Illness:  Nadia Irene is a 73 y.o. female presents with history of poorly-controlled diabetes, HTN, and tobacco abuse (1 ppd,  stopped 2 months ago).  She reports that her   2023 and since then she has been dealing with extreme dizziness.  He denies any inciting events to make her dizzy and that happens at random times throughout the day.  She reports that her blood pressure is rarely controlled and usually at 160s/70s.  She denies any further symptoms consistent with TIA or strokes.  She is progressively losing her vision and also feels that she is progressively weaker.  She also reports some tingling in her bilateral lower extremities but denies any cramping in her calf or feet when ambulating.        Review of patient's allergies indicates:  No Known Allergies    Past Medical History:   Diagnosis Date    Anemia     Biliary obstruction     Cholangitis     Diabetes mellitus     pt denies, does not take meds 2016    EtOH dependence     GERD (gastroesophageal reflux disease)     HTN (hypertension) 2013     Past Surgical History:   Procedure Laterality Date    ARTERIOGRAPHY OF SUBCLAVIAN ARTERY  2020    Procedure: Arteriogram, Subclavian;  Surgeon: Heather Carbajal MD;  Location: [facility] CATH;  Service: Cardiology;;    COLONOSCOPY      COLONOSCOPY N/A 2024    Procedure: COLONOSCOPY;  Surgeon: Murphy Maguire Jr., MD;  Location: [facility] ENDO;  Service: Endoscopy;  Laterality: N/A;    CORONARY ANGIOGRAPHY N/A 2020    Procedure: ANGIOGRAM, CORONARY ARTERY;  Surgeon: Heather Carbajal MD;  Location: [facility] CATH;  Service: Cardiology;  Laterality: N/A;    CORONARY ARTERY BYPASS GRAFT (CABG) N/A 2020    Procedure: CORONARY ARTERY BYPASS GRAFT (CABG) x 5 VESSELS;  Surgeon: Dima Laughlin MD;  Location: [facility] OR;  Service:  Cardiovascular;  Laterality: N/A;    ENDOSCOPIC HARVEST OF VEIN N/A 5/29/2020    Procedure: SURGICAL PROCUREMENT, VEIN, ENDOSCOPIC;  Surgeon: Dima Laughlin MD;  Location: [facility] OR;  Service: Cardiovascular;  Laterality: N/A;    ERCP W/ PLASTIC STENT PLACEMENT      ERCP W/ SPHICTEROTOMY      ESOPHAGOGASTRODUODENOSCOPY      ESOPHAGOGASTRODUODENOSCOPY N/A 2/23/2024    Procedure: ESOPHAGOGASTRODUODENOSCOPY (EGD);  Surgeon: Murphy Maguire Jr., MD;  Location: [facility] ENDO;  Service: Endoscopy;  Laterality: N/A;    HYSTERECTOMY      INSERTION OF INTRA-AORTIC BALLOON ASSIST DEVICE N/A 5/29/2020    Procedure: INSERTION, INTRA-AORTIC BALLOON PUMP;  Surgeon: Dima Laughlin MD;  Location: [facility] OR;  Service: Cardiovascular;  Laterality: N/A;    LEFT HEART CATHETERIZATION Left 5/22/2020    Procedure: Left heart cath;  Surgeon: Heather Carbajal MD;  Location: [facility] CATH;  Service: Cardiology;  Laterality: Left;    SMALL BOWEL ENTEROSCOPY N/A 3/6/2024    Procedure: ENTEROSCOPY;  Surgeon: Bria Hand MD;  Location: [facility] ENDO;  Service: Endoscopy;  Laterality: N/A;     No family history on file.  Social History     Tobacco Use    Smoking status: Former     Current packs/day: 0.00     Average packs/day: 0.5 packs/day for 40.0 years (20.0 ttl pk-yrs)     Types: Cigarettes     Start date: 5/19/1980     Quit date: 5/19/2020     Years since quitting: 3.8    Smokeless tobacco: Never   Substance Use Topics    Alcohol use: Not Currently     Comment: sober 2 years 3 months    Drug use: No        Review of Systems:  Review of Systems   HENT:          Dizziness   Eyes:         Loss of vision   Musculoskeletal:         Bilateral lower extremity tingling   All other systems reviewed and are negative.      OBJECTIVE:     Vital Signs (Most Recent):  Pulse: 93 (03/26/24 1037)  BP: (!) 161/76 (03/26/24 1037)    Physical Exam:  Physical Exam   Constitutional: She is oriented to person, place, and time.  Non-toxic appearance. No distress.   HENT:    Head: Normocephalic and atraumatic.   Eyes: Right eye exhibits no discharge. Left eye exhibits no discharge.   Cardiovascular: Normal rate.   Palpable DP pulses bilaterally Pulmonary:      Effort: Pulmonary effort is normal. No respiratory distress.      Breath sounds: No wheezing.     Abdominal: Soft. She exhibits no distension. There is no abdominal tenderness.   Neurological: She is alert and oriented to person, place, and time.   Skin: Skin is warm and dry. Capillary refill takes less than 2 seconds.   Psychiatric: Her behavior is normal. Mood normal.       Laboratory:  Lab Results   Component Value Date    WBC 5.03 03/06/2024    HGB 8.1 (L) 03/06/2024    HCT 25.9 (L) 03/06/2024     03/06/2024    CHOL 135 02/21/2024    TRIG 47 02/21/2024    HDL 57 02/21/2024    ALT 14 03/05/2024    AST 23 03/05/2024     03/06/2024    K 3.6 03/06/2024     03/06/2024    CREATININE 0.8 03/06/2024    BUN 9 03/06/2024    CO2 27 03/06/2024    TSH 0.719 02/21/2024    INR 1.0 03/05/2024    HGBA1C 6.3 (H) 12/07/2023    MICROALBUR 0.2 02/05/2018         Diagnostic Results:    MRA brain:  Impression:     1. There is suspected atherosclerosis resulting in nonocclusive stenosis of bilateral cavernous carotid arteries as well as the left vertebral artery.  2. The dominant left vertebral artery is somewhat tortuous.  Minimal flow is seen in the very distal aspect of the right vertebral artery which is small in caliber, likely representing a developmentally hypoplastic vessel, possibly terminating in a posteroinferior cerebellar artery.  More proximal stenosis or occlusion of the right vertebral artery is not excluded and not included on this study.  3. There is no intracranial large vessel occlusion.       ASSESSMENT/PLAN:     73-year-old female who presents with poorly controlled diabetes and hypertension as well as tobacco abuse with complaints of dizziness.  She did have an MRA done yesterday which illustrates lesions  that are nonocclusive in her cavernous carotid arteries as well as a left vertebral artery.     It is unclear whether her vascular disease is causing her dizziness.  Her extracranial carotid arteries were not evaluated on this study.  We will need to obtain bilateral carotid artery ultrasound to see if she also has disease in her extracranial vessels.    I also address her bilateral lower extremity tingling which I do not think is secondary to vascular disease considering her history of smoking.  She has a palpable DP pulse and the tingling is most likely from her poorly controlled diabetes causing neuropathy    Also discussed the importance of smoking cessation and and the risk of cardiovascular events if she continues to smoke.    Carotid artery disease, unspecified laterality, unspecified type  -     Ambulatory referral/consult to Vascular Surgery        Plan:   Bilateral carotid artery ultrasounds follow up   Smoking cessation          Marko Cassidy M.D.   [facility]

## 2024-03-26 NOTE — TELEPHONE ENCOUNTER
Called pt and help schedule vascular for today in [place] at 10:45am, and spoke with her daughter Tucker. - Sue FIGUEROA

## 2024-03-26 NOTE — TELEPHONE ENCOUNTER
----- Message from Lobo Decker sent at 3/26/2024  9:15 AM CDT -----  Regarding: rt call  Type:  Patient Returning Call    Who Called:daughter Nelsy    Who Left Message for Patient:unknown    Does the patient know what this is regarding?:yes    Best Call Back Number:[phone]      Additional Information: nelsy st that pt sugar has been running high it was 264 yesterday, & just took it in its 263 today.  She has an appt 04/01. Please call to discuss.

## 2024-03-27 DIAGNOSIS — I25.10 CORONARY ARTERY DISEASE, UNSPECIFIED VESSEL OR LESION TYPE, UNSPECIFIED WHETHER ANGINA PRESENT, UNSPECIFIED WHETHER NATIVE OR TRANSPLANTED HEART: Primary | ICD-10-CM

## 2024-04-01 ENCOUNTER — OFFICE VISIT (OUTPATIENT)
Dept: FAMILY MEDICINE | Facility: CLINIC | Age: 74
End: 2024-04-01
Payer: MEDICARE

## 2024-04-01 ENCOUNTER — LAB VISIT (OUTPATIENT)
Dept: LAB | Facility: HOSPITAL | Age: 74
End: 2024-04-01
Attending: FAMILY MEDICINE
Payer: MEDICARE

## 2024-04-01 VITALS
SYSTOLIC BLOOD PRESSURE: 136 MMHG | OXYGEN SATURATION: 98 % | BODY MASS INDEX: 18.44 KG/M2 | WEIGHT: 97.69 LBS | HEART RATE: 70 BPM | DIASTOLIC BLOOD PRESSURE: 78 MMHG | RESPIRATION RATE: 20 BRPM | HEIGHT: 61 IN

## 2024-04-01 DIAGNOSIS — R42 VERTIGO: ICD-10-CM

## 2024-04-01 DIAGNOSIS — D64.9 ANEMIA, UNSPECIFIED TYPE: Primary | ICD-10-CM

## 2024-04-01 DIAGNOSIS — D64.9 ANEMIA, UNSPECIFIED TYPE: ICD-10-CM

## 2024-04-01 LAB
BASOPHILS # BLD AUTO: 0.05 K/UL (ref 0–0.2)
BASOPHILS NFR BLD: 1.1 % (ref 0–1.9)
DIFFERENTIAL METHOD BLD: ABNORMAL
EOSINOPHIL # BLD AUTO: 0.1 K/UL (ref 0–0.5)
EOSINOPHIL NFR BLD: 1.4 % (ref 0–8)
ERYTHROCYTE [DISTWIDTH] IN BLOOD BY AUTOMATED COUNT: 17.6 % (ref 11.5–14.5)
HCT VFR BLD AUTO: 32 % (ref 37–48.5)
HGB BLD-MCNC: 9.8 G/DL (ref 12–16)
IMM GRANULOCYTES # BLD AUTO: 0 K/UL (ref 0–0.04)
IMM GRANULOCYTES NFR BLD AUTO: 0 % (ref 0–0.5)
LYMPHOCYTES # BLD AUTO: 1.3 K/UL (ref 1–4.8)
LYMPHOCYTES NFR BLD: 29.5 % (ref 18–48)
MCH RBC QN AUTO: 27.5 PG (ref 27–31)
MCHC RBC AUTO-ENTMCNC: 30.6 G/DL (ref 32–36)
MCV RBC AUTO: 90 FL (ref 82–98)
MONOCYTES # BLD AUTO: 0.4 K/UL (ref 0.3–1)
MONOCYTES NFR BLD: 8 % (ref 4–15)
NEUTROPHILS # BLD AUTO: 2.6 K/UL (ref 1.8–7.7)
NEUTROPHILS NFR BLD: 60 % (ref 38–73)
NRBC BLD-RTO: 0 /100 WBC
PLATELET # BLD AUTO: 281 K/UL (ref 150–450)
PMV BLD AUTO: 10.8 FL (ref 9.2–12.9)
RBC # BLD AUTO: 3.56 M/UL (ref 4–5.4)
WBC # BLD AUTO: 4.37 K/UL (ref 3.9–12.7)

## 2024-04-01 PROCEDURE — G2211 COMPLEX E/M VISIT ADD ON: HCPCS | Mod: S$GLB,,, | Performed by: FAMILY MEDICINE

## 2024-04-01 PROCEDURE — 99999 PR PBB SHADOW E&M-EST. PATIENT-LVL IV: CPT | Mod: PBBFAC,,, | Performed by: FAMILY MEDICINE

## 2024-04-01 PROCEDURE — 1159F MED LIST DOCD IN RCRD: CPT | Mod: CPTII,S$GLB,, | Performed by: FAMILY MEDICINE

## 2024-04-01 PROCEDURE — 85025 COMPLETE CBC W/AUTO DIFF WBC: CPT | Performed by: FAMILY MEDICINE

## 2024-04-01 PROCEDURE — 3288F FALL RISK ASSESSMENT DOCD: CPT | Mod: CPTII,S$GLB,, | Performed by: FAMILY MEDICINE

## 2024-04-01 PROCEDURE — 3078F DIAST BP <80 MM HG: CPT | Mod: CPTII,S$GLB,, | Performed by: FAMILY MEDICINE

## 2024-04-01 PROCEDURE — 1126F AMNT PAIN NOTED NONE PRSNT: CPT | Mod: CPTII,S$GLB,, | Performed by: FAMILY MEDICINE

## 2024-04-01 PROCEDURE — 36415 COLL VENOUS BLD VENIPUNCTURE: CPT | Mod: PN | Performed by: FAMILY MEDICINE

## 2024-04-01 PROCEDURE — 1101F PT FALLS ASSESS-DOCD LE1/YR: CPT | Mod: CPTII,S$GLB,, | Performed by: FAMILY MEDICINE

## 2024-04-01 PROCEDURE — 99214 OFFICE O/P EST MOD 30 MIN: CPT | Mod: S$GLB,,, | Performed by: FAMILY MEDICINE

## 2024-04-01 PROCEDURE — 3008F BODY MASS INDEX DOCD: CPT | Mod: CPTII,S$GLB,, | Performed by: FAMILY MEDICINE

## 2024-04-01 PROCEDURE — 3075F SYST BP GE 130 - 139MM HG: CPT | Mod: CPTII,S$GLB,, | Performed by: FAMILY MEDICINE

## 2024-04-01 NOTE — PROGRESS NOTES
THIS DOCUMENT WAS MADE IN PART WITH VOICE RECOGNITION SOFTWARE.  OCCASIONALLY THIS SOFTWARE WILL MISINTERPRET WORDS OR PHRASES.    Assessment and Plan:    1. Anemia, unspecified type  CBC Auto Differential      2. Vertigo  Ambulatory referral/consult to ENT          Dizziness-continue workup with vascular, will give home Apley maneuver to perform and referral to ENT if no improvement    Anemia-recheck CBC, low threshold for follow-up with Gastroenterology.  Patient off Eliquis, continuing dual antiplatelet therapy per recommendation of Cardiology     Follow-up with me in 4 months    ______________________________________________________________________  Subjective:    Chief Complaint:  Chief Complaint   Patient presents with    Follow-up        HPI:  Nadia is a 73 y.o. year old     Hospital follow-up   February 2024-seen in the emergency department with chest pain.  Cleared by Cardiology.  Noted to have worsening anemia, GI was consulted.  EGD performed showing minimal antritis.  Colonoscopy fairly unremarkable    March 2024  Seen in the emergency department after recommendation by staff that she get evaluated for hemoglobin of 6.  Patient was admitted and transfused.  Hospital staff recommended holding Eliquis for 1 week, continue aspirin and Plavix and repeat blood work in 1 week.  Patient never got repeat blood work    Seen by Cardiology for hospital follow-up   Recommended holding off on Eliquis and resuming dual antiplatelet therapy    Today reports feeling well, denies any significant concerning symptoms with the exception of continued vertigo type dizziness   Had follow-up with vascular for abnormal MRA imaging showing some vertebral artery and carotid artery disease  Has upcoming ultrasound carotid arteries for further evaluation per vascular surgery   Treated with Astepro for possible Eustachian tube dysfunction related vertigo I prior visit, no significant improvement with that  treatment      COPD  Rx-Breo    Paroxysmal atrial fibrillation  History of, maintains normal sinus rhythm with occasional PACs   Rx-dual antiplatelet therapy, beta-blocker     Combined systolic, diastolic congestive heart failure  Previous echocardiogram 05/26/2022-grade 2 diastolic dysfunction, ejection fraction 40%  CardiologyDaniel Hernandez MD   Rx-Aldactone 25 mg, beta-blocker, Entresto, Lasix 20 mg      Valvular heart disease  05/26/2022-echocardiogram shows moderate aortic regurgitation, tricuspid regurgitation, pulmonic regurgitation, mitral regurgitation     Coronary artery disease status post CABG x5 (5/29/2020), dyslipidemia  MAGGIE :  X1 placed late 2023  Mainor Hernandez MD   Rx-aspirin 81 mg, atorvastatin 10 mg, Plavix 75 mg  Negative nuclear stress test 04/2023  Previous lipid panel acceptable     Essential hypertension  Rx-Entresto, metoprolol 50 mg, spironolactone 25 mg     Bilateral carotid artery stenosis  Currently on statin  05/27/2020-carotid ultrasound shows less than 50% stenosis right carotid artery, left internal carotid artery with 50-69% stenosis     Type 2 diabetes mellitus with associated polyneuropathy  Previous A1c-6.3%  Rx-metformin extended release 1000 mg by mouth daily     GERD  Rx : pantoprazole      Nicotine dependence  Prev rx : Patches (didn't work)     Chronic back pain   Rx-tramadol     Past Medical History:  Past Medical History:   Diagnosis Date    Anemia     Biliary obstruction     Cholangitis     Diabetes mellitus     pt denies, does not take meds 11/2016    EtOH dependence     GERD (gastroesophageal reflux disease)     HTN (hypertension) 12/30/2013       Past Surgical History:  Past Surgical History:   Procedure Laterality Date    ARTERIOGRAPHY OF SUBCLAVIAN ARTERY  5/22/2020    Procedure: Arteriogram, Subclavian;  Surgeon: Heather Carbajal MD;  Location: [facility];  Service: Cardiology;;    COLONOSCOPY      COLONOSCOPY N/A 2/23/2024    Procedure: COLONOSCOPY;  Surgeon: Andrez  Murphy NAGEL Jr., MD;  Location: [facility] ENDO;  Service: Endoscopy;  Laterality: N/A;    CORONARY ANGIOGRAPHY N/A 5/22/2020    Procedure: ANGIOGRAM, CORONARY ARTERY;  Surgeon: Heather Carbajal MD;  Location: [facility] CATH;  Service: Cardiology;  Laterality: N/A;    CORONARY ARTERY BYPASS GRAFT (CABG) N/A 5/29/2020    Procedure: CORONARY ARTERY BYPASS GRAFT (CABG) x 5 VESSELS;  Surgeon: Dima Laughlin MD;  Location: [facility] OR;  Service: Cardiovascular;  Laterality: N/A;    ENDOSCOPIC HARVEST OF VEIN N/A 5/29/2020    Procedure: SURGICAL PROCUREMENT, VEIN, ENDOSCOPIC;  Surgeon: Dima Laughlin MD;  Location: [facility] OR;  Service: Cardiovascular;  Laterality: N/A;    ERCP W/ PLASTIC STENT PLACEMENT      ERCP W/ SPHICTEROTOMY      ESOPHAGOGASTRODUODENOSCOPY      ESOPHAGOGASTRODUODENOSCOPY N/A 2/23/2024    Procedure: ESOPHAGOGASTRODUODENOSCOPY (EGD);  Surgeon: Murphy Maguire Jr., MD;  Location: [facility] ENDO;  Service: Endoscopy;  Laterality: N/A;    HYSTERECTOMY      INSERTION OF INTRA-AORTIC BALLOON ASSIST DEVICE N/A 5/29/2020    Procedure: INSERTION, INTRA-AORTIC BALLOON PUMP;  Surgeon: Dima Laughlin MD;  Location: [facility] OR;  Service: Cardiovascular;  Laterality: N/A;    LEFT HEART CATHETERIZATION Left 5/22/2020    Procedure: Left heart cath;  Surgeon: Heather Carbajal MD;  Location: [facility] CATH;  Service: Cardiology;  Laterality: Left;    SMALL BOWEL ENTEROSCOPY N/A 3/6/2024    Procedure: ENTEROSCOPY;  Surgeon: Bria Hand MD;  Location: [facility] ENDO;  Service: Endoscopy;  Laterality: N/A;       Family History:  No family history on file.    Social History:  Social History     Socioeconomic History    Marital status:    Tobacco Use    Smoking status: Former     Current packs/day: 0.00     Average packs/day: 0.5 packs/day for 40.0 years (20.0 ttl pk-yrs)     Types: Cigarettes     Start date: 5/19/1980     Quit date: 5/19/2020     Years since quitting: 3.8    Smokeless tobacco: Never   Substance and Sexual Activity    Alcohol use: Not  Currently     Comment: sober 2 years 3 months    Drug use: No     Social Determinants of Health     Financial Resource Strain: Low Risk  (2/21/2024)    Overall Financial Resource Strain (CARDIA)     Difficulty of Paying Living Expenses: Not hard at all   Food Insecurity: No Food Insecurity (2/21/2024)    Hunger Vital Sign     Worried About Running Out of Food in the Last Year: Never true     Ran Out of Food in the Last Year: Never true   Transportation Needs: No Transportation Needs (2/21/2024)    PRAPARE - Transportation     Lack of Transportation (Medical): No     Lack of Transportation (Non-Medical): No   Housing Stability: Low Risk  (2/21/2024)    Housing Stability Vital Sign     Unable to Pay for Housing in the Last Year: No     Number of Places Lived in the Last Year: 1     Unstable Housing in the Last Year: No       Medications:  Current Outpatient Medications on File Prior to Visit   Medication Sig Dispense Refill    atorvastatin (LIPITOR) 10 MG tablet TAKE 1 TABLET EVERY EVENING (Patient taking differently: Take 10 mg by mouth every evening.) 90 tablet 3    clopidogreL (PLAVIX) 75 mg tablet TAKE 1 TABLET EVERY DAY (Patient taking differently: Take 75 mg by mouth once daily.) 90 tablet 3    esomeprazole (NEXIUM) 40 MG capsule Take 1 capsule (40 mg total) by mouth before breakfast. Take on empty stomach 90 capsule 3    famotidine (PEPCID) 40 MG tablet Take 1 tablet (40 mg total) by mouth once daily. 30 tablet 11    ferrous sulfate (FEOSOL) 325 mg (65 mg iron) Tab tablet Take 1 tablet (325 mg total) by mouth once daily. 60 tablet 0    fluticasone-umeclidin-vilanter (TRELEGY ELLIPTA) 100-62.5-25 mcg DsDv Inhale 1 puff into the lungs once daily. 90 each 3    hydrALAZINE (APRESOLINE) 25 MG tablet Take 1 tablet (25 mg total) by mouth 3 (three) times daily. 90 tablet 2    latanoprost 0.005 % ophthalmic solution Place 1 drop into both eyes every evening.      metFORMIN (GLUCOPHAGE-XR) 500 MG ER 24hr tablet TAKE 2  "TABLETS ONE TIME DAILY (Patient taking differently: Take 500 mg by mouth 2 (two) times daily with meals.) 180 tablet 0    metoprolol succinate (TOPROL-XL) 50 MG 24 hr tablet Take 50 mg by mouth every morning.      sacubitriL-valsartan (ENTRESTO)  mg per tablet Take 1 tablet by mouth 2 (two) times daily. 180 tablet 3    spironolactone (ALDACTONE) 25 MG tablet Take 1 tablet (25 mg total) by mouth once daily. 90 tablet 1    timolol maleate 0.5% (TIMOPTIC) 0.5 % Drop Place 1 drop into the left eye 2 (two) times daily.      aspirin (ECOTRIN) 81 MG EC tablet Take 1 tablet (81 mg total) by mouth once daily. 30 tablet 0     No current facility-administered medications on file prior to visit.       Allergies:  Patient has no known allergies.    Immunizations:  Immunization History   Administered Date(s) Administered    COVID-19, MRNA, LN-S, PF (Pfizer) (Gray Cap) 06/20/2022    COVID-19, MRNA, LN-S, PF (Pfizer) (Purple Cap) 05/28/2022    Pneumococcal Conjugate - 20 Valent 08/04/2022       Review of Systems:  Review of Systems   All other systems reviewed and are negative.      Objective:    Vitals:  Vitals:    04/01/24 0846 04/01/24 0900   BP: (!) 142/80 136/78   Pulse: 70    Resp: 20    SpO2: 98%    Weight: 44.3 kg (97 lb 10.6 oz)    Height: 5' 1" (1.549 m)    PainSc: 0-No pain        Physical Exam  Vitals reviewed.   Constitutional:       General: She is not in acute distress.  HENT:      Head: Normocephalic and atraumatic.   Eyes:      Pupils: Pupils are equal, round, and reactive to light.   Cardiovascular:      Rate and Rhythm: Normal rate and regular rhythm.      Heart sounds: No murmur heard.     No friction rub.   Pulmonary:      Effort: Pulmonary effort is normal.      Breath sounds: Normal breath sounds.   Abdominal:      General: Bowel sounds are normal. There is no distension.      Palpations: Abdomen is soft.      Tenderness: There is no abdominal tenderness.   Musculoskeletal:      Cervical back: Neck " supple.   Skin:     General: Skin is warm and dry.      Findings: No rash.   Psychiatric:         Behavior: Behavior normal.             Franklin Garcia MD  Family Medicine

## 2024-04-11 DIAGNOSIS — I10 ESSENTIAL HYPERTENSION: ICD-10-CM

## 2024-04-11 RX ORDER — HYDRALAZINE HYDROCHLORIDE 25 MG/1
25 TABLET, FILM COATED ORAL 3 TIMES DAILY
Qty: 270 TABLET | Refills: 3 | Status: SHIPPED | OUTPATIENT
Start: 2024-04-11

## 2024-04-15 DIAGNOSIS — I50.42 CHRONIC COMBINED SYSTOLIC AND DIASTOLIC HEART FAILURE: ICD-10-CM

## 2024-04-15 RX ORDER — SACUBITRIL AND VALSARTAN 97; 103 MG/1; MG/1
1 TABLET, FILM COATED ORAL 2 TIMES DAILY
Qty: 180 TABLET | Refills: 3 | Status: SHIPPED | OUTPATIENT
Start: 2024-04-15

## 2024-04-15 NOTE — TELEPHONE ENCOUNTER
----- Message from Tiff Buckner sent at 4/15/2024 12:38 PM CDT -----  Regarding: refill  Contact: pt  Type:  RX Refill Request    Who Called: pt  Refill or New Rx:refill  RX Name and Strength:sacubitriL-valsartan (ENTRESTO)  mg per tablet  How is the patient currently taking it? (ex. 1XDay):1X    Preferred Pharmacy with phone number:  [facility] [address] [address]  [address] [address] [address]  Phone: [phone] Fax: [phone]    Local or Mail Order:local  Ordering Provider:Jose  Would the patient rather a call back or a response via [facility]? Call back  Best Call Back Number:[phone]    Additional Information: sts she needs a week supply until she gets the refill from [facility]--please advise

## 2024-04-15 NOTE — TELEPHONE ENCOUNTER
Please approve for Entresto  mg    Last OV 04/01/24  Last refill date 06/02/23  Last labs 04/11/24    Next appt 06/24/24

## 2024-04-20 NOTE — TELEPHONE ENCOUNTER
Care Due:                  Date            Visit Type   Department     Provider  --------------------------------------------------------------------------------                                EP -                              PRIMARY      [facility] FAMILY  Last Visit: 04-      CARE ([facility])   MIKAEL Garcia                               -                              [facility]  Next Visit: 06-      CARE ([facility])   MEDICINE       [place]  [place]                                                            Last  Test          Frequency    Reason                     Performed    Due Date  --------------------------------------------------------------------------------    HBA1C.......  6 months...  metFORMIN................  12- 06-    [facility] Embedded Care Due Messages. Reference number: [id number].   4/19/2024 8:26:50 PM CDT

## 2024-04-22 RX ORDER — CLOPIDOGREL BISULFATE 75 MG/1
TABLET ORAL
Qty: 90 TABLET | Refills: 3 | Status: SHIPPED | OUTPATIENT
Start: 2024-04-22

## 2024-04-22 RX ORDER — METFORMIN HYDROCHLORIDE 500 MG/1
TABLET, EXTENDED RELEASE ORAL
Qty: 180 TABLET | Refills: 0 | Status: SHIPPED | OUTPATIENT
Start: 2024-04-22

## 2024-04-22 NOTE — TELEPHONE ENCOUNTER
Refill Routing Note   Medication(s) are not appropriate for processing by [facility] for the following reason(s):        ED/Hospital Visit since last OV with provider  Required labs abnormal    [facility] action(s):  Defer        Medication Therapy Plan: FLOS      Appointments  past 12m or future 3m with PCP    Date Provider   Last Visit   4/1/2024 Franklin Garcia MD   Next Visit   6/24/2024 Franklin Garcia MD   ED visits in past 90 days: 1        Note composed:2:52 PM 04/22/2024

## 2024-04-24 DIAGNOSIS — I65.29 STENOSIS OF CAROTID ARTERY, UNSPECIFIED LATERALITY: Primary | ICD-10-CM

## 2024-04-24 DIAGNOSIS — I77.89 OTHER SPECIFIED DISORDERS OF ARTERIES AND ARTERIOLES: ICD-10-CM

## 2024-04-24 PROBLEM — K92.2 ACUTE GI BLEEDING: Status: ACTIVE | Noted: 2024-04-24

## 2024-04-24 PROBLEM — Z71.89 ACP (ADVANCE CARE PLANNING): Status: ACTIVE | Noted: 2024-04-24

## 2024-04-25 DIAGNOSIS — I47.20 V TACH: ICD-10-CM

## 2024-04-25 DIAGNOSIS — I48.0 PAF (PAROXYSMAL ATRIAL FIBRILLATION): Primary | ICD-10-CM

## 2024-04-25 PROBLEM — D50.9 IRON DEFICIENCY ANEMIA: Status: ACTIVE | Noted: 2024-04-25

## 2024-04-25 PROBLEM — I48.91 ATRIAL FIBRILLATION: Status: RESOLVED | Noted: 2024-03-05 | Resolved: 2024-04-25

## 2024-04-25 PROBLEM — Z71.89 ACP (ADVANCE CARE PLANNING): Status: RESOLVED | Noted: 2024-04-24 | Resolved: 2024-04-25

## 2024-04-25 PROBLEM — I50.20 HFREF (HEART FAILURE WITH REDUCED EJECTION FRACTION): Status: ACTIVE | Noted: 2024-04-25

## 2024-05-07 ENCOUNTER — LAB VISIT (OUTPATIENT)
Dept: LAB | Facility: HOSPITAL | Age: 74
End: 2024-05-07
Attending: FAMILY MEDICINE
Payer: MEDICARE

## 2024-05-07 ENCOUNTER — OFFICE VISIT (OUTPATIENT)
Dept: FAMILY MEDICINE | Facility: CLINIC | Age: 74
End: 2024-05-07
Payer: MEDICARE

## 2024-05-07 VITALS
WEIGHT: 96.69 LBS | RESPIRATION RATE: 18 BRPM | OXYGEN SATURATION: 99 % | HEART RATE: 79 BPM | HEIGHT: 61 IN | SYSTOLIC BLOOD PRESSURE: 160 MMHG | DIASTOLIC BLOOD PRESSURE: 70 MMHG | BODY MASS INDEX: 18.26 KG/M2

## 2024-05-07 DIAGNOSIS — J43.1 PANLOBULAR EMPHYSEMA: Chronic | ICD-10-CM

## 2024-05-07 DIAGNOSIS — E11.42 TYPE 2 DIABETES MELLITUS WITH DIABETIC POLYNEUROPATHY, WITHOUT LONG-TERM CURRENT USE OF INSULIN: ICD-10-CM

## 2024-05-07 DIAGNOSIS — K83.8 COMMON BILE DUCT DILATION: ICD-10-CM

## 2024-05-07 DIAGNOSIS — Z79.899 DRUG THERAPY: ICD-10-CM

## 2024-05-07 DIAGNOSIS — K27.9 PEPTIC ULCER DISEASE: Primary | ICD-10-CM

## 2024-05-07 DIAGNOSIS — I47.29 NSVT (NONSUSTAINED VENTRICULAR TACHYCARDIA): ICD-10-CM

## 2024-05-07 DIAGNOSIS — D50.0 IRON DEFICIENCY ANEMIA DUE TO CHRONIC BLOOD LOSS: ICD-10-CM

## 2024-05-07 LAB
ALBUMIN SERPL BCP-MCNC: 3.9 G/DL (ref 3.5–5.2)
ALP SERPL-CCNC: 75 U/L (ref 55–135)
ALT SERPL W/O P-5'-P-CCNC: 13 U/L (ref 10–44)
ANION GAP SERPL CALC-SCNC: 8 MMOL/L (ref 8–16)
AST SERPL-CCNC: 17 U/L (ref 10–40)
BASOPHILS # BLD AUTO: 0.06 K/UL (ref 0–0.2)
BASOPHILS NFR BLD: 1.2 % (ref 0–1.9)
BILIRUB SERPL-MCNC: 0.3 MG/DL (ref 0.1–1)
BUN SERPL-MCNC: 14 MG/DL (ref 8–23)
CALCIUM SERPL-MCNC: 10 MG/DL (ref 8.7–10.5)
CHLORIDE SERPL-SCNC: 109 MMOL/L (ref 95–110)
CO2 SERPL-SCNC: 24 MMOL/L (ref 23–29)
CREAT SERPL-MCNC: 0.9 MG/DL (ref 0.5–1.4)
DIFFERENTIAL METHOD BLD: ABNORMAL
EOSINOPHIL # BLD AUTO: 0 K/UL (ref 0–0.5)
EOSINOPHIL NFR BLD: 0.6 % (ref 0–8)
ERYTHROCYTE [DISTWIDTH] IN BLOOD BY AUTOMATED COUNT: 17.8 % (ref 11.5–14.5)
EST. GFR  (NO RACE VARIABLE): >60 ML/MIN/1.73 M^2
ESTIMATED AVG GLUCOSE: 120 MG/DL (ref 68–131)
GLUCOSE SERPL-MCNC: 149 MG/DL (ref 70–110)
HBA1C MFR BLD: 5.8 % (ref 4–5.6)
HCT VFR BLD AUTO: 30.2 % (ref 37–48.5)
HGB BLD-MCNC: 9.1 G/DL (ref 12–16)
IMM GRANULOCYTES # BLD AUTO: 0.01 K/UL (ref 0–0.04)
IMM GRANULOCYTES NFR BLD AUTO: 0.2 % (ref 0–0.5)
LYMPHOCYTES # BLD AUTO: 1.3 K/UL (ref 1–4.8)
LYMPHOCYTES NFR BLD: 27.2 % (ref 18–48)
MCH RBC QN AUTO: 27.1 PG (ref 27–31)
MCHC RBC AUTO-ENTMCNC: 30.1 G/DL (ref 32–36)
MCV RBC AUTO: 90 FL (ref 82–98)
MONOCYTES # BLD AUTO: 0.4 K/UL (ref 0.3–1)
MONOCYTES NFR BLD: 7.6 % (ref 4–15)
NEUTROPHILS # BLD AUTO: 3.1 K/UL (ref 1.8–7.7)
NEUTROPHILS NFR BLD: 63.2 % (ref 38–73)
NRBC BLD-RTO: 0 /100 WBC
PLATELET # BLD AUTO: 337 K/UL (ref 150–450)
PMV BLD AUTO: 10.5 FL (ref 9.2–12.9)
POTASSIUM SERPL-SCNC: 3.8 MMOL/L (ref 3.5–5.1)
PROT SERPL-MCNC: 7.3 G/DL (ref 6–8.4)
RBC # BLD AUTO: 3.36 M/UL (ref 4–5.4)
SODIUM SERPL-SCNC: 141 MMOL/L (ref 136–145)
WBC # BLD AUTO: 4.86 K/UL (ref 3.9–12.7)

## 2024-05-07 PROCEDURE — 1101F PT FALLS ASSESS-DOCD LE1/YR: CPT | Mod: CPTII,S$GLB,, | Performed by: FAMILY MEDICINE

## 2024-05-07 PROCEDURE — 1126F AMNT PAIN NOTED NONE PRSNT: CPT | Mod: CPTII,S$GLB,, | Performed by: FAMILY MEDICINE

## 2024-05-07 PROCEDURE — 3008F BODY MASS INDEX DOCD: CPT | Mod: CPTII,S$GLB,, | Performed by: FAMILY MEDICINE

## 2024-05-07 PROCEDURE — 4010F ACE/ARB THERAPY RXD/TAKEN: CPT | Mod: CPTII,S$GLB,, | Performed by: FAMILY MEDICINE

## 2024-05-07 PROCEDURE — 3078F DIAST BP <80 MM HG: CPT | Mod: CPTII,S$GLB,, | Performed by: FAMILY MEDICINE

## 2024-05-07 PROCEDURE — 3077F SYST BP >= 140 MM HG: CPT | Mod: CPTII,S$GLB,, | Performed by: FAMILY MEDICINE

## 2024-05-07 PROCEDURE — 80053 COMPREHEN METABOLIC PANEL: CPT | Performed by: FAMILY MEDICINE

## 2024-05-07 PROCEDURE — 99999 PR PBB SHADOW E&M-EST. PATIENT-LVL IV: CPT | Mod: PBBFAC,,, | Performed by: FAMILY MEDICINE

## 2024-05-07 PROCEDURE — 83036 HEMOGLOBIN GLYCOSYLATED A1C: CPT | Performed by: FAMILY MEDICINE

## 2024-05-07 PROCEDURE — 1160F RVW MEDS BY RX/DR IN RCRD: CPT | Mod: CPTII,S$GLB,, | Performed by: FAMILY MEDICINE

## 2024-05-07 PROCEDURE — 99214 OFFICE O/P EST MOD 30 MIN: CPT | Mod: S$GLB,,, | Performed by: FAMILY MEDICINE

## 2024-05-07 PROCEDURE — 1159F MED LIST DOCD IN RCRD: CPT | Mod: CPTII,S$GLB,, | Performed by: FAMILY MEDICINE

## 2024-05-07 PROCEDURE — 3288F FALL RISK ASSESSMENT DOCD: CPT | Mod: CPTII,S$GLB,, | Performed by: FAMILY MEDICINE

## 2024-05-07 PROCEDURE — 85025 COMPLETE CBC W/AUTO DIFF WBC: CPT | Performed by: FAMILY MEDICINE

## 2024-05-07 PROCEDURE — 36415 COLL VENOUS BLD VENIPUNCTURE: CPT | Mod: PN | Performed by: FAMILY MEDICINE

## 2024-05-07 RX ORDER — INSULIN PUMP SYRINGE, 3 ML
EACH MISCELLANEOUS
Qty: 1 EACH | Refills: 0 | Status: SHIPPED | OUTPATIENT
Start: 2024-05-07 | End: 2025-05-07

## 2024-05-07 RX ORDER — INSULIN ASPART 100 [IU]/ML
INJECTION, SOLUTION INTRAVENOUS; SUBCUTANEOUS
Qty: 3 ML | Refills: 11 | Status: SHIPPED | OUTPATIENT
Start: 2024-05-07

## 2024-05-07 RX ORDER — PEN NEEDLE, DIABETIC 30 GX3/16"
NEEDLE, DISPOSABLE MISCELLANEOUS
Qty: 150 EACH | Refills: 12 | Status: SHIPPED | OUTPATIENT
Start: 2024-05-07

## 2024-05-07 RX ORDER — LANCETS
EACH MISCELLANEOUS
Qty: 100 EACH | Refills: 11 | Status: SHIPPED | OUTPATIENT
Start: 2024-05-07

## 2024-05-07 NOTE — PROGRESS NOTES
" THIS DOCUMENT WAS MADE IN PART WITH VOICE RECOGNITION SOFTWARE.  OCCASIONALLY THIS SOFTWARE WILL MISINTERPRET WORDS OR PHRASES.    Assessment and Plan:    1. Peptic ulcer disease        2. NSVT (nonsustained ventricular tachycardia)        3. Iron deficiency anemia due to chronic blood loss        4. Common bile duct dilation        5. Drug therapy  CBC Auto Differential    Comprehensive Metabolic Panel      6. Panlobular emphysema  fluticasone-umeclidin-vilanter (TRELEGY ELLIPTA) 100-62.5-25 mcg DsDv      7. Type 2 diabetes mellitus with diabetic polyneuropathy, without long-term current use of insulin  insulin aspart U-100 (NOVOLOG FLEXPEN U-100 INSULIN) 100 unit/mL (3 mL) InPn pen    blood-glucose meter kit    lancets Misc    blood sugar diagnostic Strp    pen needle, diabetic (BD ULTRA-FINE PARISH PEN NEEDLE) 32 gauge x 5/32" Ndle          Refilled medicines as above     Patient would like to try rapid acting insulin, gave a very low dose sliding scale, only given before meals, no higher than 3 units per dose    Follow-up GI, recheck CBC today   Continue PPI, iron replacement    Follow-up cardiology as planned next month   Modify risk factors best we can    Follow-up with me in 2 months    ______________________________________________________________________  Subjective:    Chief Complaint:  Chief Complaint   Patient presents with    Hospital Follow Up     Admitted to [facility] on April 23rd discharged April 26        HPI:  Nadia is a 73 y.o. year old       Hospital stay early April  Patient admitted with chest pain  ACS ruled out, nuclear stress test showed no reversible ischemia but having runs of ventricular tachycardia   Beta-blocker dose increased  LHC-no interventions    Hospital stay early May  Presented to ER with hematemesis  Noted to have possible pancreatic mass on CT scan   Transfuse 1 unit packed red blood cells  Endoscopy found bleeding angioectasias, treated with argon plasma coagulation   Also " found dilated common bile duct but observed that it was filled with bile sludge was drained into the duodenal lumen    Anemia secondary to peptic ulcer disease   Previous hemoglobin 8.3  Taking oral iron  Having some periumbilical pain     Chest pain, history of coronary artery disease  Has follow-up appointment with Cardiology on 06/18/2024  Still having CP : nightly (pins and needles) a/w shortness of breath     \diabetes   Patient was being given insulin in the hospital for a few readings above 200, she reports this made her feel much better once getting the sugars below 200, interested in home insulin regimen  Previous A1c 6.3% but did have occasional number over 200.  Has been using friends glucometer showing some readings at home over 200    COPD  Rx- Trelegy      Paroxysmal atrial fibrillation  History of, maintains normal sinus rhythm with occasional PACs   Rx-dual antiplatelet therapy, beta-blocker     Combined systolic, diastolic congestive heart failure  Previous echocardiogram 05/26/2022-grade 2 diastolic dysfunction, ejection fraction 40%  Cardiology- MD Mary   Rx-Aldactone 25 mg, beta-blocker, Entresto, Lasix 20 mg      Valvular heart disease  05/26/2022-echocardiogram shows moderate aortic regurgitation, tricuspid regurgitation, pulmonic regurgitation, mitral regurgitation     Coronary artery disease status post CABG x5 (5/29/2020), dyslipidemia  MAGGIE :  X1 placed late 2023  Cardiology- MD Mary   Rx-aspirin 81 mg, atorvastatin 10 mg, Plavix 75 mg  Negative nuclear stress test 04/2023  Previous lipid panel acceptable     Essential hypertension  Rx-Entresto, metoprolol 50 mg, spironolactone 25 mg     Bilateral carotid artery stenosis  Currently on statin  05/27/2020-carotid ultrasound shows less than 50% stenosis right carotid artery, left internal carotid artery with 50-69% stenosis     Type 2 diabetes mellitus with associated polyneuropathy  Previous A1c-6.3%  Rx-metformin extended release 1000  mg by mouth daily     GERD  Rx : pantoprazole      Nicotine dependence  Prev rx : Patches (didn't work)     Chronic back pain   Rx-tramadol     Past Medical History:  Past Medical History:   Diagnosis Date    Anemia     Biliary obstruction     Cholangitis     Diabetes mellitus     pt denies, does not take meds 11/2016    EtOH dependence     GERD (gastroesophageal reflux disease)     HTN (hypertension) 12/30/2013       Past Surgical History:  Past Surgical History:   Procedure Laterality Date    ARTERIOGRAPHY OF SUBCLAVIAN ARTERY  5/22/2020    Procedure: Arteriogram, Subclavian;  Surgeon: Heather Carbajal MD;  Location: [facility] CATH;  Service: Cardiology;;    COLONOSCOPY      COLONOSCOPY N/A 2/23/2024    Procedure: COLONOSCOPY;  Surgeon: Murphy Maguire Jr., MD;  Location: [facility] ENDO;  Service: Endoscopy;  Laterality: N/A;    CORONARY ANGIOGRAPHY N/A 5/22/2020    Procedure: ANGIOGRAM, CORONARY ARTERY;  Surgeon: Heather Carbajal MD;  Location: [facility] CATH;  Service: Cardiology;  Laterality: N/A;    CORONARY ARTERY BYPASS GRAFT (CABG) N/A 5/29/2020    Procedure: CORONARY ARTERY BYPASS GRAFT (CABG) x 5 VESSELS;  Surgeon: Dima Laughlin MD;  Location: [facility] OR;  Service: Cardiovascular;  Laterality: N/A;    ENDOSCOPIC HARVEST OF VEIN N/A 5/29/2020    Procedure: SURGICAL PROCUREMENT, VEIN, ENDOSCOPIC;  Surgeon: Dima Laughlin MD;  Location: [facility] OR;  Service: Cardiovascular;  Laterality: N/A;    ENDOSCOPIC ULTRASOUND OF UPPER GASTROINTESTINAL TRACT N/A 4/24/2024    Procedure: ULTRASOUND, UPPER GI TRACT, ENDOSCOPIC;  Surgeon: Holden Aguirre MD;  Location: [facility];  Service: Endoscopy;  Laterality: N/A;    ERCP W/ PLASTIC STENT PLACEMENT      ERCP W/ SPHICTEROTOMY      ESOPHAGOGASTRODUODENOSCOPY      ESOPHAGOGASTRODUODENOSCOPY N/A 2/23/2024    Procedure: ESOPHAGOGASTRODUODENOSCOPY (EGD);  Surgeon: Murphy Maguire Jr., MD;  Location: [facility] ENDO;  Service: Endoscopy;  Laterality: N/A;    ESOPHAGOGASTRODUODENOSCOPY N/A 4/24/2024     Procedure: EGD (ESOPHAGOGASTRODUODENOSCOPY);  Surgeon: Holden Aguirre MD;  Location: [facility] ENDO;  Service: Endoscopy;  Laterality: N/A;    HYSTERECTOMY      INSERTION OF INTRA-AORTIC BALLOON ASSIST DEVICE N/A 5/29/2020    Procedure: INSERTION, INTRA-AORTIC BALLOON PUMP;  Surgeon: Dima Laughlin MD;  Location: [facility] OR;  Service: Cardiovascular;  Laterality: N/A;    LEFT HEART CATHETERIZATION Left 5/22/2020    Procedure: Left heart cath;  Surgeon: Heather Carbajal MD;  Location: [facility] CATH;  Service: Cardiology;  Laterality: Left;    SMALL BOWEL ENTEROSCOPY N/A 3/6/2024    Procedure: ENTEROSCOPY;  Surgeon: Bria Hand MD;  Location: [facility] ENDO;  Service: Endoscopy;  Laterality: N/A;       Family History:  No family history on file.    Social History:  Social History     Socioeconomic History    Marital status:    Tobacco Use    Smoking status: Former     Current packs/day: 0.00     Average packs/day: 0.5 packs/day for 40.0 years (20.0 ttl pk-yrs)     Types: Cigarettes     Start date: 5/19/1980     Quit date: 5/19/2020     Years since quitting: 3.9    Smokeless tobacco: Never   Substance and Sexual Activity    Alcohol use: Not Currently     Comment: sober 2 years 3 months    Drug use: No     Social Determinants of Health     Financial Resource Strain: Low Risk  (2/21/2024)    Overall Financial Resource Strain (CARDIA)     Difficulty of Paying Living Expenses: Not hard at all   Food Insecurity: No Food Insecurity (4/24/2024)    Hunger Vital Sign     Worried About Running Out of Food in the Last Year: Never true     Ran Out of Food in the Last Year: Never true   Transportation Needs: No Transportation Needs (4/24/2024)    PRAPARE - Transportation     Lack of Transportation (Medical): No     Lack of Transportation (Non-Medical): No   Housing Stability: Low Risk  (4/24/2024)    Housing Stability Vital Sign     Unable to Pay for Housing in the Last Year: No     Homeless in the Last Year: No        Medications:  Current Outpatient Medications on File Prior to Visit   Medication Sig Dispense Refill    amiodarone (PACERONE) 200 MG Tab Take 1 tablet (200 mg total) by mouth 3 (three) times daily. 90 tablet 11    atorvastatin (LIPITOR) 10 MG tablet TAKE 1 TABLET EVERY EVENING (Patient taking differently: Take 10 mg by mouth every evening.) 90 tablet 3    clopidogreL (PLAVIX) 75 mg tablet TAKE 1 TABLET EVERY DAY 90 tablet 3    esomeprazole (NEXIUM) 40 MG capsule Take 1 capsule (40 mg total) by mouth before breakfast. Take on empty stomach 90 capsule 3    famotidine (PEPCID) 40 MG tablet Take 1 tablet (40 mg total) by mouth once daily. 30 tablet 11    ferrous sulfate (FEOSOL) 325 mg (65 mg iron) Tab tablet Take 1 tablet (325 mg total) by mouth once daily. 60 tablet 0    fluticasone-umeclidin-vilanter (TRELEGY ELLIPTA) 100-62.5-25 mcg DsDv Inhale 1 puff into the lungs once daily. 90 each 3    hydrALAZINE (APRESOLINE) 25 MG tablet TAKE 1 TABLET THREE TIMES DAILY 270 tablet 3    latanoprost 0.005 % ophthalmic solution Place 1 drop into both eyes every evening.      metFORMIN (GLUCOPHAGE-XR) 500 MG ER 24hr tablet TAKE 2 TABLETS ONE TIME DAILY 180 tablet 0    metoprolol succinate (TOPROL-XL) 25 MG 24 hr tablet Take 1 tablet (25 mg total) by mouth once daily. 90 tablet 3    sacubitriL-valsartan (ENTRESTO)  mg per tablet Take 1 tablet by mouth 2 (two) times daily. 180 tablet 3    spironolactone (ALDACTONE) 25 MG tablet Take 1 tablet (25 mg total) by mouth once daily. 90 tablet 1    sucralfate (CARAFATE) 1 gram tablet Take 1 tablet (1 g total) by mouth 4 (four) times daily before meals and nightly. 30 tablet 0    timolol maleate 0.5% (TIMOPTIC) 0.5 % Drop Place 1 drop into the left eye 2 (two) times daily.      aspirin (ECOTRIN) 81 MG EC tablet Take 1 tablet (81 mg total) by mouth once daily. 30 tablet 0     No current facility-administered medications on file prior to visit.       Allergies:  Patient has no  "known allergies.    Immunizations:  Immunization History   Administered Date(s) Administered    COVID-19, MRNA, LN-S, PF (Pfizer) (Gray Cap) 06/20/2022    COVID-19, MRNA, LN-S, PF (Pfizer) (Purple Cap) 05/28/2022    Pneumococcal Conjugate - 20 Valent 08/04/2022       Review of Systems:  Review of Systems   All other systems reviewed and are negative.      Objective:    Vitals:  Vitals:    05/07/24 0959   BP: (!) 160/70   Pulse: 79   Resp: 18   SpO2: 99%   Weight: 43.9 kg (96 lb 10.8 oz)   Height: 5' 1" (1.549 m)   PainSc: 0-No pain       Physical Exam  Vitals reviewed.   Constitutional:       General: She is not in acute distress.  HENT:      Head: Normocephalic and atraumatic.   Eyes:      Pupils: Pupils are equal, round, and reactive to light.   Cardiovascular:      Rate and Rhythm: Normal rate and regular rhythm.      Heart sounds: No murmur heard.     No friction rub.   Pulmonary:      Effort: Pulmonary effort is normal.      Breath sounds: Normal breath sounds.   Abdominal:      General: Bowel sounds are normal. There is no distension.      Palpations: Abdomen is soft.      Tenderness: There is no abdominal tenderness.   Musculoskeletal:      Cervical back: Neck supple.   Skin:     General: Skin is warm and dry.      Findings: No rash.   Psychiatric:         Behavior: Behavior normal.           Franklin Garcia MD  Family Medicine      "

## 2024-05-13 ENCOUNTER — HOSPITAL ENCOUNTER (OUTPATIENT)
Dept: CARDIOLOGY | Facility: HOSPITAL | Age: 74
Discharge: HOME | End: 2024-05-13
Attending: PHYSICIAN ASSISTANT
Payer: MEDICARE

## 2024-05-13 DIAGNOSIS — I47.20 V TACH: ICD-10-CM

## 2024-05-13 DIAGNOSIS — I48.0 PAF (PAROXYSMAL ATRIAL FIBRILLATION): ICD-10-CM

## 2024-05-13 PROCEDURE — 93272 ECG/REVIEW INTERPRET ONLY: CPT | Mod: ,,, | Performed by: INTERNAL MEDICINE

## 2024-05-13 PROCEDURE — 93270 REMOTE 30 DAY ECG REV/REPORT: CPT | Mod: PO

## 2024-05-15 RX ORDER — ISOPROPYL ALCOHOL 70 ML/100ML
SWAB TOPICAL
Refills: 0 | OUTPATIENT
Start: 2024-05-15

## 2024-05-15 NOTE — TELEPHONE ENCOUNTER
Refill Decision Note   Nadia Irene  is requesting a refill authorization.  Brief Assessment and Rationale for Refill:  Quick Discontinue     Medication Therapy Plan:    Pharmacy is requesting new scripts for the following medications without required information, (sig/ frequency/qty/etc)      Medication Reconciliation Completed: No     Comments: Pharmacies have been requesting medications for patients without required information, (sig, frequency, qty, etc.). In addition, requests are sent for medication(s) pt. are currently not taking, and medications patients have never taken.    We have spoken to the pharmacies about these request types and advised their teams previously that we are unable to assess these New Script requests and require all details for these requests. This is a known issue and has been reported.     Note composed:2:09 PM 05/15/2024

## 2024-05-15 NOTE — TELEPHONE ENCOUNTER
No care due was identified.  [facility] Embedded Care Due Messages. Reference number: [id number].   5/15/2024 12:01:38 PM CDT

## 2024-05-29 ENCOUNTER — TELEPHONE (OUTPATIENT)
Dept: FAMILY MEDICINE | Facility: CLINIC | Age: 74
End: 2024-05-29
Payer: MEDICARE

## 2024-05-29 NOTE — TELEPHONE ENCOUNTER
Patient was asking for a sooner apt for a hospital f/u, I explained to her we can get her scheduled with our nurse practitioner and scheduled an appointment

## 2024-06-01 NOTE — TELEPHONE ENCOUNTER
No care due was identified.  [facility] Embedded Care Due Messages. Reference number: [id number].   6/01/2024 4:15:01 AM CDT

## 2024-06-03 ENCOUNTER — OFFICE VISIT (OUTPATIENT)
Dept: FAMILY MEDICINE | Facility: CLINIC | Age: 74
End: 2024-06-03
Payer: MEDICARE

## 2024-06-03 VITALS
HEIGHT: 61 IN | RESPIRATION RATE: 18 BRPM | WEIGHT: 96.25 LBS | BODY MASS INDEX: 18.17 KG/M2 | HEART RATE: 78 BPM | DIASTOLIC BLOOD PRESSURE: 66 MMHG | SYSTOLIC BLOOD PRESSURE: 145 MMHG

## 2024-06-03 DIAGNOSIS — I47.29 NSVT (NONSUSTAINED VENTRICULAR TACHYCARDIA): ICD-10-CM

## 2024-06-03 DIAGNOSIS — E87.6 HYPOKALEMIA: ICD-10-CM

## 2024-06-03 DIAGNOSIS — I10 ESSENTIAL HYPERTENSION: Primary | ICD-10-CM

## 2024-06-03 DIAGNOSIS — E11.42 TYPE 2 DIABETES MELLITUS WITH DIABETIC POLYNEUROPATHY, WITHOUT LONG-TERM CURRENT USE OF INSULIN: ICD-10-CM

## 2024-06-03 DIAGNOSIS — E83.42 HYPOMAGNESEMIA: ICD-10-CM

## 2024-06-03 DIAGNOSIS — R42 DIZZINESS AND GIDDINESS: ICD-10-CM

## 2024-06-03 DIAGNOSIS — I77.9 CAROTID ARTERY DISEASE, UNSPECIFIED LATERALITY, UNSPECIFIED TYPE: ICD-10-CM

## 2024-06-03 PROCEDURE — 3008F BODY MASS INDEX DOCD: CPT | Mod: CPTII,S$GLB,, | Performed by: NURSE PRACTITIONER

## 2024-06-03 PROCEDURE — 1125F AMNT PAIN NOTED PAIN PRSNT: CPT | Mod: CPTII,S$GLB,, | Performed by: NURSE PRACTITIONER

## 2024-06-03 PROCEDURE — 1101F PT FALLS ASSESS-DOCD LE1/YR: CPT | Mod: CPTII,S$GLB,, | Performed by: NURSE PRACTITIONER

## 2024-06-03 PROCEDURE — 1160F RVW MEDS BY RX/DR IN RCRD: CPT | Mod: CPTII,S$GLB,, | Performed by: NURSE PRACTITIONER

## 2024-06-03 PROCEDURE — 4010F ACE/ARB THERAPY RXD/TAKEN: CPT | Mod: CPTII,S$GLB,, | Performed by: NURSE PRACTITIONER

## 2024-06-03 PROCEDURE — 3044F HG A1C LEVEL LT 7.0%: CPT | Mod: CPTII,S$GLB,, | Performed by: NURSE PRACTITIONER

## 2024-06-03 PROCEDURE — 99999 PR PBB SHADOW E&M-EST. PATIENT-LVL V: CPT | Mod: PBBFAC,,, | Performed by: NURSE PRACTITIONER

## 2024-06-03 PROCEDURE — 3288F FALL RISK ASSESSMENT DOCD: CPT | Mod: CPTII,S$GLB,, | Performed by: NURSE PRACTITIONER

## 2024-06-03 PROCEDURE — 3066F NEPHROPATHY DOC TX: CPT | Mod: CPTII,S$GLB,, | Performed by: NURSE PRACTITIONER

## 2024-06-03 PROCEDURE — 99214 OFFICE O/P EST MOD 30 MIN: CPT | Mod: S$GLB,,, | Performed by: NURSE PRACTITIONER

## 2024-06-03 PROCEDURE — 3061F NEG MICROALBUMINURIA REV: CPT | Mod: CPTII,S$GLB,, | Performed by: NURSE PRACTITIONER

## 2024-06-03 PROCEDURE — 3078F DIAST BP <80 MM HG: CPT | Mod: CPTII,S$GLB,, | Performed by: NURSE PRACTITIONER

## 2024-06-03 PROCEDURE — 1159F MED LIST DOCD IN RCRD: CPT | Mod: CPTII,S$GLB,, | Performed by: NURSE PRACTITIONER

## 2024-06-03 PROCEDURE — 3077F SYST BP >= 140 MM HG: CPT | Mod: CPTII,S$GLB,, | Performed by: NURSE PRACTITIONER

## 2024-06-03 RX ORDER — ATORVASTATIN CALCIUM 10 MG/1
10 TABLET, FILM COATED ORAL NIGHTLY
Qty: 90 TABLET | Refills: 3 | Status: SHIPPED | OUTPATIENT
Start: 2024-06-03

## 2024-06-03 NOTE — PROGRESS NOTES
THIS DOCUMENT WAS MADE IN PART WITH VOICE RECOGNITION SOFTWARE.  OCCASIONALLY THIS SOFTWARE WILL MISINTERPRET WORDS OR PHRASES.     Assessment and Plan:    Essential hypertension  Comments:  Stable  Continue regimen    Hypokalemia  Comments:  We will repeat CMP    Hypomagnesemia  Comments:  Continue magnesium supplement    Type 2 diabetes mellitus with diabetic polyneuropathy, without long-term current use of insulin  Comments:  A1c 5.8  Controlled  Continue regimen    Carotid artery disease, unspecified laterality, unspecified type  Comments:  Schedule ultrasound and follow up with Vascular    NSVT (nonsustained ventricular tachycardia)  Comments:  Sees cardiologist on 6/18/24    Dizziness and giddiness             Visit summary:      Hospital course and diagnostic studies reviewed in detail with patient during today's visit.    Patient advised to keep follow up with cardiology.  She received contact information for vascular and will schedule follow up.    Patient to return on 6/17/24- to have CMP repeated    Patient to continue medications as prescribed.    Emergent conditions/ER precautions discussed.        Follow up in about 9 weeks (around 8/5/2024) for Follow-up with PCP- Dr. Garcia already scheduled..   ______________________________________________________________________  Subjective:    Chief Complaint:  ER follow up    HPI:  Nadia is a 73 y.o. year old female with a past medical history noted below, here for hospital follow up.  Patient was seen in the ER at [facility] after presenting to the ER complaining of dizziness, generalized weakness and chest.  Patient's blood pressure was greatly elevated at that time.   Patient's magnesium and potassium was low- electrolytes were replaced and she was discharged to follow up with PCP.  Patient discharged on potassium and magnesium supplement.  Taking as prescribed.    Patient is currently wearing cardiac event monitor- since May 13th     She has f/u with Cardiologist-  Dr. Oliveira on 6/18/24    Patient reports she is feeling much better, denies chest pain and shortness of breath.  Reports some dizziness if she stands too quickly  Patient reports she plans on following back up with Vascular - regarding carotid  Stenosis- states ultrasound was ordered, still needs to complete study.    She has been monitoring her blood pressure- reports 140/68- this morning at home.          See hospital course below:    Medical Decision Making  Patient presented with generalized weakness and dizziness.  Per patient on arrival by EMS blood pressure was significantly elevated.  Blood pressure had improved on arrival to the emergency department.  Had received aspirin and 2 nitro prior to arriving.  Patient was well-appearing with no focal deficits.  Laboratory evaluation did show hypomagnesemia mild hypokalemia.  No other significant abnormalities.  Patient negative troponin x2.  H&H stable as compared to previous.  Patient remained stable magnesium and potassium were replaced.  No emergent need for hospitalization at this time.  Will prescribe magnesium and potassium outpatient and patient was discharged with family        DdX:  Hypertensive emergency, ACS, anemia, electrolyte disturbance, STEVEN, rhabdo          COPD  Rx-Breo     Paroxysmal atrial fibrillation  History of, maintains normal sinus rhythm with occasional PACs   Rx-dual antiplatelet therapy, beta-blocker     Combined systolic, diastolic congestive heart failure  Previous echocardiogram 05/26/2022-grade 2 diastolic dysfunction, ejection fraction 40%  Cardiology- MD Mary   Rx-Aldactone 25 mg, beta-blocker, Entresto, Lasix 20 mg      Valvular heart disease  05/26/2022-echocardiogram shows moderate aortic regurgitation, tricuspid regurgitation, pulmonic regurgitation, mitral regurgitation     Coronary artery disease status post CABG x5 (5/29/2020), dyslipidemia  MAGGIE :  X1 placed late 2023  Cardiology- MD Mary   Rx-aspirin 81 mg,  atorvastatin 10 mg, Plavix 75 mg  Negative nuclear stress test 04/2023  Previous lipid panel acceptable     Essential hypertension  Rx-Entresto, metoprolol 50 mg, spironolactone 25 mg, Hydralazine 25mg     Bilateral carotid artery stenosis  Currently on statin  05/27/2020-carotid ultrasound shows less than 50% stenosis right carotid artery, left internal carotid artery with 50-69% stenosis     Type 2 diabetes mellitus with associated polyneuropathy  Previous A1c-6.3%  Rx-metformin extended release 1000 mg by mouth daily     GERD  Rx : pantoprazole      Nicotine dependence  Prev rx : Patches (didn't work)     Chronic back pain   Rx-tramadol           Medications:  Current Outpatient Medications on File Prior to Visit   Medication Sig Dispense Refill    amiodarone (PACERONE) 200 MG Tab Take 1 tablet (200 mg total) by mouth 3 (three) times daily. 90 tablet 11    aspirin (ECOTRIN) 81 MG EC tablet Take 1 tablet (81 mg total) by mouth once daily. 30 tablet 0    atorvastatin (LIPITOR) 10 MG tablet TAKE 1 TABLET EVERY EVENING (Patient taking differently: Take 10 mg by mouth every evening.) 90 tablet 3    blood sugar diagnostic Strp To check BG 3 times daily, to use with insurance preferred meter 100 each 11    blood-glucose meter kit To check BG 3 times daily, to use with insurance preferred meter 1 each 0    clopidogreL (PLAVIX) 75 mg tablet TAKE 1 TABLET EVERY DAY 90 tablet 3    esomeprazole (NEXIUM) 40 MG capsule Take 1 capsule (40 mg total) by mouth before breakfast. Take on empty stomach 90 capsule 3    famotidine (PEPCID) 40 MG tablet Take 1 tablet (40 mg total) by mouth once daily. 30 tablet 11    ferrous sulfate (FEOSOL) 325 mg (65 mg iron) Tab tablet Take 1 tablet (325 mg total) by mouth once daily. 60 tablet 0    fluticasone-umeclidin-vilanter (TRELEGY ELLIPTA) 100-62.5-25 mcg DsDv Inhale 1 puff into the lungs once daily. 90 each 3    hydrALAZINE (APRESOLINE) 25 MG tablet TAKE 1 TABLET THREE TIMES DAILY 270 tablet  "3    insulin aspart U-100 (NOVOLOG FLEXPEN U-100 INSULIN) 100 unit/mL (3 mL) InPn pen Administer per sliding scale three times daily before meals, max daily dose 20 units 3 mL 11    lancets Misc To check BG 3 times daily, to use with insurance preferred meter 100 each 11    latanoprost 0.005 % ophthalmic solution Place 1 drop into both eyes every evening.      magnesium oxide (MAG-OX) 400 mg (241.3 mg magnesium) tablet Take 1 tablet (400 mg total) by mouth once daily. 30 tablet 0    metFORMIN (GLUCOPHAGE-XR) 500 MG ER 24hr tablet TAKE 2 TABLETS ONE TIME DAILY 180 tablet 0    metoprolol succinate (TOPROL-XL) 25 MG 24 hr tablet Take 1 tablet (25 mg total) by mouth once daily. 90 tablet 3    pen needle, diabetic (BD ULTRA-FINE PARISH PEN NEEDLE) 32 gauge x 5/32" Ndle Uses 4 daily, on multiple daily insulin injections 150 each 12    potassium chloride SA (K-DUR,KLOR-CON) 20 MEQ tablet Take 1 tablet (20 mEq total) by mouth once daily. 7 tablet 0    sacubitriL-valsartan (ENTRESTO)  mg per tablet Take 1 tablet by mouth 2 (two) times daily. 180 tablet 3    spironolactone (ALDACTONE) 25 MG tablet Take 1 tablet (25 mg total) by mouth once daily. 90 tablet 1    sucralfate (CARAFATE) 1 gram tablet Take 1 tablet (1 g total) by mouth 4 (four) times daily before meals and nightly. 30 tablet 0    timolol maleate 0.5% (TIMOPTIC) 0.5 % Drop Place 1 drop into the left eye 2 (two) times daily.       No current facility-administered medications on file prior to visit.       Review of Systems:  Review of Systems   All other systems reviewed and are negative.      Past Medical History:  Past Medical History:   Diagnosis Date    Anemia     Biliary obstruction     Cholangitis     Diabetes mellitus     pt denies, does not take meds 11/2016    EtOH dependence     GERD (gastroesophageal reflux disease)     HTN (hypertension) 12/30/2013       Objective:    Vitals:  Vitals:    06/03/24 0923 06/03/24 0952   BP: (!) 160/68 (!) 145/66   Pulse: " "78    Resp: 18    Weight: 43.6 kg (96 lb 3.7 oz)    Height: 5' 1" (1.549 m)    PainSc:   3    PainLoc: Neck        Physical Exam  Vitals reviewed.   Constitutional:       General: She is not in acute distress.  HENT:      Head: Normocephalic and atraumatic.   Eyes:      Pupils: Pupils are equal, round, and reactive to light.   Cardiovascular:      Rate and Rhythm: Normal rate and regular rhythm.      Heart sounds: No murmur heard.     No friction rub.   Pulmonary:      Effort: Pulmonary effort is normal.      Breath sounds: Normal breath sounds.   Abdominal:      General: Bowel sounds are normal. There is no distension.      Palpations: Abdomen is soft.      Tenderness: There is no abdominal tenderness.   Musculoskeletal:      Cervical back: Neck supple.   Skin:     General: Skin is warm and dry.      Findings: No rash.   Psychiatric:         Behavior: Behavior normal.         Data:  CMP  Sodium   Date Value Ref Range Status   05/28/2024 138 136 - 145 mmol/L Final     Potassium   Date Value Ref Range Status   05/28/2024 3.4 (L) 3.5 - 5.1 mmol/L Final     Comment:     Anion Gap reference range revised on 4/28/2023     Chloride   Date Value Ref Range Status   05/28/2024 110 95 - 110 mmol/L Final     CO2   Date Value Ref Range Status   05/28/2024 17 (L) 22 - 31 mmol/L Final     Glucose   Date Value Ref Range Status   05/28/2024 121 (H) 70 - 110 mg/dL Final     Comment:     The ADA recommends the following guidelines for fasting glucose:    Normal:       less than 100 mg/dL    Prediabetes:  100 mg/dL to 125 mg/dL    Diabetes:     126 mg/dL or higher       BUN   Date Value Ref Range Status   05/28/2024 13 7 - 18 mg/dL Final     Creatinine   Date Value Ref Range Status   05/28/2024 0.86 0.50 - 1.40 mg/dL Final     Calcium   Date Value Ref Range Status   05/28/2024 9.5 8.4 - 10.2 mg/dL Final     Total Protein   Date Value Ref Range Status   05/28/2024 7.5 6.0 - 8.4 g/dL Final     Albumin   Date Value Ref Range Status "   05/28/2024 4.3 3.5 - 5.2 g/dL Final     Total Bilirubin   Date Value Ref Range Status   05/28/2024 0.4 0.2 - 1.3 mg/dL Final     Alkaline Phosphatase   Date Value Ref Range Status   05/28/2024 75 38 - 145 U/L Final     AST   Date Value Ref Range Status   05/28/2024 31 14 - 36 U/L Final     ALT   Date Value Ref Range Status   05/28/2024 16 0 - 35 U/L Final     Anion Gap   Date Value Ref Range Status   05/28/2024 11 5 - 12 mmol/L Final     Comment:     Anion Gap reference range revised on 4/28/2023     eGFR   Date Value Ref Range Status   05/28/2024 >60 >60 mL/min/1.73 m^2 Final         Medical history reviewed, Medications reconciled.        I spent a total of 30 minutes on the day of the visit.This includes face to face time and non-face to face time preparing to see the patient (eg, review of tests), obtaining and/or reviewing separately obtained history, documenting clinical information in the electronic or other health record, independently interpreting results and communicating results to the patient/family/caregiver, or care coordinator.       Rosemary Soto, FNP-C  Family Medicine

## 2024-06-03 NOTE — PATIENT INSTRUCTIONS
Call to schedule with  Dr Marko Cassidy  Physician    Primary Contact Information    Phone Fax E-mail Address   [phone] Not available [address] [facility] [place] [address]         Languages Specialties     English Vascular Surgery, Surgery

## 2024-06-03 NOTE — TELEPHONE ENCOUNTER
Refill Decision Note   Nadia Washington  is requesting a refill authorization.  Brief Assessment and Rationale for Refill:  Approve     Medication Therapy Plan:  ED visit for hypomagnesemia. Pt had ED f/u. Will approve 90 with 3 refills      Comments:     Note composed:10:39 AM 06/03/2024             Appointments     Last Visit   5/7/2024 Franklin Garcia MD   Next Visit   8/5/2024 Franklin Garcia MD

## 2024-06-07 ENCOUNTER — HOSPITAL ENCOUNTER (OUTPATIENT)
Dept: CARDIOLOGY | Facility: HOSPITAL | Age: 74
Discharge: HOME | End: 2024-06-07
Attending: STUDENT IN AN ORGANIZED HEALTH CARE EDUCATION/TRAINING PROGRAM
Payer: MEDICARE

## 2024-06-07 DIAGNOSIS — I65.29 STENOSIS OF CAROTID ARTERY, UNSPECIFIED LATERALITY: ICD-10-CM

## 2024-06-07 DIAGNOSIS — I77.89 OTHER SPECIFIED DISORDERS OF ARTERIES AND ARTERIOLES: ICD-10-CM

## 2024-06-07 LAB
LEFT ARM DIASTOLIC BLOOD PRESSURE: 66 MMHG
LEFT ARM SYSTOLIC BLOOD PRESSURE: 145 MMHG
LEFT CBA DIAS: 13 CM/S
LEFT CBA SYS: 77 CM/S
LEFT CCA DIST DIAS: 10 CM/S
LEFT CCA DIST SYS: 84 CM/S
LEFT CCA MID DIAS: 9 CM/S
LEFT CCA MID SYS: 105 CM/S
LEFT CCA PROX DIAS: 9 CM/S
LEFT CCA PROX SYS: 115 CM/S
LEFT ECA DIAS: 0 CM/S
LEFT ECA SYS: 99 CM/S
LEFT ICA DIST DIAS: 24 CM/S
LEFT ICA DIST SYS: 111 CM/S
LEFT ICA MID DIAS: 29 CM/S
LEFT ICA MID SYS: 109 CM/S
LEFT ICA PROX DIAS: 17 CM/S
LEFT ICA PROX SYS: 84 CM/S
LEFT VERTEBRAL DIAS: 14 CM/S
LEFT VERTEBRAL SYS: 92 CM/S
OHS CV CAROTID RIGHT ICA EDV HIGHEST: 32
OHS CV CAROTID ULTRASOUND LEFT ICA/CCA RATIO: 1.32
OHS CV CAROTID ULTRASOUND RIGHT ICA/CCA RATIO: 1.62
OHS CV PV CAROTID LEFT HIGHEST CCA: 115
OHS CV PV CAROTID LEFT HIGHEST ICA: 111
OHS CV PV CAROTID RIGHT HIGHEST CCA: 139
OHS CV PV CAROTID RIGHT HIGHEST ICA: 120
OHS CV US CAROTID LEFT HIGHEST EDV: 29
RIGHT ARM DIASTOLIC BLOOD PRESSURE: 66 MMHG
RIGHT ARM SYSTOLIC BLOOD PRESSURE: 145 MMHG
RIGHT CBA DIAS: 13 CM/S
RIGHT CBA SYS: 50 CM/S
RIGHT CCA DIST DIAS: 7 CM/S
RIGHT CCA DIST SYS: 74 CM/S
RIGHT CCA MID DIAS: 10 CM/S
RIGHT CCA MID SYS: 139 CM/S
RIGHT CCA PROX DIAS: 6 CM/S
RIGHT CCA PROX SYS: 86 CM/S
RIGHT ECA DIAS: 3 CM/S
RIGHT ECA SYS: 77 CM/S
RIGHT ICA DIST DIAS: 32 CM/S
RIGHT ICA DIST SYS: 120 CM/S
RIGHT ICA MID DIAS: 29 CM/S
RIGHT ICA MID SYS: 104 CM/S
RIGHT ICA PROX DIAS: 11 CM/S
RIGHT ICA PROX SYS: 45 CM/S
RIGHT VERTEBRAL DIAS: 16 CM/S
RIGHT VERTEBRAL SYS: 40 CM/S

## 2024-06-07 PROCEDURE — 93880 EXTRACRANIAL BILAT STUDY: CPT | Mod: PO

## 2024-06-17 ENCOUNTER — LAB VISIT (OUTPATIENT)
Dept: LAB | Facility: HOSPITAL | Age: 74
End: 2024-06-17
Attending: FAMILY MEDICINE
Payer: MEDICARE

## 2024-06-17 DIAGNOSIS — Z79.899 DRUG THERAPY: ICD-10-CM

## 2024-06-17 LAB
ALBUMIN SERPL BCP-MCNC: 3.4 G/DL (ref 3.5–5.2)
ALP SERPL-CCNC: 53 U/L (ref 55–135)
ALT SERPL W/O P-5'-P-CCNC: 9 U/L (ref 10–44)
ANION GAP SERPL CALC-SCNC: 11 MMOL/L (ref 8–16)
AST SERPL-CCNC: 13 U/L (ref 10–40)
BILIRUB SERPL-MCNC: 0.3 MG/DL (ref 0.1–1)
BUN SERPL-MCNC: 28 MG/DL (ref 8–23)
CALCIUM SERPL-MCNC: 8.8 MG/DL (ref 8.7–10.5)
CHLORIDE SERPL-SCNC: 107 MMOL/L (ref 95–110)
CHOLEST SERPL-MCNC: 121 MG/DL (ref 120–199)
CHOLEST/HDLC SERPL: 2.6 {RATIO} (ref 2–5)
CO2 SERPL-SCNC: 22 MMOL/L (ref 23–29)
CREAT SERPL-MCNC: 0.9 MG/DL (ref 0.5–1.4)
EST. GFR  (NO RACE VARIABLE): >60 ML/MIN/1.73 M^2
ESTIMATED AVG GLUCOSE: 131 MG/DL (ref 68–131)
GLUCOSE SERPL-MCNC: 120 MG/DL (ref 70–110)
HBA1C MFR BLD: 6.2 % (ref 4–5.6)
HDLC SERPL-MCNC: 47 MG/DL (ref 40–75)
HDLC SERPL: 38.8 % (ref 20–50)
LDLC SERPL CALC-MCNC: 62.2 MG/DL (ref 63–159)
NONHDLC SERPL-MCNC: 74 MG/DL
POTASSIUM SERPL-SCNC: 3.6 MMOL/L (ref 3.5–5.1)
PROT SERPL-MCNC: 6.4 G/DL (ref 6–8.4)
SODIUM SERPL-SCNC: 140 MMOL/L (ref 136–145)
TRIGL SERPL-MCNC: 59 MG/DL (ref 30–150)

## 2024-06-17 PROCEDURE — 36415 COLL VENOUS BLD VENIPUNCTURE: CPT | Mod: PN | Performed by: FAMILY MEDICINE

## 2024-06-17 PROCEDURE — 83036 HEMOGLOBIN GLYCOSYLATED A1C: CPT | Performed by: FAMILY MEDICINE

## 2024-06-17 PROCEDURE — 80053 COMPREHEN METABOLIC PANEL: CPT | Performed by: FAMILY MEDICINE

## 2024-06-17 PROCEDURE — 80061 LIPID PANEL: CPT | Performed by: FAMILY MEDICINE

## 2024-06-18 ENCOUNTER — OFFICE VISIT (OUTPATIENT)
Dept: CARDIOLOGY | Facility: CLINIC | Age: 74
End: 2024-06-18
Payer: MEDICARE

## 2024-06-18 VITALS
BODY MASS INDEX: 19.27 KG/M2 | HEIGHT: 60 IN | WEIGHT: 98.13 LBS | SYSTOLIC BLOOD PRESSURE: 153 MMHG | DIASTOLIC BLOOD PRESSURE: 71 MMHG | HEART RATE: 85 BPM

## 2024-06-18 DIAGNOSIS — I50.42 CHRONIC COMBINED SYSTOLIC AND DIASTOLIC HEART FAILURE: ICD-10-CM

## 2024-06-18 DIAGNOSIS — I10 ESSENTIAL HYPERTENSION: ICD-10-CM

## 2024-06-18 DIAGNOSIS — I65.23 BILATERAL CAROTID ARTERY STENOSIS: ICD-10-CM

## 2024-06-18 DIAGNOSIS — I48.0 PAF (PAROXYSMAL ATRIAL FIBRILLATION): ICD-10-CM

## 2024-06-18 DIAGNOSIS — I50.20 HFREF (HEART FAILURE WITH REDUCED EJECTION FRACTION): ICD-10-CM

## 2024-06-18 DIAGNOSIS — I25.10 CORONARY ARTERY DISEASE, UNSPECIFIED VESSEL OR LESION TYPE, UNSPECIFIED WHETHER ANGINA PRESENT, UNSPECIFIED WHETHER NATIVE OR TRANSPLANTED HEART: ICD-10-CM

## 2024-06-18 DIAGNOSIS — Z95.1 S/P CABG (CORONARY ARTERY BYPASS GRAFT): ICD-10-CM

## 2024-06-18 DIAGNOSIS — Z72.0 TOBACCO ABUSE: ICD-10-CM

## 2024-06-18 DIAGNOSIS — I70.0 AORTIC ATHEROSCLEROSIS: Primary | ICD-10-CM

## 2024-06-18 DIAGNOSIS — I10 ESSENTIAL HYPERTENSION: Primary | ICD-10-CM

## 2024-06-18 DIAGNOSIS — F17.210 CIGARETTE NICOTINE DEPENDENCE WITHOUT COMPLICATION: ICD-10-CM

## 2024-06-18 PROCEDURE — 3044F HG A1C LEVEL LT 7.0%: CPT | Mod: CPTII,S$GLB,, | Performed by: INTERNAL MEDICINE

## 2024-06-18 PROCEDURE — 99214 OFFICE O/P EST MOD 30 MIN: CPT | Mod: S$GLB,,, | Performed by: INTERNAL MEDICINE

## 2024-06-18 PROCEDURE — 3288F FALL RISK ASSESSMENT DOCD: CPT | Mod: CPTII,S$GLB,, | Performed by: INTERNAL MEDICINE

## 2024-06-18 PROCEDURE — 3066F NEPHROPATHY DOC TX: CPT | Mod: CPTII,S$GLB,, | Performed by: INTERNAL MEDICINE

## 2024-06-18 PROCEDURE — 3061F NEG MICROALBUMINURIA REV: CPT | Mod: CPTII,S$GLB,, | Performed by: INTERNAL MEDICINE

## 2024-06-18 PROCEDURE — 1159F MED LIST DOCD IN RCRD: CPT | Mod: CPTII,S$GLB,, | Performed by: INTERNAL MEDICINE

## 2024-06-18 PROCEDURE — 99999 PR PBB SHADOW E&M-EST. PATIENT-LVL III: CPT | Mod: PBBFAC,,, | Performed by: INTERNAL MEDICINE

## 2024-06-18 PROCEDURE — 1101F PT FALLS ASSESS-DOCD LE1/YR: CPT | Mod: CPTII,S$GLB,, | Performed by: INTERNAL MEDICINE

## 2024-06-18 PROCEDURE — 3078F DIAST BP <80 MM HG: CPT | Mod: CPTII,S$GLB,, | Performed by: INTERNAL MEDICINE

## 2024-06-18 PROCEDURE — 1126F AMNT PAIN NOTED NONE PRSNT: CPT | Mod: CPTII,S$GLB,, | Performed by: INTERNAL MEDICINE

## 2024-06-18 PROCEDURE — 3077F SYST BP >= 140 MM HG: CPT | Mod: CPTII,S$GLB,, | Performed by: INTERNAL MEDICINE

## 2024-06-18 PROCEDURE — 4010F ACE/ARB THERAPY RXD/TAKEN: CPT | Mod: CPTII,S$GLB,, | Performed by: INTERNAL MEDICINE

## 2024-06-18 PROCEDURE — 3008F BODY MASS INDEX DOCD: CPT | Mod: CPTII,S$GLB,, | Performed by: INTERNAL MEDICINE

## 2024-06-18 RX ORDER — CLONIDINE HYDROCHLORIDE 0.1 MG/1
0.1 TABLET ORAL 2 TIMES DAILY
Qty: 60 TABLET | Refills: 3 | Status: SHIPPED | OUTPATIENT
Start: 2024-06-18 | End: 2025-06-18

## 2024-06-18 NOTE — PROGRESS NOTES
Subjective:    Patient ID:  Nadia Irene is a 73 y.o. female patient here for evaluation Follow-up      History of Present Illness:  Follow-up.  Known coronary artery disease.  History of CABG.  Remains on dual antiplatelet therapy.  Post CABG PCI stent 2023, occluded JEFF to the LAD, successful PCI stent ostial LAD with atherectomy.  The time of catheterization was moderate disease noted in the vein graft to the obtuse marginal.  Vein graft to 1st diagonal and to the right coronary artery were patent.    Recent visits to urgent care for elevated blood pressure currently no changes in medication.  Blood pressure has been stable since that time.    Diabetes mellitus, hypertension, dyslipidemia past tobacco use             Review of patient's allergies indicates:  No Known Allergies    Past Medical History:   Diagnosis Date    Anemia     Biliary obstruction     Cholangitis     Diabetes mellitus     pt denies, does not take meds 11/2016    EtOH dependence     GERD (gastroesophageal reflux disease)     HTN (hypertension) 12/30/2013     Past Surgical History:   Procedure Laterality Date    ARTERIOGRAPHY OF SUBCLAVIAN ARTERY  5/22/2020    Procedure: Arteriogram, Subclavian;  Surgeon: Heather Carbajal MD;  Location: [facility] CATH;  Service: Cardiology;;    COLONOSCOPY      COLONOSCOPY N/A 2/23/2024    Procedure: COLONOSCOPY;  Surgeon: Murphy Maguire Jr., MD;  Location: [facility] ENDO;  Service: Endoscopy;  Laterality: N/A;    CORONARY ANGIOGRAPHY N/A 5/22/2020    Procedure: ANGIOGRAM, CORONARY ARTERY;  Surgeon: Heather Carbajal MD;  Location: [facility] CATH;  Service: Cardiology;  Laterality: N/A;    CORONARY ARTERY BYPASS GRAFT (CABG) N/A 5/29/2020    Procedure: CORONARY ARTERY BYPASS GRAFT (CABG) x 5 VESSELS;  Surgeon: Dima Laughlin MD;  Location: [facility] OR;  Service: Cardiovascular;  Laterality: N/A;    ENDOSCOPIC HARVEST OF VEIN N/A 5/29/2020    Procedure: SURGICAL PROCUREMENT, VEIN, ENDOSCOPIC;  Surgeon: Dima Laughlin MD;   Location: [facility] OR;  Service: Cardiovascular;  Laterality: N/A;    ENDOSCOPIC ULTRASOUND OF UPPER GASTROINTESTINAL TRACT N/A 2024    Procedure: ULTRASOUND, UPPER GI TRACT, ENDOSCOPIC;  Surgeon: Holden Aguirre MD;  Location: [facility];  Service: Endoscopy;  Laterality: N/A;    ERCP W/ PLASTIC STENT PLACEMENT      ERCP W/ SPHICTEROTOMY      ESOPHAGOGASTRODUODENOSCOPY      ESOPHAGOGASTRODUODENOSCOPY N/A 2024    Procedure: ESOPHAGOGASTRODUODENOSCOPY (EGD);  Surgeon: Murphy Maguire Jr., MD;  Location: [facility] ENDO;  Service: Endoscopy;  Laterality: N/A;    ESOPHAGOGASTRODUODENOSCOPY N/A 2024    Procedure: EGD (ESOPHAGOGASTRODUODENOSCOPY);  Surgeon: Holden Aguirre MD;  Location: [facility] ENDO;  Service: Endoscopy;  Laterality: N/A;    HYSTERECTOMY      INSERTION OF INTRA-AORTIC BALLOON ASSIST DEVICE N/A 2020    Procedure: INSERTION, INTRA-AORTIC BALLOON PUMP;  Surgeon: Dima Laughlin MD;  Location: [facility] OR;  Service: Cardiovascular;  Laterality: N/A;    LEFT HEART CATHETERIZATION Left 2020    Procedure: Left heart cath;  Surgeon: Heather Carbajal MD;  Location: [facility] CATH;  Service: Cardiology;  Laterality: Left;    SMALL BOWEL ENTEROSCOPY N/A 3/6/2024    Procedure: ENTEROSCOPY;  Surgeon: Bria Hand MD;  Location: [facility] ENDO;  Service: Endoscopy;  Laterality: N/A;     Social History     Tobacco Use    Smoking status: Former     Current packs/day: 0.00     Average packs/day: 0.5 packs/day for 40.0 years (20.0 ttl pk-yrs)     Types: Cigarettes     Start date: 1980     Quit date: 2020     Years since quittin.0    Smokeless tobacco: Never   Substance Use Topics    Alcohol use: Not Currently     Comment: sober 2 years 3 months    Drug use: No        Review of Systems:    As noted in HPI in addition      REVIEW OF SYSTEMS  Review of Systems   Constitutional: Negative for decreased appetite, diaphoresis, night sweats, weight gain and weight loss.   HENT:  Negative for nosebleeds  and odynophagia.    Eyes:  Negative for double vision and photophobia.   Cardiovascular:  Positive for chest pain. Negative for claudication, cyanosis, dyspnea on exertion, irregular heartbeat, leg swelling, near-syncope, orthopnea, palpitations, paroxysmal nocturnal dyspnea and syncope.   Respiratory:  Negative for cough, hemoptysis and wheezing.    Hematologic/Lymphatic: Negative for adenopathy.   Skin:  Negative for flushing, skin cancer and suspicious lesions.   Musculoskeletal:  Negative for gout, myalgias and neck pain.   Gastrointestinal:  Negative for abdominal pain, heartburn, hematemesis and hematochezia.   Genitourinary:  Negative for bladder incontinence, hesitancy and nocturia.   Neurological:  Negative for focal weakness, headaches, light-headedness and paresthesias.   Psychiatric/Behavioral:  Negative for memory loss and substance abuse.               Objective:        Vitals:    06/18/24 1110   BP: (!) 153/71   Pulse: 85       Lab Results   Component Value Date    WBC 4.69 05/28/2024    HGB 9.9 (L) 05/28/2024    HCT 30.7 (L) 05/28/2024     05/28/2024    CHOL 121 06/17/2024    TRIG 59 06/17/2024    HDL 47 06/17/2024    ALT 9 (L) 06/17/2024    AST 13 06/17/2024     06/17/2024    K 3.6 06/17/2024     06/17/2024    CREATININE 0.9 06/17/2024    BUN 28 (H) 06/17/2024    CO2 22 (L) 06/17/2024    TSH 0.719 02/21/2024    INR 1.0 03/05/2024    HGBA1C 6.2 (H) 06/17/2024    MICROALBUR 0.2 02/05/2018        ECHOCARDIOGRAM RESULTS  Results for orders placed during the hospital encounter of 02/20/24    Echo Saline Bubble? No    Interpretation Summary    Left Ventricle: There is mild concentric hypertrophy. There is normal systolic function with a visually estimated ejection fraction of 60 - 65%. There is normal diastolic function.    Right Ventricle: Normal right ventricular cavity size. Wall thickness is normal. Right ventricle wall motion  is normal. Systolic function is normal.    Aortic  Valve: There is mild stenosis. Aortic valve area by VTI is 1.66 cm². Aortic valve peak velocity is 2.17 m/s. Mean gradient is 9 mmHg. The dimensionless index is 0.58. There is mild aortic regurgitation.    Pulmonary Artery: The estimated pulmonary artery systolic pressure is 30 mmHg.    IVC/SVC: Intermediate venous pressure at 8 mmHg.    Results for orders placed during the hospital encounter of 05/19/20    Cardiac catheterization    Conclusion  · Three vessel coronary artery disease.  · Mid LAD lesion , 85% stenosed.  · Ost 2nd Diag lesion , 80% stenosed.  · Dist Cx lesion , 90% stenosed.  · Ost 3rd Mrg lesion , 90% stenosed.  · RPDA lesion , 80% stenosed.  · LV end diastolic pressure is severely elevated.    I certify that I was present for catheter insertion, catheter manipulation, angiography, and angiographic interpretation of this patient.    Procedure Log documented by Documenter: Camille Faith RN and verified by Heather Carbajal MD.    Date: 5/22/2020  Time: 1:10 PM          CURRENT/PREVIOUS VISIT EKG  Results for orders placed or performed during the hospital encounter of 06/14/24   EKG 12-lead    Collection Time: 06/14/24  6:37 PM   Result Value Ref Range    QRS Duration 90 ms    [facility] QTC Calculation 480 ms    Narrative    Test Reason : R07.9,    Vent. Rate : 095 BPM     Atrial Rate : 095 BPM     P-R Int : 144 ms          QRS Dur : 090 ms      QT Int : 382 ms       P-R-T Axes : 053 020 094 degrees     QTc Int : 480 ms    Normal sinus rhythm  Anteroseptal infarct (cited on or before 19-FEB-2024)  Abnormal ECG  When compared with ECG of 28-MAY-2024 17:24,  No significant change was found    Referred By: [email or web address]   SELF           Confirmed By:      No valid procedures specified.   Results for orders placed during the hospital encounter of 04/18/23    Nuclear Stress - Cardiology Interpreted    Interpretation Summary    Normal myocardial perfusion scan. There is no evidence of myocardial ischemia or  infarction.    The gated perfusion images showed an ejection fraction of 55% post stress.    There is normal wall motion post stress.    LV cavity size is normal at rest and normal at stress.    The ECG portion of the study is abnormal but not diagnostic.    The patient reported chest pain during the stress test.    During stress, occasional PVCs are noted.    No valid procedures specified.    PHYSICAL EXAM  GENERAL: well built, well nourished, well-developed in no apparent distress alert and oriented.   HEENT: Normocephalic. Pupils normal and conjunctivae normal.  Mucous membranes normal, no cyanosis or icterus, trachea central,no pallor or icterus is noted..   NECK: No JVD. No bruit..   THYROID: Thyroid not enlarged. No nodules present..   CARDIAC:  Normal S1-S2.  No murmur rub click or gallop.  PMI nondisplaced.  LUNGS: Clear to auscultation. No wheezing or rhonchi..   ABDOMEN: Soft no masses or organomegaly.  No abdomen pulsations or bruits.  Normal bowel sounds. No pulsations and no masses felt, No guarding or rebound.   URINARY: No nagel catheter   EXTREMITIES: No cyanosis, clubbing or edema noted at this time., no calf tenderness bilaterally.   PERIPHERAL VASCULAR SYSTEM: Good palpable distal pulses.  2+ femoral, popliteal and pedal pulses.  No bruits    CENTRAL NERVOUS SYSTEM: No focal motor or sensory deficits noted.   SKIN: Skin without lesions, moist, well perfused.   MUSCLE STRENGTH & TONE: No noteable weakness, atrophy or abnormal movement    I HAVE REVIEWED :    The vital signs, nurses notes, and all the pertinent radiology and labs.         Current Outpatient Medications   Medication Instructions    amiodarone (PACERONE) 200 mg, Oral, 3 times daily    aspirin (ECOTRIN) 81 mg, Oral, Daily    atorvastatin (LIPITOR) 10 mg, Oral, Nightly    blood sugar diagnostic Strp To check BG 3 times daily, to use with insurance preferred meter    blood-glucose meter kit To check BG 3 times daily, to use with  "insurance preferred meter    clopidogreL (PLAVIX) 75 mg tablet TAKE 1 TABLET EVERY DAY    esomeprazole (NEXIUM) 40 mg, Oral, Before breakfast, Take on empty stomach    famotidine (PEPCID) 40 mg, Oral, Daily    ferrous sulfate (FEOSOL) 325 mg, Oral, Daily    fluticasone-umeclidin-vilanter (TRELEGY ELLIPTA) 100-62.5-25 mcg DsDv 1 puff, Inhalation, Daily    hydrALAZINE (APRESOLINE) 25 mg, Oral, 3 times daily    insulin aspart U-100 (NOVOLOG FLEXPEN U-100 INSULIN) 100 unit/mL (3 mL) InPn pen Administer per sliding scale three times daily before meals, max daily dose 20 units    lancets Misc To check BG 3 times daily, to use with insurance preferred meter    latanoprost 0.005 % ophthalmic solution 1 drop, Both Eyes, Nightly    magnesium oxide (MAG-OX) 400 mg, Oral, Daily    metFORMIN (GLUCOPHAGE-XR) 500 MG ER 24hr tablet TAKE 2 TABLETS ONE TIME DAILY    metoprolol succinate (TOPROL-XL) 25 mg, Oral, Daily    pen needle, diabetic (BD ULTRA-FINE PARISH PEN NEEDLE) 32 gauge x 5/32" Ndle Uses 4 daily, on multiple daily insulin injections    potassium chloride SA (K-DUR,KLOR-CON) 20 MEQ tablet 20 mEq, Oral, Daily    sacubitriL-valsartan (ENTRESTO)  mg per tablet 1 tablet, Oral, 2 times daily    spironolactone (ALDACTONE) 25 mg, Oral, Daily    sucralfate (CARAFATE) 1 g, Oral, Before meals & nightly    timolol maleate 0.5% (TIMOPTIC) 0.5 % Drop 1 drop, Left Eye, 2 times daily          Assessment:   CABG 2020.  Post CABG PCI stent ostial LAD 11/2023 for occluded JEFF to the LAD.  Remainder of vein grafts with the time were patent.    Hypertension, diabetes mellitus, dyslipidemia    History of PAF, maintained normal sinus rhythm PACs.  Past history of GI bleed.  Patient currently off Eliquis.        Plan:   Echo EF 02/2024 stable, mild AS.  Is Catapres 0.1 as pill in pocket for blood pressures greater than 170 otherwise continue present medications  Renal artery ultrasound        No follow-ups on file.       "

## 2024-06-27 ENCOUNTER — OFFICE VISIT (OUTPATIENT)
Dept: VASCULAR SURGERY | Facility: CLINIC | Age: 74
End: 2024-06-27
Payer: MEDICARE

## 2024-06-27 VITALS
HEIGHT: 61 IN | WEIGHT: 95 LBS | SYSTOLIC BLOOD PRESSURE: 129 MMHG | DIASTOLIC BLOOD PRESSURE: 63 MMHG | BODY MASS INDEX: 17.94 KG/M2 | HEART RATE: 76 BPM

## 2024-06-27 DIAGNOSIS — I65.23 BILATERAL CAROTID ARTERY STENOSIS: Primary | ICD-10-CM

## 2024-06-27 DIAGNOSIS — K21.9 GASTROESOPHAGEAL REFLUX DISEASE WITHOUT ESOPHAGITIS: ICD-10-CM

## 2024-06-27 DIAGNOSIS — R60.0 EDEMA OF BOTH LOWER EXTREMITIES: Primary | ICD-10-CM

## 2024-06-27 PROCEDURE — 99999 PR PBB SHADOW E&M-EST. PATIENT-LVL IV: CPT | Mod: PBBFAC,,, | Performed by: STUDENT IN AN ORGANIZED HEALTH CARE EDUCATION/TRAINING PROGRAM

## 2024-06-27 RX ORDER — FAMOTIDINE 40 MG/1
40 TABLET, FILM COATED ORAL
Qty: 90 TABLET | Refills: 3 | Status: SHIPPED | OUTPATIENT
Start: 2024-06-27

## 2024-06-27 RX ORDER — SPIRONOLACTONE 25 MG/1
25 TABLET ORAL
Qty: 90 TABLET | Refills: 3 | Status: SHIPPED | OUTPATIENT
Start: 2024-06-27

## 2024-06-27 NOTE — PROGRESS NOTES
[place] - Cardio [facility]  [facility]  History & Physical    SUBJECTIVE:     Chief Complaint:   Chief Complaint   Patient presents with    Follow-up     Carotid u/s results.         History of Present Illness:  Nadia Irene is a 73 y.o. female presents with history of poorly-controlled diabetes, HTN, and tobacco abuse (1 ppd,  stopped 2 months ago).  She reports that her   2023 and since then she has been dealing with extreme dizziness.  He denies any inciting events to make her dizzy and that happens at random times throughout the day.  She reports that her blood pressure is rarely controlled and usually at 160s/70s.  She denies any further symptoms consistent with TIA or strokes.  She is progressively losing her vision and also feels that she is progressively weaker.  She also reports some tingling in her bilateral lower extremities but denies any cramping in her calf or feet when ambulating.         Interval history:  Still has tingling in her lower extremities.  She does report that her compression stockings that she wears does make her legs feel much better.  The stockings she wears also minimize the swelling in her legs          Review of patient's allergies indicates:  No Known Allergies    Past Medical History:   Diagnosis Date    Anemia     Biliary obstruction     Cholangitis     Diabetes mellitus     pt denies, does not take meds 2016    EtOH dependence     GERD (gastroesophageal reflux disease)     HTN (hypertension) 2013     Past Surgical History:   Procedure Laterality Date    ARTERIOGRAPHY OF SUBCLAVIAN ARTERY  2020    Procedure: Arteriogram, Subclavian;  Surgeon: Heather Carbajal MD;  Location: [facility] CATH;  Service: Cardiology;;    COLONOSCOPY      COLONOSCOPY N/A 2024    Procedure: COLONOSCOPY;  Surgeon: Murphy Maguire Jr., MD;  Location: [facility] ENDO;  Service: Endoscopy;  Laterality: N/A;    CORONARY ANGIOGRAPHY N/A 2020     Procedure: ANGIOGRAM, CORONARY ARTERY;  Surgeon: Heather Carbajal MD;  Location: [facility] CATH;  Service: Cardiology;  Laterality: N/A;    CORONARY ARTERY BYPASS GRAFT (CABG) N/A 5/29/2020    Procedure: CORONARY ARTERY BYPASS GRAFT (CABG) x 5 VESSELS;  Surgeon: Dima Laughlin MD;  Location: [facility] OR;  Service: Cardiovascular;  Laterality: N/A;    ENDOSCOPIC HARVEST OF VEIN N/A 5/29/2020    Procedure: SURGICAL PROCUREMENT, VEIN, ENDOSCOPIC;  Surgeon: Dima Laughlin MD;  Location: [facility] OR;  Service: Cardiovascular;  Laterality: N/A;    ENDOSCOPIC ULTRASOUND OF UPPER GASTROINTESTINAL TRACT N/A 4/24/2024    Procedure: ULTRASOUND, UPPER GI TRACT, ENDOSCOPIC;  Surgeon: Holden Aguirre MD;  Location: [facility];  Service: Endoscopy;  Laterality: N/A;    ERCP W/ PLASTIC STENT PLACEMENT      ERCP W/ SPHICTEROTOMY      ESOPHAGOGASTRODUODENOSCOPY      ESOPHAGOGASTRODUODENOSCOPY N/A 2/23/2024    Procedure: ESOPHAGOGASTRODUODENOSCOPY (EGD);  Surgeon: Murphy Maguire Jr., MD;  Location: [facility];  Service: Endoscopy;  Laterality: N/A;    ESOPHAGOGASTRODUODENOSCOPY N/A 4/24/2024    Procedure: EGD (ESOPHAGOGASTRODUODENOSCOPY);  Surgeon: Holden Aguirre MD;  Location: [facility] ENDO;  Service: Endoscopy;  Laterality: N/A;    HYSTERECTOMY      INSERTION OF INTRA-AORTIC BALLOON ASSIST DEVICE N/A 5/29/2020    Procedure: INSERTION, INTRA-AORTIC BALLOON PUMP;  Surgeon: Dima Laughlin MD;  Location: [facility] OR;  Service: Cardiovascular;  Laterality: N/A;    LEFT HEART CATHETERIZATION Left 5/22/2020    Procedure: Left heart cath;  Surgeon: Heather Carbajal MD;  Location: [facility] CATH;  Service: Cardiology;  Laterality: Left;    SMALL BOWEL ENTEROSCOPY N/A 3/6/2024    Procedure: ENTEROSCOPY;  Surgeon: Bria Hand MD;  Location: [facility] ENDO;  Service: Endoscopy;  Laterality: N/A;     No family history on file.  Social History     Tobacco Use    Smoking status: Former     Current packs/day: 0.00     Average packs/day: 0.5 packs/day for 40.0 years  (20.0 ttl pk-yrs)     Types: Cigarettes     Start date: 1980     Quit date: 2020     Years since quittin.1    Smokeless tobacco: Never   Substance Use Topics    Alcohol use: Not Currently     Comment: sober 2 years 3 months    Drug use: No        Review of Systems:  Review of Systems   All other systems reviewed and are negative.      OBJECTIVE:     Vital Signs (Most Recent):  Pulse: 76 (24 0808)  BP: 129/63 (24 0808)    Physical Exam:  Physical Exam   Constitutional: She is oriented to person, place, and time.  Non-toxic appearance. No distress.   Cardiovascular: Normal rate and normal pulses. Pulmonary:      Effort: Pulmonary effort is normal. No respiratory distress.      Breath sounds: No wheezing.     Abdominal: Soft.   Musculoskeletal:      Right lower leg: Edema present.      Left lower leg: Edema present.   Neurological: She is alert and oriented to person, place, and time.   Skin: Skin is warm and dry. Capillary refill takes less than 2 seconds.   Vitals reviewed.      Laboratory:  Lab Results   Component Value Date    WBC 4.69 2024    HGB 9.9 (L) 2024    HCT 30.7 (L) 2024     2024    CHOL 121 2024    TRIG 59 2024    HDL 47 2024    ALT 9 (L) 2024    AST 13 2024     2024    K 3.6 2024     2024    CREATININE 0.9 2024    BUN 28 (H) 2024    CO2 22 (L) 2024    TSH 0.719 2024    INR 1.0 2024    HGBA1C 6.2 (H) 2024    MICROALBUR 0.2 2018         Diagnostic Results:        ASSESSMENT/PLAN:       73-year-old female who complains of dizziness and tingling in her lower extremities    I did order a bilateral carotid ultrasound which I visualized and reviewed and the patient has less than 50% stenosis in bilateral carotid arteries.      Patient still has a strong palpable DP pulse in bilateral lower extremities.  After further investigation in his discussion today  she does report some symptoms that are concerning for maybe venous insufficiency that could be causing some of her symptoms in her legs.    We will plan for a bilateral lower extremity venous reflux studies with follow      Marko Cassidy M.D.   [facility]

## 2024-06-27 NOTE — TELEPHONE ENCOUNTER
Refill Routing Note   Medication(s) are not appropriate for processing by [facility] for the following reason(s):        ED/Hospital Visit since last OV with provider  Required vitals abnormal    [facility] action(s):  Defer               Appointments  past 12m or future 3m with PCP    Date Provider   Last Visit   5/7/2024 Franklin Garcia MD   Next Visit   8/5/2024 Franklin Garcia MD   ED visits in past 90 days: 3        Note composed:7:27 AM 06/27/2024

## 2024-06-27 NOTE — TELEPHONE ENCOUNTER
No care due was identified.  [facility] Embedded Care Due Messages. Reference number: [id number].   6/27/2024 2:47:16 AM CDT

## 2024-06-28 ENCOUNTER — TELEPHONE (OUTPATIENT)
Dept: VASCULAR SURGERY | Facility: CLINIC | Age: 74
End: 2024-06-28
Payer: MEDICARE

## 2024-06-28 NOTE — TELEPHONE ENCOUNTER
Spoke w/ pt. Scheduled US for 7/19 @ 4pm and appt to review results on 8/1 @ 10a. Pt advised to call daughter. Attempted to call daughter. No answer, left msg.

## 2024-06-28 NOTE — TELEPHONE ENCOUNTER
Spoke w/ pt daughter. Rescheduled US to 7/23 @ 10a and F/U appt to 8/1 @ 10a per pt daughter request.

## 2024-07-09 RX ORDER — METFORMIN HYDROCHLORIDE 500 MG/1
TABLET, EXTENDED RELEASE ORAL
Qty: 180 TABLET | Refills: 1 | Status: SHIPPED | OUTPATIENT
Start: 2024-07-09

## 2024-07-09 NOTE — TELEPHONE ENCOUNTER
No care due was identified.  [facility] Embedded Care Due Messages. Reference number: [id number].   7/09/2024 11:33:29 AM CDT

## 2024-07-09 NOTE — TELEPHONE ENCOUNTER
Refill Decision Note   Nadia Irene  is requesting a refill authorization.  Brief Assessment and Rationale for Refill:  Approve     Medication Therapy Plan:  EDv 6/16/24 for Elevated blood pressure reading. No change to current metformin therapy during encounter      Comments:     Note composed:6:32 PM 07/09/2024

## 2024-07-15 ENCOUNTER — HOSPITAL ENCOUNTER (OUTPATIENT)
Dept: CARDIOLOGY | Facility: HOSPITAL | Age: 74
Discharge: HOME | End: 2024-07-15
Attending: INTERNAL MEDICINE
Payer: MEDICARE

## 2024-07-15 DIAGNOSIS — I50.20 HFREF (HEART FAILURE WITH REDUCED EJECTION FRACTION): ICD-10-CM

## 2024-07-15 DIAGNOSIS — I70.0 AORTIC ATHEROSCLEROSIS: ICD-10-CM

## 2024-07-15 DIAGNOSIS — I25.10 CORONARY ARTERY DISEASE, UNSPECIFIED VESSEL OR LESION TYPE, UNSPECIFIED WHETHER ANGINA PRESENT, UNSPECIFIED WHETHER NATIVE OR TRANSPLANTED HEART: ICD-10-CM

## 2024-07-15 DIAGNOSIS — I50.42 CHRONIC COMBINED SYSTOLIC AND DIASTOLIC HEART FAILURE: ICD-10-CM

## 2024-07-15 DIAGNOSIS — I10 ESSENTIAL HYPERTENSION: ICD-10-CM

## 2024-07-15 DIAGNOSIS — I65.23 BILATERAL CAROTID ARTERY STENOSIS: ICD-10-CM

## 2024-07-15 LAB
ABDOMINAL AORTA PROX EDV: 15 CM/S
ABDOMINAL AORTA PROX PSV: 74 CM/S
LEFT RENAL DIST DIAS: 14 CM/S
LEFT RENAL DIST SYS: 97 CM/S
LEFT RENAL MID DIAS: 15 CM/S
LEFT RENAL MID SYS: 80 CM/S
LEFT RENAL ORIGIN DIAS: 16 CM/S
LEFT RENAL ORIGIN SYS: 468 CM/S
LEFT RENAL PROX DIAS: 24 CM/S
LEFT RENAL PROX RAR: 6.08
LEFT RENAL PROX SYS: 450 CM/S
LEFT RENAL ULTRASOUND ACCELERATION TIME MEASUREMENT 1: 40 MS
LEFT RENAL ULTRASOUND ACCELERATION TIME MEASUREMENT 2: 63 MS
LEFT RENAL ULTRASOUND ACCELERATION TIME MEASUREMENT 3: 37 MS
LEFT RENAL ULTRASOUND ACCELERATION TIME MEASUREMENT AVERAGE: 63 MS
LEFT RENAL ULTRASOUND KIDNEY SIZE MEASUREMENT 1: 10.2 CM
LEFT RENAL ULTRASOUND KIDNEY SIZE MEASUREMENT 2: 10.09 CM
LEFT RENAL ULTRASOUND KIDNEY SIZE MEASUREMENT 3: 10.03 CM
LEFT RENAL ULTRASOUND KIDNEY SIZE MEASUREMENT AVERAGE: 10.2 CM
LEFT RENAL ULTRASOUND RESISTIVE INDEX MEASUREMENT 1: 0.68
LEFT RENAL ULTRASOUND RESISTIVE INDEX MEASUREMENT 2: 0.62
LEFT RENAL ULTRASOUND RESISTIVE INDEX MEASUREMENT 3: 0.76
LEFT RENAL ULTRASOUND RESISTIVE INDEX MEASUREMENT AVERAGE: 0.76
OHS CV LEFT RENAL RAR: 6.32
OHS CV RIGHT RENAL RAR: 1.11
OHS CV US LEFT RENAL HIGHEST EDV: 24
OHS CV US LEFT RENAL HIGHEST PSV: 468
OHS CV US RIGHT RENAL HIGHEST EDV: 19
OHS CV US RIGHT RENAL HIGHEST PSV: 82
PROX AORTIC AP: 1.9 CM
PROX AORTIC TRANS: 1.92 CM
RIGHT RENAL DIST DIAS: 19 CM/S
RIGHT RENAL DIST SYS: 80 CM/S
RIGHT RENAL MID DIAS: 7 CM/S
RIGHT RENAL MID SYS: 55 CM/S
RIGHT RENAL ORIGIN DIAS: 9 CM/S
RIGHT RENAL ORIGIN SYS: 82 CM/S
RIGHT RENAL PROX DIAS: 7 CM/S
RIGHT RENAL PROX RAR: 0.92
RIGHT RENAL PROX SYS: 68 CM/S
RIGHT RENAL ULTRASOUND ACCELERATION TIME MEASUREMENT 1: 43 MS
RIGHT RENAL ULTRASOUND ACCELERATION TIME MEASUREMENT 2: 43 MS
RIGHT RENAL ULTRASOUND ACCELERATION TIME MEASUREMENT 3: 34 MS
RIGHT RENAL ULTRASOUND ACCELERATION TIME MEASUREMENT AVERAGE: 43 MS
RIGHT RENAL ULTRASOUND KIDNEY SIZE MEASUREMENT 1: 9.98 CM
RIGHT RENAL ULTRASOUND KIDNEY SIZE MEASUREMENT 2: 9.48 CM
RIGHT RENAL ULTRASOUND KIDNEY SIZE MEASUREMENT 3: 9.26 CM
RIGHT RENAL ULTRASOUND KIDNEY SIZE MEASUREMENT AVERAGE: 9.98 CM
RIGHT RENAL ULTRASOUND RESISTIVE INDEX MEASUREMENT 1: 0.81
RIGHT RENAL ULTRASOUND RESISTIVE INDEX MEASUREMENT 2: 0.87
RIGHT RENAL ULTRASOUND RESISTIVE INDEX MEASUREMENT 3: 0.82
RIGHT RENAL ULTRASOUND RESISTIVE INDEX MEASUREMENT AVERAGE: 0.87

## 2024-07-15 PROCEDURE — 93975 VASCULAR STUDY: CPT | Mod: PO

## 2024-07-15 PROCEDURE — 93975 VASCULAR STUDY: CPT | Mod: 26,,, | Performed by: INTERNAL MEDICINE

## 2024-07-23 ENCOUNTER — HOSPITAL ENCOUNTER (OUTPATIENT)
Dept: CARDIOLOGY | Facility: HOSPITAL | Age: 74
Discharge: HOME | End: 2024-07-23
Attending: STUDENT IN AN ORGANIZED HEALTH CARE EDUCATION/TRAINING PROGRAM
Payer: MEDICARE

## 2024-07-23 DIAGNOSIS — R60.0 EDEMA OF BOTH LOWER EXTREMITIES: ICD-10-CM

## 2024-07-23 LAB
LEFT GREAT SAPHENOUS JUNCTION DIA: 0.49 CM
LEFT SMALL SAPHENOUS KNEE DIA: 0.31 CM
LEFT SMALL SAPHENOUS SPJ DIA: 0.27 CM
RIGHT GIAC DIA: 0.24 MM
RIGHT GREAT SAPHENOUS DISTAL THIGH DIA: 0.32 CM
RIGHT GREAT SAPHENOUS JUNCTION DIA: 0.72 CM
RIGHT GREAT SAPHENOUS KNEE DIA: 0.28 CM
RIGHT GREAT SAPHENOUS MIDDLE THIGH DIA: 0.24 CM
RIGHT GREAT SAPHENOUS PROXIMAL CALF DIA: 0.23 CM
RIGHT SMALL SAPHENOUS KNEE DIA: 0.24 CM
RIGHT SMALL SAPHENOUS SPJ DIA: 0.23 CM

## 2024-07-23 PROCEDURE — 93970 EXTREMITY STUDY: CPT | Mod: TC,PO

## 2024-07-29 PROBLEM — K92.2 ACUTE UPPER GI BLEEDING: Status: RESOLVED | Noted: 2024-04-24 | Resolved: 2024-07-29

## 2024-07-29 PROBLEM — I25.119 ATHSCL HEART DISEASE OF NATIVE COR ART W UNSP ANG PCTRS: Status: ACTIVE | Noted: 2022-05-30

## 2024-07-29 PROBLEM — I25.119 ATHEROSCLEROTIC HEART DISEASE OF NATIVE CORONARY ARTERY WITH UNSPECIFIED ANGINA PECTORIS: Status: ACTIVE | Noted: 2024-07-29

## 2024-07-29 PROBLEM — I47.20 VENTRICULAR TACHYCARDIA: Chronic | Status: ACTIVE | Noted: 2024-04-07

## 2024-07-29 PROBLEM — I50.23 ACUTE ON CHRONIC SYSTOLIC CHF (CONGESTIVE HEART FAILURE): Status: ACTIVE | Noted: 2022-05-30

## 2024-07-29 PROBLEM — I25.10 CAD (CORONARY ARTERY DISEASE): Chronic | Status: ACTIVE | Noted: 2024-04-06

## 2024-07-29 PROBLEM — E11.9 DIABETES MELLITUS: Chronic | Status: ACTIVE | Noted: 2024-04-06

## 2024-07-29 PROBLEM — R07.9 CHEST PAIN: Status: ACTIVE | Noted: 2024-04-06

## 2024-07-29 PROBLEM — D64.9 ANEMIA: Chronic | Status: ACTIVE | Noted: 2024-04-06

## 2024-07-30 PROBLEM — K31.811 GASTROINTESTINAL HEMORRHAGE ASSOCIATED WITH ANGIODYSPLASIA OF STOMACH AND DUODENUM: Status: ACTIVE | Noted: 2024-02-21

## 2024-07-30 PROBLEM — D51.8 VITAMIN B12 DEFICIENCY (DIETARY) ANEMIA: Status: ACTIVE | Noted: 2024-07-30

## 2024-07-31 PROBLEM — M79.672 LEFT FOOT PAIN: Status: ACTIVE | Noted: 2024-07-31

## 2024-07-31 PROBLEM — E44.0 MODERATE MALNUTRITION: Status: ACTIVE | Noted: 2024-07-31

## 2024-08-05 ENCOUNTER — OFFICE VISIT (OUTPATIENT)
Dept: FAMILY MEDICINE | Facility: CLINIC | Age: 74
End: 2024-08-05
Payer: MEDICARE

## 2024-08-05 VITALS
DIASTOLIC BLOOD PRESSURE: 76 MMHG | SYSTOLIC BLOOD PRESSURE: 164 MMHG | WEIGHT: 99.31 LBS | HEIGHT: 61 IN | HEART RATE: 82 BPM | RESPIRATION RATE: 18 BRPM | BODY MASS INDEX: 18.75 KG/M2

## 2024-08-05 DIAGNOSIS — I10 ESSENTIAL HYPERTENSION: ICD-10-CM

## 2024-08-05 DIAGNOSIS — D50.0 ANEMIA, BLOOD LOSS: ICD-10-CM

## 2024-08-05 DIAGNOSIS — Z23 IMMUNIZATION DUE: Primary | ICD-10-CM

## 2024-08-05 DIAGNOSIS — K31.811 GASTROINTESTINAL HEMORRHAGE ASSOCIATED WITH ANGIODYSPLASIA OF STOMACH AND DUODENUM: ICD-10-CM

## 2024-08-05 DIAGNOSIS — J30.2 SEASONAL ALLERGIC RHINITIS, UNSPECIFIED TRIGGER: ICD-10-CM

## 2024-08-05 DIAGNOSIS — E11.42 TYPE 2 DIABETES MELLITUS WITH DIABETIC POLYNEUROPATHY, WITHOUT LONG-TERM CURRENT USE OF INSULIN: ICD-10-CM

## 2024-08-05 PROBLEM — I50.23 ACUTE ON CHRONIC SYSTOLIC CHF (CONGESTIVE HEART FAILURE): Status: RESOLVED | Noted: 2022-05-30 | Resolved: 2024-08-05

## 2024-08-05 PROCEDURE — 3077F SYST BP >= 140 MM HG: CPT | Mod: CPTII,S$GLB,, | Performed by: FAMILY MEDICINE

## 2024-08-05 PROCEDURE — 3078F DIAST BP <80 MM HG: CPT | Mod: CPTII,S$GLB,, | Performed by: FAMILY MEDICINE

## 2024-08-05 PROCEDURE — 99214 OFFICE O/P EST MOD 30 MIN: CPT | Mod: S$GLB,,, | Performed by: FAMILY MEDICINE

## 2024-08-05 PROCEDURE — 1159F MED LIST DOCD IN RCRD: CPT | Mod: CPTII,S$GLB,, | Performed by: FAMILY MEDICINE

## 2024-08-05 PROCEDURE — 99999 PR PBB SHADOW E&M-EST. PATIENT-LVL IV: CPT | Mod: PBBFAC,,, | Performed by: FAMILY MEDICINE

## 2024-08-05 PROCEDURE — 1101F PT FALLS ASSESS-DOCD LE1/YR: CPT | Mod: CPTII,S$GLB,, | Performed by: FAMILY MEDICINE

## 2024-08-05 PROCEDURE — 1111F DSCHRG MED/CURRENT MED MERGE: CPT | Mod: CPTII,S$GLB,, | Performed by: FAMILY MEDICINE

## 2024-08-05 PROCEDURE — 3066F NEPHROPATHY DOC TX: CPT | Mod: CPTII,S$GLB,, | Performed by: FAMILY MEDICINE

## 2024-08-05 PROCEDURE — 3008F BODY MASS INDEX DOCD: CPT | Mod: CPTII,S$GLB,, | Performed by: FAMILY MEDICINE

## 2024-08-05 PROCEDURE — 1126F AMNT PAIN NOTED NONE PRSNT: CPT | Mod: CPTII,S$GLB,, | Performed by: FAMILY MEDICINE

## 2024-08-05 PROCEDURE — 3061F NEG MICROALBUMINURIA REV: CPT | Mod: CPTII,S$GLB,, | Performed by: FAMILY MEDICINE

## 2024-08-05 PROCEDURE — 3044F HG A1C LEVEL LT 7.0%: CPT | Mod: CPTII,S$GLB,, | Performed by: FAMILY MEDICINE

## 2024-08-05 PROCEDURE — 4010F ACE/ARB THERAPY RXD/TAKEN: CPT | Mod: CPTII,S$GLB,, | Performed by: FAMILY MEDICINE

## 2024-08-05 PROCEDURE — 3288F FALL RISK ASSESSMENT DOCD: CPT | Mod: CPTII,S$GLB,, | Performed by: FAMILY MEDICINE

## 2024-08-05 RX ORDER — AZELASTINE 1 MG/ML
1 SPRAY, METERED NASAL 2 TIMES DAILY
Qty: 30 ML | Refills: 5 | Status: SHIPPED | OUTPATIENT
Start: 2024-08-05 | End: 2025-08-05

## 2024-08-05 RX ORDER — NIFEDIPINE 60 MG/1
60 TABLET, EXTENDED RELEASE ORAL DAILY
Qty: 30 TABLET | Refills: 11 | Status: SHIPPED | OUTPATIENT
Start: 2024-08-05 | End: 2025-08-05

## 2024-08-12 ENCOUNTER — TELEPHONE (OUTPATIENT)
Dept: FAMILY MEDICINE | Facility: CLINIC | Age: 74
End: 2024-08-12

## 2024-08-12 ENCOUNTER — OFFICE VISIT (OUTPATIENT)
Dept: FAMILY MEDICINE | Facility: CLINIC | Age: 74
End: 2024-08-12
Payer: MEDICARE

## 2024-08-12 ENCOUNTER — LAB VISIT (OUTPATIENT)
Dept: LAB | Facility: HOSPITAL | Age: 74
End: 2024-08-12
Attending: FAMILY MEDICINE
Payer: MEDICARE

## 2024-08-12 VITALS
DIASTOLIC BLOOD PRESSURE: 72 MMHG | SYSTOLIC BLOOD PRESSURE: 126 MMHG | HEART RATE: 88 BPM | BODY MASS INDEX: 18.37 KG/M2 | HEIGHT: 61 IN | WEIGHT: 97.31 LBS | OXYGEN SATURATION: 97 %

## 2024-08-12 DIAGNOSIS — E11.42 TYPE 2 DIABETES MELLITUS WITH DIABETIC POLYNEUROPATHY, WITHOUT LONG-TERM CURRENT USE OF INSULIN: ICD-10-CM

## 2024-08-12 DIAGNOSIS — K21.9 GASTROESOPHAGEAL REFLUX DISEASE WITHOUT ESOPHAGITIS: ICD-10-CM

## 2024-08-12 DIAGNOSIS — K31.811 GASTROINTESTINAL HEMORRHAGE ASSOCIATED WITH ANGIODYSPLASIA OF STOMACH AND DUODENUM: ICD-10-CM

## 2024-08-12 DIAGNOSIS — I10 ESSENTIAL HYPERTENSION: Primary | ICD-10-CM

## 2024-08-12 LAB
ANION GAP SERPL CALC-SCNC: 13 MMOL/L (ref 8–16)
BASOPHILS # BLD AUTO: 0.07 K/UL (ref 0–0.2)
BASOPHILS NFR BLD: 1.5 % (ref 0–1.9)
BUN SERPL-MCNC: 11 MG/DL (ref 8–23)
CALCIUM SERPL-MCNC: 9.8 MG/DL (ref 8.7–10.5)
CHLORIDE SERPL-SCNC: 106 MMOL/L (ref 95–110)
CO2 SERPL-SCNC: 22 MMOL/L (ref 23–29)
CREAT SERPL-MCNC: 0.9 MG/DL (ref 0.5–1.4)
DIFFERENTIAL METHOD BLD: ABNORMAL
EOSINOPHIL # BLD AUTO: 0 K/UL (ref 0–0.5)
EOSINOPHIL NFR BLD: 0.8 % (ref 0–8)
ERYTHROCYTE [DISTWIDTH] IN BLOOD BY AUTOMATED COUNT: 23.2 % (ref 11.5–14.5)
EST. GFR  (NO RACE VARIABLE): >60 ML/MIN/1.73 M^2
FERRITIN SERPL-MCNC: 203 NG/ML (ref 20–300)
GLUCOSE SERPL-MCNC: 153 MG/DL (ref 70–110)
HCT VFR BLD AUTO: 32.4 % (ref 37–48.5)
HGB BLD-MCNC: 9.8 G/DL (ref 12–16)
IMM GRANULOCYTES # BLD AUTO: 0.02 K/UL (ref 0–0.04)
IMM GRANULOCYTES NFR BLD AUTO: 0.4 % (ref 0–0.5)
IRON SERPL-MCNC: 79 UG/DL (ref 30–160)
LYMPHOCYTES # BLD AUTO: 1 K/UL (ref 1–4.8)
LYMPHOCYTES NFR BLD: 21.6 % (ref 18–48)
MCH RBC QN AUTO: 25.7 PG (ref 27–31)
MCHC RBC AUTO-ENTMCNC: 30.2 G/DL (ref 32–36)
MCV RBC AUTO: 85 FL (ref 82–98)
MONOCYTES # BLD AUTO: 0.2 K/UL (ref 0.3–1)
MONOCYTES NFR BLD: 4.9 % (ref 4–15)
NEUTROPHILS # BLD AUTO: 3.3 K/UL (ref 1.8–7.7)
NEUTROPHILS NFR BLD: 70.8 % (ref 38–73)
NRBC BLD-RTO: 0 /100 WBC
PLATELET # BLD AUTO: 309 K/UL (ref 150–450)
PMV BLD AUTO: 10.8 FL (ref 9.2–12.9)
POTASSIUM SERPL-SCNC: 3.8 MMOL/L (ref 3.5–5.1)
RBC # BLD AUTO: 3.81 M/UL (ref 4–5.4)
SATURATED IRON: 20 % (ref 20–50)
SODIUM SERPL-SCNC: 141 MMOL/L (ref 136–145)
TOTAL IRON BINDING CAPACITY: 391 UG/DL (ref 250–450)
TRANSFERRIN SERPL-MCNC: 264 MG/DL (ref 200–375)
WBC # BLD AUTO: 4.72 K/UL (ref 3.9–12.7)

## 2024-08-12 PROCEDURE — 1159F MED LIST DOCD IN RCRD: CPT | Mod: CPTII,S$GLB,,

## 2024-08-12 PROCEDURE — 36415 COLL VENOUS BLD VENIPUNCTURE: CPT | Mod: PN | Performed by: FAMILY MEDICINE

## 2024-08-12 PROCEDURE — 82985 ASSAY OF GLYCATED PROTEIN: CPT | Performed by: FAMILY MEDICINE

## 2024-08-12 PROCEDURE — 3008F BODY MASS INDEX DOCD: CPT | Mod: CPTII,S$GLB,,

## 2024-08-12 PROCEDURE — 83540 ASSAY OF IRON: CPT | Performed by: FAMILY MEDICINE

## 2024-08-12 PROCEDURE — 1126F AMNT PAIN NOTED NONE PRSNT: CPT | Mod: CPTII,S$GLB,,

## 2024-08-12 PROCEDURE — 82728 ASSAY OF FERRITIN: CPT | Performed by: FAMILY MEDICINE

## 2024-08-12 PROCEDURE — 3066F NEPHROPATHY DOC TX: CPT | Mod: CPTII,S$GLB,,

## 2024-08-12 PROCEDURE — 84466 ASSAY OF TRANSFERRIN: CPT | Performed by: FAMILY MEDICINE

## 2024-08-12 PROCEDURE — 3078F DIAST BP <80 MM HG: CPT | Mod: CPTII,S$GLB,,

## 2024-08-12 PROCEDURE — 85025 COMPLETE CBC W/AUTO DIFF WBC: CPT | Performed by: FAMILY MEDICINE

## 2024-08-12 PROCEDURE — 80048 BASIC METABOLIC PNL TOTAL CA: CPT | Performed by: FAMILY MEDICINE

## 2024-08-12 PROCEDURE — 3288F FALL RISK ASSESSMENT DOCD: CPT | Mod: CPTII,S$GLB,,

## 2024-08-12 PROCEDURE — 4010F ACE/ARB THERAPY RXD/TAKEN: CPT | Mod: CPTII,S$GLB,,

## 2024-08-12 PROCEDURE — 3074F SYST BP LT 130 MM HG: CPT | Mod: CPTII,S$GLB,,

## 2024-08-12 PROCEDURE — 99999 PR PBB SHADOW E&M-EST. PATIENT-LVL IV: CPT | Mod: PBBFAC,,,

## 2024-08-12 PROCEDURE — 3061F NEG MICROALBUMINURIA REV: CPT | Mod: CPTII,S$GLB,,

## 2024-08-12 PROCEDURE — 1111F DSCHRG MED/CURRENT MED MERGE: CPT | Mod: CPTII,S$GLB,,

## 2024-08-12 PROCEDURE — 1101F PT FALLS ASSESS-DOCD LE1/YR: CPT | Mod: CPTII,S$GLB,,

## 2024-08-12 PROCEDURE — 3044F HG A1C LEVEL LT 7.0%: CPT | Mod: CPTII,S$GLB,,

## 2024-08-12 PROCEDURE — 99214 OFFICE O/P EST MOD 30 MIN: CPT | Mod: S$GLB,,,

## 2024-08-12 RX ORDER — CLONIDINE HYDROCHLORIDE 0.1 MG/1
TABLET ORAL
Qty: 30 TABLET | Refills: 5
Start: 2024-08-12

## 2024-08-12 RX ORDER — FAMOTIDINE 40 MG/1
40 TABLET, FILM COATED ORAL 2 TIMES DAILY
Qty: 60 TABLET | Refills: 1 | Status: SHIPPED | OUTPATIENT
Start: 2024-08-12 | End: 2025-08-12

## 2024-08-12 NOTE — PROGRESS NOTES
[facility]  [address]  [place] [address]    Subjective    Chief Complaint:   Chief Complaint   Patient presents with    Follow-up       History of Present Illness:     Nadia Irene is a(n) 73 y.o. female with past medical history as noted below who presents to the clinic today for follow up.    BP today 126/72. LOV nifedipine 60 mg added to extensive regimen for hypertension, see below. Hydralazine discontinued. At the time she was not taking her clonidine because it had not arrived from mail order pharmacy. It still has not arrived   BP at home fluctuates. Sometimes it is similar to today's reading, sometimes it is very high (systolic values in 170s). No chest pain or shortness of breath.     Labs in process from draw this morning    +heartburn refractory to Nexium 40 mg BID and carafate 1g QID. No abdominal pain or melanotic stools. She has a recent history UGIB, sees GI Dr. Hawthorne next week     COPD  Rx- Trelegy      Paroxysmal atrial fibrillation  History of, maintains normal sinus rhythm with occasional PACs   Rx-dual antiplatelet therapy, beta-blocker     Combined systolic, diastolic congestive heart failure  Previous echocardiogram 05/26/2022-grade 2 diastolic dysfunction, ejection fraction 40%  Cardiology- MD Mary   Rx-Aldactone 25 mg, beta-blocker, Entresto, Lasix 20 mg      Valvular heart disease  05/26/2022-echocardiogram shows moderate aortic regurgitation, tricuspid regurgitation, pulmonic regurgitation, mitral regurgitation     Coronary artery disease status post CABG x5 (5/29/2020), dyslipidemia  MAGGIE :  X1 placed late 2023  Cardiology- MD Mary   Rx-aspirin 81 mg, atorvastatin 10 mg, Plavix 75 mg  Negative nuclear stress test 04/2023  Previous lipid panel acceptable     Essential hypertension  Rx-Entresto, metoprolol 25 mg, spironolactone 25 mg,  Clonidine 0.1 PRN, nifedipine 60 mg      Bilateral carotid artery  stenosis  Currently on statin  05/27/2020-carotid ultrasound shows less than 50% stenosis right carotid artery, left internal carotid artery with 50-69% stenosis     Type 2 diabetes mellitus with associated polyneuropathy  Previous A1c-6.3%  Rx-metformin extended release 1000 mg by mouth daily, sliding scale insulin, low intensity     GERD  Rx : esomeprazole 40 mg BID, carafate 1 g QID      Nicotine dependence  Prev rx : Patches (didn't work)     Chronic back pain   Rx-tramadol     Problem List:   Patient Active Problem List   Diagnosis    Essential hypertension    Tobacco abuse    Choledocholithiasis    Type 2 diabetes mellitus with diabetic polyneuropathy, without long-term current use of insulin    Calculus of bile duct without cholecystitis and without obstruction    Common bile duct dilation    Gastroesophageal reflux disease without esophagitis    Hypokalemia    Coronary artery disease    Chronic combined systolic and diastolic heart failure    Bilateral carotid artery stenosis    S/P CABG (coronary artery bypass graft)    COPD (chronic obstructive pulmonary disease)    Valvular heart disease    Symptomatic anemia    Cigarette nicotine dependence without complication    Aortic atherosclerosis    Other chest pain    Gastrointestinal hemorrhage associated with angiodysplasia of stomach and duodenum    PAF (paroxysmal atrial fibrillation)    NSVT (nonsustained ventricular tachycardia)    ACP (advance care planning)    Iron deficiency anemia    HFrEF (heart failure with reduced ejection fraction)    Athscl heart disease of native cor art w unsp ang pctrs    Atherosclerotic heart disease of native coronary artery with unspecified angina pectoris    CAD (coronary artery disease)    Chest pain    Ventricular tachycardia    Anemia    Diabetes mellitus    Vitamin B12 deficiency (dietary) anemia    Moderate malnutrition    Left foot pain       Current Outpatient Medications:   Current Outpatient Medications   Medication  "Instructions    amiodarone (PACERONE) 200 mg, Oral, 3 times daily    aspirin (ECOTRIN) 81 mg, Oral, Daily    atorvastatin (LIPITOR) 10 mg, Oral, Nightly    azelastine (ASTELIN) 137 mcg, Nasal, 2 times daily    blood sugar diagnostic Strp To check BG 3 times daily, to use with insurance preferred meter    blood-glucose meter kit To check BG 3 times daily, to use with insurance preferred meter    cloNIDine (CATAPRES) 0.1 mg, Oral, 2 times daily    clopidogreL (PLAVIX) 75 mg tablet TAKE 1 TABLET EVERY DAY    cyanocobalamin (VITAMIN B-12) 1,000 mcg, Oral, Daily    esomeprazole (NEXIUM) 40 mg, Oral, 2 times daily before meals, Take on empty stomach    ferrous sulfate (FEOSOL) 325 mg, Oral, With breakfast    fluticasone-umeclidin-vilanter (TRELEGY ELLIPTA) 100-62.5-25 mcg DsDv 1 puff, Inhalation, Daily    insulin aspart U-100 (NOVOLOG FLEXPEN U-100 INSULIN) 100 unit/mL (3 mL) InPn pen Administer per sliding scale three times daily before meals, max daily dose 20 units    lancets Misc To check BG 3 times daily, to use with insurance preferred meter    latanoprost 0.005 % ophthalmic solution 1 drop, Both Eyes, Nightly    magnesium oxide (MAG-OX) 400 mg, Oral, Daily    metFORMIN (GLUCOPHAGE-XR) 500 MG ER 24hr tablet TAKE 2 TABLETS ONE TIME DAILY    metoprolol succinate (TOPROL-XL) 25 mg, Oral, Daily    NIFEdipine (PROCARDIA-XL) 60 mg, Oral, Daily    pen needle, diabetic (BD ULTRA-FINE PARISH PEN NEEDLE) 32 gauge x 5/32" Ndle Uses 4 daily, on multiple daily insulin injections    potassium chloride SA (K-DUR,KLOR-CON) 20 MEQ tablet 20 mEq, Oral, Daily    sacubitriL-valsartan (ENTRESTO)  mg per tablet 1 tablet, Oral, 2 times daily    spironolactone (ALDACTONE) 25 mg, Oral    sucralfate (CARAFATE) 1 g, Oral, Before meals & nightly    timolol maleate 0.5% (TIMOPTIC) 0.5 % Drop 1 drop, Left Eye, 2 times daily       Surgical History:   Past Surgical History:   Procedure Laterality Date    ARTERIOGRAPHY OF SUBCLAVIAN ARTERY  " 5/22/2020    Procedure: Arteriogram, Subclavian;  Surgeon: Heather Carbajal MD;  [facility];  Service: Cardiology;;    COLONOSCOPY      COLONOSCOPY N/A 2/23/2024    Procedure: COLONOSCOPY;  Surgeon: Murphy Maguire Jr., MD;  Location: [facility] ENDO;  Service: Endoscopy;  Laterality: N/A;    CORONARY ANGIOGRAPHY N/A 5/22/2020    Procedure: ANGIOGRAM, CORONARY ARTERY;  Surgeon: Heather Carbajal MD;  Location: [facility] CATH;  Service: Cardiology;  Laterality: N/A;    CORONARY ARTERY BYPASS GRAFT (CABG) N/A 5/29/2020    Procedure: CORONARY ARTERY BYPASS GRAFT (CABG) x 5 VESSELS;  Surgeon: Dima Laughlin MD;  Location: [facility] OR;  Service: Cardiovascular;  Laterality: N/A;    ENDOSCOPIC HARVEST OF VEIN N/A 5/29/2020    Procedure: SURGICAL PROCUREMENT, VEIN, ENDOSCOPIC;  Surgeon: Dima Laughlin MD;  Location: [facility] OR;  Service: Cardiovascular;  Laterality: N/A;    ENDOSCOPIC ULTRASOUND OF UPPER GASTROINTESTINAL TRACT N/A 4/24/2024    Procedure: ULTRASOUND, UPPER GI TRACT, ENDOSCOPIC;  Surgeon: Holden Aguirre MD;  Location: [facility] ENDO;  Service: Endoscopy;  Laterality: N/A;    ERCP W/ PLASTIC STENT PLACEMENT      ERCP W/ SPHICTEROTOMY      ESOPHAGOGASTRODUODENOSCOPY      ESOPHAGOGASTRODUODENOSCOPY N/A 2/23/2024    Procedure: ESOPHAGOGASTRODUODENOSCOPY (EGD);  Surgeon: Murphy Maguire Jr., MD;  Location: [facility] ENDO;  Service: Endoscopy;  Laterality: N/A;    ESOPHAGOGASTRODUODENOSCOPY N/A 4/24/2024    Procedure: EGD (ESOPHAGOGASTRODUODENOSCOPY);  Surgeon: Holden Aguirre MD;  Location: [facility] ENDO;  Service: Endoscopy;  Laterality: N/A;    HYSTERECTOMY      INSERTION OF INTRA-AORTIC BALLOON ASSIST DEVICE N/A 5/29/2020    Procedure: INSERTION, INTRA-AORTIC BALLOON PUMP;  Surgeon: Dima Laughlin MD;  Location: [facility] OR;  Service: Cardiovascular;  Laterality: N/A;    LEFT HEART CATHETERIZATION Left 5/22/2020    Procedure: Left heart cath;  Surgeon: Heather Carbajal MD;  Location: [facility] CATH;  Service: Cardiology;  Laterality:  "Left;    SMALL BOWEL ENTEROSCOPY N/A 3/6/2024    Procedure: ENTEROSCOPY;  Surgeon: Bria Hand MD;  Location: [facility] ENDO;  Service: Endoscopy;  Laterality: N/A;    SMALL BOWEL ENTEROSCOPY N/A 7/30/2024    Procedure: ENTEROSCOPY;  Surgeon: Charles Hawthorne MD;  Location: [facility] ENDO;  Service: Endoscopy;  Laterality: N/A;       Family History:   Family History   Problem Relation Name Age of Onset    Cancer Father      Cancer Sister         Allergies: Review of patient's allergies indicates:  No Known Allergies    Tobacco Status:   Tobacco Use: Medium Risk (8/12/2024)    Patient History     Smoking Tobacco Use: Former     Smokeless Tobacco Use: Never     Passive Exposure: Not on file       Sexual Activity:   Social History     Substance and Sexual Activity   Sexual Activity Not on file       Alcohol Use:   Social History     Substance and Sexual Activity   Alcohol Use Not Currently    Comment: 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   Respiratory:  Negative for cough and shortness of breath.    Cardiovascular:  Negative for chest pain.   Gastrointestinal:  Positive for heartburn. Negative for abdominal pain, blood in stool, melena, nausea and vomiting.       Physical Exam  Constitutional:       General: She is not in acute distress.     Appearance: Normal appearance. She is underweight.   HENT:      Head: Normocephalic and atraumatic.   Cardiovascular:      Rate and Rhythm: Normal rate and regular rhythm.      Heart sounds: Normal heart sounds. No murmur heard.  Pulmonary:      Effort: Pulmonary effort is normal. No respiratory distress.      Breath sounds: Normal breath sounds. No wheezing.   Abdominal:      General: Bowel sounds are normal.      Tenderness: There is no abdominal " tenderness.   Skin:     General: Skin is warm.   Neurological:      Mental Status: She is alert and oriented to person, place, and time.   Psychiatric:         Behavior: Behavior normal.           Assessment and Plan:    1. Essential hypertension  -     cloNIDine (CATAPRES) 0.1 MG tablet; Take one tablet by mouth ONLY IF blood pressure is greater than or equal to 180/110.  Dispense: 30 tablet; Refill: 5    2. Gastroesophageal reflux disease without esophagitis  -     famotidine (PEPCID) 40 MG tablet; Take 1 tablet (40 mg total) by mouth 2 (two) times daily.  Dispense: 60 tablet; Refill: 1        Visit summary:    Nadia Luis Washington presented today for f/u.    Hypertension controlled today. Keep home log, only take clonidine PRN per patient instructions. F/u 2 weeks     Add pepcid BID for uncontrolled GERD until GI visit next week      Patient was instructed to report to ER if symptoms become severe.    Follow up: 2 weeks       María Ordaz PA-C    This note was created partially with voice dictation software and is prone to errors. This note has been reviewed by me but some errors are inevitable.

## 2024-08-12 NOTE — TELEPHONE ENCOUNTER
----- Message from María Ordaz PA-C sent at 8/12/2024  3:14 PM CDT -----  Please call pt and let her know I sent in another med for heartburn. Go to the ER if you have abdominal pain, black stool or blood in stool

## 2024-08-12 NOTE — TELEPHONE ENCOUNTER
Called pt to let her know that the Physicians Assistant Mrs. Ordaz, sent in the medication for her heart burn, and if she has any severe reactions to the medications to go straight to the ED. Pt said ok and hung up.

## 2024-08-13 ENCOUNTER — OFFICE VISIT (OUTPATIENT)
Dept: CARDIOLOGY | Facility: CLINIC | Age: 74
End: 2024-08-13
Payer: MEDICARE

## 2024-08-13 VITALS
DIASTOLIC BLOOD PRESSURE: 66 MMHG | HEART RATE: 73 BPM | HEIGHT: 61 IN | WEIGHT: 98.75 LBS | BODY MASS INDEX: 18.64 KG/M2 | SYSTOLIC BLOOD PRESSURE: 128 MMHG

## 2024-08-13 DIAGNOSIS — I25.119 ATHSCL HEART DISEASE OF NATIVE COR ART W UNSP ANG PCTRS: ICD-10-CM

## 2024-08-13 DIAGNOSIS — I25.10 CORONARY ARTERY DISEASE, UNSPECIFIED VESSEL OR LESION TYPE, UNSPECIFIED WHETHER ANGINA PRESENT, UNSPECIFIED WHETHER NATIVE OR TRANSPLANTED HEART: Chronic | ICD-10-CM

## 2024-08-13 DIAGNOSIS — I25.119 ATHEROSCLEROSIS OF NATIVE CORONARY ARTERY OF NATIVE HEART WITH ANGINA PECTORIS: ICD-10-CM

## 2024-08-13 DIAGNOSIS — Z95.1 S/P CABG (CORONARY ARTERY BYPASS GRAFT): ICD-10-CM

## 2024-08-13 DIAGNOSIS — I38 VALVULAR HEART DISEASE: Chronic | ICD-10-CM

## 2024-08-13 DIAGNOSIS — I70.0 AORTIC ATHEROSCLEROSIS: Primary | ICD-10-CM

## 2024-08-13 PROCEDURE — 1126F AMNT PAIN NOTED NONE PRSNT: CPT | Mod: CPTII,S$GLB,, | Performed by: INTERNAL MEDICINE

## 2024-08-13 PROCEDURE — 99214 OFFICE O/P EST MOD 30 MIN: CPT | Mod: S$GLB,,, | Performed by: INTERNAL MEDICINE

## 2024-08-13 PROCEDURE — 3061F NEG MICROALBUMINURIA REV: CPT | Mod: CPTII,S$GLB,, | Performed by: INTERNAL MEDICINE

## 2024-08-13 PROCEDURE — 4010F ACE/ARB THERAPY RXD/TAKEN: CPT | Mod: CPTII,S$GLB,, | Performed by: INTERNAL MEDICINE

## 2024-08-13 PROCEDURE — 1101F PT FALLS ASSESS-DOCD LE1/YR: CPT | Mod: CPTII,S$GLB,, | Performed by: INTERNAL MEDICINE

## 2024-08-13 PROCEDURE — 3066F NEPHROPATHY DOC TX: CPT | Mod: CPTII,S$GLB,, | Performed by: INTERNAL MEDICINE

## 2024-08-13 PROCEDURE — 3074F SYST BP LT 130 MM HG: CPT | Mod: CPTII,S$GLB,, | Performed by: INTERNAL MEDICINE

## 2024-08-13 PROCEDURE — 3008F BODY MASS INDEX DOCD: CPT | Mod: CPTII,S$GLB,, | Performed by: INTERNAL MEDICINE

## 2024-08-13 PROCEDURE — 1159F MED LIST DOCD IN RCRD: CPT | Mod: CPTII,S$GLB,, | Performed by: INTERNAL MEDICINE

## 2024-08-13 PROCEDURE — 1111F DSCHRG MED/CURRENT MED MERGE: CPT | Mod: CPTII,S$GLB,, | Performed by: INTERNAL MEDICINE

## 2024-08-13 PROCEDURE — 3044F HG A1C LEVEL LT 7.0%: CPT | Mod: CPTII,S$GLB,, | Performed by: INTERNAL MEDICINE

## 2024-08-13 PROCEDURE — 3078F DIAST BP <80 MM HG: CPT | Mod: CPTII,S$GLB,, | Performed by: INTERNAL MEDICINE

## 2024-08-13 PROCEDURE — 3288F FALL RISK ASSESSMENT DOCD: CPT | Mod: CPTII,S$GLB,, | Performed by: INTERNAL MEDICINE

## 2024-08-13 PROCEDURE — 99999 PR PBB SHADOW E&M-EST. PATIENT-LVL IV: CPT | Mod: PBBFAC,,, | Performed by: INTERNAL MEDICINE

## 2024-08-13 NOTE — PROGRESS NOTES
Subjective:    Patient ID:  Nadia Irene is a 73 y.o. female patient here for evaluation Follow-up (er)      History of Present Illness:  Follow-up.  Upper GI bleed.  Endoscopy with angina ectasias stomach, treated with cauterization.  Patient did receive blood transfusion.  Present with chest pain weakness dizziness.    Known coronary disease status post remote CABG.  PCI stent 2023, occluded JEFF to the LAD.  Successful PCI stent LAD.  Moderate disease noted in the vein graft to the obtuse marginal, vein graft to the 1st diagonal into the right coronary artery patent.    Diabetes mellitus, hypertension, dyslipidemia, past tobacco use.       Follow up labs yesterday with elevated blood glucose , H&H stable on CBC.      Review of patient's allergies indicates:  No Known Allergies    Past Medical History:   Diagnosis Date    Anemia     Biliary obstruction     Cholangitis     Diabetes mellitus     pt denies, does not take meds 11/2016    EtOH dependence     GERD (gastroesophageal reflux disease)     HTN (hypertension) 12/30/2013     Past Surgical History:   Procedure Laterality Date    ARTERIOGRAPHY OF SUBCLAVIAN ARTERY  5/22/2020    Procedure: Arteriogram, Subclavian;  Surgeon: Heather Carbajal MD;  Location: [facility] CATH;  Service: Cardiology;;    COLONOSCOPY      COLONOSCOPY N/A 2/23/2024    Procedure: COLONOSCOPY;  Surgeon: Murphy Maguire Jr., MD;  Location: [facility] ENDO;  Service: Endoscopy;  Laterality: N/A;    CORONARY ANGIOGRAPHY N/A 5/22/2020    Procedure: ANGIOGRAM, CORONARY ARTERY;  Surgeon: Heather Carbajal MD;  Location: [facility] CATH;  Service: Cardiology;  Laterality: N/A;    CORONARY ARTERY BYPASS GRAFT (CABG) N/A 5/29/2020    Procedure: CORONARY ARTERY BYPASS GRAFT (CABG) x 5 VESSELS;  Surgeon: Dima Laughlin MD;  Location: [facility] OR;  Service: Cardiovascular;  Laterality: N/A;    ENDOSCOPIC HARVEST OF VEIN N/A 5/29/2020    Procedure: SURGICAL PROCUREMENT, VEIN, ENDOSCOPIC;  Surgeon: Dima Laughlin MD;   Location: [facility] OR;  Service: Cardiovascular;  Laterality: N/A;    ENDOSCOPIC ULTRASOUND OF UPPER GASTROINTESTINAL TRACT N/A 2024    Procedure: ULTRASOUND, UPPER GI TRACT, ENDOSCOPIC;  Surgeon: Holden Aguirre MD;  Location: [facility];  Service: Endoscopy;  Laterality: N/A;    ERCP W/ PLASTIC STENT PLACEMENT      ERCP W/ SPHICTEROTOMY      ESOPHAGOGASTRODUODENOSCOPY      ESOPHAGOGASTRODUODENOSCOPY N/A 2024    Procedure: ESOPHAGOGASTRODUODENOSCOPY (EGD);  Surgeon: Murphy Maguire Jr., MD;  Location: [facility] ENDO;  Service: Endoscopy;  Laterality: N/A;    ESOPHAGOGASTRODUODENOSCOPY N/A 2024    Procedure: EGD (ESOPHAGOGASTRODUODENOSCOPY);  Surgeon: Holden Aguirre MD;  Location: [facility] ENDO;  Service: Endoscopy;  Laterality: N/A;    HYSTERECTOMY      INSERTION OF INTRA-AORTIC BALLOON ASSIST DEVICE N/A 2020    Procedure: INSERTION, INTRA-AORTIC BALLOON PUMP;  Surgeon: Dima Laughlin MD;  Location: [facility] OR;  Service: Cardiovascular;  Laterality: N/A;    LEFT HEART CATHETERIZATION Left 2020    Procedure: Left heart cath;  Surgeon: Heather Carbajal MD;  Location: [facility] CATH;  Service: Cardiology;  Laterality: Left;    SMALL BOWEL ENTEROSCOPY N/A 3/6/2024    Procedure: ENTEROSCOPY;  Surgeon: Bria Hand MD;  Location: [facility] ENDO;  Service: Endoscopy;  Laterality: N/A;    SMALL BOWEL ENTEROSCOPY N/A 2024    Procedure: ENTEROSCOPY;  Surgeon: Charles Hawthorne MD;  Location: [facility] ENDO;  Service: Endoscopy;  Laterality: N/A;     Social History     Tobacco Use    Smoking status: Former     Current packs/day: 0.00     Average packs/day: 0.5 packs/day for 40.0 years (20.0 ttl pk-yrs)     Types: Cigarettes     Start date: 1980     Quit date: 2020     Years since quittin.2    Smokeless tobacco: Never   Substance Use Topics    Alcohol use: Not Currently     Comment: sober 2 years 3 months    Drug use: No        Review of Systems:    As noted in HPI in addition      REVIEW OF  SYSTEMS  Review of Systems   Constitutional: Negative for decreased appetite, diaphoresis, night sweats, weight gain and weight loss.   HENT:  Negative for nosebleeds and odynophagia.    Eyes:  Negative for double vision and photophobia.   Cardiovascular:  Positive for dyspnea on exertion. Negative for chest pain, claudication, cyanosis, irregular heartbeat, leg swelling, near-syncope, orthopnea, palpitations, paroxysmal nocturnal dyspnea and syncope.   Respiratory:  Positive for shortness of breath. Negative for cough, hemoptysis and wheezing.    Hematologic/Lymphatic: Negative for adenopathy.   Skin:  Negative for flushing, skin cancer and suspicious lesions.   Musculoskeletal:  Negative for gout, myalgias and neck pain.   Gastrointestinal:  Negative for abdominal pain, heartburn, hematemesis and hematochezia.   Genitourinary:  Negative for bladder incontinence, hesitancy and nocturia.   Neurological:  Negative for focal weakness, headaches, light-headedness and paresthesias.   Psychiatric/Behavioral:  Negative for memory loss and substance abuse.               Objective:        Vitals:    08/13/24 1121   BP: 128/66   Pulse: 73       Lab Results   Component Value Date    WBC 4.72 08/12/2024    HGB 9.8 (L) 08/12/2024    HCT 32.4 (L) 08/12/2024     08/12/2024    CHOL 121 06/17/2024    TRIG 59 06/17/2024    HDL 47 06/17/2024    ALT 19 07/29/2024    AST 26 07/29/2024     08/12/2024    K 3.8 08/12/2024     08/12/2024    CREATININE 0.9 08/12/2024    BUN 11 08/12/2024    CO2 22 (L) 08/12/2024    TSH 0.719 02/21/2024    INR 1.1 07/29/2024    HGBA1C 6.2 (H) 06/17/2024    MICROALBUR 0.2 02/05/2018        ECHOCARDIOGRAM RESULTS  Results for orders placed during the hospital encounter of 02/20/24    Echo Saline Bubble? No    Interpretation Summary    Left Ventricle: There is mild concentric hypertrophy. There is normal systolic function with a visually estimated ejection fraction of 60 - 65%. There is  normal diastolic function.    Right Ventricle: Normal right ventricular cavity size. Wall thickness is normal. Right ventricle wall motion  is normal. Systolic function is normal.    Aortic Valve: There is mild stenosis. Aortic valve area by VTI is 1.66 cm². Aortic valve peak velocity is 2.17 m/s. Mean gradient is 9 mmHg. The dimensionless index is 0.58. There is mild aortic regurgitation.    Pulmonary Artery: The estimated pulmonary artery systolic pressure is 30 mmHg.    IVC/SVC: Intermediate venous pressure at 8 mmHg.    Results for orders placed during the hospital encounter of 05/19/20    Cardiac catheterization    Conclusion  · Three vessel coronary artery disease.  · Mid LAD lesion , 85% stenosed.  · Ost 2nd Diag lesion , 80% stenosed.  · Dist Cx lesion , 90% stenosed.  · Ost 3rd Mrg lesion , 90% stenosed.  · RPDA lesion , 80% stenosed.  · LV end diastolic pressure is severely elevated.    I certify that I was present for catheter insertion, catheter manipulation, angiography, and angiographic interpretation of this patient.    Procedure Log documented by Documenter: Camille Faith RN and verified by Heather Carbajal MD.    Date: 5/22/2020  Time: 1:10 PM          CURRENT/PREVIOUS VISIT EKG  Results for orders placed or performed during the hospital encounter of 07/29/24   EKG 12-lead    Collection Time: 07/31/24  8:03 AM   Result Value Ref Range    QRS Duration 86 ms    [facility] QTC Calculation 467 ms    Narrative    Test Reason : R07.9,    Vent. Rate : 088 BPM     Atrial Rate : 088 BPM     P-R Int : 132 ms          QRS Dur : 086 ms      QT Int : 386 ms       P-R-T Axes : 000 015 084 degrees     QTc Int : 467 ms    Normal sinus rhythm  Possible Left atrial enlargement  Anteroseptal infarct (cited on or before 19-FEB-2024)  Abnormal ECG  When compared with ECG of 29-JUL-2024 21:38,  Premature supraventricular complexes are no longer Present  Confirmed by TAZ DUENAS MD ([id number]) on 8/1/2024 4:34:37 PM    Referred By:  [email or web address]   SELF           Confirmed By:TAZ DUENAS MD     No valid procedures specified.   Results for orders placed during the hospital encounter of 04/18/23    Nuclear Stress - Cardiology Interpreted    Interpretation Summary    Normal myocardial perfusion scan. There is no evidence of myocardial ischemia or infarction.    The gated perfusion images showed an ejection fraction of 55% post stress.    There is normal wall motion post stress.    LV cavity size is normal at rest and normal at stress.    The ECG portion of the study is abnormal but not diagnostic.    The patient reported chest pain during the stress test.    During stress, occasional PVCs are noted.    No valid procedures specified.    PHYSICAL EXAM  GENERAL: well built, well nourished, well-developed in no apparent distress alert and oriented.   HEENT: Normocephalic. Pupils normal and conjunctivae normal.  Mucous membranes normal, no cyanosis or icterus, trachea central,no pallor or icterus is noted..   NECK: No JVD. No bruit..   THYROID: Thyroid not enlarged. No nodules present..   CARDIAC:  Normal S1-S2.  No murmur rub click or gallop.  PMI nondisplaced.  CHEST ANATOMY: normal.   LUNGS: Clear to auscultation. No wheezing or rhonchi..   ABDOMEN: Soft no masses or organomegaly.  No abdomen pulsations or bruits.  Normal bowel sounds. No pulsations and no masses felt, No guarding or rebound.   URINARY: No nagel catheter   EXTREMITIES: No cyanosis, clubbing or edema noted at this time., no calf tenderness bilaterally.   PERIPHERAL VASCULAR SYSTEM: Good palpable distal pulses.  2+ femoral, popliteal and pedal pulses.  No bruits    CENTRAL NERVOUS SYSTEM: No focal motor or sensory deficits noted.   SKIN: Skin without lesions, moist, well perfused.   MUSCLE STRENGTH & TONE: No noteable weakness, atrophy or abnormal movement    I HAVE REVIEWED :    The vital signs, nurses notes, and all the pertinent radiology and labs.         Current Outpatient Medications  "  Medication Instructions    amiodarone (PACERONE) 200 mg, Oral, 3 times daily    aspirin (ECOTRIN) 81 mg, Oral, Daily    atorvastatin (LIPITOR) 10 mg, Oral, Nightly    azelastine (ASTELIN) 137 mcg, Nasal, 2 times daily    blood sugar diagnostic Strp To check BG 3 times daily, to use with insurance preferred meter    blood-glucose meter kit To check BG 3 times daily, to use with insurance preferred meter    cloNIDine (CATAPRES) 0.1 MG tablet Take one tablet by mouth ONLY IF blood pressure is greater than or equal to 180/110.    clopidogreL (PLAVIX) 75 mg tablet TAKE 1 TABLET EVERY DAY    cyanocobalamin (VITAMIN B-12) 1,000 mcg, Oral, Daily    esomeprazole (NEXIUM) 40 mg, Oral, 2 times daily before meals, Take on empty stomach    famotidine (PEPCID) 40 mg, Oral, 2 times daily    ferrous sulfate (FEOSOL) 325 mg, Oral, With breakfast    fluticasone-umeclidin-vilanter (TRELEGY ELLIPTA) 100-62.5-25 mcg DsDv 1 puff, Inhalation, Daily    insulin aspart U-100 (NOVOLOG FLEXPEN U-100 INSULIN) 100 unit/mL (3 mL) InPn pen Administer per sliding scale three times daily before meals, max daily dose 20 units    lancets Misc To check BG 3 times daily, to use with insurance preferred meter    latanoprost 0.005 % ophthalmic solution 1 drop, Both Eyes, Nightly    magnesium oxide (MAG-OX) 400 mg, Oral, Daily    metFORMIN (GLUCOPHAGE-XR) 500 MG ER 24hr tablet TAKE 2 TABLETS ONE TIME DAILY    metoprolol succinate (TOPROL-XL) 25 mg, Oral, Daily    NIFEdipine (PROCARDIA-XL) 60 mg, Oral, Daily    pen needle, diabetic (BD ULTRA-FINE PARISH PEN NEEDLE) 32 gauge x 5/32" Ndle Uses 4 daily, on multiple daily insulin injections    potassium chloride SA (K-DUR,KLOR-CON) 20 MEQ tablet 20 mEq, Oral, Daily    sacubitriL-valsartan (ENTRESTO)  mg per tablet 1 tablet, Oral, 2 times daily    spironolactone (ALDACTONE) 25 mg, Oral    sucralfate (CARAFATE) 1 g, Oral, Before meals & nightly    timolol maleate 0.5% (TIMOPTIC) 0.5 % Drop 1 drop, Left Eye, " 2 times daily          Assessment:   Recent hospitalization for GI bleed.  Gastric angioectasias, treated with cauterization, blood transfusion.  Stable.  CBC yesterday stable.    Acute on chronic atypical chest pain.  Dizziness.    Coronary disease.  Status post remote CABG.  PCI stent LAD 2023, occluded JEFF to the LAD.  Graft anatomy the otherwise stable.        Plan:   Need to review and reconciled medications.  Labs follow-up GI.  CV exam review systems otherwise stable    Cardiac exam review of systems stable.  Nuclear perfusion imaging 04/2023 with no evidence of ischemia.  Recent carotid ultrasound 4/24 stable.  No significant critical extracranial disease.  Event monitor performed 04/2024 with rare nonsustained VT.  No complex atrial arrhythmia.  Update cardiac echo, call results.  Last echo with mild AS..    No follow-ups on file.

## 2024-08-16 LAB — FRUCTOSAMINE SERPL-SCNC: 426 UMOL /L

## 2024-08-26 ENCOUNTER — TELEPHONE (OUTPATIENT)
Dept: FAMILY MEDICINE | Facility: CLINIC | Age: 74
End: 2024-08-26
Payer: MEDICARE

## 2024-08-26 ENCOUNTER — OFFICE VISIT (OUTPATIENT)
Dept: FAMILY MEDICINE | Facility: CLINIC | Age: 74
End: 2024-08-26
Payer: MEDICARE

## 2024-08-26 ENCOUNTER — OFFICE VISIT (OUTPATIENT)
Dept: OPTOMETRY | Facility: CLINIC | Age: 74
End: 2024-08-26
Payer: MEDICARE

## 2024-08-26 ENCOUNTER — TELEPHONE (OUTPATIENT)
Dept: OPTOMETRY | Facility: CLINIC | Age: 74
End: 2024-08-26
Payer: MEDICARE

## 2024-08-26 VITALS
WEIGHT: 95.88 LBS | DIASTOLIC BLOOD PRESSURE: 66 MMHG | HEIGHT: 61 IN | BODY MASS INDEX: 18.1 KG/M2 | SYSTOLIC BLOOD PRESSURE: 130 MMHG

## 2024-08-26 DIAGNOSIS — I10 ESSENTIAL HYPERTENSION: Primary | ICD-10-CM

## 2024-08-26 DIAGNOSIS — H16.141 SUPERFICIAL PUNCTATE KERATITIS OF RIGHT EYE: ICD-10-CM

## 2024-08-26 DIAGNOSIS — H57.11 ACUTE RIGHT EYE PAIN: ICD-10-CM

## 2024-08-26 DIAGNOSIS — H40.1134 PRIMARY OPEN ANGLE GLAUCOMA (POAG) OF BOTH EYES, INDETERMINATE STAGE: ICD-10-CM

## 2024-08-26 DIAGNOSIS — H25.12 NUCLEAR SCLEROSIS, LEFT: ICD-10-CM

## 2024-08-26 DIAGNOSIS — H54.511A LOW VISION OF RIGHT EYE: ICD-10-CM

## 2024-08-26 DIAGNOSIS — H27.01 APHAKIA, RIGHT: ICD-10-CM

## 2024-08-26 DIAGNOSIS — H57.11 DISCOMFORT OF EYE, RIGHT: Primary | ICD-10-CM

## 2024-08-26 DIAGNOSIS — Z79.899 POLYPHARMACY: ICD-10-CM

## 2024-08-26 PROCEDURE — 3288F FALL RISK ASSESSMENT DOCD: CPT | Mod: CPTII,S$GLB,,

## 2024-08-26 PROCEDURE — 1159F MED LIST DOCD IN RCRD: CPT | Mod: CPTII,S$GLB,,

## 2024-08-26 PROCEDURE — 99214 OFFICE O/P EST MOD 30 MIN: CPT | Mod: S$GLB,,,

## 2024-08-26 PROCEDURE — 3075F SYST BP GE 130 - 139MM HG: CPT | Mod: CPTII,S$GLB,,

## 2024-08-26 PROCEDURE — 3066F NEPHROPATHY DOC TX: CPT | Mod: CPTII,S$GLB,,

## 2024-08-26 PROCEDURE — 3061F NEG MICROALBUMINURIA REV: CPT | Mod: CPTII,S$GLB,, | Performed by: OPTOMETRIST

## 2024-08-26 PROCEDURE — 99999 PR PBB SHADOW E&M-EST. PATIENT-LVL V: CPT | Mod: PBBFAC,,,

## 2024-08-26 PROCEDURE — 3078F DIAST BP <80 MM HG: CPT | Mod: CPTII,S$GLB,,

## 2024-08-26 PROCEDURE — 3061F NEG MICROALBUMINURIA REV: CPT | Mod: CPTII,S$GLB,,

## 2024-08-26 PROCEDURE — 3288F FALL RISK ASSESSMENT DOCD: CPT | Mod: CPTII,S$GLB,, | Performed by: OPTOMETRIST

## 2024-08-26 PROCEDURE — 1111F DSCHRG MED/CURRENT MED MERGE: CPT | Mod: CPTII,S$GLB,,

## 2024-08-26 PROCEDURE — 3044F HG A1C LEVEL LT 7.0%: CPT | Mod: CPTII,S$GLB,, | Performed by: OPTOMETRIST

## 2024-08-26 PROCEDURE — 99203 OFFICE O/P NEW LOW 30 MIN: CPT | Mod: S$GLB,,, | Performed by: OPTOMETRIST

## 2024-08-26 PROCEDURE — 1159F MED LIST DOCD IN RCRD: CPT | Mod: CPTII,S$GLB,, | Performed by: OPTOMETRIST

## 2024-08-26 PROCEDURE — 4010F ACE/ARB THERAPY RXD/TAKEN: CPT | Mod: CPTII,S$GLB,,

## 2024-08-26 PROCEDURE — 1126F AMNT PAIN NOTED NONE PRSNT: CPT | Mod: CPTII,S$GLB,, | Performed by: OPTOMETRIST

## 2024-08-26 PROCEDURE — 99999 PR PBB SHADOW E&M-EST. PATIENT-LVL III: CPT | Mod: PBBFAC,,, | Performed by: OPTOMETRIST

## 2024-08-26 PROCEDURE — 3008F BODY MASS INDEX DOCD: CPT | Mod: CPTII,S$GLB,,

## 2024-08-26 PROCEDURE — 1101F PT FALLS ASSESS-DOCD LE1/YR: CPT | Mod: CPTII,S$GLB,,

## 2024-08-26 PROCEDURE — 4010F ACE/ARB THERAPY RXD/TAKEN: CPT | Mod: CPTII,S$GLB,, | Performed by: OPTOMETRIST

## 2024-08-26 PROCEDURE — 1125F AMNT PAIN NOTED PAIN PRSNT: CPT | Mod: CPTII,S$GLB,,

## 2024-08-26 PROCEDURE — 1101F PT FALLS ASSESS-DOCD LE1/YR: CPT | Mod: CPTII,S$GLB,, | Performed by: OPTOMETRIST

## 2024-08-26 PROCEDURE — 3044F HG A1C LEVEL LT 7.0%: CPT | Mod: CPTII,S$GLB,,

## 2024-08-26 PROCEDURE — 3066F NEPHROPATHY DOC TX: CPT | Mod: CPTII,S$GLB,, | Performed by: OPTOMETRIST

## 2024-08-26 PROCEDURE — 1111F DSCHRG MED/CURRENT MED MERGE: CPT | Mod: CPTII,S$GLB,, | Performed by: OPTOMETRIST

## 2024-08-26 RX ORDER — CLONIDINE HYDROCHLORIDE 0.1 MG/1
TABLET ORAL
Start: 2024-08-26

## 2024-08-26 NOTE — PROGRESS NOTES
[facility]  [address] [place] [address]    Subjective    Chief Complaint:   Chief Complaint   Patient presents with    Follow-up     Follow up on blood pressure       History of Present Illness:     Nadia Irene is a(n) 73 y.o. female with past medical history as noted below who presents to the clinic today for blood pressure follow up. She is present with a family member.     Normotensive today after resting. She reports labile BP at home, ranging from 120s-170s systolic. She hasn't picked up clonidine, stating it was not at her pharmacy. She has not needed it as BP has remained under 180/110. She notes that BP does not follow a predictable pattern. It is not elevated or low at certain times of the day. She usually checks it in the morning, reports controlled readings, then rechecks a few minutes later and it is very high.     Reports needing help with prescriptions at home because her eyesight is poor. She attributes this to cataracts.  She reports increased right eye pain and redness but unable to give an exact time frame (over the last week or so?). Pain is not described as severe. She has a history chronic glaucoma previously following w Dr. Strickland but is looking for a new optometrist.       COPD  Rx- Trelegy      Paroxysmal atrial fibrillation  History of, maintains normal sinus rhythm with occasional PACs   Rx-dual antiplatelet therapy, beta-blocker     Combined systolic, diastolic congestive heart failure  Previous echocardiogram 05/26/2022-grade 2 diastolic dysfunction, ejection fraction 40%  Cardiology- MD Mary   Rx-Aldactone 25 mg, beta-blocker, Entresto, Lasix 20 mg      Valvular heart disease  05/26/2022-echocardiogram shows moderate aortic regurgitation, tricuspid regurgitation, pulmonic regurgitation, mitral regurgitation     Coronary artery disease status post CABG x5 (5/29/2020), dyslipidemia  MAGGIE :  X1 placed late  2023  Cardiology- MD Mary   Rx-aspirin 81 mg, atorvastatin 10 mg, Plavix 75 mg  Negative nuclear stress test 04/2023  Previous lipid panel acceptable     Essential hypertension  Rx-Entresto, metoprolol 25 mg, spironolactone 25 mg,  Clonidine 0.1 PRN, nifedipine 60 mg      Bilateral carotid artery stenosis  Currently on statin  05/27/2020-carotid ultrasound shows less than 50% stenosis right carotid artery, left internal carotid artery with 50-69% stenosis     Type 2 diabetes mellitus with associated polyneuropathy  Previous A1c-6.3%  Rx-metformin extended release 1000 mg by mouth daily, sliding scale insulin, low intensity     GERD  Rx : esomeprazole 40 mg BID, carafate 1 g QID      Nicotine dependence  Prev rx : Patches (didn't work)     Chronic back pain   Rx-tramadol     Problem List:   Patient Active Problem List   Diagnosis    Essential hypertension    Tobacco abuse    Choledocholithiasis    Type 2 diabetes mellitus with diabetic polyneuropathy, without long-term current use of insulin    Calculus of bile duct without cholecystitis and without obstruction    Common bile duct dilation    Gastroesophageal reflux disease without esophagitis    Hypokalemia    Coronary artery disease    Chronic combined systolic and diastolic heart failure    Bilateral carotid artery stenosis    S/P CABG (coronary artery bypass graft)    COPD (chronic obstructive pulmonary disease)    Valvular heart disease    Symptomatic anemia    Cigarette nicotine dependence without complication    Aortic atherosclerosis    Other chest pain    Gastrointestinal hemorrhage associated with angiodysplasia of stomach and duodenum    PAF (paroxysmal atrial fibrillation)    NSVT (nonsustained ventricular tachycardia)    ACP (advance care planning)    Iron deficiency anemia    HFrEF (heart failure with reduced ejection fraction)    Athscl heart disease of native cor art w unsp ang pctrs    Atherosclerotic heart disease of native coronary artery with  "unspecified angina pectoris    CAD (coronary artery disease)    Chest pain    Ventricular tachycardia    Anemia    Diabetes mellitus    Vitamin B12 deficiency (dietary) anemia    Moderate malnutrition    Left foot pain       Current Outpatient Medications:   Current Outpatient Medications   Medication Instructions    amiodarone (PACERONE) 200 mg, Oral, 3 times daily    aspirin (ECOTRIN) 81 mg, Oral, Daily    atorvastatin (LIPITOR) 10 mg, Oral, Nightly    azelastine (ASTELIN) 137 mcg, Nasal, 2 times daily    blood sugar diagnostic Strp To check BG 3 times daily, to use with insurance preferred meter    blood-glucose meter kit To check BG 3 times daily, to use with insurance preferred meter    cloNIDine (CATAPRES) 0.1 MG tablet Take one tablet by mouth ONLY IF blood pressure is greater than or equal to 180/110.    clopidogreL (PLAVIX) 75 mg tablet TAKE 1 TABLET EVERY DAY    cyanocobalamin (VITAMIN B-12) 1,000 mcg, Oral, Daily    esomeprazole (NEXIUM) 40 mg, Oral, 2 times daily before meals, Take on empty stomach    famotidine (PEPCID) 40 mg, Oral, 2 times daily    ferrous sulfate (FEOSOL) 325 mg, Oral, With breakfast    fluticasone-umeclidin-vilanter (TRELEGY ELLIPTA) 100-62.5-25 mcg DsDv 1 puff, Inhalation, Daily    insulin aspart U-100 (NOVOLOG FLEXPEN U-100 INSULIN) 100 unit/mL (3 mL) InPn pen Administer per sliding scale three times daily before meals, max daily dose 20 units    lancets Misc To check BG 3 times daily, to use with insurance preferred meter    latanoprost 0.005 % ophthalmic solution 1 drop, Both Eyes, Nightly    magnesium oxide (MAG-OX) 400 mg, Oral, Daily    metFORMIN (GLUCOPHAGE-XR) 500 MG ER 24hr tablet TAKE 2 TABLETS ONE TIME DAILY    metoprolol succinate (TOPROL-XL) 25 mg, Oral, Daily    NIFEdipine (PROCARDIA-XL) 60 mg, Oral, Daily    pen needle, diabetic (BD ULTRA-FINE PARISH PEN NEEDLE) 32 gauge x 5/32" Ndle Uses 4 daily, on multiple daily insulin injections    potassium chloride SA " (K-DUR,KLOR-CON) 20 MEQ tablet 20 mEq, Oral, Daily    sacubitriL-valsartan (ENTRESTO)  mg per tablet 1 tablet, Oral, 2 times daily    spironolactone (ALDACTONE) 25 mg, Oral    sucralfate (CARAFATE) 1 g, Oral, Before meals & nightly    timolol maleate 0.5% (TIMOPTIC) 0.5 % Drop 1 drop, Left Eye, 2 times daily       Surgical History:   Past Surgical History:   Procedure Laterality Date    ARTERIOGRAPHY OF SUBCLAVIAN ARTERY  5/22/2020    Procedure: Arteriogram, Subclavian;  Surgeon: Heather Carbajal MD;  Location: [facility] CATH;  Service: Cardiology;;    COLONOSCOPY      COLONOSCOPY N/A 2/23/2024    Procedure: COLONOSCOPY;  Surgeon: Murphy Maguire Jr., MD;  Location: [facility] ENDO;  Service: Endoscopy;  Laterality: N/A;    CORONARY ANGIOGRAPHY N/A 5/22/2020    Procedure: ANGIOGRAM, CORONARY ARTERY;  Surgeon: Heather Carbajal MD;  Location: [facility] CATH;  Service: Cardiology;  Laterality: N/A;    CORONARY ARTERY BYPASS GRAFT (CABG) N/A 5/29/2020    Procedure: CORONARY ARTERY BYPASS GRAFT (CABG) x 5 VESSELS;  Surgeon: Dima Laughlin MD;  Location: [facility] OR;  Service: Cardiovascular;  Laterality: N/A;    ENDOSCOPIC HARVEST OF VEIN N/A 5/29/2020    Procedure: SURGICAL PROCUREMENT, VEIN, ENDOSCOPIC;  Surgeon: Dima Laughlin MD;  Location: [facility] OR;  Service: Cardiovascular;  Laterality: N/A;    ENDOSCOPIC ULTRASOUND OF UPPER GASTROINTESTINAL TRACT N/A 4/24/2024    Procedure: ULTRASOUND, UPPER GI TRACT, ENDOSCOPIC;  Surgeon: Holden Aguirre MD;  Location: [facility] ENDO;  Service: Endoscopy;  Laterality: N/A;    ERCP W/ PLASTIC STENT PLACEMENT      ERCP W/ SPHICTEROTOMY      ESOPHAGOGASTRODUODENOSCOPY      ESOPHAGOGASTRODUODENOSCOPY N/A 2/23/2024    Procedure: ESOPHAGOGASTRODUODENOSCOPY (EGD);  Surgeon: Murphy Maguire Jr., MD;  Location: [facility] ENDO;  Service: Endoscopy;  Laterality: N/A;    ESOPHAGOGASTRODUODENOSCOPY N/A 4/24/2024    Procedure: EGD (ESOPHAGOGASTRODUODENOSCOPY);  Surgeon: Holden Aguirre MD;  Location: [facility]  "ENDO;  Service: Endoscopy;  Laterality: N/A;    HYSTERECTOMY      INSERTION OF INTRA-AORTIC BALLOON ASSIST DEVICE N/A 5/29/2020    Procedure: INSERTION, INTRA-AORTIC BALLOON PUMP;  Surgeon: Dima Laughlin MD;  Location: [facility] OR;  Service: Cardiovascular;  Laterality: N/A;    LEFT HEART CATHETERIZATION Left 5/22/2020    Procedure: Left heart cath;  Surgeon: Heather Carbajal MD;  Location: [facility] CATH;  Service: Cardiology;  Laterality: Left;    SMALL BOWEL ENTEROSCOPY N/A 3/6/2024    Procedure: ENTEROSCOPY;  Surgeon: Bria Hand MD;  Location: [facility] ENDO;  Service: Endoscopy;  Laterality: N/A;    SMALL BOWEL ENTEROSCOPY N/A 7/30/2024    Procedure: ENTEROSCOPY;  Surgeon: Charles Hawthorne MD;  Location: [facility] ENDO;  Service: Endoscopy;  Laterality: N/A;       Family History:   Family History   Problem Relation Name Age of Onset    Cancer Father      Cancer Sister         Allergies: Review of patient's allergies indicates:  No Known Allergies    Tobacco Status:   Tobacco Use: Medium Risk (8/12/2024)    Patient History     Smoking Tobacco Use: Former     Smokeless Tobacco Use: Never     Passive Exposure: Not on file       Sexual Activity:   Social History     Substance and Sexual Activity   Sexual Activity Not on file       Alcohol Use:   Social History     Substance and Sexual Activity   Alcohol Use Not Currently    Comment: sober 2 years 3 months         Objective       Vitals:    08/26/24 1000 08/26/24 1022   BP: (!) 146/76 130/66   Weight: 43.5 kg (95 lb 14.4 oz)    Height: 5' 1" (1.549 m)        Review of Systems   Constitutional:  Negative for chills and fever.   Eyes:  Positive for blurred vision (bilateral), photophobia, pain (right) and redness (right).   Respiratory:  Negative for cough and shortness of breath.    Cardiovascular:  Negative for chest pain.       Physical Exam  Constitutional:       General: She is not in acute distress.     Appearance: Normal appearance.   HENT:      Head: Normocephalic and " atraumatic.   Eyes:      General: Lids are normal.         Right eye: No discharge.         Left eye: No discharge.      Extraocular Movements:      Right eye: Normal extraocular motion.      Left eye: Normal extraocular motion.      Conjunctiva/sclera:      Right eye: Right conjunctiva is injected.      Left eye: Left conjunctiva is not injected.      Comments: Exam limited 2/2 photophobia. Cloudy pupils.    Cardiovascular:      Rate and Rhythm: Normal rate and regular rhythm.      Heart sounds: Normal heart sounds. No murmur heard.  Pulmonary:      Effort: Pulmonary effort is normal. No respiratory distress.      Breath sounds: Normal breath sounds. No wheezing.   Skin:     General: Skin is warm.   Neurological:      Mental Status: She is alert and oriented to person, place, and time.   Psychiatric:         Behavior: Behavior normal.           Assessment and Plan:    1. Essential hypertension  -     cloNIDine (CATAPRES) 0.1 MG tablet; Take one tablet by mouth ONLY IF blood pressure is greater than or equal to 180/110.    2. Polypharmacy  -     Ambulatory referral/consult to Home Health; Future; Expected date: 08/27/2024    3. Acute right eye pain  -     Ambulatory referral/consult to Optometry; Future; Expected date: 09/02/2024        Visit summary:    Nadia Irene presented today for f/u.    Labile readings at home. Initially hypertensive, normotensive upon my recheck after resting a few moments. We reviewed correct home BP check procedure. Recommend rest prior to checking, no talking, no caffeine, etc.  Clonidine to use PRN ONLY, sent to pharmacy   Keep a log and if pattern of morning highs, evening lows, or vice versa, may need to optimize timing of administration.     Communicated with Dr. Iniguez, who will see patient today to r/o acute glaucoma.    Referral to home health skilled nursing to assist w medications, eyesight poor and she feels she needs help with administering meds     Patient was  instructed to report to ER if symptoms become severe.    Follow up: routine with PCP, sooner if BP is still elevated at home       María Ordaz PA-C    This note was created partially with voice dictation software and is prone to errors. This note has been reviewed by me but some errors are inevitable.

## 2024-08-26 NOTE — TELEPHONE ENCOUNTER
Called pt to let her know that we got the appointment for her eye doctor today. Pt said thanks and hung up.

## 2024-08-27 NOTE — PATIENT INSTRUCTIONS
"DRY EYES -- BURNING OR JUDY SYMPTOMS:  Use Over The Counter artificial tears as needed for dry eye symptoms.   Some common brands include:  Systane, Optive, Refresh, and Thera-Tears.  These drops can be used as frequently as desired, but may be most helpful use during long periods of concentrated work.  For example, reading / working at the computer. Start with 3-4x per day.     Nighttime Ophthalmic gel or ointments are available: Refresh PM, Genteal, and Lacrilube.    Avoid drops that "get redness out" (Visine, Murine, Clear Eyes), as these may contain medication that could further irritate the eyes, especially with chronic use.    ALLERGY EYES -- ITCHING SYMPTOMS:  Over the counter medications include--Pataday, Zaditor, and Alaway.  Use as directed 1-2 drops daily for symptoms of itching / watering eyes.  These drops will not help for dry eye or exposure symptoms.    REDNESS RELIEF:  Lumify---is a good redness reliever that will not cause irritation if used chronically.          "

## 2024-08-27 NOTE — PROGRESS NOTES
HPI    Pt c/o itchy, burning, redness, and Photophobia     Ppt sts this has been going on x 2-3 days. Pt using Latanoprost QHS OU and   Timolol BID OU. Pt see's Dr. Godinez q4 months.     Agree above  Long h/o advanced glaucoma w/ multi meds  OD w/ acute discomfort and fbs x 2-3 days   Denies previous episode  Referred from NP  Last edited by BETSEY Iniguez, OD on 8/27/2024  7:25 AM.            Assessment /Plan     For exam results, see Encounter Report.    Discomfort of eye, right    Superficial punctate keratitis of right eye    Primary open angle glaucoma (POAG) of both eyes, indeterminate stage    Aphakia, right    Nuclear sclerosis, left    Low vision of right eye      1,2. No K/conj/ tarsal fb noted  OD w/ moderate spk central / nasal and mild K edema   Presumed resolving irritation 2/2/ fb   Advised gel gtts q2-3h while awake for 4-5 days   Message full time eye provider if not improving     3.   Unknown history of presumed OAG---poor internal view OD   IOP controlle w/ meds  OD single digits--poor view   OS low teens --large cupping   Continue as directed     4.   OD w/ apparent aphakia, w/ ? Old cortex present   5.   OS w/ moderate NS, but still good view to posterior pole   Pt caregiver notes they have appt for CE consult upcoming in September 6.   HM vision today at exam     Continue OU   Timolol bid  Latanoprost qhs     Keep appt for CE consult September   Keep regular appt and care with Dr Godinez

## 2024-09-09 ENCOUNTER — TELEPHONE (OUTPATIENT)
Dept: FAMILY MEDICINE | Facility: CLINIC | Age: 74
End: 2024-09-09
Payer: MEDICARE

## 2024-09-09 NOTE — TELEPHONE ENCOUNTER
----- Message from Shama Howell sent at 9/9/2024 10:08 AM CDT -----  Type:  Needs Medical Advice    Who Called:  Pt's daughter Noami    Symptoms (please be specific):  tested positive for covid     How long has patient had these symptoms:  9/7/24    Pharmacy name and phone #:   .  [facility] - [facility] [address] [address]  [address] [address] [address]  Phone: [phone] Fax: [phone]    Would the patient rather a call back or a response via [facility]?  Call back    Best Call Back Number:  [phone]    Additional Information:  Needing to know what she can take so it does not get worse or what Dr Garcia suggest she do.   Please call back to advise. Thanks!

## 2024-09-23 PROBLEM — R06.00 DYSPNEA: Status: ACTIVE | Noted: 2024-09-23

## 2024-09-23 PROBLEM — R10.9 ABDOMINAL PAIN: Status: ACTIVE | Noted: 2024-09-23

## 2024-09-24 ENCOUNTER — EXTERNAL HOME HEALTH (OUTPATIENT)
Dept: HOME HEALTH SERVICES | Facility: HOSPITAL | Age: 74
End: 2024-09-24
Payer: MEDICARE

## 2024-09-27 ENCOUNTER — OFFICE VISIT (OUTPATIENT)
Dept: FAMILY MEDICINE | Facility: CLINIC | Age: 74
End: 2024-09-27
Payer: MEDICARE

## 2024-09-27 VITALS
DIASTOLIC BLOOD PRESSURE: 77 MMHG | HEART RATE: 82 BPM | SYSTOLIC BLOOD PRESSURE: 155 MMHG | WEIGHT: 97.56 LBS | BODY MASS INDEX: 18.43 KG/M2 | OXYGEN SATURATION: 99 % | RESPIRATION RATE: 18 BRPM

## 2024-09-27 DIAGNOSIS — K59.00 CONSTIPATION, UNSPECIFIED CONSTIPATION TYPE: Primary | ICD-10-CM

## 2024-09-27 DIAGNOSIS — K21.9 GASTROESOPHAGEAL REFLUX DISEASE WITHOUT ESOPHAGITIS: ICD-10-CM

## 2024-09-27 DIAGNOSIS — E11.42 TYPE 2 DIABETES MELLITUS WITH DIABETIC POLYNEUROPATHY, WITHOUT LONG-TERM CURRENT USE OF INSULIN: ICD-10-CM

## 2024-09-27 DIAGNOSIS — R10.84 GENERALIZED ABDOMINAL PAIN: ICD-10-CM

## 2024-09-27 DIAGNOSIS — I10 ESSENTIAL HYPERTENSION: ICD-10-CM

## 2024-09-27 DIAGNOSIS — Z09 HOSPITAL DISCHARGE FOLLOW-UP: ICD-10-CM

## 2024-09-27 PROCEDURE — 99999 PR PBB SHADOW E&M-EST. PATIENT-LVL V: CPT | Mod: PBBFAC,,, | Performed by: NURSE PRACTITIONER

## 2024-09-27 RX ORDER — CLONIDINE HYDROCHLORIDE 0.1 MG/1
TABLET ORAL
Qty: 30 TABLET | Refills: 1 | Status: SHIPPED | OUTPATIENT
Start: 2024-09-27

## 2024-09-27 NOTE — PROGRESS NOTES
THIS DOCUMENT WAS MADE IN PART WITH VOICE RECOGNITION SOFTWARE.  OCCASIONALLY THIS SOFTWARE WILL MISINTERPRET WORDS OR PHRASES.     Assessment and Plan:    Constipation, unspecified constipation type  Comments:  Improving  Continue MiraLax daily  Increase hydration  May try Colace as needed    Hospital discharge follow-up    Gastroesophageal reflux disease without esophagitis  Comments:  Improving  Continue omeprazole and Pepcid  Work on diet modification    Type 2 diabetes mellitus with diabetic polyneuropathy, without long-term current use of insulin  Comments:  Controlled  Continue regimen  Monitor blood sugar    Generalized abdominal pain  Comments:  Resolved    Essential hypertension  Comments:  Elevated today- did not take medication  Advised to take medication as prescribed  Continue regimen  Keep follow up with cardiologist  Orders:  -     cloNIDine (CATAPRES) 0.1 MG tablet; Take one tablet by mouth ONLY IF blood pressure is greater than or equal to 180/110.  Dispense: 30 tablet; Refill: 1             Visit summary:  Hospital course and diagnostic studies reviewed in detail with patient during today's visit.    Patient advised to keep follow up with specialists.    Advised on diagnosis, medications and symptomatic treatment.    Emergent conditions/ER precautions discussed.    Follow up in about 3 months (around 12/27/2024) for Follow-up with PCP.   ______________________________________________________________________  Subjective:    Chief Complaint:  Hospital follow up    HPI:  Nadia is a 74 y.o. year old female with a past medical history noted below, here for hospital follow up.  Patient was admitted to [facility] on September 22nd after presenting to the ER complaining of abdominal pain, shortness of breath and epigastric pain.   Large amount of stool noted on CT  Added miralax daily- helping.  Patient reports she feels much better.  No longer having abdominal pain.  Last bowel movement this morning- reports  normal.    Blood pressure elevated today- patient admits she did not take medications morning, requesting refill on clonidine prescription- states she never received.  Takes as needed.      Working with home health- once a week.     F/u with cardiologist on 10/1/24        See hospital course below:    Hospital Course:   Patient admitted with complaints of abdominal pain. CT scan with possible enteritis and and noted stool burden. Per patient BM day prior to admit. Diet slowly advanced but patient with continued symptoms. Patient diet advanced. Patient had BM with resolution of symptoms. Patient eventually discharged home with close follow up.               COPD  Rx-Breo     Paroxysmal atrial fibrillation  History of, maintains normal sinus rhythm with occasional PACs   Rx-dual antiplatelet therapy, beta-blocker     Combined systolic, diastolic congestive heart failure  Previous echocardiogram 05/26/2022-grade 2 diastolic dysfunction, ejection fraction 40%  Cardiology- MD Mary   Rx-Aldactone 25 mg, beta-blocker, Entresto, Lasix 20 mg      Valvular heart disease  05/26/2022-echocardiogram shows moderate aortic regurgitation, tricuspid regurgitation, pulmonic regurgitation, mitral regurgitation     Coronary artery disease status post CABG x5 (5/29/2020), dyslipidemia  MAGGIE :  X1 placed late 2023  Cardiology- MD Mary   Rx-aspirin 81 mg, atorvastatin 10 mg, Plavix 75 mg  Negative nuclear stress test 04/2023  Previous lipid panel acceptable     Essential hypertension  Rx-Entresto, metoprolol 50 mg, spironolactone 25 mg, Hydralazine 25mg,  clonidine 0.1mg prn     Bilateral carotid artery stenosis  Currently on statin  05/27/2020-carotid ultrasound shows less than 50% stenosis right carotid artery, left internal carotid artery with 50-69% stenosis     Type 2 diabetes mellitus with associated polyneuropathy  Previous A1c-6.3%  Rx-metformin extended release 1000 mg by mouth daily, Novolog  sliding scale     GERD  Rx :  pantoprazole      Nicotine dependence  Prev rx : Patches (didn't work)     Chronic back pain   Rx-tramadol                 Medications:  Current Outpatient Medications on File Prior to Visit   Medication Sig Dispense Refill    amiodarone (PACERONE) 200 MG Tab Take 1 tablet (200 mg total) by mouth 3 (three) times daily. 90 tablet 0    aspirin (ECOTRIN) 81 MG EC tablet Take 1 tablet (81 mg total) by mouth once daily. 30 tablet 0    atorvastatin (LIPITOR) 10 MG tablet Take 1 tablet (10 mg total) by mouth every evening. 90 tablet 3    azelastine (ASTELIN) 137 mcg (0.1 %) nasal spray 1 spray (137 mcg total) by Nasal route 2 (two) times daily. 30 mL 5    blood sugar diagnostic Strp To check BG 3 times daily, to use with insurance preferred meter 100 each 11    blood-glucose meter kit To check BG 3 times daily, to use with insurance preferred meter 1 each 0    clopidogreL (PLAVIX) 75 mg tablet TAKE 1 TABLET EVERY DAY (Patient taking differently: Take 75 mg by mouth once daily.) 90 tablet 3    esomeprazole (NEXIUM) 40 MG capsule Take 1 capsule (40 mg total) by mouth 2 (two) times daily before meals. Take on empty stomach 60 capsule 1    famotidine (PEPCID) 40 MG tablet Take 1 tablet (40 mg total) by mouth 2 (two) times daily. 60 tablet 1    ferrous sulfate (FEOSOL) 325 mg (65 mg iron) Tab tablet Take 1 tablet (325 mg total) by mouth daily with breakfast. 30 tablet 1    fluticasone-umeclidin-vilanter (TRELEGY ELLIPTA) 100-62.5-25 mcg DsDv Inhale 1 puff into the lungs once daily. 90 each 3    hydrALAZINE (APRESOLINE) 25 MG tablet Take 25 mg by mouth 3 (three) times daily.      insulin aspart U-100 (NOVOLOG FLEXPEN U-100 INSULIN) 100 unit/mL (3 mL) InPn pen Administer per sliding scale three times daily before meals, max daily dose 20 units (Patient taking differently: Inject 2-4 Units into the skin before meals as needed (high blood sugar via sliding scale). Administer per sliding scale three times daily before meals, max  "daily dose 20 units  No insulin for BS reading 139 and below  Go to ER for BS reading of 250 and above per pt's daughter) 3 mL 11    lancets Misc To check BG 3 times daily, to use with insurance preferred meter 100 each 11    latanoprost 0.005 % ophthalmic solution Place 1 drop into both eyes every evening.      metFORMIN (GLUCOPHAGE-XR) 500 MG ER 24hr tablet TAKE 2 TABLETS ONE TIME DAILY (Patient taking differently: Take 500 mg by mouth 2 (two) times daily with meals.) 180 tablet 1    metoprolol succinate (TOPROL-XL) 25 MG 24 hr tablet Take 3 tablets (75 mg total) by mouth once daily. 90 tablet 0    NIFEdipine (PROCARDIA-XL) 60 MG (OSM) 24 hr tablet Take 1 tablet (60 mg total) by mouth once daily. 30 tablet 11    pen needle, diabetic (BD ULTRA-FINE PARISH PEN NEEDLE) 32 gauge x 5/32" Ndle Uses 4 daily, on multiple daily insulin injections 150 each 12    polyethylene glycol (MIRALAX) 17 gram PwPk Take 17 g by mouth once daily. for 15 doses 15 each 0    sacubitriL-valsartan (ENTRESTO)  mg per tablet Take 1 tablet by mouth 2 (two) times daily. 180 tablet 3    spironolactone (ALDACTONE) 25 MG tablet TAKE 1 TABLET EVERY DAY 90 tablet 3    sucralfate (CARAFATE) 1 gram tablet Take 1 tablet (1 g total) by mouth 4 (four) times daily before meals and nightly. 30 tablet 0    timolol maleate 0.5% (TIMOPTIC) 0.5 % Drop Place 1 drop into the left eye 2 (two) times daily.      [DISCONTINUED] cloNIDine (CATAPRES) 0.1 MG tablet Take one tablet by mouth ONLY IF blood pressure is greater than or equal to 180/110.      cyanocobalamin (VITAMIN B-12) 1000 MCG tablet Take 1 tablet (1,000 mcg total) by mouth once daily. (Patient not taking: Reported on 9/27/2024) 30 tablet 1    magnesium oxide (MAG-OX) 400 mg (241.3 mg magnesium) tablet Take 1 tablet (400 mg total) by mouth once daily. (Patient not taking: Reported on 9/27/2024) 30 tablet 0    potassium chloride SA (K-DUR,KLOR-CON) 20 MEQ tablet Take 1 tablet (20 mEq total) by mouth " once daily. (Patient not taking: Reported on 9/27/2024) 7 tablet 0     No current facility-administered medications on file prior to visit.       Review of Systems:  Review of Systems   Constitutional:  Negative for fatigue, fever and unexpected weight change.   HENT:  Negative for congestion, postnasal drip and rhinorrhea.    Respiratory:  Negative for cough and shortness of breath.    Cardiovascular:  Negative for chest pain and leg swelling.   Gastrointestinal:  Negative for abdominal distention, constipation, diarrhea, nausea and vomiting.   Genitourinary:  Negative for difficulty urinating and dysuria.   Musculoskeletal:  Negative for arthralgias and back pain.   Neurological:  Negative for dizziness and headaches.       Past Medical History:  Past Medical History:   Diagnosis Date    Anemia     Biliary obstruction     Cholangitis     Diabetes mellitus     pt denies, does not take meds 11/2016    EtOH dependence     GERD (gastroesophageal reflux disease)     HTN (hypertension) 12/30/2013       Objective:    Vitals:  Vitals:    09/27/24 0936 09/27/24 0949   BP: (!) 168/76 (!) 155/77   Pulse: 82    Resp: 18    SpO2: 99%    Weight: 44.3 kg (97 lb 8.9 oz)    PainSc: 0-No pain        Physical Exam  Vitals and nursing note reviewed.   Constitutional:       General: She is not in acute distress.  HENT:      Head: Normocephalic and atraumatic.      Mouth/Throat:      Mouth: Mucous membranes are moist.      Pharynx: Oropharynx is clear.   Eyes:      General: No scleral icterus.     Conjunctiva/sclera: Conjunctivae normal.   Cardiovascular:      Rate and Rhythm: Normal rate and regular rhythm.   Pulmonary:      Effort: Pulmonary effort is normal. No respiratory distress.      Breath sounds: Normal breath sounds.   Abdominal:      General: Bowel sounds are normal. There is no distension.      Palpations: Abdomen is soft.   Musculoskeletal:      Cervical back: Neck supple.      Right lower leg: No edema.      Left lower  leg: No edema.   Lymphadenopathy:      Cervical: No cervical adenopathy.   Skin:     General: Skin is warm and dry.   Neurological:      Mental Status: She is alert and oriented to person, place, and time.   Psychiatric:         Mood and Affect: Mood normal.         Behavior: Behavior normal.         Thought Content: Thought content normal.         Data:       Medical history reviewed, Medications reconciled.        I spent a total of 30 minutes on the day of the visit.  This includes face to face time and non-face to face time preparing to see the patient (eg, review of tests), obtaining and/or reviewing separately obtained history, documenting clinical information in the electronic or other health record, independently interpreting results and communicating results to the patient/family/caregiver, or care coordinator.       BRENNA Chand-C  Family Medicine

## 2024-09-30 PROBLEM — I25.10 CORONARY ARTERY DISEASE: Status: RESOLVED | Noted: 2020-05-23 | Resolved: 2024-09-30

## 2024-09-30 PROBLEM — I25.119 ATHSCL HEART DISEASE OF NATIVE COR ART W UNSP ANG PCTRS: Status: RESOLVED | Noted: 2022-05-30 | Resolved: 2024-09-30

## 2024-09-30 PROBLEM — R07.89 OTHER CHEST PAIN: Status: RESOLVED | Noted: 2024-02-20 | Resolved: 2024-09-30

## 2024-10-01 ENCOUNTER — OFFICE VISIT (OUTPATIENT)
Dept: CARDIOLOGY | Facility: CLINIC | Age: 74
End: 2024-10-01
Payer: MEDICARE

## 2024-10-01 VITALS
HEART RATE: 65 BPM | WEIGHT: 96.56 LBS | HEIGHT: 61 IN | SYSTOLIC BLOOD PRESSURE: 182 MMHG | DIASTOLIC BLOOD PRESSURE: 75 MMHG | BODY MASS INDEX: 18.23 KG/M2

## 2024-10-01 DIAGNOSIS — I48.0 PAF (PAROXYSMAL ATRIAL FIBRILLATION): ICD-10-CM

## 2024-10-01 DIAGNOSIS — I65.23 BILATERAL CAROTID ARTERY STENOSIS: ICD-10-CM

## 2024-10-01 DIAGNOSIS — I25.10 CORONARY ARTERY DISEASE, UNSPECIFIED VESSEL OR LESION TYPE, UNSPECIFIED WHETHER ANGINA PRESENT, UNSPECIFIED WHETHER NATIVE OR TRANSPLANTED HEART: Chronic | ICD-10-CM

## 2024-10-01 DIAGNOSIS — I10 HYPERTENSION, UNSPECIFIED TYPE: ICD-10-CM

## 2024-10-01 DIAGNOSIS — Z95.1 S/P CABG (CORONARY ARTERY BYPASS GRAFT): ICD-10-CM

## 2024-10-01 DIAGNOSIS — I70.1 RENAL ARTERY STENOSIS: ICD-10-CM

## 2024-10-01 DIAGNOSIS — I38 VALVULAR HEART DISEASE: Chronic | ICD-10-CM

## 2024-10-01 DIAGNOSIS — I50.42 CHRONIC COMBINED SYSTOLIC AND DIASTOLIC HEART FAILURE: Primary | ICD-10-CM

## 2024-10-01 PROCEDURE — 3078F DIAST BP <80 MM HG: CPT | Mod: CPTII,S$GLB,,

## 2024-10-01 PROCEDURE — 99999 PR PBB SHADOW E&M-EST. PATIENT-LVL IV: CPT | Mod: PBBFAC,,,

## 2024-10-01 PROCEDURE — 1159F MED LIST DOCD IN RCRD: CPT | Mod: CPTII,S$GLB,,

## 2024-10-01 PROCEDURE — 99214 OFFICE O/P EST MOD 30 MIN: CPT | Mod: S$GLB,,,

## 2024-10-01 PROCEDURE — 3066F NEPHROPATHY DOC TX: CPT | Mod: CPTII,S$GLB,,

## 2024-10-01 PROCEDURE — 3008F BODY MASS INDEX DOCD: CPT | Mod: CPTII,S$GLB,,

## 2024-10-01 PROCEDURE — 3288F FALL RISK ASSESSMENT DOCD: CPT | Mod: CPTII,S$GLB,,

## 2024-10-01 PROCEDURE — 1126F AMNT PAIN NOTED NONE PRSNT: CPT | Mod: CPTII,S$GLB,,

## 2024-10-01 PROCEDURE — 3077F SYST BP >= 140 MM HG: CPT | Mod: CPTII,S$GLB,,

## 2024-10-01 PROCEDURE — 3044F HG A1C LEVEL LT 7.0%: CPT | Mod: CPTII,S$GLB,,

## 2024-10-01 PROCEDURE — 3061F NEG MICROALBUMINURIA REV: CPT | Mod: CPTII,S$GLB,,

## 2024-10-01 PROCEDURE — 4010F ACE/ARB THERAPY RXD/TAKEN: CPT | Mod: CPTII,S$GLB,,

## 2024-10-01 PROCEDURE — 1101F PT FALLS ASSESS-DOCD LE1/YR: CPT | Mod: CPTII,S$GLB,,

## 2024-10-01 NOTE — PROGRESS NOTES
Subjective:    Patient ID:  Nadia Irene is a 74 y.o. female patient here for evaluation Aortic atherosclerosis    History of Present Illness:     Nadia Irene is a 73 y/o female who follows with Dr. Hernandez here today for hospital follow up, accompanied by daughter. Last seen in clinic 8/13/2024. She was hospitalized last week (9/22-24/2024)with complaints of chest pain, SOB, and abdominal pain. Imaging revealed moderate stool burden, which was successfully treated, discharged in stable condition. She reports intermittent midsternal chest discomfort (described as ants?) and dizziness (chronic), occasional palpitations unrelated, but denies SOB/IBRAHIM, fatigue, weakness, leg swelling.     /75 today. Manual recheck 172/70. She reports not taking her BP medications this morning because she forgot about her appointment and was running late.     Focused Past History includes:  Chronic combined systolic and diastolic heart failure; Valvular heart disease  TTE 5/2022: LVEF 40%, mild LVDD. Normal RV. Severe LAE. Moderate AR. Mild to moderate TR. Mild to moderate DE. Moderate MR. No PH.   TTE 2/2024: LVEF 60-65%, no diastolic dysfunction. Normal RV. Mild AS (MARVIN 1.66, Vmax 2.17, MG 9, DI 0.58). Mild AR. No PH.   TTE 4/2024 (in the setting of frequent VT runs): LVEF 50-55%, mild LVDD. Normal RV. No AS. Mild-moderate MR. Mild-moderate AR. Mild TR. No PH.   CAD s/p CABG, stent  CABG x5 in 2020 (LIMA to LAD, SVG to 2nd posterolateral, SVG to OM3, SVG to OM2 with a Y configuration to OM3 graft, SVG to diagonal)  Angiogram 11/2023 (report): Rotational atherectomy PTCA/stenting to mid LAD. Occluded JEFF to the LAD.   Regadenoson stress SPECT MPI 4/7/2024: Negative for ischemia  Angiogram 4/10/2024: Patent grafts and stent. RCA with 70% proximal stenosis and 50-60% distal stenosis. Otherwise, nonobstructive CAD.   Renal artery stenosis, left  Renal US 7/2024: 80-99% stenosis of left renal artery. Insignificant  stenosis in right renal artery. Intrinsic kidney disease.   NSVT  Noted during hospitalization in 4/2024  TTE 4/2024: LVEF 50-55%, mild LVDD. Normal RV. No AS. Mild-moderate MR. Mild-moderate AR. Mild TR.   30-day Cardiac event monitor 5/2024: Baseline rhythm SR. Rare NSVT. 1% PAC burden. 3% PVC burden. No AF.   PAF  SR on multiple recent EKGs  Not on AC due to UGIB requiring blood transfusion  4/2024: Endoscopy revealed angioectasias in stomach, treated with cauterization  Carotid artery stenosis  Carotid US 6/2024: Bilateral plaque with <50% stenosis bilaterally.   Hypertension  Type 2 DM  Former tobacco use, quit in 4/2024 (20 pack-years)  ETOH abuse -- reportedly sober since 2022            Most Recent Echocardiogram Results  Results for orders placed during the hospital encounter of 02/20/24    Echo Saline Bubble? No    Interpretation Summary    Left Ventricle: There is mild concentric hypertrophy. There is normal systolic function with a visually estimated ejection fraction of 60 - 65%. There is normal diastolic function.    Right Ventricle: Normal right ventricular cavity size. Wall thickness is normal. Right ventricle wall motion  is normal. Systolic function is normal.    Aortic Valve: There is mild stenosis. Aortic valve area by VTI is 1.66 cm². Aortic valve peak velocity is 2.17 m/s. Mean gradient is 9 mmHg. The dimensionless index is 0.58. There is mild aortic regurgitation.    Pulmonary Artery: The estimated pulmonary artery systolic pressure is 30 mmHg.    IVC/SVC: Intermediate venous pressure at 8 mmHg.      Most Recent Nuclear Stress Test Results  Results for orders placed during the hospital encounter of 04/18/23    Nuclear Stress - Cardiology Interpreted    Interpretation Summary    Normal myocardial perfusion scan. There is no evidence of myocardial ischemia or infarction.    The gated perfusion images showed an ejection fraction of 55% post stress.    There is normal wall motion post stress.     LV cavity size is normal at rest and normal at stress.    The ECG portion of the study is abnormal but not diagnostic.    The patient reported chest pain during the stress test.    During stress, occasional PVCs are noted.      Most Recent Cardiac PET Stress Test Results  No results found for this or any previous visit.      Most Recent Cardiovascular Angiogram results  Results for orders placed during the hospital encounter of 05/19/20    Cardiac catheterization    Conclusion  · Three vessel coronary artery disease.  · Mid LAD lesion , 85% stenosed.  · Ost 2nd Diag lesion , 80% stenosed.  · Dist Cx lesion , 90% stenosed.  · Ost 3rd Mrg lesion , 90% stenosed.  · RPDA lesion , 80% stenosed.  · LV end diastolic pressure is severely elevated.    I certify that I was present for catheter insertion, catheter manipulation, angiography, and angiographic interpretation of this patient.    Procedure Log documented by Documenter: Camille Faith RN and verified by Heather Carbajal MD.    Date: 5/22/2020  Time: 1:10 PM      Other Most Recent Cardiology Results  Results for orders placed during the hospital encounter of 09/22/24    Cardiac monitoring strips      REVIEW OF SYSTEMS: As noted in HPI   CARDIOVASCULAR: Chest pain, palpitations. No arm/neck/jaw pain or edema.  RESPIRATORY: No recent fever, cough, SOB.  : No blood in the urine  GI: No reflux, nausea, vomiting, or blood in stool.   MUSCULOSKELETAL: No falls.   NEURO: Dizziness (chronic). No headaches, syncope.   EYES: No sudden changes in vision.     Past Medical History:   Diagnosis Date    Anemia     Biliary obstruction     Cholangitis     Diabetes mellitus     pt denies, does not take meds 11/2016    EtOH dependence     GERD (gastroesophageal reflux disease)     HTN (hypertension) 12/30/2013     Past Surgical History:   Procedure Laterality Date    ARTERIOGRAPHY OF SUBCLAVIAN ARTERY  05/22/2020    Procedure: Arteriogram, Subclavian;  Surgeon: Heather Carbajal MD;   Location: [facility] CATH;  Service: Cardiology;;    COLONOSCOPY      COLONOSCOPY N/A 02/23/2024    Procedure: COLONOSCOPY;  Surgeon: Murphy Maguire Jr., MD;  Location: [facility] ENDO;  Service: Endoscopy;  Laterality: N/A;    CORONARY ANGIOGRAPHY N/A 05/22/2020    Procedure: ANGIOGRAM, CORONARY ARTERY;  Surgeon: Heather Carbajal MD;  Location: [facility] CATH;  Service: Cardiology;  Laterality: N/A;    CORONARY ARTERY BYPASS GRAFT (CABG) N/A 05/29/2020    Procedure: CORONARY ARTERY BYPASS GRAFT (CABG) x 5 VESSELS;  Surgeon: Dima Laughlin MD;  Location: [facility] OR;  Service: Cardiovascular;  Laterality: N/A;    ENDOSCOPIC HARVEST OF VEIN N/A 05/29/2020    Procedure: SURGICAL PROCUREMENT, VEIN, ENDOSCOPIC;  Surgeon: Dima Laughlin MD;  Location: [facility] OR;  Service: Cardiovascular;  Laterality: N/A;    ENDOSCOPIC ULTRASOUND OF UPPER GASTROINTESTINAL TRACT N/A 04/24/2024    Procedure: ULTRASOUND, UPPER GI TRACT, ENDOSCOPIC;  Surgeon: Holden Aguirre MD;  Location: [facility];  Service: Endoscopy;  Laterality: N/A;    ERCP W/ PLASTIC STENT PLACEMENT      ERCP W/ SPHICTEROTOMY      ESOPHAGOGASTRODUODENOSCOPY      ESOPHAGOGASTRODUODENOSCOPY N/A 02/23/2024    Procedure: ESOPHAGOGASTRODUODENOSCOPY (EGD);  Surgeon: Murphy Maguire Jr., MD;  Location: [facility] ENDO;  Service: Endoscopy;  Laterality: N/A;    ESOPHAGOGASTRODUODENOSCOPY N/A 04/24/2024    Procedure: EGD (ESOPHAGOGASTRODUODENOSCOPY);  Surgeon: Holden Aguirre MD;  Location: [facility] ENDO;  Service: Endoscopy;  Laterality: N/A;    HYSTERECTOMY      INSERTION OF INTRA-AORTIC BALLOON ASSIST DEVICE N/A 05/29/2020    Procedure: INSERTION, INTRA-AORTIC BALLOON PUMP;  Surgeon: Dima Laughlin MD;  Location: [facility] OR;  Service: Cardiovascular;  Laterality: N/A;    LEFT HEART CATHETERIZATION Left 05/22/2020    Procedure: Left heart cath;  Surgeon: Heather Carbajal MD;  Location: [facility] CATH;  Service: Cardiology;  Laterality: Left;    SMALL BOWEL ENTEROSCOPY N/A 03/06/2024    Procedure: ENTEROSCOPY;   Surgeon: Bria Hand MD;  Location: [facility] ENDO;  Service: Endoscopy;  Laterality: N/A;    SMALL BOWEL ENTEROSCOPY N/A 2024    Procedure: ENTEROSCOPY;  Surgeon: Charles Hawthorne MD;  Location: [facility] ENDO;  Service: Endoscopy;  Laterality: N/A;     Social History     Tobacco Use    Smoking status: Former     Current packs/day: 0.00     Average packs/day: 0.5 packs/day for 40.0 years (20.0 ttl pk-yrs)     Types: Cigarettes     Start date: 1980     Quit date: 2020     Years since quittin.3    Smokeless tobacco: Never   Substance Use Topics    Alcohol use: Not Currently     Comment: sober 2 years 3 months    Drug use: No         Objective      Vitals:    10/01/24 0921   BP: (!) 182/75   Pulse: 65       LAST EKG  Results for orders placed or performed during the hospital encounter of 24   EKG 12-lead    Collection Time: 24 12:26 PM   Result Value Ref Range    QRS Duration 72 ms    [facility] QTC Calculation 449 ms    Narrative    Test Reason : R07.9,    Vent. Rate : 095 BPM     Atrial Rate : 095 BPM     P-R Int : 112 ms          QRS Dur : 072 ms      QT Int : 358 ms       P-R-T Axes : 010 -20 085 degrees     QTc Int : 449 ms    Sinus rhythm with Premature atrial complexes  Anterior infarct (cited on or before 2024)  Abnormal ECG  When compared with ECG of 22-SEP-2024 19:30,  T wave inversion no longer evident in Anterior leads  Confirmed by Jonh HOWARD, Regan LEGGETT ([id number]) on 2024 6:07:50 PM    Referred By: [email or web address]   SELF           Confirmed By:Regan Borja MD     LIPIDS - LAST 2   Lab Results   Component Value Date    CHOL 121 2024    CHOL 135 2024    HDL 47 2024    HDL 57 2024    LDLCALC 62.2 (L) 2024    LDLCALC 68.6 2024    TRIG 59 2024    TRIG 47 2024    CHOLHDL 38.8 2024    CHOLHDL 42.2 2024     CARDIAC PROFILE - LAST 2  Lab Results   Component Value Date     (H) 10/26/2022    BNP 1,114 (H) 2022    CPK  81 05/28/2024     05/20/2020    TROPONINI <0.012 09/22/2024    TROPONINI <0.012 09/22/2024      CBC - LAST 2  Lab Results   Component Value Date    WBC 2.74 (L) 09/24/2024    WBC 3.22 (L) 09/23/2024    HGB 9.1 (L) 09/24/2024    HGB 8.7 (L) 09/23/2024    HCT 29.0 (L) 09/24/2024    HCT 27.8 (L) 09/23/2024     09/24/2024     09/23/2024     Lab Results   Component Value Date    LABPT 13.8 07/29/2024    LABPT 19.9 (H) 05/29/2020    INR 1.1 07/29/2024    INR 1.0 03/05/2024    APTT 25.8 07/29/2024    APTT 22.2 03/05/2024     CHEMISTRY - LAST 2  Lab Results   Component Value Date     09/24/2024     09/23/2024    K 3.8 09/24/2024    K 3.1 (L) 09/23/2024    CO2 25 09/24/2024    CO2 26 09/23/2024    BUN 10 09/24/2024    BUN 11 09/23/2024    CREATININE 0.62 09/24/2024    CREATININE 0.70 09/23/2024     (H) 09/24/2024    GLU 95 09/23/2024    CALCIUM 9.1 09/24/2024    CALCIUM 9.1 09/23/2024    PH 7.402 05/26/2022    PH 7.45 06/02/2020    MG 1.6 09/07/2024    MG 2.1 08/01/2024    ALBUMIN 4.1 09/22/2024    ALBUMIN 4.4 09/07/2024    ALT 30 09/22/2024    ALT 26 09/07/2024    AST 43 (H) 09/22/2024    AST 39 (H) 09/07/2024      ENDOCRINE - LAST 2  Lab Results   Component Value Date    HGBA1C 6.3 (H) 09/23/2024    HGBA1C 6.2 (H) 06/17/2024    TSH 0.719 02/21/2024    TSH 2.000 06/04/2020        PHYSICAL EXAM  CONSTITUTIONAL: Well built, well nourished in no apparent distress  NECK: no carotid bruit, no JVD  LUNGS: CTA  CHEST WALL: no tenderness  HEART: regular rate and rhythm, S1, S2 normal, no murmur, click, rub or gallop   ABDOMEN: soft, non-tender; bowel sounds normal; no masses,  no organomegaly  EXTREMITIES: Extremities normal, no edema, no calf tenderness noted  NEURO: AAO X 3    I HAVE REVIEWED :    The vital signs, most recent cardiac testing, and most recent pertinent non-cardiology provider notes.    Current Outpatient Medications   Medication Instructions    amiodarone (PACERONE) 200 mg,  "Oral, 3 times daily    aspirin (ECOTRIN) 81 mg, Oral, Daily    atorvastatin (LIPITOR) 10 mg, Oral, Nightly    azelastine (ASTELIN) 137 mcg, Nasal, 2 times daily    blood sugar diagnostic Strp To check BG 3 times daily, to use with insurance preferred meter    blood-glucose meter kit To check BG 3 times daily, to use with insurance preferred meter    cloNIDine (CATAPRES) 0.1 MG tablet Take one tablet by mouth ONLY IF blood pressure is greater than or equal to 180/110.    clopidogreL (PLAVIX) 75 mg tablet TAKE 1 TABLET EVERY DAY    cyanocobalamin (VITAMIN B-12) 1,000 mcg, Oral, Daily    esomeprazole (NEXIUM) 40 mg, Oral, 2 times daily before meals, Take on empty stomach    famotidine (PEPCID) 40 mg, Oral, 2 times daily    ferrous sulfate (FEOSOL) 325 mg, Oral, With breakfast    fluticasone-umeclidin-vilanter (TRELEGY ELLIPTA) 100-62.5-25 mcg DsDv 1 puff, Inhalation, Daily    hydrALAZINE (APRESOLINE) 25 mg, 3 times daily    insulin aspart U-100 (NOVOLOG FLEXPEN U-100 INSULIN) 100 unit/mL (3 mL) InPn pen Administer per sliding scale three times daily before meals, max daily dose 20 units    lancets Misc To check BG 3 times daily, to use with insurance preferred meter    latanoprost 0.005 % ophthalmic solution 1 drop, Nightly    magnesium oxide (MAG-OX) 400 mg, Oral, Daily    metFORMIN (GLUCOPHAGE-XR) 500 MG ER 24hr tablet TAKE 2 TABLETS ONE TIME DAILY    metoprolol succinate (TOPROL-XL) 75 mg, Oral, Daily    NIFEdipine (PROCARDIA-XL) 60 mg, Oral, Daily    pen needle, diabetic (BD ULTRA-FINE PARISH PEN NEEDLE) 32 gauge x 5/32" Ndle Uses 4 daily, on multiple daily insulin injections    polyethylene glycol (MIRALAX) 17 g, Oral, Daily    potassium chloride SA (K-DUR,KLOR-CON) 20 MEQ tablet 20 mEq, Oral, Daily    sacubitriL-valsartan (ENTRESTO)  mg per tablet 1 tablet, Oral, 2 times daily    spironolactone (ALDACTONE) 25 mg, Oral    sucralfate (CARAFATE) 1 g, Oral, Before meals & nightly    timolol maleate 0.5% " (TIMOPTIC) 0.5 % Drop 1 drop, 2 times daily        Assessment & Plan   1. Chronic combined systolic and diastolic heart failure (Primary)      2. Coronary artery disease, unspecified vessel or lesion type, unspecified whether angina present, unspecified whether native or transplanted heart      3. S/P CABG (coronary artery bypass graft)      4. Renal artery stenosis      5. Valvular heart disease      6. Hypertension, unspecified type      7. PAF (paroxysmal atrial fibrillation)      8. Bilateral carotid artery stenosis       PLAN:     Taper amiodarone to 200 mg BID for 2 weeks THEN 100 mg daily  CMP, TSH in 1 month (assess for hepatic, thyroid effects of amiodarone)    Continue aspirin 81 mg daily  Continue clopidogrel 75 mg daily  Continue atorvastatin 10 mg daily  Continue hydralazine 25 mg TID  Continue metoprolol succinate 75 mg daily  Continue nifedipine 60 mg daily  Continue sacubitril-valsartan  mg BID  Continue spironolactone 25 mg daily  Continue clonidine 0.1 mg daily PRN BP >180/110    Emphasized the importance of modifying lifestyle related risk factors including tobacco avoidance, limiting alcohol intake, aerobic exercise, weight management and Mediterranean diet.       Follow up in about 6 months (around 4/1/2025).     Corey Darvill, NP [facility]   Office: [phone]

## 2024-10-02 ENCOUNTER — PATIENT MESSAGE (OUTPATIENT)
Dept: CARDIOLOGY | Facility: CLINIC | Age: 74
End: 2024-10-02
Payer: MEDICARE

## 2024-10-02 RX ORDER — AMIODARONE HYDROCHLORIDE 200 MG/1
200 TABLET ORAL 2 TIMES DAILY
Qty: 28 TABLET | Refills: 0 | Status: SHIPPED | OUTPATIENT
Start: 2024-10-02 | End: 2024-10-16

## 2024-10-02 RX ORDER — AMIODARONE HYDROCHLORIDE 200 MG/1
100 TABLET ORAL DAILY
Qty: 15 TABLET | Refills: 11 | Status: SHIPPED | OUTPATIENT
Start: 2024-10-17 | End: 2025-10-17

## 2024-10-14 ENCOUNTER — LAB VISIT (OUTPATIENT)
Dept: LAB | Facility: HOSPITAL | Age: 74
End: 2024-10-14
Payer: MEDICARE

## 2024-10-14 DIAGNOSIS — I50.42 CHRONIC COMBINED SYSTOLIC AND DIASTOLIC HEART FAILURE: ICD-10-CM

## 2024-10-14 DIAGNOSIS — I48.0 PAF (PAROXYSMAL ATRIAL FIBRILLATION): ICD-10-CM

## 2024-10-14 LAB
ALBUMIN SERPL BCP-MCNC: 3.5 G/DL (ref 3.5–5.2)
ALP SERPL-CCNC: 78 U/L (ref 55–135)
ALT SERPL W/O P-5'-P-CCNC: 13 U/L (ref 10–44)
ANION GAP SERPL CALC-SCNC: 9 MMOL/L (ref 8–16)
AST SERPL-CCNC: 20 U/L (ref 10–40)
BILIRUB SERPL-MCNC: 0.3 MG/DL (ref 0.1–1)
BUN SERPL-MCNC: 14 MG/DL (ref 8–23)
CALCIUM SERPL-MCNC: 9.4 MG/DL (ref 8.7–10.5)
CHLORIDE SERPL-SCNC: 108 MMOL/L (ref 95–110)
CO2 SERPL-SCNC: 24 MMOL/L (ref 23–29)
CREAT SERPL-MCNC: 1 MG/DL (ref 0.5–1.4)
EST. GFR  (NO RACE VARIABLE): 59.9 ML/MIN/1.73 M^2
GLUCOSE SERPL-MCNC: 123 MG/DL (ref 70–110)
POTASSIUM SERPL-SCNC: 4 MMOL/L (ref 3.5–5.1)
PROT SERPL-MCNC: 6.9 G/DL (ref 6–8.4)
SODIUM SERPL-SCNC: 141 MMOL/L (ref 136–145)
TSH SERPL DL<=0.005 MIU/L-ACNC: 0.86 UIU/ML (ref 0.4–4)

## 2024-10-14 PROCEDURE — 80053 COMPREHEN METABOLIC PANEL: CPT

## 2024-10-14 PROCEDURE — 84443 ASSAY THYROID STIM HORMONE: CPT

## 2024-10-14 PROCEDURE — 36415 COLL VENOUS BLD VENIPUNCTURE: CPT | Mod: PN

## 2024-10-15 ENCOUNTER — TELEPHONE (OUTPATIENT)
Dept: CARDIOLOGY | Facility: CLINIC | Age: 74
End: 2024-10-15
Payer: MEDICARE

## 2024-10-15 NOTE — TELEPHONE ENCOUNTER
----- Message from Johnny Bates NP sent at 10/15/2024 10:13 AM CDT -----  Contact: [facility]  Please confirm with patient that she is taking amiodarone 200 mg three times per day.  ----- Message -----  From: Christine Mcleod MA  Sent: 10/10/2024   2:04 PM CDT  To: Johnny Bates NP      ----- Message -----  From: Julissa Nelson  Sent: 10/10/2024  12:02 PM CDT  To: Paulo BLANCHARD Staff    Type:  Pharmacy Calling to Clarify an RX    Pharmacy Name:[facility]    Prescription Name: amiodarone (PACERONE) 200 MG Tab    What do they need to clarify?: received 2 scripts on same day with different instructions on each    Best Call Back Number: [phone]    Additional Information:  please call to advise    Thanks

## 2024-10-15 NOTE — TELEPHONE ENCOUNTER
Spoke with patient to inform her that Johnny (NP) will like for her to take the Amiodarone 200 mg two times a day for 2 weeks then to switch to 100 mg a day.

## 2024-10-29 DIAGNOSIS — K21.9 GASTROESOPHAGEAL REFLUX DISEASE WITHOUT ESOPHAGITIS: ICD-10-CM

## 2024-10-30 RX ORDER — FAMOTIDINE 40 MG/1
40 TABLET, FILM COATED ORAL 2 TIMES DAILY
Qty: 180 TABLET | Refills: 3 | Status: SHIPPED | OUTPATIENT
Start: 2024-10-30

## 2024-12-05 ENCOUNTER — PATIENT OUTREACH (OUTPATIENT)
Dept: ADMINISTRATIVE | Facility: HOSPITAL | Age: 74
End: 2024-12-05
Payer: MEDICARE

## 2024-12-05 DIAGNOSIS — Z87.891 PERSONAL HISTORY OF TOBACCO USE, PRESENTING HAZARDS TO HEALTH: ICD-10-CM

## 2024-12-05 DIAGNOSIS — F17.210 CIGARETTE SMOKER: Primary | ICD-10-CM

## 2024-12-05 NOTE — PROGRESS NOTES
Population Health Chart Review & Patient Outreach Details      Additional [facility] Health Notes:               Updates Requested / Reviewed:      Updated Care Coordination Note and Immunizations Reconciliation Completed or Queried: [facility] Topics Overdue:      VB Score: 2     Uncontrolled BP  LDCT Lung Screen    Influenza Vaccine  Tetanus Vaccine  Shingles/Zoster Vaccine  RSV Vaccine                  Health Maintenance Topic(s) Outreach Outcomes & Actions Taken:    Low Dose CT Screening - Outreach Outcomes & Actions Taken  : No answer unable to lm.

## 2024-12-16 DIAGNOSIS — I10 ESSENTIAL HYPERTENSION: ICD-10-CM

## 2024-12-17 RX ORDER — CLONIDINE HYDROCHLORIDE 0.1 MG/1
TABLET ORAL
Qty: 30 TABLET | Refills: 0 | Status: SHIPPED | OUTPATIENT
Start: 2024-12-17 | End: 2024-12-20 | Stop reason: SDUPTHER

## 2024-12-17 NOTE — TELEPHONE ENCOUNTER
Refill Routing Note   Medication(s) are not appropriate for processing by [facility] for the following reason(s):        Outside of protocol  Non-participating provider    [facility] action(s):  Route               Appointments  past 12m or future 3m with PCP    Date Provider   Last Visit   9/27/2024 Rosemary Soto FNP   Next Visit   Visit date not found Rosemary Soto FNP   ED visits in past 90 days: 0        Note composed:6:23 AM 12/17/2024

## 2024-12-20 ENCOUNTER — OFFICE VISIT (OUTPATIENT)
Dept: FAMILY MEDICINE | Facility: CLINIC | Age: 74
End: 2024-12-20
Payer: MEDICARE

## 2024-12-20 VITALS
HEIGHT: 61 IN | DIASTOLIC BLOOD PRESSURE: 68 MMHG | HEART RATE: 60 BPM | BODY MASS INDEX: 19.75 KG/M2 | SYSTOLIC BLOOD PRESSURE: 134 MMHG | WEIGHT: 104.63 LBS | RESPIRATION RATE: 16 BRPM

## 2024-12-20 DIAGNOSIS — I10 ESSENTIAL HYPERTENSION: ICD-10-CM

## 2024-12-20 DIAGNOSIS — I10 HYPERTENSION, UNSPECIFIED TYPE: Primary | ICD-10-CM

## 2024-12-20 PROCEDURE — 99999 PR PBB SHADOW E&M-EST. PATIENT-LVL IV: CPT | Mod: PBBFAC,,, | Performed by: FAMILY MEDICINE

## 2024-12-20 RX ORDER — CLONIDINE HYDROCHLORIDE 0.1 MG/1
TABLET ORAL
Qty: 60 TABLET | Refills: 3 | Status: SHIPPED | OUTPATIENT
Start: 2024-12-20

## 2024-12-20 NOTE — PROGRESS NOTES
THIS DOCUMENT WAS MADE IN PART WITH VOICE RECOGNITION SOFTWARE.  OCCASIONALLY THIS SOFTWARE WILL MISINTERPRET WORDS OR PHRASES.    Assessment and Plan:    1. Hypertension, unspecified type        2. Essential hypertension  cloNIDine (CATAPRES) 0.1 MG tablet    Elevated today- did not take medication  Advised to take medication as prescribed  Continue regimen  Keep follow up with cardiologist            Assessment & Plan    PLAN SUMMARY:   Follow up in 2 weeks with María for blood pressure check   Contact office if fever, cough, or chest pain occurs   May consider adding Imdur if chest pain persists   Refilled Clonidine with additional prescriptions   Continue current antihypertensive regimen: Entresto, Metoprolol, Spironolactone, Hydralazine, amlodipine, and Clonidine   Follow up on January 30th with Dr. Cohen (cardiologist) as scheduled   Avoid sudden discontinuation of Clonidine    HYPERTENSION:   Explained that elevated blood pressure can cause chest pain.   Discussed genetic factors contributing to hypertension.   Nadia to monitor blood pressure at home and record readings.   Nadia to bring blood pressure cuff to next appointment.   Nadia to avoid sudden discontinuation of Clonidine.   Continued current antihypertensive regimen: Entresto, Metoprolol, Spironolactone, Hydralazine, amlodipine, and Clonidine.   Refilled Clonidine with additional prescriptions to prevent running out.   Contact the office if experiencing chest pain, may consider adding Imdur.    HEART FAILURE:   Informed about potential leg edema as a side effect of amlodipine.   Contact the office if leg edema develops.    INFLUENZA PREVENTION:   Discussed influenza risks.   Contact the office immediately if fever or cough occurs.    FOLLOW-UP:   Follow up in 2 weeks with María for blood pressure check.   Follow up on January 30th with Dr. Cohen (cardiologist) as scheduled.            ______________________________________________________________________  Subjective:    Chief Complaint:  Chief Complaint   Patient presents with    Follow-up        HPI:  Nadia is a 74 y.o. year old       History of Present Illness    CHIEF COMPLAINT:  Nadia presents today for follow-up after recent hospitalization for chest pain and hypertensive crisis.    RECENT HOSPITALIZATION:  She was recently hospitalized for chest pain and hypertensive crisis. Stress test was negative and renal duplex ultrasound was unremarkable. She was discharged after blood pressure improved with the addition of amlodipine.    HYPERTENSION:  She has a history of hypertension managed with six medications: Entresto, Metoprolol, Spironolactone, Hydralazine, Amlodipine, and Clonidine (taken as needed for elevated blood pressure). After discharge, her blood pressure was recorded at 175.    CHEST PAIN:  She denies current chest pain.      ROS:  ROS as indicated in HPI.           COPD  Rx- Trelegy      Paroxysmal atrial fibrillation  History of, maintains normal sinus rhythm with occasional PACs   Rx-dual antiplatelet therapy, beta-blocker     Combined systolic, diastolic congestive heart failure  Previous echocardiogram 05/26/2022-grade 2 diastolic dysfunction, ejection fraction 40%  Cardiology- MD Mary   Rx-Aldactone 25 mg, beta-blocker, Entresto, Lasix 20 mg      Valvular heart disease  05/26/2022-echocardiogram shows moderate aortic regurgitation, tricuspid regurgitation, pulmonic regurgitation, mitral regurgitation     Coronary artery disease status post CABG x5 (5/29/2020), dyslipidemia  MAGGIE :  X1 placed late 2023  Cardiology- MD Mary   Rx-aspirin 81 mg, atorvastatin 10 mg, Plavix 75 mg  Negative nuclear stress test 04/2023  Previous lipid panel acceptable     Essential hypertension  Rx-Entresto, metoprolol 25 mg, spironolactone 25 mg, hydralazine 25 TID, Clonidine 0.1 BID, nifedipine       Bilateral carotid artery  stenosis  Currently on statin  05/27/2020-carotid ultrasound shows less than 50% stenosis right carotid artery, left internal carotid artery with 50-69% stenosis     Type 2 diabetes mellitus with associated polyneuropathy  Previous A1c-6.3%  Rx-metformin extended release 1000 mg by mouth daily, sliding scale insulin, low intensity     GERD  Rx : pantoprazole      Nicotine dependence  Prev rx : Patches (didn't work)     Chronic back pain   Rx-tramadol     Past Medical History:  Past Medical History:   Diagnosis Date    Anemia     Biliary obstruction     Cholangitis     Diabetes mellitus     pt denies, does not take meds 11/2016    EtOH dependence     GERD (gastroesophageal reflux disease)     HTN (hypertension) 12/30/2013       Past Surgical History:  Past Surgical History:   Procedure Laterality Date    ARTERIOGRAPHY OF SUBCLAVIAN ARTERY  05/22/2020    Procedure: Arteriogram, Subclavian;  Surgeon: Heather Carbajal MD;  Location: [facility] CATH;  Service: Cardiology;;    COLONOSCOPY      COLONOSCOPY N/A 02/23/2024    Procedure: COLONOSCOPY;  Surgeon: Murphy Maguire Jr., MD;  Location: [facility] ENDO;  Service: Endoscopy;  Laterality: N/A;    CORONARY ANGIOGRAPHY N/A 05/22/2020    Procedure: ANGIOGRAM, CORONARY ARTERY;  Surgeon: Heather Carbajal MD;  [facility]ATH;  Service: Cardiology;  Laterality: N/A;    CORONARY ARTERY BYPASS GRAFT (CABG) N/A 05/29/2020    Procedure: CORONARY ARTERY BYPASS GRAFT (CABG) x 5 VESSELS;  Surgeon: Dima Laughlin MD;  Location: [facility] OR;  Service: Cardiovascular;  Laterality: N/A;    ENDOSCOPIC HARVEST OF VEIN N/A 05/29/2020    Procedure: SURGICAL PROCUREMENT, VEIN, ENDOSCOPIC;  Surgeon: Dima Laughlin MD;  Location: [facility] OR;  Service: Cardiovascular;  Laterality: N/A;    ENDOSCOPIC ULTRASOUND OF UPPER GASTROINTESTINAL TRACT N/A 04/24/2024    Procedure: ULTRASOUND, UPPER GI TRACT, ENDOSCOPIC;  Surgeon: Holden Aguirre MD;  Location: [facility] ENDO;  Service: Endoscopy;  Laterality: N/A;    ERCP  W/ PLASTIC STENT PLACEMENT      ERCP W/ SPHICTEROTOMY      ESOPHAGOGASTRODUODENOSCOPY      ESOPHAGOGASTRODUODENOSCOPY N/A 2024    Procedure: ESOPHAGOGASTRODUODENOSCOPY (EGD);  Surgeon: Murphy Maguire Jr., MD;  Location: [facility] ENDO;  Service: Endoscopy;  Laterality: N/A;    ESOPHAGOGASTRODUODENOSCOPY N/A 2024    Procedure: EGD (ESOPHAGOGASTRODUODENOSCOPY);  Surgeon: Holden Aguirre MD;  Location: [facility] ENDO;  Service: Endoscopy;  Laterality: N/A;    HYSTERECTOMY      INSERTION OF INTRA-AORTIC BALLOON ASSIST DEVICE N/A 2020    Procedure: INSERTION, INTRA-AORTIC BALLOON PUMP;  Surgeon: Dima Laughlin MD;  Location: [facility] OR;  Service: Cardiovascular;  Laterality: N/A;    LEFT HEART CATHETERIZATION Left 2020    Procedure: Left heart cath;  Surgeon: Heather Carbajal MD;  Location: [facility] CATH;  Service: Cardiology;  Laterality: Left;    SMALL BOWEL ENTEROSCOPY N/A 2024    Procedure: ENTEROSCOPY;  Surgeon: Bria Hand MD;  Location: [facility] ENDO;  Service: Endoscopy;  Laterality: N/A;    SMALL BOWEL ENTEROSCOPY N/A 2024    Procedure: ENTEROSCOPY;  Surgeon: Charles Hawthorne MD;  Location: [facility];  Service: Endoscopy;  Laterality: N/A;       Family History:  Family History   Problem Relation Name Age of Onset    Cancer Father      Cancer Sister      Glaucoma Neg Hx      Macular degeneration Neg Hx         Social History:  Social History     Socioeconomic History    Marital status:    Tobacco Use    Smoking status: Former     Current packs/day: 0.00     Average packs/day: 0.5 packs/day for 40.0 years (20.0 ttl pk-yrs)     Types: Cigarettes     Start date: 1980     Quit date: 2020     Years since quittin.5    Smokeless tobacco: Never   Substance and Sexual Activity    Alcohol use: Not Currently     Comment: sober 2 years 3 months    Drug use: No     Social Drivers of Health     Financial Resource Strain: Low Risk  (2024)    Received from [facility]     Overall Financial Resource Strain (CARDIA)     Difficulty of Paying Living Expenses: Not hard at all   Food Insecurity: No Food Insecurity (12/17/2024)    Received from [facility]    Hunger Vital Sign     Worried About Running Out of Food in the Last Year: Never true     Ran Out of Food in the Last Year: Never true   Transportation Needs: No Transportation Needs (12/17/2024)    Received from [facility]    PRAPARE - Transportation     Lack of Transportation (Medical): No     Lack of Transportation (Non-Medical): No   Physical Activity: Inactive (12/17/2024)    Received from [facility]    Exercise Vital Sign     Days of Exercise per Week: 0 days     Minutes of Exercise per Session: 0 min   Stress: No Stress Concern Present (12/17/2024)    Received from [facility]    [employer] - Occupational Stress Questionnaire     Feeling of Stress : Not at all   Housing Stability: Low Risk  (12/17/2024)    Received from [facility]    Housing Stability Vital Sign     Unable to Pay for Housing in the Last Year: No     Number of Times Moved in the Last Year: 1     Homeless in the Last Year: No       Medications:  Current Outpatient Medications on File Prior to Visit   Medication Sig Dispense Refill    amiodarone (PACERONE) 200 MG Tab Take 0.5 tablets (100 mg total) by mouth once daily. 15 tablet 11    atorvastatin (LIPITOR) 10 MG tablet Take 1 tablet (10 mg total) by mouth every evening. 90 tablet 3    azelastine (ASTELIN) 137 mcg (0.1 %) nasal spray 1 spray (137 mcg total) by Nasal route 2 (two) times daily. 30 mL 5    blood sugar diagnostic Strp To check BG 3 times daily, to use with insurance preferred meter 100 each 11    blood-glucose meter kit To check BG 3 times daily, to use with insurance preferred meter 1 each 0    clopidogreL (PLAVIX) 75 mg tablet TAKE 1 TABLET EVERY DAY (Patient taking differently: Take 75 mg by mouth once daily.) 90 tablet 3    esomeprazole (NEXIUM) 40 MG capsule Take 1  capsule (40 mg total) by mouth 2 (two) times daily before meals. Take on empty stomach 60 capsule 1    famotidine (PEPCID) 40 MG tablet Take 1 tablet (40 mg total) by mouth 2 (two) times daily. 180 tablet 3    ferrous sulfate (FEOSOL) 325 mg (65 mg iron) Tab tablet Take 1 tablet (325 mg total) by mouth daily with breakfast. 30 tablet 1    fluticasone-umeclidin-vilanter (TRELEGY ELLIPTA) 100-62.5-25 mcg DsDv Inhale 1 puff into the lungs once daily. 90 each 3    insulin aspart U-100 (NOVOLOG FLEXPEN U-100 INSULIN) 100 unit/mL (3 mL) InPn pen Administer per sliding scale three times daily before meals, max daily dose 20 units (Patient taking differently: Inject 2-4 Units into the skin before meals as needed (high blood sugar via sliding scale). Administer per sliding scale three times daily before meals, max daily dose 20 units  No insulin for BS reading 139 and below  Go to ER for BS reading of 250 and above per pt's daughter) 3 mL 11    latanoprost 0.005 % ophthalmic solution Place 1 drop into both eyes every evening.      magnesium oxide (MAG-OX) 400 mg (241.3 mg magnesium) tablet Take 1 tablet (400 mg total) by mouth once daily. 30 tablet 0    metFORMIN (GLUCOPHAGE-XR) 500 MG ER 24hr tablet TAKE 2 TABLETS ONE TIME DAILY (Patient taking differently: Take 500 mg by mouth 2 (two) times daily with meals.) 180 tablet 1    metoprolol succinate (TOPROL-XL) 25 MG 24 hr tablet Take 3 tablets (75 mg total) by mouth once daily. 90 tablet 0    NIFEdipine (PROCARDIA-XL) 60 MG (OSM) 24 hr tablet Take 1 tablet (60 mg total) by mouth once daily. 30 tablet 11    sacubitriL-valsartan (ENTRESTO)  mg per tablet Take 1 tablet by mouth 2 (two) times daily. 180 tablet 3    spironolactone (ALDACTONE) 25 MG tablet TAKE 1 TABLET EVERY DAY 90 tablet 3    sucralfate (CARAFATE) 1 gram tablet Take 1 tablet (1 g total) by mouth 4 (four) times daily before meals and nightly. 30 tablet 0    timolol maleate 0.5% (TIMOPTIC) 0.5 % Drop Place 1  "drop into the left eye 2 (two) times daily.      [DISCONTINUED] cloNIDine (CATAPRES) 0.1 MG tablet TAKE ONE TABLET BY MOUTH ONLY IF BLOOD PRESSURE IS GREATER THAN OR EQUAL /110. 30 tablet 0    [DISCONTINUED] hydrALAZINE (APRESOLINE) 25 MG tablet Take 25 mg by mouth 3 (three) times daily.      aspirin (ECOTRIN) 81 MG EC tablet Take 1 tablet (81 mg total) by mouth once daily. 30 tablet 0    cyanocobalamin (VITAMIN B-12) 1000 MCG tablet Take 1 tablet (1,000 mcg total) by mouth once daily. (Patient not taking: Reported on 12/20/2024) 30 tablet 1    lancets Misc To check BG 3 times daily, to use with insurance preferred meter 100 each 11    pen needle, diabetic (BD ULTRA-FINE PARISH PEN NEEDLE) 32 gauge x 5/32" Ndle Uses 4 daily, on multiple daily insulin injections 150 each 12    potassium chloride SA (K-DUR,KLOR-CON) 20 MEQ tablet Take 1 tablet (20 mEq total) by mouth once daily. (Patient not taking: Reported on 12/20/2024) 7 tablet 0     No current facility-administered medications on file prior to visit.       Allergies:  Patient has no known allergies.    Immunizations:  Immunization History   Administered Date(s) Administered    COVID-19, MRNA, LN-S, PF (Pfizer) (Gray Cap) 06/20/2022    COVID-19, MRNA, LN-S, PF (Pfizer) (Purple Cap) 05/28/2022    Pneumococcal Conjugate - 20 Valent 08/04/2022       Review of Systems:  Review of Systems   All other systems reviewed and are negative.      Objective:    Vitals:  Vitals:    12/20/24 0853   BP: 134/68   Pulse: 60   Resp: 16   Weight: 47.4 kg (104 lb 9.7 oz)   Height: 5' 1" (1.549 m)       Physical Exam  Vitals reviewed.   Constitutional:       General: She is not in acute distress.  HENT:      Head: Normocephalic and atraumatic.   Eyes:      Pupils: Pupils are equal, round, and reactive to light.   Cardiovascular:      Rate and Rhythm: Normal rate and regular rhythm.      Heart sounds: No murmur heard.     No friction rub.   Pulmonary:      Effort: Pulmonary effort " is normal.      Breath sounds: Normal breath sounds.   Abdominal:      General: Bowel sounds are normal. There is no distension.      Palpations: Abdomen is soft.      Tenderness: There is no abdominal tenderness.   Musculoskeletal:      Cervical back: Neck supple.   Skin:     General: Skin is warm and dry.      Findings: No rash.   Psychiatric:         Behavior: Behavior normal.             Franklin Garcia MD  Family Medicine

## 2025-02-03 ENCOUNTER — OFFICE VISIT (OUTPATIENT)
Dept: CARDIOLOGY | Facility: CLINIC | Age: 75
End: 2025-02-03
Attending: INTERNAL MEDICINE
Payer: MEDICARE

## 2025-02-03 VITALS
WEIGHT: 111.13 LBS | BODY MASS INDEX: 20.98 KG/M2 | HEART RATE: 67 BPM | SYSTOLIC BLOOD PRESSURE: 139 MMHG | DIASTOLIC BLOOD PRESSURE: 67 MMHG | HEIGHT: 61 IN

## 2025-02-03 DIAGNOSIS — F17.210 CIGARETTE NICOTINE DEPENDENCE WITHOUT COMPLICATION: ICD-10-CM

## 2025-02-03 DIAGNOSIS — I25.10 CORONARY ARTERY DISEASE, UNSPECIFIED VESSEL OR LESION TYPE, UNSPECIFIED WHETHER ANGINA PRESENT, UNSPECIFIED WHETHER NATIVE OR TRANSPLANTED HEART: Chronic | ICD-10-CM

## 2025-02-03 DIAGNOSIS — I70.0 AORTIC ATHEROSCLEROSIS: ICD-10-CM

## 2025-02-03 DIAGNOSIS — Z72.0 TOBACCO ABUSE: ICD-10-CM

## 2025-02-03 DIAGNOSIS — I10 HYPERTENSION, UNSPECIFIED TYPE: Primary | ICD-10-CM

## 2025-02-03 DIAGNOSIS — I48.0 PAF (PAROXYSMAL ATRIAL FIBRILLATION): ICD-10-CM

## 2025-02-03 DIAGNOSIS — I25.119 ATHEROSCLEROSIS OF NATIVE CORONARY ARTERY OF NATIVE HEART WITH ANGINA PECTORIS: ICD-10-CM

## 2025-02-03 DIAGNOSIS — I50.42 CHRONIC COMBINED SYSTOLIC AND DIASTOLIC HEART FAILURE: ICD-10-CM

## 2025-02-03 DIAGNOSIS — J43.1 PANLOBULAR EMPHYSEMA: ICD-10-CM

## 2025-02-03 DIAGNOSIS — R07.9 CHEST PAIN, UNSPECIFIED TYPE: ICD-10-CM

## 2025-02-03 DIAGNOSIS — I65.23 BILATERAL CAROTID ARTERY STENOSIS: ICD-10-CM

## 2025-02-03 DIAGNOSIS — Z95.1 S/P CABG (CORONARY ARTERY BYPASS GRAFT): ICD-10-CM

## 2025-02-03 PROCEDURE — 3008F BODY MASS INDEX DOCD: CPT | Mod: CPTII,S$GLB,, | Performed by: INTERNAL MEDICINE

## 2025-02-03 PROCEDURE — 1159F MED LIST DOCD IN RCRD: CPT | Mod: CPTII,S$GLB,, | Performed by: INTERNAL MEDICINE

## 2025-02-03 PROCEDURE — 3078F DIAST BP <80 MM HG: CPT | Mod: CPTII,S$GLB,, | Performed by: INTERNAL MEDICINE

## 2025-02-03 PROCEDURE — 99214 OFFICE O/P EST MOD 30 MIN: CPT | Mod: 25,S$GLB,, | Performed by: INTERNAL MEDICINE

## 2025-02-03 PROCEDURE — 4010F ACE/ARB THERAPY RXD/TAKEN: CPT | Mod: CPTII,S$GLB,, | Performed by: INTERNAL MEDICINE

## 2025-02-03 PROCEDURE — 3288F FALL RISK ASSESSMENT DOCD: CPT | Mod: CPTII,S$GLB,, | Performed by: INTERNAL MEDICINE

## 2025-02-03 PROCEDURE — 1101F PT FALLS ASSESS-DOCD LE1/YR: CPT | Mod: CPTII,S$GLB,, | Performed by: INTERNAL MEDICINE

## 2025-02-03 PROCEDURE — 1126F AMNT PAIN NOTED NONE PRSNT: CPT | Mod: CPTII,S$GLB,, | Performed by: INTERNAL MEDICINE

## 2025-02-03 PROCEDURE — 93010 ELECTROCARDIOGRAM REPORT: CPT | Mod: S$GLB,,, | Performed by: INTERNAL MEDICINE

## 2025-02-03 PROCEDURE — 3075F SYST BP GE 130 - 139MM HG: CPT | Mod: CPTII,S$GLB,, | Performed by: INTERNAL MEDICINE

## 2025-02-03 PROCEDURE — 99999 PR PBB SHADOW E&M-EST. PATIENT-LVL IV: CPT | Mod: PBBFAC,,, | Performed by: INTERNAL MEDICINE

## 2025-02-03 PROCEDURE — 93005 ELECTROCARDIOGRAM TRACING: CPT | Mod: PO

## 2025-02-03 NOTE — PROGRESS NOTES
Subjective:    Patient ID:  Nadia Irene is a 74 y.o. female patient here for evaluation Aortic atherosclerosis      History of Present Illness:  Cardiology follow-up.  Recent hospitalization for hypertensive urgency, chest pain.  Noninvasive cardiac assessment negative for ischemia.  Hospitalization 12/2024.  [facility].    Known coronary artery disease.  Status post remote CABG.  PCI stent 2023 occluded JEFF to the LAD successful PCI stent LAD.  At that time moderate disease noted in the vein graft to the obtuse marginal, and vein graft to the 1st diagonal , RCA patent.  EF normal.    Hypertension, diabetes mellitus dyslipidemia, past tobacco use.    PAF.    Remote history of GI bleed, seen by GI.  Endoscopy with angioectasias stomach, resolved.  Treated with cauterization.  Patient did receive blood transfusion.  Non recurrent.             Review of patient's allergies indicates:  No Known Allergies    Past Medical History:   Diagnosis Date    Anemia     Biliary obstruction     Cholangitis     Diabetes mellitus     pt denies, does not take meds 11/2016    EtOH dependence     GERD (gastroesophageal reflux disease)     HTN (hypertension) 12/30/2013     Past Surgical History:   Procedure Laterality Date    ARTERIOGRAPHY OF SUBCLAVIAN ARTERY  05/22/2020    Procedure: Arteriogram, Subclavian;  Surgeon: Heather Carbajal MD;  Location: [facility] CATH;  Service: Cardiology;;    COLONOSCOPY      COLONOSCOPY N/A 02/23/2024    Procedure: COLONOSCOPY;  Surgeon: Murphy Maguire Jr., MD;  Location: [facility] ENDO;  Service: Endoscopy;  Laterality: N/A;    CORONARY ANGIOGRAPHY N/A 05/22/2020    Procedure: ANGIOGRAM, CORONARY ARTERY;  Surgeon: Heather Carbajal MD;  Location: [facility] CATH;  Service: Cardiology;  Laterality: N/A;    CORONARY ARTERY BYPASS GRAFT (CABG) N/A 05/29/2020    Procedure: CORONARY ARTERY BYPASS GRAFT (CABG) x 5 VESSELS;  Surgeon: Dima Laughlin MD;  Location: [facility] OR;  Service: Cardiovascular;   Laterality: N/A;    ENDOSCOPIC HARVEST OF VEIN N/A 2020    Procedure: SURGICAL PROCUREMENT, VEIN, ENDOSCOPIC;  Surgeon: Dima Laughlin MD;  Location[facility] OR;  Service: Cardiovascular;  Laterality: N/A;    ENDOSCOPIC ULTRASOUND OF UPPER GASTROINTESTINAL TRACT N/A 2024    Procedure: ULTRASOUND, UPPER GI TRACT, ENDOSCOPIC;  Surgeon: Holden Aguirre MD;  Location: [facility] ENDO;  Service: Endoscopy;  Laterality: N/A;    ERCP W/ PLASTIC STENT PLACEMENT      ERCP W/ SPHICTEROTOMY      ESOPHAGOGASTRODUODENOSCOPY      ESOPHAGOGASTRODUODENOSCOPY N/A 2024    Procedure: ESOPHAGOGASTRODUODENOSCOPY (EGD);  Surgeon: Murphy Maguire Jr., MD;  Location: [facility] ENDO;  Service: Endoscopy;  Laterality: N/A;    ESOPHAGOGASTRODUODENOSCOPY N/A 2024    Procedure: EGD (ESOPHAGOGASTRODUODENOSCOPY);  Surgeon: Holden Aguirre MD;  Location: [facility] ENDO;  Service: Endoscopy;  Laterality: N/A;    HYSTERECTOMY      INSERTION OF INTRA-AORTIC BALLOON ASSIST DEVICE N/A 2020    Procedure: INSERTION, INTRA-AORTIC BALLOON PUMP;  Surgeon: Dima Laughlin MD;  Location: [facility] OR;  Service: Cardiovascular;  Laterality: N/A;    LEFT HEART CATHETERIZATION Left 2020    Procedure: Left heart cath;  Surgeon: Heather Carbajal MD;  Location: [facility] CATH;  Service: Cardiology;  Laterality: Left;    SMALL BOWEL ENTEROSCOPY N/A 2024    Procedure: ENTEROSCOPY;  Surgeon: Bria Hand MD;  Location: [facility] ENDO;  Service: Endoscopy;  Laterality: N/A;    SMALL BOWEL ENTEROSCOPY N/A 2024    Procedure: ENTEROSCOPY;  Surgeon: Charles Hawthorne MD;  Location: [facility] ENDO;  Service: Endoscopy;  Laterality: N/A;     Social History     Tobacco Use    Smoking status: Former     Current packs/day: 0.00     Average packs/day: 0.5 packs/day for 40.0 years (20.0 ttl pk-yrs)     Types: Cigarettes     Start date: 1980     Quit date: 2020     Years since quittin.7    Smokeless tobacco: Never   Substance Use Topics     Alcohol use: Not Currently     Comment: sober 2 years 3 months    Drug use: No        Review of Systems:    As noted in HPI in addition      REVIEW OF SYSTEMS  Review of Systems   Constitutional: Negative for decreased appetite, diaphoresis, night sweats, weight gain and weight loss.   HENT:  Negative for nosebleeds and odynophagia.    Eyes:  Negative for double vision and photophobia.   Cardiovascular:  Positive for chest pain. Negative for claudication, cyanosis, dyspnea on exertion, irregular heartbeat, leg swelling, near-syncope, orthopnea, palpitations, paroxysmal nocturnal dyspnea and syncope.   Respiratory:  Positive for shortness of breath. Negative for cough, hemoptysis and wheezing.    Hematologic/Lymphatic: Negative for adenopathy.   Skin:  Negative for flushing, skin cancer and suspicious lesions.   Musculoskeletal:  Negative for gout, myalgias and neck pain.   Gastrointestinal:  Negative for abdominal pain, heartburn, hematemesis and hematochezia.   Genitourinary:  Negative for bladder incontinence, hesitancy and nocturia.   Neurological:  Negative for focal weakness, headaches, light-headedness and paresthesias.   Psychiatric/Behavioral:  Negative for memory loss and substance abuse.               Objective:        Vitals:    02/03/25 1005   BP: 139/67   Pulse: 67       Lab Results   Component Value Date    WBC 2.74 (L) 09/24/2024    HGB 9.1 (L) 09/24/2024    HCT 29.0 (L) 09/24/2024     09/24/2024    CHOL 124 12/17/2024    TRIG 35 12/17/2024    HDL 67 (H) 12/17/2024    ALT 13 10/14/2024    AST 20 10/14/2024     10/14/2024    K 4.0 10/14/2024     10/14/2024    CREATININE 1.0 10/14/2024    BUN 14 10/14/2024    CO2 24 10/14/2024    TSH 0.856 10/14/2024    INR 1.1 07/29/2024    HGBA1C 7.2 (H) 12/17/2024    MICROALBUR 0.2 02/05/2018        ECHOCARDIOGRAM RESULTS  Results for orders placed during the hospital encounter of 02/20/24    Echo Saline Bubble? No    Interpretation Summary    Left  Ventricle: There is mild concentric hypertrophy. There is normal systolic function with a visually estimated ejection fraction of 60 - 65%. There is normal diastolic function.    Right Ventricle: Normal right ventricular cavity size. Wall thickness is normal. Right ventricle wall motion  is normal. Systolic function is normal.    Aortic Valve: There is mild stenosis. Aortic valve area by VTI is 1.66 cm². Aortic valve peak velocity is 2.17 m/s. Mean gradient is 9 mmHg. The dimensionless index is 0.58. There is mild aortic regurgitation.    Pulmonary Artery: The estimated pulmonary artery systolic pressure is 30 mmHg.    IVC/SVC: Intermediate venous pressure at 8 mmHg.    Results for orders placed during the hospital encounter of 05/19/20    Cardiac catheterization    Conclusion  · Three vessel coronary artery disease.  · Mid LAD lesion , 85% stenosed.  · Ost 2nd Diag lesion , 80% stenosed.  · Dist Cx lesion , 90% stenosed.  · Ost 3rd Mrg lesion , 90% stenosed.  · RPDA lesion , 80% stenosed.  · LV end diastolic pressure is severely elevated.    I certify that I was present for catheter insertion, catheter manipulation, angiography, and angiographic interpretation of this patient.    Procedure Log documented by Documenter: Camille Faith RN and verified by Heather Carbajal MD.    Date: 5/22/2020  Time: 1:10 PM          CURRENT/PREVIOUS VISIT EKG  Results for orders placed or performed during the hospital encounter of 09/22/24   EKG 12-lead    Collection Time: 09/23/24 12:26 PM   Result Value Ref Range    QRS Duration 72 ms    [facility] QTC Calculation 449 ms    Narrative    Test Reason : R07.9,    Vent. Rate : 095 BPM     Atrial Rate : 095 BPM     P-R Int : 112 ms          QRS Dur : 072 ms      QT Int : 358 ms       P-R-T Axes : 010 -20 085 degrees     QTc Int : 449 ms    Sinus rhythm with Premature atrial complexes  Anterior infarct (cited on or before 19-FEB-2024)  Abnormal ECG  When compared with ECG of 22-SEP-2024  19:30,  T wave inversion no longer evident in Anterior leads  Confirmed by John HOWARD, Regan LEGGETT ([id number]) on 9/23/2024 6:07:50 PM    Referred By: [email or web address]   SELF           Confirmed By:Regan Borja MD     No valid procedures specified.   Results for orders placed during the hospital encounter of 04/18/23    Nuclear Stress - Cardiology Interpreted    Interpretation Summary    Normal myocardial perfusion scan. There is no evidence of myocardial ischemia or infarction.    The gated perfusion images showed an ejection fraction of 55% post stress.    There is normal wall motion post stress.    LV cavity size is normal at rest and normal at stress.    The ECG portion of the study is abnormal but not diagnostic.    The patient reported chest pain during the stress test.    During stress, occasional PVCs are noted.    No valid procedures specified.    PHYSICAL EXAM  GENERAL: well built, well nourished, well-developed in no apparent distress alert and oriented.   HEENT: Normocephalic. Pupils normal and conjunctivae normal.  Mucous membranes normal, no cyanosis or icterus, trachea central,no pallor or icterus is noted..   NECK: No JVD. No bruit..   THYROID: Thyroid not enlarged. No nodules present..   CARDIAC:  Normal S1-S2.  No murmur rub click or gallop.  PMI nondisplaced.  CHEST ANATOMY: normal.   LUNGS: Clear to auscultation. No wheezing or rhonchi..   ABDOMEN: Soft no masses or organomegaly.  No abdomen pulsations or bruits.  Normal bowel sounds. No pulsations and no masses felt, No guarding or rebound.   URINARY: No nagel catheter   EXTREMITIES: No cyanosis, clubbing or edema noted at this time., no calf tenderness bilaterally.   PERIPHERAL VASCULAR SYSTEM: Good palpable distal pulses.  2+ femoral, popliteal and pedal pulses.  No bruits    CENTRAL NERVOUS SYSTEM: No focal motor or sensory deficits noted.   SKIN: Skin without lesions, moist, well perfused.   MUSCLE STRENGTH & TONE: No noteable weakness, atrophy or  "abnormal movement    I HAVE REVIEWED :    The vital signs, nurses notes, and all the pertinent radiology and labs.         Current Outpatient Medications   Medication Instructions    amiodarone (PACERONE) 100 mg, Oral, Daily    aspirin (ECOTRIN) 81 mg, Oral, Daily    atorvastatin (LIPITOR) 10 mg, Oral, Nightly    azelastine (ASTELIN) 137 mcg, Nasal, 2 times daily    blood sugar diagnostic Strp To check BG 3 times daily, to use with insurance preferred meter    blood-glucose meter kit To check BG 3 times daily, to use with insurance preferred meter    cloNIDine (CATAPRES) 0.1 MG tablet Take one tablet by mouth ONLY IF blood pressure is greater than or equal to 180/110.    clopidogreL (PLAVIX) 75 mg tablet TAKE 1 TABLET EVERY DAY    cyanocobalamin (VITAMIN B-12) 1,000 mcg, Oral, Daily    esomeprazole (NEXIUM) 40 mg, Oral, 2 times daily before meals, Take on empty stomach    famotidine (PEPCID) 40 mg, Oral, 2 times daily    ferrous sulfate (FEOSOL) 325 mg, Oral, With breakfast    fluticasone-umeclidin-vilanter (TRELEGY ELLIPTA) 100-62.5-25 mcg DsDv 1 puff, Inhalation, Daily    insulin aspart U-100 (NOVOLOG FLEXPEN U-100 INSULIN) 100 unit/mL (3 mL) InPn pen Administer per sliding scale three times daily before meals, max daily dose 20 units    lancets Misc To check BG 3 times daily, to use with insurance preferred meter    latanoprost 0.005 % ophthalmic solution 1 drop, Nightly    magnesium oxide (MAG-OX) 400 mg, Oral, Daily    metFORMIN (GLUCOPHAGE-XR) 500 MG ER 24hr tablet TAKE 2 TABLETS ONE TIME DAILY    metoprolol succinate (TOPROL-XL) 75 mg, Oral, Daily    NIFEdipine (PROCARDIA-XL) 60 mg, Oral, Daily    pen needle, diabetic (BD ULTRA-FINE PARISH PEN NEEDLE) 32 gauge x 5/32" Ndle Uses 4 daily, on multiple daily insulin injections    potassium chloride SA (K-DUR,KLOR-CON) 20 MEQ tablet 20 mEq, Oral, Daily    sacubitriL-valsartan (ENTRESTO)  mg per tablet 1 tablet, Oral, 2 times daily    spironolactone (ALDACTONE) " 25 mg, Oral    sucralfate (CARAFATE) 1 g, Oral, Before meals & nightly    timolol maleate 0.5% (TIMOPTIC) 0.5 % Drop 1 drop, 2 times daily          Assessment:       Recent hospitalization for chest pain, hypertension urgent, negative noninvasive cardiac assessment.  Patient meds change with the addition amlodipine 10 mg daily.     Acute on chronic atypical chest pain.  Dizziness.     Coronary disease.  Status post remote CABG.  PCI stent LAD 2023, occluded JEFF to the LAD.  Graft anatomy the otherwise stable.          Plan:   Meds reviewed and reconciled.  We will no changes.  Labs follow-up primary care.  Recent noninvasive cardiac assessment in [facility] negative for ischemia.  Last echo with preserved ejection fraction, mild AS.    Six-month          No follow-ups on file.

## 2025-02-04 LAB
OHS QRS DURATION: 88 MS
OHS QTC CALCULATION: 410 MS

## 2025-02-17 ENCOUNTER — OFFICE VISIT (OUTPATIENT)
Dept: FAMILY MEDICINE | Facility: CLINIC | Age: 75
End: 2025-02-17
Payer: MEDICARE

## 2025-02-17 VITALS
SYSTOLIC BLOOD PRESSURE: 120 MMHG | DIASTOLIC BLOOD PRESSURE: 64 MMHG | WEIGHT: 112.44 LBS | BODY MASS INDEX: 21.23 KG/M2 | HEIGHT: 61 IN | HEART RATE: 88 BPM

## 2025-02-17 DIAGNOSIS — E11.42 TYPE 2 DIABETES MELLITUS WITH DIABETIC POLYNEUROPATHY, WITHOUT LONG-TERM CURRENT USE OF INSULIN: ICD-10-CM

## 2025-02-17 DIAGNOSIS — I10 ESSENTIAL HYPERTENSION: Primary | ICD-10-CM

## 2025-02-17 DIAGNOSIS — J43.1 PANLOBULAR EMPHYSEMA: Chronic | ICD-10-CM

## 2025-02-17 DIAGNOSIS — D50.0 IRON DEFICIENCY ANEMIA DUE TO CHRONIC BLOOD LOSS: ICD-10-CM

## 2025-02-17 RX ORDER — CLONIDINE HYDROCHLORIDE 0.1 MG/1
TABLET ORAL
Qty: 60 TABLET | Refills: 3 | Status: SHIPPED | OUTPATIENT
Start: 2025-02-17

## 2025-02-17 RX ORDER — METOPROLOL SUCCINATE 25 MG/1
75 TABLET, EXTENDED RELEASE ORAL DAILY
Qty: 90 TABLET | Refills: 1 | Status: SHIPPED | OUTPATIENT
Start: 2025-02-17

## 2025-02-17 NOTE — PROGRESS NOTES
THIS DOCUMENT WAS MADE IN PART WITH VOICE RECOGNITION SOFTWARE.  OCCASIONALLY THIS SOFTWARE WILL MISINTERPRET WORDS OR PHRASES.     Assessment and Plan:    Essential hypertension  Comments:  Controlled in clinic today  Advised to take medication as prescribed  Continue regimen  Keep follow up with cardiologist  Orders:  -     metoprolol succinate (TOPROL-XL) 25 MG 24 hr tablet; Take 3 tablets (75 mg total) by mouth once daily.  Dispense: 90 tablet; Refill: 1  -     cloNIDine (CATAPRES) 0.1 MG tablet; Take one tablet by mouth ONLY IF blood pressure is greater than or equal to 180/110.  Dispense: 60 tablet; Refill: 3  -     Basic Metabolic Panel; Future; Expected date: 02/17/2025    Type 2 diabetes mellitus with diabetic polyneuropathy, without long-term current use of insulin  Comments:  A1c 7.2  Continue regimen  Due to repeat A1c in 2 months  Orders:  -     Basic Metabolic Panel; Future; Expected date: 02/17/2025    Panlobular emphysema  Comments:  Denies recent exacerbation  Continue Trelegy  Orders:  -     fluticasone-umeclidin-vilanter (TRELEGY ELLIPTA) 100-62.5-25 mcg DsDv; Inhale 1 puff into the lungs once daily.  Dispense: 90 each; Refill: 3    Iron deficiency anemia due to chronic blood loss  Comments:  Stable on recent labs  Due to repeat  Orders:  -     Iron and TIBC; Future; Expected date: 02/17/2025  -     Ferritin; Future; Expected date: 02/17/2025  -     CBC Auto Differential; Future; Expected date: 02/17/2025             Visit summary:  Chronic conditions stable    Medications refilled    Due to recheck labs    Continue to work on regular exercise, maintain healthy weight, balanced diet. Avoid unhealthy habits: smoking, excessive alcohol intake.    Emergent conditions/ER precautions discussed.      Follow up in about 3 months (around 5/17/2025) for Follow-up with PCP- Dr. Garcia.   ______________________________________________________________________  Subjective:    Chief Complaint:  Follow up chronic  medical conditions/medication refills.    HPI:  Nadia is a 74 y.o. year old female with a past medical history noted below, here to follow up chronic medical conditions/medication refills.       She reports that she is still getting some elevated blood pressure readings.  She did not bring her log with her.   She reports seeing cardiologist earlier this month.  Continued current medications- planning follow up in 6mths.      Chronic conditions reviewed below:    COPD-  Rx- Trelegy      Paroxysmal atrial fibrillation  History of, maintains normal sinus rhythm with occasional PACs   Rx-dual antiplatelet therapy, beta-blocker     Combined systolic, diastolic congestive heart failure  Previous echocardiogram 05/26/2022-grade 2 diastolic dysfunction, ejection fraction 40%  Cardiology- MD Mary   Rx-Aldactone 25 mg, beta-blocker, Entresto, Lasix 20 mg      Valvular heart disease  05/26/2022-echocardiogram shows moderate aortic regurgitation, tricuspid regurgitation, pulmonic regurgitation, mitral regurgitation     Coronary artery disease status post CABG x5 (5/29/2020), dyslipidemia  MAGGIE :  X1 placed late 2023  Cardiology- MD Mary   Rx-aspirin 81 mg, atorvastatin 10 mg, Plavix 75 mg  Negative nuclear stress test 04/2023  Previous lipid panel acceptable     Essential hypertension  Rx-Entresto, metoprolol 25 mg, spironolactone 25 mg, hydralazine 25 TID, Clonidine 0.1 BID, nifedipine       Bilateral carotid artery stenosis  Currently on statin  05/27/2020-carotid ultrasound shows less than 50% stenosis right carotid artery, left internal carotid artery with 50-69% stenosis     Type 2 diabetes mellitus with associated polyneuropathy  Previous A1c-7.2  Rx-metformin extended release 1000 mg by mouth daily, sliding scale insulin, low intensity     GERD  Rx : pantoprazole      Nicotine dependence  Prev rx : Patches (didn't work)     Chronic back pain   Rx-tramadol           Medications:  Medications Ordered Prior to  "Encounter[1]    Review of Systems:  Review of Systems   Constitutional:  Negative for chills, fatigue, fever and unexpected weight change.   HENT:  Negative for congestion, postnasal drip and rhinorrhea.    Respiratory:  Negative for cough and shortness of breath.    Gastrointestinal:  Negative for constipation, diarrhea, nausea and vomiting.   Genitourinary:  Negative for difficulty urinating and dysuria.   Musculoskeletal:  Negative for arthralgias and back pain.   Neurological:  Negative for dizziness and headaches.       Past Medical History:  Past Medical History:   Diagnosis Date    Anemia     Biliary obstruction     Cholangitis     Diabetes mellitus     pt denies, does not take meds 11/2016    EtOH dependence     GERD (gastroesophageal reflux disease)     HTN (hypertension) 12/30/2013       Objective:    Vitals:  Vitals:    02/17/25 0948   BP: 120/64   Pulse: 88   Weight: 51 kg (112 lb 7 oz)   Height: 5' 1" (1.549 m)   PainSc: 0-No pain       Physical Exam  Vitals and nursing note reviewed.   Constitutional:       General: She is not in acute distress.  HENT:      Head: Normocephalic and atraumatic.   Eyes:      Pupils: Pupils are equal, round, and reactive to light.   Cardiovascular:      Rate and Rhythm: Normal rate and regular rhythm.      Heart sounds: No murmur heard.     No friction rub.   Pulmonary:      Effort: Pulmonary effort is normal.      Breath sounds: Normal breath sounds.   Abdominal:      General: Bowel sounds are normal. There is no distension.      Palpations: Abdomen is soft.      Tenderness: There is no abdominal tenderness.   Musculoskeletal:      Cervical back: Neck supple.   Skin:     General: Skin is warm and dry.      Findings: No rash.   Psychiatric:         Behavior: Behavior normal.         Data:  Lab Results   Component Value Date    HGBA1C 7.2 (H) 12/17/2024    HGBA1C 6.3 (H) 09/23/2024    HGBA1C 6.2 (H) 06/17/2024      Lab Results   Component Value Date    WBC 2.74 (L) " 09/24/2024    HGB 9.1 (L) 09/24/2024    HCT 29.0 (L) 09/24/2024    MCV 83 09/24/2024     09/24/2024     Lab Results   Component Value Date    IRON 79 08/12/2024    TRANSFERRIN 264 08/12/2024    TIBC 391 08/12/2024    LABIRON 9 (L) 07/30/2024    FESATURATED 20 08/12/2024             Medical history reviewed, Medications reconciled.              [facility]  Family Medicine         [1]   Current Outpatient Medications on File Prior to Visit   Medication Sig Dispense Refill    amiodarone (PACERONE) 200 MG Tab Take 0.5 tablets (100 mg total) by mouth once daily. 15 tablet 11    aspirin (ECOTRIN) 81 MG EC tablet Take 1 tablet (81 mg total) by mouth once daily. 30 tablet 0    atorvastatin (LIPITOR) 10 MG tablet Take 1 tablet (10 mg total) by mouth every evening. 90 tablet 3    azelastine (ASTELIN) 137 mcg (0.1 %) nasal spray 1 spray (137 mcg total) by Nasal route 2 (two) times daily. 30 mL 5    blood sugar diagnostic Strp To check BG 3 times daily, to use with insurance preferred meter 100 each 11    blood-glucose meter kit To check BG 3 times daily, to use with insurance preferred meter 1 each 0    clopidogreL (PLAVIX) 75 mg tablet TAKE 1 TABLET EVERY DAY 90 tablet 3    cyanocobalamin (VITAMIN B-12) 1000 MCG tablet Take 1 tablet (1,000 mcg total) by mouth once daily. 30 tablet 1    esomeprazole (NEXIUM) 40 MG capsule Take 1 capsule (40 mg total) by mouth 2 (two) times daily before meals. Take on empty stomach 60 capsule 1    famotidine (PEPCID) 40 MG tablet Take 1 tablet (40 mg total) by mouth 2 (two) times daily. 180 tablet 3    ferrous sulfate (FEOSOL) 325 mg (65 mg iron) Tab tablet Take 1 tablet (325 mg total) by mouth daily with breakfast. 30 tablet 1    insulin aspart U-100 (NOVOLOG FLEXPEN U-100 INSULIN) 100 unit/mL (3 mL) InPn pen Administer per sliding scale three times daily before meals, max daily dose 20 units 3 mL 11    lancets Misc To check BG 3 times daily, to use with insurance preferred  "meter 100 each 11    latanoprost 0.005 % ophthalmic solution Place 1 drop into both eyes every evening.      magnesium oxide (MAG-OX) 400 mg (241.3 mg magnesium) tablet Take 1 tablet (400 mg total) by mouth once daily. 30 tablet 0    metFORMIN (GLUCOPHAGE-XR) 500 MG ER 24hr tablet TAKE 2 TABLETS ONE TIME DAILY 180 tablet 1    NIFEdipine (PROCARDIA-XL) 60 MG (OSM) 24 hr tablet Take 1 tablet (60 mg total) by mouth once daily. 30 tablet 11    pen needle, diabetic (BD ULTRA-FINE PARISH PEN NEEDLE) 32 gauge x 5/32" Ndle Uses 4 daily, on multiple daily insulin injections 150 each 12    potassium chloride SA (K-DUR,KLOR-CON) 20 MEQ tablet Take 1 tablet (20 mEq total) by mouth once daily. 7 tablet 0    sacubitriL-valsartan (ENTRESTO)  mg per tablet Take 1 tablet by mouth 2 (two) times daily. 180 tablet 3    spironolactone (ALDACTONE) 25 MG tablet TAKE 1 TABLET EVERY DAY 90 tablet 3    sucralfate (CARAFATE) 1 gram tablet Take 1 tablet (1 g total) by mouth 4 (four) times daily before meals and nightly. 30 tablet 0    timolol maleate 0.5% (TIMOPTIC) 0.5 % Drop Place 1 drop into the left eye 2 (two) times daily.      [DISCONTINUED] cloNIDine (CATAPRES) 0.1 MG tablet Take one tablet by mouth ONLY IF blood pressure is greater than or equal to 180/110. 60 tablet 3    [DISCONTINUED] fluticasone-umeclidin-vilanter (TRELEGY ELLIPTA) 100-62.5-25 mcg DsDv Inhale 1 puff into the lungs once daily. 90 each 3    [DISCONTINUED] metoprolol succinate (TOPROL-XL) 25 MG 24 hr tablet Take 3 tablets (75 mg total) by mouth once daily. 90 tablet 0     No current facility-administered medications on file prior to visit.     "

## 2025-02-25 ENCOUNTER — PATIENT OUTREACH (OUTPATIENT)
Dept: ADMINISTRATIVE | Facility: HOSPITAL | Age: 75
End: 2025-02-25
Payer: MEDICARE

## 2025-02-25 DIAGNOSIS — Z87.891 FORMER SMOKER: Primary | ICD-10-CM

## 2025-02-25 NOTE — PROGRESS NOTES
Population Health Chart Review & Patient Outreach Details      Additional [facility] Notes:    Lung CT SCAN Report            Updates Requested / Reviewed:      Care Everywhere and          Health Maintenance Topics Overdue:      VB Score: 0     Patient is not due for any topics at this time.    Influenza Vaccine  Tetanus Vaccine  Shingles/Zoster Vaccine  RSV Vaccine                  Health Maintenance Topic(s) Outreach Outcomes & Actions Taken:    Low Dose CT Screening - Outreach Outcomes & Actions Taken  : Low Dose CT Order Placed and Low Dose CT Scheduled

## 2025-03-17 ENCOUNTER — HOSPITAL ENCOUNTER (OUTPATIENT)
Dept: RADIOLOGY | Facility: HOSPITAL | Age: 75
Discharge: HOME | End: 2025-03-17
Attending: FAMILY MEDICINE
Payer: MEDICARE

## 2025-03-17 ENCOUNTER — RESULTS FOLLOW-UP (OUTPATIENT)
Dept: FAMILY MEDICINE | Facility: CLINIC | Age: 75
End: 2025-03-17
Payer: MEDICARE

## 2025-03-17 DIAGNOSIS — R91.1 LUNG NODULE: Primary | ICD-10-CM

## 2025-03-17 DIAGNOSIS — Z87.891 FORMER SMOKER: ICD-10-CM

## 2025-03-17 PROCEDURE — 71271 CT THORAX LUNG CANCER SCR C-: CPT | Mod: 26,,, | Performed by: RADIOLOGY

## 2025-03-17 PROCEDURE — 99999 PR PBB SHADOW E&M-EST. PATIENT-LVL I: CPT | Mod: PBBFAC,,, | Performed by: FAMILY MEDICINE

## 2025-03-17 PROCEDURE — 71271 CT THORAX LUNG CANCER SCR C-: CPT | Mod: TC,PO

## 2025-03-18 ENCOUNTER — TELEPHONE (OUTPATIENT)
Dept: FAMILY MEDICINE | Facility: CLINIC | Age: 75
End: 2025-03-18
Payer: MEDICARE

## 2025-03-18 ENCOUNTER — TELEPHONE (OUTPATIENT)
Facility: CLINIC | Age: 75
End: 2025-03-18
Payer: MEDICARE

## 2025-03-18 NOTE — NURSING
Received message from radiology regarding pt's LDCT results. Referral for pulmonology placed by PCP, as well as PET scan.  Reached out to patient and scheduled a visit in our lung clinic for 3/25 with Dr Flores. PET scheduled for 4/1.   Date, time, and location of appt has been confirmed.       Oncology Navigation   Intake  Cancer Type: LDCT/Incidental Lung Finding  Type of Referral: Internal  Date of Referral: 03/18/25  Initial Nurse Navigator Contact: 03/18/25  Referral to Initial Contact Timeline (days): 0  First Appointment Available: 03/25/25  Appointment Date: 03/25/25  First Available Date vs. Scheduled Date (days): 0     Treatment  Current Status: Staging work-up             Procedures: CT; PET scan  CT Schedule Date: 03/17/25  PET Scan Schedule Date: 04/01/25                 Acuity      Follow Up  No follow-ups on file.

## 2025-03-25 ENCOUNTER — OFFICE VISIT (OUTPATIENT)
Facility: CLINIC | Age: 75
End: 2025-03-25
Payer: MEDICARE

## 2025-03-25 ENCOUNTER — DOCUMENTATION ONLY (OUTPATIENT)
Facility: CLINIC | Age: 75
End: 2025-03-25

## 2025-03-25 ENCOUNTER — TUMOR BOARD CONFERENCE (OUTPATIENT)
Dept: HEMATOLOGY/ONCOLOGY | Facility: CLINIC | Age: 75
End: 2025-03-25
Payer: MEDICARE

## 2025-03-25 VITALS
OXYGEN SATURATION: 99 % | SYSTOLIC BLOOD PRESSURE: 165 MMHG | RESPIRATION RATE: 20 BRPM | BODY MASS INDEX: 21.39 KG/M2 | TEMPERATURE: 98 F | WEIGHT: 113.31 LBS | HEART RATE: 74 BPM | HEIGHT: 61 IN | DIASTOLIC BLOOD PRESSURE: 67 MMHG

## 2025-03-25 DIAGNOSIS — R91.1 LUNG NODULE: ICD-10-CM

## 2025-03-25 DIAGNOSIS — R91.1 SOLITARY PULMONARY NODULE: ICD-10-CM

## 2025-03-25 DIAGNOSIS — R93.89 ABNORMAL CHEST CT: Primary | ICD-10-CM

## 2025-03-25 DIAGNOSIS — E11.42 TYPE 2 DIABETES MELLITUS WITH DIABETIC POLYNEUROPATHY, WITHOUT LONG-TERM CURRENT USE OF INSULIN: ICD-10-CM

## 2025-03-25 DIAGNOSIS — J43.2 CENTRILOBULAR EMPHYSEMA: ICD-10-CM

## 2025-03-25 PROCEDURE — 1159F MED LIST DOCD IN RCRD: CPT | Mod: CPTII,S$GLB,, | Performed by: INTERNAL MEDICINE

## 2025-03-25 PROCEDURE — 1101F PT FALLS ASSESS-DOCD LE1/YR: CPT | Mod: CPTII,S$GLB,, | Performed by: INTERNAL MEDICINE

## 2025-03-25 PROCEDURE — 99999 PR PBB SHADOW E&M-EST. PATIENT-LVL V: CPT | Mod: PBBFAC,,, | Performed by: INTERNAL MEDICINE

## 2025-03-25 PROCEDURE — 3288F FALL RISK ASSESSMENT DOCD: CPT | Mod: CPTII,S$GLB,, | Performed by: INTERNAL MEDICINE

## 2025-03-25 PROCEDURE — 3008F BODY MASS INDEX DOCD: CPT | Mod: CPTII,S$GLB,, | Performed by: INTERNAL MEDICINE

## 2025-03-25 PROCEDURE — 4010F ACE/ARB THERAPY RXD/TAKEN: CPT | Mod: CPTII,S$GLB,, | Performed by: INTERNAL MEDICINE

## 2025-03-25 PROCEDURE — 3078F DIAST BP <80 MM HG: CPT | Mod: CPTII,S$GLB,, | Performed by: INTERNAL MEDICINE

## 2025-03-25 PROCEDURE — 99204 OFFICE O/P NEW MOD 45 MIN: CPT | Mod: S$GLB,,, | Performed by: INTERNAL MEDICINE

## 2025-03-25 PROCEDURE — 3077F SYST BP >= 140 MM HG: CPT | Mod: CPTII,S$GLB,, | Performed by: INTERNAL MEDICINE

## 2025-03-25 PROCEDURE — 1126F AMNT PAIN NOTED NONE PRSNT: CPT | Mod: CPTII,S$GLB,, | Performed by: INTERNAL MEDICINE

## 2025-03-25 RX ORDER — ALBUTEROL SULFATE 90 UG/1
2 INHALANT RESPIRATORY (INHALATION) EVERY 4 HOURS PRN
Qty: 18 G | Refills: 5 | Status: SHIPPED | OUTPATIENT
Start: 2025-03-25

## 2025-03-25 NOTE — PROGRESS NOTES
[facility]      THORACIC MULTIDISCIPLINARY TUMOR BOARD  PATIENT REVIEW FORM  ___________________________________________________________________    CLINIC #: [id number]  DATE: 03/25/2025    TUMOR SITE:   Cancer Type: Other (Lung Nodule)     Tumor Laterality: Right     Metastatic Site(s): None     Cancer Staging Complete: No       Presenting Hospital / Clinic: [facility]     Virtual Tumor Board Conference: In person       PRESENTER:   Presenter: Dr. Flores     Specialties Present: Medical Oncology; Hematology; Radiation Oncology; Surgical Oncology; Pathology; Navigation; Research; Integrative Oncology; Radiology; Pulmonology       PATIENT SUMMARY:   74 y.o. female recently evaluated at [facility] for uncontrolled blood pressure. She has a history of heart surgery in May 2020, which required ventilator support due to lung bleeding.   Her diabetes is currently not well-controlled, with home glucose readings around 200-300. She uses a Trelegy inhaler to help with breathing.     IMAGING:  - CT Chest: May 2020, very abnormal, patient was sick in the hospital and had heart surgery  - CT Chest: Recent, shows changes in the right lung upper lobe, patchy area noted, could be a scar or potentially lung cancer, minor changes in left lung, enlarged esophagus     SOCIAL HISTORY:  - Smoking: Former smoker, quit around May 2020. Smoked for 40 years prior.     Exposures: cigarettes      Smoking Hx: former 1/2 PPD over 40  years quit 5/2020 [facility] admission        Background Information  Patient Status: a new patient  Reason for Consultation: Initial Presentation  Treatment to Date: None         BOARD RECOMMENDATIONS:   Recommended Plan (General)  Recommended Plan: Additional observation  Recommended Plan Note: follow up in 3 months with chest CT         CONSULT NEEDED:     [] Surgery    [] Hem/Onc    [] Rad/Onc    [] Dietary                  [] Social Service    [] Psychology       [] Pulmonology    Cancer Staging   No matching staging information was found for the patient.          [x] Patricia'l Treatment Guidelines reviewed and care planned is consistent with guidelines.         (i.e., NCCN, NCI, PD, ACO, AUA, etc.)    PRESENTATION AT CANCER CONFERENCE:   Presentation at Cancer Conference: Prospective       CLINICAL TRIAL ELIGIBILITY:   Clinical Trial Eligibility  Clinical Trial Eligibility: Not discussed         Alona Lu

## 2025-03-25 NOTE — PROGRESS NOTES
Met with patient and daughter in pulmonary clinic today.  Explained my role as navigator.  Reviewed plan of care and answered questions.    Plan is for 3m CT with f/u at [facility].   My contact information was provided and pt was encouraged to call with any questions or concerns.  Pt and daughter verbalized understanding.

## 2025-03-25 NOTE — PROGRESS NOTES
[facility]   Initial Office Visit  Chief Complaint   Patient presents with    new patient     right lung nodule       SUBJECTIVE:     History of Present Illness:  Patient is a 74 y.o. female   History of Present Illness    CHIEF COMPLAINT:  - Patient presents for evaluation of an abnormal CT of the chest, as referred by Dr. Torres.    HPI:  Patient was recently evaluated at [facility] for uncontrolled blood pressure. She has a history of heart surgery in May 2020, which required ventilator support due to lung bleeding. She reports recent onset of cough with non-bloody sputum production and chest discomfort, described as a burning sensation or tingling. Her appetite is good, and she has been gaining weight recently, having previously been under 100 lbs. She reports difficulty swallowing, noting occasional dysphagia. Her diabetes is currently not well-controlled, with home glucose readings around 200-300. She uses a Trelegy inhaler to help with breathing.    She denies fevers, chills, sweats, weight loss, or bloody sputum. She denies a family history of lung cancer.    TEST RESULTS:  - Hemoglobin A1c: September, checked  - Blood glucose: Home readings, around 230  - Stress test: 2 years ago  - Echocardiogram: 1 year ago    IMAGING:  - CT Chest: May 2020, very abnormal, patient was sick in the hospital and had heart surgery  - CT Chest: Recent, shows changes in the right lung upper lobe, patchy area noted, could be a scar or potentially lung cancer, minor changes in left lung, enlarged esophagus    SOCIAL HISTORY:  - Smoking: Former smoker, quit around May 2020. Smoked for 40 years prior.          Exposures: cigarettes     Smoking Hx: former 1/2 PPD over 40  years quit 5/2020 [facility] admission     Past Medical History:   Diagnosis Date    Anemia     Biliary obstruction     Cholangitis     Diabetes mellitus     pt denies, does not take meds 11/2016    EtOH dependence     GERD  (gastroesophageal reflux disease)     HTN (hypertension) 12/30/2013     Past Surgical History:   Procedure Laterality Date    ARTERIOGRAPHY OF SUBCLAVIAN ARTERY  05/22/2020    Procedure: Arteriogram, Subclavian;  Surgeon: Heather Carbajal MD;  Location: [facility] CATH;  Service: Cardiology;;    COLONOSCOPY      COLONOSCOPY N/A 02/23/2024    Procedure: COLONOSCOPY;  Surgeon: Murphy Maguire Jr., MD;  Location: [facility] ENDO;  Service: Endoscopy;  Laterality: N/A;    CORONARY ANGIOGRAPHY N/A 05/22/2020    Procedure: ANGIOGRAM, CORONARY ARTERY;  Surgeon: Heather Carbajal MD;  Location: [facility] CATH;  Service: Cardiology;  Laterality: N/A;    CORONARY ARTERY BYPASS GRAFT (CABG) N/A 05/29/2020    Procedure: CORONARY ARTERY BYPASS GRAFT (CABG) x 5 VESSELS;  Surgeon: Dima Laughlin MD;  Location: [facility] OR;  Service: Cardiovascular;  Laterality: N/A;    ENDOSCOPIC HARVEST OF VEIN N/A 05/29/2020    Procedure: SURGICAL PROCUREMENT, VEIN, ENDOSCOPIC;  Surgeon: Dima Laughlin MD;  Location: [facility] OR;  Service: Cardiovascular;  Laterality: N/A;    ENDOSCOPIC ULTRASOUND OF UPPER GASTROINTESTINAL TRACT N/A 04/24/2024    Procedure: ULTRASOUND, UPPER GI TRACT, ENDOSCOPIC;  Surgeon: Holden Aguirre MD;  Location: [facility] ENDO;  Service: Endoscopy;  Laterality: N/A;    ERCP W/ PLASTIC STENT PLACEMENT      ERCP W/ SPHICTEROTOMY      ESOPHAGOGASTRODUODENOSCOPY      ESOPHAGOGASTRODUODENOSCOPY N/A 02/23/2024    Procedure: ESOPHAGOGASTRODUODENOSCOPY (EGD);  Surgeon: Murphy Maguire Jr., MD;  Location: [facility] ENDO;  Service: Endoscopy;  Laterality: N/A;    ESOPHAGOGASTRODUODENOSCOPY N/A 04/24/2024    Procedure: EGD (ESOPHAGOGASTRODUODENOSCOPY);  Surgeon: Holden Aguirre MD;  Location: [facility] ENDO;  Service: Endoscopy;  Laterality: N/A;    HYSTERECTOMY      INSERTION OF INTRA-AORTIC BALLOON ASSIST DEVICE N/A 05/29/2020    Procedure: INSERTION, INTRA-AORTIC BALLOON PUMP;  Surgeon: Dima Laughlin MD;  Location: [facility] OR;  Service: Cardiovascular;  Laterality:  "N/A;    LEFT HEART CATHETERIZATION Left 05/22/2020    Procedure: Left heart cath;  Surgeon: Heather Carbajal MD;  Location: [facility] CATH;  Service: Cardiology;  Laterality: Left;    SMALL BOWEL ENTEROSCOPY N/A 03/06/2024    Procedure: ENTEROSCOPY;  Surgeon: Bria Hand MD;  Location: [facility] ENDO;  Service: Endoscopy;  Laterality: N/A;    SMALL BOWEL ENTEROSCOPY N/A 07/30/2024    Procedure: ENTEROSCOPY;  Surgeon: Charles Hawthorne MD;  Location: [facility] ENDO;  Service: Endoscopy;  Laterality: N/A;     Family History   Problem Relation Name Age of Onset    Cancer Father      Cancer Sister      Glaucoma Neg Hx      Macular degeneration Neg Hx       Social History[1]     Review of patient's allergies indicates:  No Known Allergies    Medications Ordered Prior to Encounter[2]      Review of Systems     Constitutional: Negative unless otherwise commented above  HENT: Negative unless otherwise commented above  Eyes: Negative unless otherwise commented above  Respiratory: Negative unless otherwise commented above  Cardiovascular: Negative unless otherwise commented above  Musculoskeletal: Negative unless otherwise commented above  Skin: Negative unless otherwise commented above  Neurological: Negative unless otherwise commented above  Hematological: Negative unless otherwise commented above  Endocrine: Negative unless otherwise commented above    OBJECTIVE:     Vital Signs (Most Recent)  Visit Vitals  BP (!) 165/67 (BP Location: Left arm, Patient Position: Sitting)   Pulse 74   Temp 97.8 °F (36.6 °C) (Temporal)   Resp 20   Ht 5' 1" (1.549 m)   Wt 51.4 kg (113 lb 5.1 oz)   LMP  (LMP Unknown)   SpO2 99%   BMI 21.41 kg/m²     5' 1" (1.549 m)  51.4 kg (113 lb 5.1 oz)     SpO2: 99 %      Physical Exam:    General: diminutive, NAD on RA   Eye: Extraocular movements are intact, Normal conjunctiva.  No scleral icterus  HENT: Normocephalic, Oral mucosa is moist, No pharyngeal erythema, clear oropharynx  Neck: Supple, Non-tender, No " jugular venous distention.  Respiratory: clear w/o wheezes, rhonchi   Cardiovascular: RRR   Extremities: no c/c/e   Gastrointestinal: Soft, Non-tender, Non-distended   Lymphatics: No lymphadenopathy neck, supraclavicular.   Integumentary: Warm, Dry.   Neurologic: Alert, Oriented, Normal motor function, No focal defects.         Echocardiogram  Results for orders placed during the hospital encounter of 02/20/24    Echo Saline Bubble? No    Interpretation Summary    Left Ventricle: There is mild concentric hypertrophy. There is normal systolic function with a visually estimated ejection fraction of 60 - 65%. There is normal diastolic function.    Right Ventricle: Normal right ventricular cavity size. Wall thickness is normal. Right ventricle wall motion  is normal. Systolic function is normal.    Aortic Valve: There is mild stenosis. Aortic valve area by VTI is 1.66 cm². Aortic valve peak velocity is 2.17 m/s. Mean gradient is 9 mmHg. The dimensionless index is 0.58. There is mild aortic regurgitation.    Pulmonary Artery: The estimated pulmonary artery systolic pressure is 30 mmHg.    IVC/SVC: Intermediate venous pressure at 8 mmHg.    Most Recent Nuclear Stress Test Results  Results for orders placed during the hospital encounter of 04/18/23    Nuclear Stress - Cardiology Interpreted    Interpretation Summary    Normal myocardial perfusion scan. There is no evidence of myocardial ischemia or infarction.    The gated perfusion images showed an ejection fraction of 55% post stress.    There is normal wall motion post stress.    LV cavity size is normal at rest and normal at stress.    The ECG portion of the study is abnormal but not diagnostic.    The patient reported chest pain during the stress test.    During stress, occasional PVCs are noted.    Most Recent Cardiac PET Stress Test Results  No results found for this or any previous visit.    Most Recent Cardiovascular Angiogram  Results for orders placed during the  hospital encounter of 05/19/20    Cardiac catheterization    Conclusion  · Three vessel coronary artery disease.  · Mid LAD lesion , 85% stenosed.  · Ost 2nd Diag lesion , 80% stenosed.  · Dist Cx lesion , 90% stenosed.  · Ost 3rd Mrg lesion , 90% stenosed.  · RPDA lesion , 80% stenosed.  · LV end diastolic pressure is severely elevated.    I certify that I was present for catheter insertion, catheter manipulation, angiography, and angiographic interpretation of this patient.    Procedure Log documented by Documenter: Camille Faith RN and verified by Heather Carbajal MD.    Date: 5/22/2020  Time: 1:10 PM        SpO2: 99 %      Significant Imaging:     Narrative & Impression  EXAMINATION:  CT CHEST LUNG SCREENING LOW DOSE     CLINICAL HISTORY:  Lung cancer screening, >= 20 pk-yr smoking history, risk factor(s) (Age >= 50y); Personal history of nicotine dependence     TECHNIQUE:  CT of the thorax was performed with low dose, lung screening protocol.  No contrast was administered.  Sagittal and coronal reconstructions were obtained.     COMPARISON:  Multiple prior chest radiographs, most recent 09/22/2024; CT abdomen pelvis 09/22/2024     FINDINGS:  Lungs: The largest opacity in the right lung appears part solid and measures 2.2 cm, solid component measures 0.9 cm, on series 4, image 93.  Additional adjacent smaller ground-glass nodules in the posterior right upper lobe.  The lungs show findings consistent with emphysema.  Left basilar ground-glass opacities, likely atelectasis.  Calcified pulmonary nodules and right hilar lymph nodes in keeping with prior granulomatous disease.     Pleura:   No effusion..     Heart and pericardium: Normal size without effusion.     Aorta and vasculature: Atherosclerosis including coronary arteries.     Chest wall and skeletal structures: Median sternotomy wires are intact and aligned.  Age-appropriate degenerative changes.     Upper abdomen: Unchanged pneumobilia.     Impression:      Lung-RADS Category: 4B - Suspicious - consultation advised - possible next steps  Chest CT, tissue sampling and-or PET/CT.     Clinically or potentially clinically significant non lung cancer finding:  None.     Prior Lung Cancer Modifier:  No history of prior lung cancer.     This report was flagged in Epic as abnormal.        Electronically signed by:Jackeline Morales  Date:                                            03/17/2025  Time:                                           15:31  Test(s) Reviewed  I have personally reviewed and interpreted the following in detail with patient and daughter:  [] Chest Xray Images   [x] CT Images   [] Echocardiogram   [] Labs   [] Other:       Assessment/ Plan:     Abnormal chest CT  -     CT Chest Without Contrast; Future; Expected date: 06/25/2025    Centrilobular emphysema  -     albuterol (PROVENTIL/VENTOLIN HFA) 90 mcg/actuation inhaler; Inhale 2 puffs into the lungs every 4 (four) hours as needed for Wheezing. Rescue  Dispense: 18 g; Refill: 5    Lung nodule  -     Ambulatory referral/consult to Pulmonology    Type 2 diabetes mellitus with diabetic polyneuropathy, without long-term current use of insulin    Solitary pulmonary nodule         Assessment & Plan    Reviewed history, including recent hospitalization for uncontrolled BP and prior heart surgery in May 2020.  Evaluated recent abnormal CT Chest, noting a patchy area in the right upper lobe.  Considered differential diagnoses including scar tissue and potential lung cancer.  Assessed current respiratory status, finding no immediate concerns.    LUNG ABNORMALITY:  - Explained the nature of the lung abnormality seen on CT, including the possibility of it being scar tissue or potentially cancerous.  - Discussed the importance of monitoring the lung abnormality over time.  - Ordered follow-up CT Chest in 3 months to monitor the lung abnormality for any changes, as her glucoses are too high to perform a standard PET ( of note  is that we will cancel the ordered Dotatate PET, which is for carcinoid). I do not feel we need to immediately proceed with robotic bronchoscopy (multiple cardiac issues increase the risk of complications) .    CHRONIC OBSTRUCTIVE PULMONARY DISEASE:  - Started albuterol inhaler as needed for shortness of breath.    TYPE 2 DIABETES MELLITUS:  - Informed patient about the limitations of performing a PET scan with uncontrolled diabetes.    FOLLOW-UP:  - Follow up in 3 months after CT in regular office across from [facility].          No follow-ups on file.     See labs and med orders.    Medications have been reviewed and reconciled.      Portions of this note may have been created with voice recognition software.  Grammatical, syntax and spelling errors may be inevitable.      This note was generated with the assistance of ambient listening technology. Verbal consent was obtained by the patient and accompanying visitor(s) for the recording of patient appointment to facilitate this note. I attest to having reviewed and edited the generated note for accuracy, though some syntax or spelling errors may persist. Please contact the author of this note for any clarification.            [1]   Social History  Tobacco Use    Smoking status: Former     Current packs/day: 0.00     Average packs/day: 0.5 packs/day for 40.0 years (20.0 ttl pk-yrs)     Types: Cigarettes     Start date: 1980     Quit date: 2020     Years since quittin.8    Smokeless tobacco: Never   Substance Use Topics    Alcohol use: Not Currently     Comment: sober 2 years 3 months    Drug use: No   [2]   Current Outpatient Medications on File Prior to Visit   Medication Sig Dispense Refill    amiodarone (PACERONE) 200 MG Tab Take 0.5 tablets (100 mg total) by mouth once daily. 15 tablet 11    atorvastatin (LIPITOR) 10 MG tablet TAKE 1 TABLET EVERY EVENING 90 tablet 2    azelastine (ASTELIN) 137 mcg (0.1 %) nasal spray 1 spray (137 mcg  total) by Nasal route 2 (two) times daily. 30 mL 5    blood sugar diagnostic Strp To check BG 3 times daily, to use with insurance preferred meter 100 each 11    blood-glucose meter kit To check BG 3 times daily, to use with insurance preferred meter 1 each 0    cloNIDine (CATAPRES) 0.1 MG tablet Take one tablet by mouth ONLY IF blood pressure is greater than or equal to 180/110. 60 tablet 3    clopidogreL (PLAVIX) 75 mg tablet TAKE 1 TABLET EVERY DAY 90 tablet 3    cyanocobalamin (VITAMIN B-12) 1000 MCG tablet Take 1 tablet (1,000 mcg total) by mouth once daily. 30 tablet 1    esomeprazole (NEXIUM) 40 MG capsule Take 1 capsule (40 mg total) by mouth 2 (two) times daily before meals. Take on empty stomach 60 capsule 1    famotidine (PEPCID) 40 MG tablet Take 1 tablet (40 mg total) by mouth 2 (two) times daily. 180 tablet 3    ferrous sulfate (FEOSOL) 325 mg (65 mg iron) Tab tablet Take 1 tablet (325 mg total) by mouth daily with breakfast. 30 tablet 1    fluticasone-umeclidin-vilanter (TRELEGY ELLIPTA) 100-62.5-25 mcg DsDv Inhale 1 puff into the lungs once daily. 90 each 3    insulin aspart U-100 (NOVOLOG FLEXPEN U-100 INSULIN) 100 unit/mL (3 mL) InPn pen Administer per sliding scale three times daily before meals, max daily dose 20 units 3 mL 11    lancets Misc To check BG 3 times daily, to use with insurance preferred meter 100 each 11    latanoprost 0.005 % ophthalmic solution Place 1 drop into both eyes every evening.      magnesium oxide (MAG-OX) 400 mg (241.3 mg magnesium) tablet Take 1 tablet (400 mg total) by mouth once daily. 30 tablet 0    metFORMIN (GLUCOPHAGE-XR) 500 MG ER 24hr tablet TAKE 2 TABLETS ONE TIME DAILY 180 tablet 1    metoprolol succinate (TOPROL-XL) 25 MG 24 hr tablet Take 3 tablets (75 mg total) by mouth once daily. 90 tablet 1    NIFEdipine (PROCARDIA-XL) 60 MG (OSM) 24 hr tablet Take 1 tablet (60 mg total) by mouth once daily. 30 tablet 11    pen needle, diabetic (BD ULTRA-FINE PARISH PEN  "NEEDLE) 32 gauge x 5/32" Ndle Uses 4 daily, on multiple daily insulin injections 150 each 12    potassium chloride SA (K-DUR,KLOR-CON) 20 MEQ tablet Take 1 tablet (20 mEq total) by mouth once daily. 7 tablet 0    sacubitriL-valsartan (ENTRESTO)  mg per tablet Take 1 tablet by mouth 2 (two) times daily. 180 tablet 3    spironolactone (ALDACTONE) 25 MG tablet TAKE 1 TABLET EVERY DAY 90 tablet 3    sucralfate (CARAFATE) 1 gram tablet Take 1 tablet (1 g total) by mouth 4 (four) times daily before meals and nightly. 30 tablet 0    timolol maleate 0.5% (TIMOPTIC) 0.5 % Drop Place 1 drop into the left eye 2 (two) times daily.      aspirin (ECOTRIN) 81 MG EC tablet Take 1 tablet (81 mg total) by mouth once daily. 30 tablet 0     No current facility-administered medications on file prior to visit.     "

## 2025-04-05 DIAGNOSIS — I50.42 CHRONIC COMBINED SYSTOLIC AND DIASTOLIC HEART FAILURE: ICD-10-CM

## 2025-04-05 NOTE — TELEPHONE ENCOUNTER
Care Due:                  Date            Visit Type   Department     Provider  --------------------------------------------------------------------------------                                EP -                              PRIMARY      [facility] FAMILY  Last Visit: 12-      CARE ([facility])   MIKAEL Garcia                              EP -                              [facility]  Next Visit: 05-      CARE ([facility])   [facility]       Franklin Garcia                                                            Last  Test          Frequency    Reason                     Performed    Due Date  --------------------------------------------------------------------------------    eGFR........  12 months..  sacubitriL-valsartan.....  06-   06-    Health [facility] Embedded Care Due Messages. Reference number: [id number].   4/05/2025 2:41:53 AM CDT

## 2025-04-07 RX ORDER — SACUBITRIL AND VALSARTAN 97; 103 MG/1; MG/1
1 TABLET, FILM COATED ORAL 2 TIMES DAILY
Qty: 180 TABLET | Refills: 2 | Status: SHIPPED | OUTPATIENT
Start: 2025-04-07

## 2025-04-07 NOTE — TELEPHONE ENCOUNTER
Refill Routing Note   Medication(s) are not appropriate for processing by [facility] for the following reason(s):        Required vitals abnormal    [facility] action(s):  Defer   Requires labs : Yes             Appointments  past 12m or future 3m with PCP    Date Provider   Last Visit   12/20/2024 Franklin Garcia MD   Next Visit   5/20/2025 Franklin Garcia MD   ED visits in past 90 days: 0        Note composed:8:24 AM 04/07/2025

## 2025-04-12 NOTE — TELEPHONE ENCOUNTER
Refill Routing Note   Medication(s) are not appropriate for processing by [facility] for the following reason(s):        Required vitals abnormal    [facility] action(s):  Defer             Appointments  past 12m or future 3m with PCP    Date Provider   Last Visit   12/20/2024 Franklin Garcia MD   Next Visit   5/20/2025 Franklin Garcia MD   ED visits in past 90 days: 0        Note composed:1:35 PM 04/12/2025

## 2025-04-12 NOTE — TELEPHONE ENCOUNTER
No care due was identified.  [facility] Embedded Care Due Messages. Reference number: [id number].   4/12/2025 2:28:16 AM CDT   Mercedes Flap Text: The defect edges were debeveled with a #15 scalpel blade.  Given the location of the defect, shape of the defect and the proximity to free margins a Mercedes flap was deemed most appropriate.  Using a sterile surgical marker, an appropriate advancement flap was drawn incorporating the defect and placing the expected incisions within the relaxed skin tension lines where possible. The area thus outlined was incised deep to adipose tissue with a #15 scalpel blade.  The skin margins were undermined to an appropriate distance in all directions utilizing iris scissors.

## 2025-04-14 RX ORDER — SPIRONOLACTONE 25 MG/1
25 TABLET ORAL
Qty: 90 TABLET | Refills: 3 | Status: SHIPPED | OUTPATIENT
Start: 2025-04-14

## 2025-04-15 DIAGNOSIS — I10 HYPERTENSION, UNSPECIFIED TYPE: Primary | ICD-10-CM

## 2025-04-15 RX ORDER — HYDRALAZINE HYDROCHLORIDE 25 MG/1
25 TABLET, FILM COATED ORAL 3 TIMES DAILY
Qty: 270 TABLET | Refills: 3 | Status: SHIPPED | OUTPATIENT
Start: 2025-04-15

## 2025-04-23 NOTE — TELEPHONE ENCOUNTER
Refill Routing Note   Medication(s) are not appropriate for processing by [facility] for the following reason(s):        Required labs outdated  Drug-drug interaction    [facility] action(s):  Defer             Appointments  past 12m or future 3m with PCP    Date Provider   Last Visit   12/20/2024 Franklin Garcia MD   Next Visit   5/20/2025 Franklin Garcia MD   ED visits in past 90 days: 0        Note composed:2:55 PM 04/23/2025

## 2025-04-23 NOTE — TELEPHONE ENCOUNTER
No care due was identified.  [facility] Embedded Care Due Messages. Reference number: [id number].   4/23/2025 11:35:00 AM CDT

## 2025-04-24 RX ORDER — CLOPIDOGREL BISULFATE 75 MG/1
75 TABLET ORAL
Qty: 90 TABLET | Refills: 3 | Status: SHIPPED | OUTPATIENT
Start: 2025-04-24

## 2025-04-25 ENCOUNTER — OFFICE VISIT (OUTPATIENT)
Dept: FAMILY MEDICINE | Facility: CLINIC | Age: 75
End: 2025-04-25
Payer: MEDICARE

## 2025-04-25 VITALS
OXYGEN SATURATION: 99 % | BODY MASS INDEX: 20.75 KG/M2 | HEART RATE: 79 BPM | SYSTOLIC BLOOD PRESSURE: 155 MMHG | WEIGHT: 109.88 LBS | HEIGHT: 61 IN | DIASTOLIC BLOOD PRESSURE: 77 MMHG

## 2025-04-25 DIAGNOSIS — Z09 HOSPITAL DISCHARGE FOLLOW-UP: ICD-10-CM

## 2025-04-25 DIAGNOSIS — I10 UNCONTROLLED HYPERTENSION: Primary | ICD-10-CM

## 2025-04-25 DIAGNOSIS — E11.42 TYPE 2 DIABETES MELLITUS WITH DIABETIC POLYNEUROPATHY, WITHOUT LONG-TERM CURRENT USE OF INSULIN: ICD-10-CM

## 2025-04-25 DIAGNOSIS — I50.42 CHRONIC COMBINED SYSTOLIC AND DIASTOLIC HEART FAILURE: ICD-10-CM

## 2025-04-25 DIAGNOSIS — K21.9 GASTROESOPHAGEAL REFLUX DISEASE WITHOUT ESOPHAGITIS: ICD-10-CM

## 2025-04-25 PROCEDURE — 99999 PR PBB SHADOW E&M-EST. PATIENT-LVL V: CPT | Mod: PBBFAC,,, | Performed by: NURSE PRACTITIONER

## 2025-04-25 NOTE — PROGRESS NOTES
THIS DOCUMENT WAS MADE IN PART WITH VOICE RECOGNITION SOFTWARE.  OCCASIONALLY THIS SOFTWARE WILL MISINTERPRET WORDS OR PHRASES.     Assessment and Plan:    Uncontrolled hypertension  Comments:  improved with increase in hydralazine.   Continue current regimen for now  Monitor BP daily, keep log, bring log to follow up with PCP    Hospital discharge follow-up    Type 2 diabetes mellitus with diabetic polyneuropathy, without long-term current use of insulin  Comments:  Uncontrolled  A1c 9.3  Continue metformin  Advised on adjusting sliding scale  Recheck A1c 3 months  Follow up PCP    Chronic combined systolic and diastolic heart failure  Comments:  Stable  Continue regimen  Keep follow up with cardiologist    Gastroesophageal reflux disease without esophagitis  Comments:  Symptoms improved  Continue Carafate and Protonix  Follow up with GI             Visit summary:    Hospital course and diagnostic studies reviewed in detail with patient during today's visit.    Advised to keep follow up with cardiologist and GI.     Emergent conditions/ER precautions discussed.      Follow up for Follow-up with PCP- Dr. Garcia as scheduled.   ______________________________________________________________________  Subjective:    Chief Complaint:  Hospital follow up    HPI:  Nadia is a 74 y.o. year old female with a past medical history noted below, here for hospital follow up.  Patient was admitted to [place] on April 20th after presenting to the ER complaining of chest/epigastric pain.  Patient's blood pressure was significantly elevated at that time.  Stress test was negative.  GI was consulted to evaluate epigastric pain.  EGD completed- negative.  Blood pressure medications were adjusted.  She is now taking hydralazine 50 mg TID.  She is also taking spironolactone 25 mg daily.  Metoprolol 75 mg daily, nifedipine 60 mg and clonidine PRN.  She was discharged on April 22nd.  Advised to follow up with PCP, Cardiology and GI.   She  has follow up with Cardiology- Dr. Hernandez in August.   Patient thinks she has follow up with GI next week  She was discharged on Carafate and Protonix- reports improvement epigastric pain.   Patient reports that her blood sugar has been  elevated over the past month.   She did not bring log- states some readings in the 200-300 range.   States she is taking her metformin as prescribed.  She is also on sliding scale insulin- low intensity.  Max dose 4 Units.           See hospital course below:    Hospital Course: Ms. Irene is a pleasant 74-year-old female patient with known past medical history significant for hypertension, CAD, type 2 diabetes and glaucoma with Past Surgical history of a CABG.Patient presented to the emergency department today with the complaint of chest pain. Her stress test was negative. GI has been consulted to eval epigastric pain, hx of gastric ulcer with bleeding. She was taken for an EGD and it was negative. Her blood pressure medications were adjusted for better blood pressure control contributing to her headache and chest pain. She felt much better after bp was controlled and with she received tylenol. She has been instructed to follow up with cardiology and gi outpatient.   She was seen and examined at discharge and stable, she has returned to her baseline with no further chest pain. She was having some abdominal pain post egd , her daughter got her something to drink and it improved. She has been instructed to return for any new or worsening s&s.         Medications:  Medications Ordered Prior to Encounter[1]    Review of Systems:  Review of Systems   Constitutional:  Negative for fatigue, fever and unexpected weight change.   HENT:  Positive for postnasal drip. Negative for congestion and rhinorrhea.    Respiratory:  Negative for cough and shortness of breath.    Cardiovascular:  Negative for chest pain.   Gastrointestinal:  Negative for abdominal distention, abdominal pain,  "constipation, diarrhea, nausea and vomiting.   Genitourinary:  Negative for difficulty urinating and dysuria.   Musculoskeletal:  Positive for arthralgias. Negative for back pain.   Neurological:  Negative for dizziness and headaches.       Past Medical History:  Past Medical History:   Diagnosis Date    Anemia     Biliary obstruction     Cholangitis     Diabetes mellitus     pt denies, does not take meds 11/2016    EtOH dependence     GERD (gastroesophageal reflux disease)     HTN (hypertension) 12/30/2013       Objective:    Vitals:  Vitals:    04/25/25 0942 04/25/25 1010   BP: (!) 160/87 (!) 155/77   Pulse: 79    SpO2: 99%    Weight: 49.9 kg (109 lb 14.4 oz)    Height: 5' 1" (1.549 m)    PainSc: 0-No pain        Physical Exam  Vitals and nursing note reviewed.   Constitutional:       General: She is not in acute distress.  HENT:      Head: Normocephalic and atraumatic.   Eyes:      Pupils: Pupils are equal, round, and reactive to light.   Cardiovascular:      Rate and Rhythm: Normal rate and regular rhythm.      Heart sounds: No murmur heard.     No friction rub.   Pulmonary:      Effort: Pulmonary effort is normal.      Breath sounds: Normal breath sounds.   Abdominal:      General: Bowel sounds are normal. There is no distension.      Palpations: Abdomen is soft.      Tenderness: There is no abdominal tenderness.   Musculoskeletal:      Cervical back: Neck supple.   Skin:     General: Skin is warm and dry.      Findings: No rash.   Psychiatric:         Behavior: Behavior normal.         Data:  Lab Results   Component Value Date    HGBA1C 9.3 (H) 04/21/2025    HGBA1C 7.2 (H) 12/17/2024    HGBA1C 6.3 (H) 09/23/2024    CMP  Sodium   Date Value Ref Range Status   04/22/2025 141 136 - 145 mmol/L Final   10/14/2024 141 136 - 145 mmol/L Final     Potassium   Date Value Ref Range Status   04/22/2025 3.4 (L) 3.5 - 5.1 mmol/L Final   10/14/2024 4.0 3.5 - 5.1 mmol/L Final     Chloride   Date Value Ref Range Status "   10/14/2024 108 95 - 110 mmol/L Final     CO2   Date Value Ref Range Status   10/14/2024 24 23 - 29 mmol/L Final     Carbon Dioxide   Date Value Ref Range Status   04/22/2025 25 21 - 32 mmol/L Final     Glucose   Date Value Ref Range Status   10/14/2024 123 (H) 70 - 110 mg/dL Final     BUN   Date Value Ref Range Status   04/22/2025 13.0 7.0 - 18.0 mg/dL Final   10/14/2024 14 8 - 23 mg/dL Final     Creatinine   Date Value Ref Range Status   04/22/2025 0.84 0.51 - 0.95 mg/dL Final   10/14/2024 1.0 0.5 - 1.4 mg/dL Final     Calcium   Date Value Ref Range Status   04/22/2025 9.2 8.5 - 10.1 mg/dL Final   10/14/2024 9.4 8.7 - 10.5 mg/dL Final     Total Protein   Date Value Ref Range Status   10/14/2024 6.9 6.0 - 8.4 g/dL Final     Albumin   Date Value Ref Range Status   04/22/2025 3.3 (L) 3.4 - 5.0 g/dL Final   10/14/2024 3.5 3.5 - 5.2 g/dL Final     Total Bilirubin   Date Value Ref Range Status   04/22/2025 1.3 0.2 - 1.3 mg/dL Final   10/14/2024 0.3 0.1 - 1.0 mg/dL Final     Comment:     For infants and newborns, interpretation of results should be based  on gestational age, weight and in agreement with clinical  observations.    Premature Infant recommended reference ranges:  Up to 24 hours.............<8.0 mg/dL  Up to 48 hours............<12.0 mg/dL  3-5 days..................<15.0 mg/dL  6-29 days.................<15.0 mg/dL       Alkaline Phosphatase   Date Value Ref Range Status   10/14/2024 78 55 - 135 U/L Final     AST   Date Value Ref Range Status   04/22/2025 17 15 - 37 U/L Final   10/14/2024 20 10 - 40 U/L Final     ALT   Date Value Ref Range Status   04/22/2025 18 13 - 61 U/L Final   10/14/2024 13 10 - 44 U/L Final     Anion Gap   Date Value Ref Range Status   10/14/2024 9 8 - 16 mmol/L Final     eGFR   Date Value Ref Range Status   04/22/2025 74 (L) >=90 mL/min/1.73m2 Final     Comment:     Calculation based on the Chronic Kidney Disease Epidemiology Collaboration (CKD-EPI) equation refit without adjustment  for race.   10/14/2024 59.9 (A) >60 mL/min/1.73 m^2 Final     Medical history reviewed, Medications reconciled.              BRENNA Chand-KAYLEE  Family Medicine         [1]   Current Outpatient Medications on File Prior to Visit   Medication Sig Dispense Refill    albuterol (PROVENTIL/VENTOLIN HFA) 90 mcg/actuation inhaler Inhale 2 puffs into the lungs every 4 (four) hours as needed for Wheezing. Rescue 18 g 5    amiodarone (PACERONE) 200 MG Tab Take 0.5 tablets (100 mg total) by mouth once daily. 15 tablet 11    aspirin (ECOTRIN) 81 MG EC tablet Take 1 tablet (81 mg total) by mouth once daily. 30 tablet 0    atorvastatin (LIPITOR) 10 MG tablet TAKE 1 TABLET EVERY EVENING 90 tablet 2    azelastine (ASTELIN) 137 mcg (0.1 %) nasal spray 1 spray (137 mcg total) by Nasal route 2 (two) times daily. 30 mL 5    blood sugar diagnostic Strp To check BG 3 times daily, to use with insurance preferred meter 100 each 11    blood-glucose meter kit To check BG 3 times daily, to use with insurance preferred meter 1 each 0    cloNIDine (CATAPRES) 0.1 MG tablet Take one tablet by mouth ONLY IF blood pressure is greater than or equal to 180/110. 60 tablet 3    clopidogreL (PLAVIX) 75 mg tablet TAKE 1 TABLET EVERY DAY 90 tablet 3    cyanocobalamin (VITAMIN B-12) 1000 MCG tablet Take 1 tablet (1,000 mcg total) by mouth once daily. 30 tablet 1    ENTRESTO  mg per tablet TAKE 1 TABLET TWICE DAILY 180 tablet 2    esomeprazole (NEXIUM) 40 MG capsule Take 1 capsule (40 mg total) by mouth 2 (two) times daily before meals. Take on empty stomach 60 capsule 1    famotidine (PEPCID) 40 MG tablet Take 1 tablet (40 mg total) by mouth 2 (two) times daily. 180 tablet 3    ferrous sulfate (FEOSOL) 325 mg (65 mg iron) Tab tablet Take 1 tablet (325 mg total) by mouth daily with breakfast. 30 tablet 1    fluticasone-umeclidin-vilanter (TRELEGY ELLIPTA) 100-62.5-25 mcg DsDv Inhale 1 puff into the lungs once daily. 90 each 3    hydrALAZINE  "(APRESOLINE) 25 MG tablet TAKE 1 TABLET THREE TIMES DAILY 270 tablet 3    insulin aspart U-100 (NOVOLOG FLEXPEN U-100 INSULIN) 100 unit/mL (3 mL) InPn pen Administer per sliding scale three times daily before meals, max daily dose 20 units 3 mL 11    lancets Misc To check BG 3 times daily, to use with insurance preferred meter 100 each 11    latanoprost 0.005 % ophthalmic solution Place 1 drop into both eyes every evening.      magnesium oxide (MAG-OX) 400 mg (241.3 mg magnesium) tablet Take 1 tablet (400 mg total) by mouth once daily. 30 tablet 0    metFORMIN (GLUCOPHAGE-XR) 500 MG ER 24hr tablet TAKE 2 TABLETS ONE TIME DAILY 180 tablet 1    metoprolol succinate (TOPROL-XL) 25 MG 24 hr tablet Take 3 tablets (75 mg total) by mouth once daily. 90 tablet 1    NIFEdipine (PROCARDIA-XL) 60 MG (OSM) 24 hr tablet Take 1 tablet (60 mg total) by mouth once daily. 30 tablet 11    pen needle, diabetic (BD ULTRA-FINE PARISH PEN NEEDLE) 32 gauge x 5/32" Ndle Uses 4 daily, on multiple daily insulin injections 150 each 12    potassium chloride SA (K-DUR,KLOR-CON) 20 MEQ tablet Take 1 tablet (20 mEq total) by mouth once daily. 7 tablet 0    spironolactone (ALDACTONE) 25 MG tablet TAKE 1 TABLET EVERY DAY 90 tablet 3    sucralfate (CARAFATE) 1 gram tablet Take 1 tablet (1 g total) by mouth 4 (four) times daily before meals and nightly. 30 tablet 0    timolol maleate 0.5% (TIMOPTIC) 0.5 % Drop Place 1 drop into the left eye 2 (two) times daily.       Current Facility-Administered Medications on File Prior to Visit   Medication Dose Route Frequency Provider Last Rate Last Admin    [DISCONTINUED] aspirin EC tablet  81 mg Oral  Provider, Generic External Data        [DISCONTINUED] budesonide nebulizer solution  0.25 mg Inhalation  Provider, Generic External Data        [DISCONTINUED] clopidogreL tablet  75 mg Oral  Provider, Generic External Data        [DISCONTINUED] dextrose 40 % gel  15 g Oral  Provider, Generic External Data        " [DISCONTINUED] dextrose 50 % in water (D50W) injection  25 g Intravenous  Provider, Generic External Data        [DISCONTINUED] formoterol nebulizer solution  20 mcg Inhalation  Provider, Generic External Data        [DISCONTINUED] glucagon injection  1 mg Intramuscular  Provider, Generic External Data        [DISCONTINUED] hydrALAZINE tablet  50 mg Oral  Provider, Generic External Data        [DISCONTINUED] insulin lispro injection  0-18 Units Subcutaneous  Provider, Generic External Data        [DISCONTINUED] ipratropium 0.02 % nebulizer solution  0.5 mg Inhalation  Provider, Generic External Data        [DISCONTINUED] pantoprazole injection  40 mg Intravenous  Provider, Generic External Data        [DISCONTINUED] sucralfate tablet  1 g Oral  Provider, Generic External Data

## 2025-04-25 NOTE — PROGRESS NOTES
THIS DOCUMENT WAS MADE IN PART WITH VOICE RECOGNITION SOFTWARE.  OCCASIONALLY THIS SOFTWARE WILL MISINTERPRET WORDS OR PHRASES.     Assessment and Plan:    Uncontrolled hypertension    Hospital discharge follow-up    Type 2 diabetes mellitus with diabetic polyneuropathy, without long-term current use of insulin    Chronic combined systolic and diastolic heart failure             Visit summary:  Assessment & Plan               Advised on diagnosis, medications and symptomatic treatment.      No follow-ups on file.   ______________________________________________________________________  Subjective:    History of Present Illness               Follow up with Cardiology      COPD-  Rx- Trelegy      Paroxysmal atrial fibrillation  History of, maintains normal sinus rhythm with occasional PACs   Rx-dual antiplatelet therapy, beta-blocker     Combined systolic, diastolic congestive heart failure  Previous echocardiogram 05/26/2022-grade 2 diastolic dysfunction, ejection fraction 40%  Cardiology- MD Mary   Rx-Aldactone 25 mg, beta-blocker, Entresto, Lasix 20 mg      Valvular heart disease  05/26/2022-echocardiogram shows moderate aortic regurgitation, tricuspid regurgitation, pulmonic regurgitation, mitral regurgitation     Coronary artery disease status post CABG x5 (5/29/2020), dyslipidemia  MAGGIE :  X1 placed late 2023  Cardiology- MD Mary   Rx-aspirin 81 mg, atorvastatin 10 mg, Plavix 75 mg  Negative nuclear stress test 04/2023  Previous lipid panel acceptable     Essential hypertension  Rx-Entresto, metoprolol 25 mg, spironolactone 25 mg, hydralazine 50TID, Clonidine 0.1 BID, nifedipine       Bilateral carotid artery stenosis  Currently on statin  05/27/2020-carotid ultrasound shows less than 50% stenosis right carotid artery, left internal carotid artery with 50-69% stenosis     Type 2 diabetes mellitus with associated polyneuropathy  Previous A1c-9.3  Rx-metformin extended release 1000 mg by mouth daily, sliding  "scale insulin, low intensity-      GERD  Rx : pantoprazole      Nicotine dependence  Prev rx : Patches (didn't work)     Chronic back pain   Rx-tramadol   Medications:  Medications Ordered Prior to Encounter[1]    Review of Systems:  Review of Systems    Past Medical History:  Past Medical History:   Diagnosis Date    Anemia     Biliary obstruction     Cholangitis     Diabetes mellitus     pt denies, does not take meds 11/2016    EtOH dependence     GERD (gastroesophageal reflux disease)     HTN (hypertension) 12/30/2013       Objective:    Vitals:  Vitals:    04/25/25 0942   BP: (!) 180/78   Pulse: 79   SpO2: 99%   Weight: 49.9 kg (109 lb 14.4 oz)   Height: 5' 1" (1.549 m)   PainSc: 0-No pain       Physical Exam    Data:  Lab Results   Component Value Date    HGBA1C 9.3 (H) 04/21/2025    HGBA1C 7.2 (H) 12/17/2024    HGBA1C 6.3 (H) 09/23/2024    .CMP  Sodium   Date Value Ref Range Status   04/22/2025 141 136 - 145 mmol/L Final   10/14/2024 141 136 - 145 mmol/L Final     Potassium   Date Value Ref Range Status   04/22/2025 3.4 (L) 3.5 - 5.1 mmol/L Final   10/14/2024 4.0 3.5 - 5.1 mmol/L Final     Chloride   Date Value Ref Range Status   10/14/2024 108 95 - 110 mmol/L Final     CO2   Date Value Ref Range Status   10/14/2024 24 23 - 29 mmol/L Final     Carbon Dioxide   Date Value Ref Range Status   04/22/2025 25 21 - 32 mmol/L Final     Glucose   Date Value Ref Range Status   10/14/2024 123 (H) 70 - 110 mg/dL Final     BUN   Date Value Ref Range Status   04/22/2025 13.0 7.0 - 18.0 mg/dL Final   10/14/2024 14 8 - 23 mg/dL Final     Creatinine   Date Value Ref Range Status   04/22/2025 0.84 0.51 - 0.95 mg/dL Final   10/14/2024 1.0 0.5 - 1.4 mg/dL Final     Calcium   Date Value Ref Range Status   04/22/2025 9.2 8.5 - 10.1 mg/dL Final   10/14/2024 9.4 8.7 - 10.5 mg/dL Final     Total Protein   Date Value Ref Range Status   10/14/2024 6.9 6.0 - 8.4 g/dL Final     Albumin   Date Value Ref Range Status   04/22/2025 3.3 (L) " 3.4 - 5.0 g/dL Final   10/14/2024 3.5 3.5 - 5.2 g/dL Final     Total Bilirubin   Date Value Ref Range Status   04/22/2025 1.3 0.2 - 1.3 mg/dL Final   10/14/2024 0.3 0.1 - 1.0 mg/dL Final     Comment:     For infants and newborns, interpretation of results should be based  on gestational age, weight and in agreement with clinical  observations.    Premature Infant recommended reference ranges:  Up to 24 hours.............<8.0 mg/dL  Up to 48 hours............<12.0 mg/dL  3-5 days..................<15.0 mg/dL  6-29 days.................<15.0 mg/dL       Alkaline Phosphatase   Date Value Ref Range Status   10/14/2024 78 55 - 135 U/L Final     AST   Date Value Ref Range Status   04/22/2025 17 15 - 37 U/L Final   10/14/2024 20 10 - 40 U/L Final     ALT   Date Value Ref Range Status   04/22/2025 18 13 - 61 U/L Final   10/14/2024 13 10 - 44 U/L Final     Anion Gap   Date Value Ref Range Status   10/14/2024 9 8 - 16 mmol/L Final     eGFR   Date Value Ref Range Status   04/22/2025 74 (L) >=90 mL/min/1.73m2 Final     Comment:     Calculation based on the Chronic Kidney Disease Epidemiology Collaboration (CKD-EPI) equation refit without adjustment for race.   10/14/2024 59.9 (A) >60 mL/min/1.73 m^2 Final          Medical history reviewed, Medications reconciled.          EJ Chand  Family Medicine           [1]   Current Outpatient Medications on File Prior to Visit   Medication Sig Dispense Refill    albuterol (PROVENTIL/VENTOLIN HFA) 90 mcg/actuation inhaler Inhale 2 puffs into the lungs every 4 (four) hours as needed for Wheezing. Rescue 18 g 5    amiodarone (PACERONE) 200 MG Tab Take 0.5 tablets (100 mg total) by mouth once daily. 15 tablet 11    aspirin (ECOTRIN) 81 MG EC tablet Take 1 tablet (81 mg total) by mouth once daily. 30 tablet 0    atorvastatin (LIPITOR) 10 MG tablet TAKE 1 TABLET EVERY EVENING 90 tablet 2    azelastine (ASTELIN) 137 mcg (0.1 %) nasal spray 1 spray (137 mcg total) by Nasal route 2  (two) times daily. 30 mL 5    blood sugar diagnostic Strp To check BG 3 times daily, to use with insurance preferred meter 100 each 11    blood-glucose meter kit To check BG 3 times daily, to use with insurance preferred meter 1 each 0    cloNIDine (CATAPRES) 0.1 MG tablet Take one tablet by mouth ONLY IF blood pressure is greater than or equal to 180/110. 60 tablet 3    clopidogreL (PLAVIX) 75 mg tablet TAKE 1 TABLET EVERY DAY 90 tablet 3    cyanocobalamin (VITAMIN B-12) 1000 MCG tablet Take 1 tablet (1,000 mcg total) by mouth once daily. 30 tablet 1    ENTRESTO  mg per tablet TAKE 1 TABLET TWICE DAILY 180 tablet 2    esomeprazole (NEXIUM) 40 MG capsule Take 1 capsule (40 mg total) by mouth 2 (two) times daily before meals. Take on empty stomach 60 capsule 1    famotidine (PEPCID) 40 MG tablet Take 1 tablet (40 mg total) by mouth 2 (two) times daily. 180 tablet 3    ferrous sulfate (FEOSOL) 325 mg (65 mg iron) Tab tablet Take 1 tablet (325 mg total) by mouth daily with breakfast. 30 tablet 1    fluticasone-umeclidin-vilanter (TRELEGY ELLIPTA) 100-62.5-25 mcg DsDv Inhale 1 puff into the lungs once daily. 90 each 3    hydrALAZINE (APRESOLINE) 25 MG tablet TAKE 1 TABLET THREE TIMES DAILY 270 tablet 3    insulin aspart U-100 (NOVOLOG FLEXPEN U-100 INSULIN) 100 unit/mL (3 mL) InPn pen Administer per sliding scale three times daily before meals, max daily dose 20 units 3 mL 11    lancets Misc To check BG 3 times daily, to use with insurance preferred meter 100 each 11    latanoprost 0.005 % ophthalmic solution Place 1 drop into both eyes every evening.      magnesium oxide (MAG-OX) 400 mg (241.3 mg magnesium) tablet Take 1 tablet (400 mg total) by mouth once daily. 30 tablet 0    metFORMIN (GLUCOPHAGE-XR) 500 MG ER 24hr tablet TAKE 2 TABLETS ONE TIME DAILY 180 tablet 1    metoprolol succinate (TOPROL-XL) 25 MG 24 hr tablet Take 3 tablets (75 mg total) by mouth once daily. 90 tablet 1    NIFEdipine (PROCARDIA-XL) 60  "MG (OSM) 24 hr tablet Take 1 tablet (60 mg total) by mouth once daily. 30 tablet 11    pen needle, diabetic (BD ULTRA-FINE PARISH PEN NEEDLE) 32 gauge x 5/32" Ndle Uses 4 daily, on multiple daily insulin injections 150 each 12    potassium chloride SA (K-DUR,KLOR-CON) 20 MEQ tablet Take 1 tablet (20 mEq total) by mouth once daily. 7 tablet 0    spironolactone (ALDACTONE) 25 MG tablet TAKE 1 TABLET EVERY DAY 90 tablet 3    sucralfate (CARAFATE) 1 gram tablet Take 1 tablet (1 g total) by mouth 4 (four) times daily before meals and nightly. 30 tablet 0    timolol maleate 0.5% (TIMOPTIC) 0.5 % Drop Place 1 drop into the left eye 2 (two) times daily.       Current Facility-Administered Medications on File Prior to Visit   Medication Dose Route Frequency Provider Last Rate Last Admin    [DISCONTINUED] aspirin EC tablet  81 mg Oral  Provider, Generic External Data        [DISCONTINUED] budesonide nebulizer solution  0.25 mg Inhalation  Provider, Generic External Data        [DISCONTINUED] clopidogreL tablet  75 mg Oral  Provider, Generic External Data        [DISCONTINUED] dextrose 40 % gel  15 g Oral  Provider, Generic External Data        [DISCONTINUED] dextrose 50 % in water (D50W) injection  25 g Intravenous  Provider, Generic External Data        [DISCONTINUED] formoterol nebulizer solution  20 mcg Inhalation  Provider, Generic External Data        [DISCONTINUED] glucagon injection  1 mg Intramuscular  Provider, Generic External Data        [DISCONTINUED] hydrALAZINE tablet  50 mg Oral  Provider, Generic External Data        [DISCONTINUED] insulin lispro injection  0-18 Units Subcutaneous  Provider, Generic External Data        [DISCONTINUED] ipratropium 0.02 % nebulizer solution  0.5 mg Inhalation  Provider, Generic External Data        [DISCONTINUED] pantoprazole injection  40 mg Intravenous  Provider, Generic External Data        [DISCONTINUED] sucralfate tablet  1 g Oral  Provider, Generic External Data         "

## 2025-05-08 DIAGNOSIS — I10 ESSENTIAL HYPERTENSION: ICD-10-CM

## 2025-05-09 RX ORDER — METOPROLOL SUCCINATE 25 MG/1
75 TABLET, EXTENDED RELEASE ORAL DAILY
Qty: 90 TABLET | Refills: 2 | Status: SHIPPED | OUTPATIENT
Start: 2025-05-09

## 2025-05-09 NOTE — TELEPHONE ENCOUNTER
No care due was identified.  [facility] Embedded Care Due Messages. Reference number: [id number].   5/09/2025 12:07:04 PM CDT

## 2025-05-09 NOTE — TELEPHONE ENCOUNTER
Refill Routing Note   Medication(s) are not appropriate for processing by [facility] for the following reason(s):        No active prescription written by provider  Required vitals abnormal - 155/77     [facility] action(s):  Defer             Appointments  past 12m or future 3m with PCP    Date Provider   Last Visit   12/20/2024 Franklin Garcia MD   Next Visit   5/20/2025 Franklin Garcia MD   ED visits in past 90 days: 0        Note composed:12:11 PM 05/09/2025

## 2025-05-09 NOTE — TELEPHONE ENCOUNTER
Refill Routing Note   Medication(s) are not appropriate for processing by [facility] for the following reason(s):        Non-participating provider  Responsible provider unclear    [facility] action(s):  Route             Appointments  past 12m or future 3m with PCP    Date Provider   Last Visit   4/25/2025 Rosemary Soto FNP   Next Visit   Visit date not found Rosemary Soto FNP   ED visits in past 90 days: 0        Note composed:8:02 AM 05/09/2025

## 2025-05-10 DIAGNOSIS — E11.42 TYPE 2 DIABETES MELLITUS WITH DIABETIC POLYNEUROPATHY, WITHOUT LONG-TERM CURRENT USE OF INSULIN: ICD-10-CM

## 2025-05-10 NOTE — TELEPHONE ENCOUNTER
No care due was identified.  [facility] Embedded Care Due Messages. Reference number: [id number].   5/10/2025 9:35:29 AM CDT

## 2025-05-11 RX ORDER — LANCETS
EACH MISCELLANEOUS
Qty: 300 EACH | Refills: 3 | Status: SHIPPED | OUTPATIENT
Start: 2025-05-11

## 2025-05-11 NOTE — TELEPHONE ENCOUNTER
Refill Decision Note   Nadia Irene  is requesting a refill authorization.  Brief Assessment and Rationale for Refill:  Approve     Medication Therapy Plan:         Comments:     Note composed:12:06 PM 05/11/2025

## 2025-05-13 ENCOUNTER — LAB VISIT (OUTPATIENT)
Dept: LAB | Facility: HOSPITAL | Age: 75
End: 2025-05-13
Attending: NURSE PRACTITIONER
Payer: MEDICARE

## 2025-05-13 DIAGNOSIS — I10 ESSENTIAL HYPERTENSION: ICD-10-CM

## 2025-05-13 DIAGNOSIS — D50.0 IRON DEFICIENCY ANEMIA DUE TO CHRONIC BLOOD LOSS: ICD-10-CM

## 2025-05-13 DIAGNOSIS — E11.42 TYPE 2 DIABETES MELLITUS WITH DIABETIC POLYNEUROPATHY, WITHOUT LONG-TERM CURRENT USE OF INSULIN: ICD-10-CM

## 2025-05-13 LAB
ABSOLUTE EOSINOPHIL (OHS): 0.03 K/UL
ABSOLUTE MONOCYTE (OHS): 0.35 K/UL (ref 0.3–1)
ABSOLUTE NEUTROPHIL COUNT (OHS): 1.64 K/UL (ref 1.8–7.7)
ANION GAP (OHS): 9 MMOL/L (ref 8–16)
BASOPHILS # BLD AUTO: 0.03 K/UL
BASOPHILS NFR BLD AUTO: 1 %
BUN SERPL-MCNC: 8 MG/DL (ref 8–23)
CALCIUM SERPL-MCNC: 9.5 MG/DL (ref 8.7–10.5)
CHLORIDE SERPL-SCNC: 108 MMOL/L (ref 95–110)
CO2 SERPL-SCNC: 25 MMOL/L (ref 23–29)
CREAT SERPL-MCNC: 0.9 MG/DL (ref 0.5–1.4)
ERYTHROCYTE [DISTWIDTH] IN BLOOD BY AUTOMATED COUNT: 20.8 % (ref 11.5–14.5)
FERRITIN SERPL-MCNC: 17 NG/ML (ref 20–300)
GFR SERPLBLD CREATININE-BSD FMLA CKD-EPI: >60 ML/MIN/1.73/M2
GLUCOSE SERPL-MCNC: 169 MG/DL (ref 70–110)
HCT VFR BLD AUTO: 32.4 % (ref 37–48.5)
HGB BLD-MCNC: 10 GM/DL (ref 12–16)
IMM GRANULOCYTES # BLD AUTO: 0.01 K/UL (ref 0–0.04)
IMM GRANULOCYTES NFR BLD AUTO: 0.3 % (ref 0–0.5)
IRON SATN MFR SERPL: 31 % (ref 20–50)
IRON SERPL-MCNC: 127 UG/DL (ref 30–160)
LYMPHOCYTES # BLD AUTO: 1.07 K/UL (ref 1–4.8)
MCH RBC QN AUTO: 25.2 PG (ref 27–31)
MCHC RBC AUTO-ENTMCNC: 30.9 G/DL (ref 32–36)
MCV RBC AUTO: 82 FL (ref 82–98)
NUCLEATED RBC (/100WBC) (OHS): 0 /100 WBC
PLATELET # BLD AUTO: 250 K/UL (ref 150–450)
PMV BLD AUTO: 10.7 FL (ref 9.2–12.9)
POTASSIUM SERPL-SCNC: 3.5 MMOL/L (ref 3.5–5.1)
RBC # BLD AUTO: 3.97 M/UL (ref 4–5.4)
RELATIVE EOSINOPHIL (OHS): 1 %
RELATIVE LYMPHOCYTE (OHS): 34.2 % (ref 18–48)
RELATIVE MONOCYTE (OHS): 11.2 % (ref 4–15)
RELATIVE NEUTROPHIL (OHS): 52.3 % (ref 38–73)
SODIUM SERPL-SCNC: 142 MMOL/L (ref 136–145)
TIBC SERPL-MCNC: 404 UG/DL (ref 250–450)
TRANSFERRIN SERPL-MCNC: 273 MG/DL (ref 200–375)
WBC # BLD AUTO: 3.13 K/UL (ref 3.9–12.7)

## 2025-05-13 PROCEDURE — 84295 ASSAY OF SERUM SODIUM: CPT

## 2025-05-13 PROCEDURE — 85025 COMPLETE CBC W/AUTO DIFF WBC: CPT

## 2025-05-13 PROCEDURE — 36415 COLL VENOUS BLD VENIPUNCTURE: CPT | Mod: PN

## 2025-05-13 PROCEDURE — 82728 ASSAY OF FERRITIN: CPT

## 2025-05-13 PROCEDURE — 84466 ASSAY OF TRANSFERRIN: CPT

## 2025-05-14 ENCOUNTER — RESULTS FOLLOW-UP (OUTPATIENT)
Dept: FAMILY MEDICINE | Facility: CLINIC | Age: 75
End: 2025-05-14

## 2025-05-20 ENCOUNTER — TELEPHONE (OUTPATIENT)
Dept: FAMILY MEDICINE | Facility: CLINIC | Age: 75
End: 2025-05-20

## 2025-05-20 ENCOUNTER — OFFICE VISIT (OUTPATIENT)
Dept: FAMILY MEDICINE | Facility: CLINIC | Age: 75
End: 2025-05-20
Payer: MEDICARE

## 2025-05-20 VITALS
DIASTOLIC BLOOD PRESSURE: 69 MMHG | TEMPERATURE: 98 F | WEIGHT: 111.88 LBS | RESPIRATION RATE: 16 BRPM | SYSTOLIC BLOOD PRESSURE: 129 MMHG | OXYGEN SATURATION: 98 % | HEIGHT: 62 IN | HEART RATE: 72 BPM | BODY MASS INDEX: 20.59 KG/M2

## 2025-05-20 DIAGNOSIS — I10 ESSENTIAL HYPERTENSION: ICD-10-CM

## 2025-05-20 DIAGNOSIS — J30.2 SEASONAL ALLERGIC RHINITIS, UNSPECIFIED TRIGGER: ICD-10-CM

## 2025-05-20 DIAGNOSIS — J43.1 PANLOBULAR EMPHYSEMA: Chronic | ICD-10-CM

## 2025-05-20 DIAGNOSIS — E11.42 TYPE 2 DIABETES MELLITUS WITH DIABETIC POLYNEUROPATHY, WITHOUT LONG-TERM CURRENT USE OF INSULIN: Primary | ICD-10-CM

## 2025-05-20 PROCEDURE — 99999 PR PBB SHADOW E&M-EST. PATIENT-LVL V: CPT | Mod: PBBFAC,,, | Performed by: FAMILY MEDICINE

## 2025-05-20 RX ORDER — MOMETASONE FUROATE MONOHYDRATE 50 UG/1
2 SPRAY, METERED NASAL DAILY
Qty: 17 G | Refills: 11 | Status: SHIPPED | OUTPATIENT
Start: 2025-05-20

## 2025-05-20 RX ORDER — LEVALBUTEROL TARTRATE 45 UG/1
1-2 AEROSOL, METERED ORAL EVERY 4 HOURS PRN
Qty: 15 G | Refills: 0 | Status: SHIPPED | OUTPATIENT
Start: 2025-05-20 | End: 2026-05-20

## 2025-05-20 NOTE — PROGRESS NOTES
THIS DOCUMENT WAS MADE IN PART WITH VOICE RECOGNITION SOFTWARE.  OCCASIONALLY THIS SOFTWARE WILL MISINTERPRET WORDS OR PHRASES.    Assessment and Plan:    1. Type 2 diabetes mellitus with diabetic polyneuropathy, without long-term current use of insulin  Hemoglobin A1C    Comprehensive Metabolic Panel    empagliflozin (JARDIANCE) 10 mg tablet      2. Panlobular emphysema  fluticasone-umeclidin-vilanter (TRELEGY ELLIPTA) 100-62.5-25 mcg DsDv    levalbuterol (XOPENEX HFA) 45 mcg/actuation inhaler    Denies recent exacerbation  Continue Trelegy      3. Essential hypertension        4. Seasonal allergic rhinitis, unspecified trigger  mometasone (NASONEX) 50 mcg/actuation nasal spray              Assessment & Plan    PLAN SUMMARY:  > Continue Trelegy for lung management  > Start Nasonex nasal spray in addition to current Astelin for allergies  > Add Jardiance to diabetes regimen  > Switch from albuterol to Xopenex for breathing issues  > Ordered A1C recheck in 2 months  > Follow up in 2 months for A1C recheck, aiming for level below 8    PLAN NOTE:  > Started Jardiance to diabetes regimen for benefits on heart failure, kidney protection, and A1C reduction.  > Ordered A1C recheck in 2 months.  > Follow up in 2 months for A1C recheck, aiming for level below 8.  > Started Nasonex nasal spray in addition to current Astelin for allergy management.  > Switched from albuterol to Xopenex for breathing issues as it is less likely to cause increased heart rate.  > Continued Trelegy for lung management.  > Anticipate labs at next appointment.           ______________________________________________________________________  Subjective:    Chief Complaint:  Chief Complaint   Patient presents with    Follow-up        HPI:  Nadia is a 74 y.o. year old       History of Present Illness    CHIEF COMPLAINT:  Nadia presents today for follow up of multiple chronic conditions    RECENT HOSPITALIZATION:  She was recently hospitalized for  chest pain and epigastric pain. Stress test was negative and EGD showed no evidence of active bleeds. Blood pressure medications were adjusted during hospitalization due to contribution to headache and chest pain symptoms.    DIABETES:  Her A1C has increased to 9.3, which is significantly higher than previous readings. She reports morning blood sugar readings consistently over 300. She denies discontinuing medications and continues taking metformin.    RESPIRATORY:  She has run out of Trelegy resulting in increased sputum production with expectoration. She reports increased heart rate up to 113 BPM with albuterol use.    ENT:  She reports painful swollen lymph nodes in the neck and increased spitting which has worsened over the past week. She uses Astelin nasal spray which causes sneezing and nasal irritation.      ROS:  ROS as indicated in HPI.           COPD  Rx- Trelegy, xopenex      Paroxysmal atrial fibrillation  History of, maintains normal sinus rhythm with occasional PACs   Rx-dual antiplatelet therapy, beta-blocker     Combined systolic, diastolic congestive heart failure  Previous echocardiogram 05/26/2022-grade 2 diastolic dysfunction, ejection fraction 40%  Cardiology- MD Mary   Rx-Aldactone 25 mg, beta-blocker, Entresto, Lasix 20 mg      Valvular heart disease  05/26/2022-echocardiogram shows moderate aortic regurgitation, tricuspid regurgitation, pulmonic regurgitation, mitral regurgitation     Coronary artery disease status post CABG x5 (5/29/2020), dyslipidemia  MAGGIE :  X1 placed late 2023  Cardiology- MD Mary   Rx-aspirin 81 mg, atorvastatin 10 mg, Plavix 75 mg  Negative nuclear stress test 04/2023  Previous lipid panel acceptable     Essential hypertension  Rx-Entresto, metoprolol 25 mg, spironolactone 25 mg, hydralazine 25 TID, Clonidine 0.1 BID, nifedipine       Bilateral carotid artery stenosis  Currently on statin  05/27/2020-carotid ultrasound shows less than 50% stenosis right carotid  artery, left internal carotid artery with 50-69% stenosis     Type 2 diabetes mellitus with associated polyneuropathy  Previous A1c- 9.3 %  Rx-metformin ER 1000 mg by mouth daily, sliding scale insulin, low intensity, jardiance      GERD  Rx : pantoprazole      Nicotine dependence  Prev rx : Patches (didn't work)     Chronic back pain   Rx-tramadol     Past Medical History:  Past Medical History:   Diagnosis Date    Anemia     Biliary obstruction     Cholangitis     Diabetes mellitus     pt denies, does not take meds 11/2016    EtOH dependence     GERD (gastroesophageal reflux disease)     HTN (hypertension) 12/30/2013       Past Surgical History:  Past Surgical History:   Procedure Laterality Date    ARTERIOGRAPHY OF SUBCLAVIAN ARTERY  05/22/2020    Procedure: Arteriogram, Subclavian;  Surgeon: Heather Carbajal MD;  Location: [facility] CATH;  Service: Cardiology;;    COLONOSCOPY      COLONOSCOPY N/A 02/23/2024    Procedure: COLONOSCOPY;  Surgeon: Murphy Maguire Jr., MD;  Location: [facility] ENDO;  Service: Endoscopy;  Laterality: N/A;    CORONARY ANGIOGRAPHY N/A 05/22/2020    Procedure: ANGIOGRAM, CORONARY ARTERY;  Surgeon: Heather Carbajal MD;  Location: [facility] CATH;  Service: Cardiology;  Laterality: N/A;    CORONARY ARTERY BYPASS GRAFT (CABG) N/A 05/29/2020    Procedure: CORONARY ARTERY BYPASS GRAFT (CABG) x 5 VESSELS;  Surgeon: Dima Laughlin MD;  Location: [facility] OR;  Service: Cardiovascular;  Laterality: N/A;    ENDOSCOPIC HARVEST OF VEIN N/A 05/29/2020    Procedure: SURGICAL PROCUREMENT, VEIN, ENDOSCOPIC;  Surgeon: Dima Laughlin MD;  Location: [facility] OR;  Service: Cardiovascular;  Laterality: N/A;    ENDOSCOPIC ULTRASOUND OF UPPER GASTROINTESTINAL TRACT N/A 04/24/2024    Procedure: ULTRASOUND, UPPER GI TRACT, ENDOSCOPIC;  Surgeon: Holden Aguirre MD;  Location: [facility] ENDO;  Service: Endoscopy;  Laterality: N/A;    ERCP W/ PLASTIC STENT PLACEMENT      ERCP W/ SPHICTEROTOMY      ESOPHAGOGASTRODUODENOSCOPY       ESOPHAGOGASTRODUODENOSCOPY N/A 2024    Procedure: ESOPHAGOGASTRODUODENOSCOPY (EGD);  Surgeon: Murphy Maguire Jr., MD;  Location: [email or web address] ENDO;  Service: Endoscopy;  Laterality: N/A;    ESOPHAGOGASTRODUODENOSCOPY N/A 2024    Procedure: EGD (ESOPHAGOGASTRODUODENOSCOPY);  Surgeon: Holden Aguirre MD;  Location: [email or web address] ENDO;  Service: Endoscopy;  Laterality: N/A;    HYSTERECTOMY      INSERTION OF INTRA-AORTIC BALLOON ASSIST DEVICE N/A 2020    Procedure: INSERTION, INTRA-AORTIC BALLOON PUMP;  Surgeon: Diam Laughlin MD;  Location: [email or web address] OR;  Service: Cardiovascular;  Laterality: N/A;    LEFT HEART CATHETERIZATION Left 2020    Procedure: Left heart cath;  Surgeon: Heather Carbajal MD;  Location: [email or web address] CATH;  Service: Cardiology;  Laterality: Left;    SMALL BOWEL ENTEROSCOPY N/A 2024    Procedure: ENTEROSCOPY;  Surgeon: Bria Hand MD;  Location: [facility] ENDO;  Service: Endoscopy;  Laterality: N/A;    SMALL BOWEL ENTEROSCOPY N/A 2024    Procedure: ENTEROSCOPY;  Surgeon: Charles Hawthorne MD;  Location: [email or web address] ENDO;  Service: Endoscopy;  Laterality: N/A;       Family History:  Family History   Problem Relation Name Age of Onset    Cancer Father      Cancer Sister      Glaucoma Neg Hx      Macular degeneration Neg Hx         Social History:  Social History     Socioeconomic History    Marital status:    Tobacco Use    Smoking status: Former     Current packs/day: 0.00     Average packs/day: 0.5 packs/day for 40.0 years (20.0 ttl pk-yrs)     Types: Cigarettes     Start date: 1980     Quit date: 2020     Years since quittin.0     Passive exposure: Past    Smokeless tobacco: Never   Substance and Sexual Activity    Alcohol use: Not Currently     Comment: sober 2 years 3 months    Drug use: No     Social Drivers of Health     Financial Resource Strain: Medium Risk (3/24/2025)    Overall Financial Resource Strain (CARDIA)     Difficulty of Paying Living Expenses: Somewhat  hard   Food Insecurity: Food Insecurity Present (4/21/2025)    Received from [facility]    Hunger Vital Sign     Worried About Running Out of Food in the Last Year: Sometimes true     Ran Out of Food in the Last Year: Sometimes true   Transportation Needs: Unmet Transportation Needs (4/21/2025)    Received from [facility]    PRAPARE - Transportation     Lack of Transportation (Medical): Yes     Lack of Transportation (Non-Medical): No   Physical Activity: Inactive (3/24/2025)    Exercise Vital Sign     Days of Exercise per Week: 0 days     Minutes of Exercise per Session: 0 min   Stress: No Stress Concern Present (3/24/2025)    [employer] - Occupational Stress Questionnaire     Feeling of Stress : Only a little   Housing Stability: Low Risk  (4/21/2025)    Received from [facility]    Housing Stability Vital Sign     Unable to Pay for Housing in the Last Year: No     Number of Times Moved in the Last Year: 1     Homeless in the Last Year: No       Medications:  Current Outpatient Medications on File Prior to Visit   Medication Sig Dispense Refill    amiodarone (PACERONE) 200 MG Tab Take 0.5 tablets (100 mg total) by mouth once daily. 15 tablet 11    atorvastatin (LIPITOR) 10 MG tablet TAKE 1 TABLET EVERY EVENING 90 tablet 2    azelastine (ASTELIN) 137 mcg (0.1 %) nasal spray 1 spray (137 mcg total) by Nasal route 2 (two) times daily. 30 mL 5    blood sugar diagnostic (ACCU-CHEK GUIDE TEST STRIPS) Strp Use to test blood glucose three (3) times a day, to be used with insurance-preferred brand of glucometer/supplies. 300 strip 3    cloNIDine (CATAPRES) 0.1 MG tablet Take one tablet by mouth ONLY IF blood pressure is greater than or equal to 180/110. 60 tablet 3    clopidogreL (PLAVIX) 75 mg tablet TAKE 1 TABLET EVERY DAY 90 tablet 3    cyanocobalamin (VITAMIN B-12) 1000 MCG tablet Take 1 tablet (1,000 mcg total) by mouth once daily. 30 tablet 1    ENTRESTO  mg per tablet TAKE 1  "TABLET TWICE DAILY 180 tablet 2    esomeprazole (NEXIUM) 40 MG capsule Take 1 capsule (40 mg total) by mouth 2 (two) times daily before meals. Take on empty stomach 60 capsule 1    famotidine (PEPCID) 40 MG tablet Take 1 tablet (40 mg total) by mouth 2 (two) times daily. 180 tablet 3    ferrous sulfate (FEOSOL) 325 mg (65 mg iron) Tab tablet Take 1 tablet (325 mg total) by mouth daily with breakfast. 30 tablet 1    hydrALAZINE (APRESOLINE) 25 MG tablet TAKE 1 TABLET THREE TIMES DAILY 270 tablet 3    insulin aspart U-100 (NOVOLOG FLEXPEN U-100 INSULIN) 100 unit/mL (3 mL) InPn pen Administer per sliding scale three times daily before meals, max daily dose 20 units 3 mL 11    lancets (ACCU-CHEK SOFTCLIX LANCETS) Misc Use to test blood glucose three (3) times a day, discard lancet after each use 300 each 3    latanoprost 0.005 % ophthalmic solution Place 1 drop into both eyes every evening.      magnesium oxide (MAG-OX) 400 mg (241.3 mg magnesium) tablet Take 1 tablet (400 mg total) by mouth once daily. 30 tablet 0    metFORMIN (GLUCOPHAGE-XR) 500 MG ER 24hr tablet TAKE 2 TABLETS ONE TIME DAILY 180 tablet 1    metoprolol succinate (TOPROL-XL) 25 MG 24 hr tablet TAKE 3 TABLETS (75 MG TOTAL) BY MOUTH ONCE DAILY. 90 tablet 2    NIFEdipine (PROCARDIA-XL) 60 MG (OSM) 24 hr tablet Take 1 tablet (60 mg total) by mouth once daily. 30 tablet 11    pen needle, diabetic (BD ULTRA-FINE PARISH PEN NEEDLE) 32 gauge x 5/32" Ndle Uses 4 daily, on multiple daily insulin injections 150 each 12    potassium chloride SA (K-DUR,KLOR-CON) 20 MEQ tablet Take 1 tablet (20 mEq total) by mouth once daily. 7 tablet 0    spironolactone (ALDACTONE) 25 MG tablet TAKE 1 TABLET EVERY DAY 90 tablet 3    sucralfate (CARAFATE) 1 gram tablet Take 1 tablet (1 g total) by mouth 4 (four) times daily before meals and nightly. 30 tablet 0    timolol maleate 0.5% (TIMOPTIC) 0.5 % Drop Place 1 drop into the left eye 2 (two) times daily.      [DISCONTINUED] " "albuterol (PROVENTIL/VENTOLIN HFA) 90 mcg/actuation inhaler Inhale 2 puffs into the lungs every 4 (four) hours as needed for Wheezing. Rescue 18 g 5    [DISCONTINUED] fluticasone-umeclidin-vilanter (TRELEGY ELLIPTA) 100-62.5-25 mcg DsDv Inhale 1 puff into the lungs once daily. 90 each 3    aspirin (ECOTRIN) 81 MG EC tablet Take 1 tablet (81 mg total) by mouth once daily. 30 tablet 0    blood-glucose meter kit To check BG 3 times daily, to use with insurance preferred meter 1 each 0     No current facility-administered medications on file prior to visit.       Allergies:  Patient has no known allergies.    Immunizations:  Immunization History   Administered Date(s) Administered    COVID-19, MRNA, LN-S, PF (Pfizer) (Gray Cap) 06/20/2022    COVID-19, MRNA, LN-S, PF (Pfizer) (Purple Cap) 05/28/2022    Pneumococcal Conjugate - 20 Valent 08/04/2022       Review of Systems:  Review of Systems   All other systems reviewed and are negative.      Objective:    Vitals:  Vitals:    05/20/25 1012   BP: 129/69   Pulse: 72   Resp: 16   Temp: 98.2 °F (36.8 °C)   TempSrc: Oral   SpO2: 98%   Weight: 50.8 kg (111 lb 14.1 oz)   Height: 5' 1.5" (1.562 m)   PainSc: 0-No pain       Physical Exam  Vitals reviewed.   Constitutional:       General: She is not in acute distress.  HENT:      Head: Normocephalic and atraumatic.   Eyes:      Pupils: Pupils are equal, round, and reactive to light.   Cardiovascular:      Rate and Rhythm: Normal rate and regular rhythm.      Heart sounds: No murmur heard.     No friction rub.   Pulmonary:      Effort: Pulmonary effort is normal.      Breath sounds: Normal breath sounds.   Abdominal:      General: Bowel sounds are normal. There is no distension.      Palpations: Abdomen is soft.      Tenderness: There is no abdominal tenderness.   Musculoskeletal:      Cervical back: Neck supple.   Skin:     General: Skin is warm and dry.      Findings: No rash.   Psychiatric:         Behavior: Behavior normal. "             Franklin Garcia MD  Family Medicine

## 2025-05-22 ENCOUNTER — PATIENT MESSAGE (OUTPATIENT)
Dept: OBSTETRICS AND GYNECOLOGY | Facility: CLINIC | Age: 75
End: 2025-05-22
Payer: MEDICARE

## 2025-06-09 ENCOUNTER — LAB VISIT (OUTPATIENT)
Dept: LAB | Facility: HOSPITAL | Age: 75
End: 2025-06-09
Attending: FAMILY MEDICINE
Payer: MEDICARE

## 2025-06-09 DIAGNOSIS — E11.42 TYPE 2 DIABETES MELLITUS WITH DIABETIC POLYNEUROPATHY, WITHOUT LONG-TERM CURRENT USE OF INSULIN: ICD-10-CM

## 2025-06-09 LAB
ALBUMIN SERPL BCP-MCNC: 3.7 G/DL (ref 3.5–5.2)
ALP SERPL-CCNC: 102 UNIT/L (ref 40–150)
ALT SERPL W/O P-5'-P-CCNC: 15 UNIT/L (ref 10–44)
ANION GAP (OHS): 13 MMOL/L (ref 8–16)
AST SERPL-CCNC: 21 UNIT/L (ref 11–45)
BILIRUB SERPL-MCNC: 0.7 MG/DL (ref 0.1–1)
BUN SERPL-MCNC: 10 MG/DL (ref 8–23)
CALCIUM SERPL-MCNC: 9 MG/DL (ref 8.7–10.5)
CHLORIDE SERPL-SCNC: 111 MMOL/L (ref 95–110)
CO2 SERPL-SCNC: 19 MMOL/L (ref 23–29)
CREAT SERPL-MCNC: 0.8 MG/DL (ref 0.5–1.4)
EAG (OHS): 212 MG/DL (ref 68–131)
GFR SERPLBLD CREATININE-BSD FMLA CKD-EPI: >60 ML/MIN/1.73/M2
GLUCOSE SERPL-MCNC: 95 MG/DL (ref 70–110)
HBA1C MFR BLD: 9 % (ref 4–5.6)
POTASSIUM SERPL-SCNC: 3.7 MMOL/L (ref 3.5–5.1)
PROT SERPL-MCNC: 7.4 GM/DL (ref 6–8.4)
SODIUM SERPL-SCNC: 143 MMOL/L (ref 136–145)

## 2025-06-09 PROCEDURE — 36415 COLL VENOUS BLD VENIPUNCTURE: CPT | Mod: PN

## 2025-06-09 PROCEDURE — 83036 HEMOGLOBIN GLYCOSYLATED A1C: CPT

## 2025-06-09 PROCEDURE — 80053 COMPREHEN METABOLIC PANEL: CPT

## 2025-06-10 ENCOUNTER — RESULTS FOLLOW-UP (OUTPATIENT)
Dept: FAMILY MEDICINE | Facility: CLINIC | Age: 75
End: 2025-06-10

## 2025-06-11 DIAGNOSIS — I10 ESSENTIAL HYPERTENSION: ICD-10-CM

## 2025-06-11 DIAGNOSIS — E11.42 TYPE 2 DIABETES MELLITUS WITH DIABETIC POLYNEUROPATHY, WITHOUT LONG-TERM CURRENT USE OF INSULIN: ICD-10-CM

## 2025-06-11 DIAGNOSIS — I10 HYPERTENSION, UNSPECIFIED TYPE: ICD-10-CM

## 2025-06-11 RX ORDER — INSULIN ASPART 100 [IU]/ML
INJECTION, SOLUTION INTRAVENOUS; SUBCUTANEOUS
Qty: 15 ML | Refills: 12 | Status: SHIPPED | OUTPATIENT
Start: 2025-06-11

## 2025-06-11 RX ORDER — HYDRALAZINE HYDROCHLORIDE 25 MG/1
TABLET, FILM COATED ORAL
Qty: 540 TABLET | Refills: 3 | Status: SHIPPED | OUTPATIENT
Start: 2025-06-11

## 2025-06-11 RX ORDER — CLONIDINE HYDROCHLORIDE 0.1 MG/1
TABLET ORAL
Qty: 30 TABLET | Refills: 1 | Status: SHIPPED | OUTPATIENT
Start: 2025-06-11

## 2025-06-11 RX ORDER — PANTOPRAZOLE SODIUM 40 MG/1
40 TABLET, DELAYED RELEASE ORAL EVERY MORNING
Qty: 30 TABLET | Refills: 1 | Status: SHIPPED | OUTPATIENT
Start: 2025-06-11

## 2025-06-11 NOTE — TELEPHONE ENCOUNTER
Refill Routing Note   Medication(s) are not appropriate for processing by [facility] for the following reason(s):        Outside of protocol  New or recently adjusted medication  No active prescription written by provider    [facility] action(s):  Route  Defer     Requires labs : Yes      Medication Therapy Plan: ED visit for chest wall pain. No FOVS. Route novolog-Sliding scale outside protocol for [facility]. Clonidine-OP. Defer Pantoprazole- No active script by PCP. Defer Hydralazine- Recent dose adjustment      Appointments  past 12m or future 3m with PCP    Date Provider   Last Visit   5/20/2025 Franklin Garcia MD   Next Visit   Visit date not found Franklin Garcia MD   ED visits in past 90 days: 1        Note composed:11:52 AM 06/11/2025

## 2025-06-11 NOTE — TELEPHONE ENCOUNTER
Care Due:                  Date            Visit Type   Department     Provider  --------------------------------------------------------------------------------                                EP -                              PRIMARY      [facility] FAMILY  Last Visit: 05-      CARE ([facility])   MEDICINE       Franklin Garcia  Next Visit: None Scheduled  None         None Found                                                            Last  Test          Frequency    Reason                     Performed    Due Date  --------------------------------------------------------------------------------    Lipid Panel.  12 months..  atorvastatin.............  06- 06-    [facility] Embedded Care Due Messages. Reference number: [id number].   6/11/2025 9:16:34 AM CDT

## 2025-06-18 ENCOUNTER — OFFICE VISIT (OUTPATIENT)
Dept: FAMILY MEDICINE | Facility: CLINIC | Age: 75
End: 2025-06-18
Payer: MEDICARE

## 2025-06-18 VITALS
WEIGHT: 111.25 LBS | SYSTOLIC BLOOD PRESSURE: 112 MMHG | OXYGEN SATURATION: 99 % | HEIGHT: 61 IN | BODY MASS INDEX: 21 KG/M2 | DIASTOLIC BLOOD PRESSURE: 62 MMHG | HEART RATE: 74 BPM

## 2025-06-18 DIAGNOSIS — E11.42 TYPE 2 DIABETES MELLITUS WITH DIABETIC POLYNEUROPATHY, WITH LONG-TERM CURRENT USE OF INSULIN: Primary | ICD-10-CM

## 2025-06-18 DIAGNOSIS — Z79.4 TYPE 2 DIABETES MELLITUS WITH DIABETIC POLYNEUROPATHY, WITH LONG-TERM CURRENT USE OF INSULIN: Primary | ICD-10-CM

## 2025-06-18 LAB — GLUCOSE SERPL-MCNC: 287 MG/DL (ref 70–110)

## 2025-06-18 PROCEDURE — 1101F PT FALLS ASSESS-DOCD LE1/YR: CPT | Mod: CPTII,S$GLB,,

## 2025-06-18 PROCEDURE — 3008F BODY MASS INDEX DOCD: CPT | Mod: CPTII,S$GLB,,

## 2025-06-18 PROCEDURE — 3074F SYST BP LT 130 MM HG: CPT | Mod: CPTII,S$GLB,,

## 2025-06-18 PROCEDURE — 82962 GLUCOSE BLOOD TEST: CPT | Mod: S$GLB,,,

## 2025-06-18 PROCEDURE — 3052F HG A1C>EQUAL 8.0%<EQUAL 9.0%: CPT | Mod: CPTII,S$GLB,,

## 2025-06-18 PROCEDURE — 1159F MED LIST DOCD IN RCRD: CPT | Mod: CPTII,S$GLB,,

## 2025-06-18 PROCEDURE — 3288F FALL RISK ASSESSMENT DOCD: CPT | Mod: CPTII,S$GLB,,

## 2025-06-18 PROCEDURE — 4010F ACE/ARB THERAPY RXD/TAKEN: CPT | Mod: CPTII,S$GLB,,

## 2025-06-18 PROCEDURE — 3078F DIAST BP <80 MM HG: CPT | Mod: CPTII,S$GLB,,

## 2025-06-18 PROCEDURE — 1126F AMNT PAIN NOTED NONE PRSNT: CPT | Mod: CPTII,S$GLB,,

## 2025-06-18 PROCEDURE — 99213 OFFICE O/P EST LOW 20 MIN: CPT | Mod: S$GLB,,,

## 2025-06-18 PROCEDURE — 99999 PR PBB SHADOW E&M-EST. PATIENT-LVL V: CPT | Mod: PBBFAC,,,

## 2025-06-18 RX ORDER — BLOOD-GLUCOSE SENSOR
EACH MISCELLANEOUS
Qty: 5 EACH | Refills: 6 | Status: SHIPPED | OUTPATIENT
Start: 2025-06-18

## 2025-06-18 RX ORDER — INSULIN GLARGINE 100 [IU]/ML
10 INJECTION, SOLUTION SUBCUTANEOUS NIGHTLY
Qty: 3 ML | Refills: 5 | Status: SHIPPED | OUTPATIENT
Start: 2025-06-18

## 2025-06-18 NOTE — PROGRESS NOTES
[facility]  [address]  [place] [address]    Subjective    Chief Complaint:   Chief Complaint   Patient presents with    Follow-up     Review Blood Work       History of Present Illness:     Nadia Irene is a(n) 74 y.o. female with past medical history as noted below who presents to the clinic today for review of recent labs.      A1c noted to be 9%, down from 9.3%. Of note this was done a bit early, she was started on Jardiance 10 mg about 3 weeks ago. Reports slight improvement in home BG readings, which are usually in 200s. No hypoglycemia.        COPD  Rx- Trelegy, xopenex      Paroxysmal atrial fibrillation  History of, maintains normal sinus rhythm with occasional PACs   Rx-dual antiplatelet therapy, beta-blocker     Combined systolic, diastolic congestive heart failure  Previous echocardiogram 05/26/2022-grade 2 diastolic dysfunction, ejection fraction 40%  Cardiology- MD Mary   Rx-Aldactone 25 mg, beta-blocker, Entresto, Lasix 20 mg      Valvular heart disease  05/26/2022-echocardiogram shows moderate aortic regurgitation, tricuspid regurgitation, pulmonic regurgitation, mitral regurgitation     Coronary artery disease status post CABG x5 (5/29/2020), dyslipidemia  MAGGIE :  X1 placed late 2023  Cardiology- MD Mary   Rx-aspirin 81 mg, atorvastatin 10 mg, Plavix 75 mg  Negative nuclear stress test 04/2023  Previous lipid panel acceptable     Essential hypertension  Rx-Entresto, metoprolol 25 mg, spironolactone 25 mg, hydralazine 25 TID, Clonidine 0.1 BID, nifedipine       Bilateral carotid artery stenosis  Currently on statin  05/27/2020-carotid ultrasound shows less than 50% stenosis right carotid artery, left internal carotid artery with 50-69% stenosis     Type 2 diabetes mellitus with associated polyneuropathy  Previous A1c- 9.3 %  Rx-metformin ER 1000 mg by mouth daily, sliding scale insulin, low intensity, jardiance      GERD  Rx :  pantoprazole      Nicotine dependence  Prev rx : Patches (didn't work)     Chronic back pain   Rx-tramadol       Problem List: Problem List[1]    Current Outpatient Medications:   Current Outpatient Medications   Medication Instructions    amiodarone (PACERONE) 100 mg, Oral, Daily    aspirin (ECOTRIN) 81 mg, Oral, Daily    atorvastatin (LIPITOR) 10 mg, Oral, Nightly    azelastine (ASTELIN) 137 mcg, Nasal, 2 times daily    blood sugar diagnostic (ACCU-CHEK GUIDE TEST STRIPS) Strp Use to test blood glucose three (3) times a day, to be used with insurance-preferred brand of glucometer/supplies.    blood-glucose meter kit To check BG 3 times daily, to use with insurance preferred meter    cloNIDine (CATAPRES) 0.1 MG tablet TAKE ONE TABLET BY MOUTH ONLY IF BLOOD PRESSURE IS GREATER THAN OR EQUAL /110.    clopidogreL (PLAVIX) 75 mg, Oral    cyanocobalamin (VITAMIN B-12) 1,000 mcg, Oral, Daily    empagliflozin (JARDIANCE) 10 mg, Oral, Daily    ENTRESTO  mg per tablet 1 tablet, Oral, 2 times daily    famotidine (PEPCID) 40 mg, Oral, 2 times daily    ferrous sulfate (FEOSOL) 325 mg, Oral, With breakfast    fluticasone-umeclidin-vilanter (TRELEGY ELLIPTA) 100-62.5-25 mcg DsDv 1 puff, Inhalation, Daily    hydrALAZINE (APRESOLINE) 25 MG tablet TAKE 2 TABLETS BY MOUTH 3 (THREE) TIMES DAILY    insulin aspart U-100 (NOVOLOG FLEXPEN U-100 INSULIN) 100 unit/mL (3 mL) InPn pen ADMINISTER PER SLIDING SCALE THREE TIMES DAILY BEFORE MEALS, MAX DAILY DOSE 20 UNITS    lancets (ACCU-CHEK SOFTCLIX LANCETS) Misc Use to test blood glucose three (3) times a day, discard lancet after each use    latanoprost 0.005 % ophthalmic solution 1 drop, Nightly    levalbuterol (XOPENEX HFA) 45 mcg/actuation inhaler 1-2 puffs, Inhalation, Every 4 hours PRN, Rescue    magnesium oxide (MAG-OX) 400 mg, Oral, Daily    metFORMIN (GLUCOPHAGE-XR) 500 MG ER 24hr tablet TAKE 2 TABLETS ONE TIME DAILY    metoprolol succinate (TOPROL-XL) 75 mg, Oral, Daily  "   mometasone (NASONEX) 50 mcg/actuation nasal spray 2 sprays, Nasal, Daily    NIFEdipine (PROCARDIA-XL) 60 mg, Oral, Daily    pantoprazole (PROTONIX) 40 mg, Oral, Every morning    pen needle, diabetic (BD ULTRA-FINE PARISH PEN NEEDLE) 32 gauge x 5/32" Ndle Uses 4 daily, on multiple daily insulin injections    potassium chloride SA (K-DUR,KLOR-CON) 20 MEQ tablet 20 mEq, Oral, Daily    spironolactone (ALDACTONE) 25 mg, Oral    sucralfate (CARAFATE) 1 g, Oral, Before meals & nightly    timolol maleate 0.5% (TIMOPTIC) 0.5 % Drop 1 drop, 2 times daily       Surgical History:   Past Surgical History:   Procedure Laterality Date    ARTERIOGRAPHY OF SUBCLAVIAN ARTERY  05/22/2020    Procedure: Arteriogram, Subclavian;  Surgeon: Heather Carbajal MD;  Location: [facility] CATH;  Service: Cardiology;;    COLONOSCOPY      COLONOSCOPY N/A 02/23/2024    Procedure: COLONOSCOPY;  Surgeon: Murphy Maguire Jr., MD;  Location: [facility] ENDO;  Service: Endoscopy;  Laterality: N/A;    CORONARY ANGIOGRAPHY N/A 05/22/2020    Procedure: ANGIOGRAM, CORONARY ARTERY;  Surgeon: Heather Carbajal MD;  Location: [facility] CATH;  Service: Cardiology;  Laterality: N/A;    CORONARY ARTERY BYPASS GRAFT (CABG) N/A 05/29/2020    Procedure: CORONARY ARTERY BYPASS GRAFT (CABG) x 5 VESSELS;  Surgeon: Dima Laughlin MD;  Location: [facility] OR;  Service: Cardiovascular;  Laterality: N/A;    ENDOSCOPIC HARVEST OF VEIN N/A 05/29/2020    Procedure: SURGICAL PROCUREMENT, VEIN, ENDOSCOPIC;  Surgeon: Dima Laughlin MD;  Location: [facility] OR;  Service: Cardiovascular;  Laterality: N/A;    ENDOSCOPIC ULTRASOUND OF UPPER GASTROINTESTINAL TRACT N/A 04/24/2024    Procedure: ULTRASOUND, UPPER GI TRACT, ENDOSCOPIC;  Surgeon: Holden Aguirre MD;  Location: [facility] ENDO;  Service: Endoscopy;  Laterality: N/A;    ERCP W/ PLASTIC STENT PLACEMENT      ERCP W/ SPHICTEROTOMY      ESOPHAGOGASTRODUODENOSCOPY      ESOPHAGOGASTRODUODENOSCOPY N/A 02/23/2024    Procedure: ESOPHAGOGASTRODUODENOSCOPY (EGD);  " "Surgeon: Murphy Maguire Jr., MD;  Location: [facility] ENDO;  Service: Endoscopy;  Laterality: N/A;    ESOPHAGOGASTRODUODENOSCOPY N/A 04/24/2024    Procedure: EGD (ESOPHAGOGASTRODUODENOSCOPY);  Surgeon: Holden Aguirre MD;  Location: [facility] ENDO;  Service: Endoscopy;  Laterality: N/A;    HYSTERECTOMY      INSERTION OF INTRA-AORTIC BALLOON ASSIST DEVICE N/A 05/29/2020    Procedure: INSERTION, INTRA-AORTIC BALLOON PUMP;  Surgeon: Dima Laughlin MD;  Location: [facility] OR;  Service: Cardiovascular;  Laterality: N/A;    LEFT HEART CATHETERIZATION Left 05/22/2020    Procedure: Left heart cath;  Surgeon: Heather Carbajal MD;  Location: [facility] CATH;  Service: Cardiology;  Laterality: Left;    SMALL BOWEL ENTEROSCOPY N/A 03/06/2024    Procedure: ENTEROSCOPY;  Surgeon: Bria Hand MD;  Location: [facility] ENDO;  Service: Endoscopy;  Laterality: N/A;    SMALL BOWEL ENTEROSCOPY N/A 07/30/2024    Procedure: ENTEROSCOPY;  Surgeon: Charles Hawthorne MD;  Location: [facility] ENDO;  Service: Endoscopy;  Laterality: N/A;       Family History:   Family History   Problem Relation Name Age of Onset    Cancer Father      Cancer Sister      Glaucoma Neg Hx      Macular degeneration Neg Hx         Allergies: Review of patient's allergies indicates:  No Known Allergies    Tobacco Status:   Tobacco Use: Medium Risk (6/18/2025)    Patient History     Smoking Tobacco Use: Former     Smokeless Tobacco Use: Never     Passive Exposure: Past       Sexual Activity:   Social History     Substance and Sexual Activity   Sexual Activity Not on file       Alcohol Use:   Social History     Substance and Sexual Activity   Alcohol Use Not Currently    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 "   Respiratory:  Negative for cough and shortness of breath.    Cardiovascular:  Negative for chest pain.       Physical Exam  Constitutional:       General: She is not in acute distress.     Appearance: Normal appearance.   HENT:      Head: Normocephalic and atraumatic.   Cardiovascular:      Rate and Rhythm: Normal rate and regular rhythm.      Heart sounds: Normal heart sounds. No murmur heard.  Pulmonary:      Effort: Pulmonary effort is normal. No respiratory distress.      Breath sounds: Normal breath sounds. No wheezing.   Skin:     General: Skin is warm.   Neurological:      Mental Status: She is alert and oriented to person, place, and time.   Psychiatric:         Behavior: Behavior normal.           Assessment and Plan:    1. Type 2 diabetes mellitus with diabetic polyneuropathy, with long-term current use of insulin  -     Hemoglobin A1C; Future; Expected date: 09/18/2025  -     Basic Metabolic Panel; Future; Expected date: 09/18/2025  -     blood-glucose sensor (FREESTYLE NAUN 3 SENSOR) Bren; Change every 14 days.  Dispense: 5 each; Refill: 6  -     flash glucose scanning reader Misc; To be used with Freestyle 3 sensor.  Dispense: 1 each; Refill: 0  -     insulin glargine U-100, Lantus, (LANTUS SOLOSTAR U-100 INSULIN) 100 unit/mL (3 mL) InPn pen; Inject 10 Units into the skin every evening.  Dispense: 3 mL; Refill: 5  -     POCT Glucose, Hand-Held Device  -     Microalbumin/Creatinine Ratio, Urine; Future; Expected date: 06/18/2025        Visit summary:    Nadia Luis Washington presented today for follow up.    Add basal Lantus insulin 10 units QHS. Continue prandial insulin per sliding scale. R/c A1c in 3 months. Prescribed CGM and reader.    Follow up: 3 months      María Ordaz PA-C    This note was created partially with voice dictation software and is prone to errors. This note has been reviewed by me but some errors are inevitable.          [1]   Patient Active Problem List  Diagnosis     HTN (hypertension)    Tobacco abuse    Choledocholithiasis    Type 2 diabetes mellitus with diabetic polyneuropathy, without long-term current use of insulin    Calculus of bile duct without cholecystitis and without obstruction    Common bile duct dilation    Gastroesophageal reflux disease without esophagitis    Hypokalemia    Chronic combined systolic and diastolic heart failure    Bilateral carotid artery stenosis    S/P CABG (coronary artery bypass graft)    COPD (chronic obstructive pulmonary disease)    Valvular heart disease    Symptomatic anemia    Cigarette nicotine dependence without complication    Aortic atherosclerosis    Gastrointestinal hemorrhage associated with angiodysplasia of stomach and duodenum    PAF (paroxysmal atrial fibrillation)    NSVT (nonsustained ventricular tachycardia)    ACP (advance care planning)    Iron deficiency anemia    HFrEF (heart failure with reduced ejection fraction)    Atherosclerotic heart disease of native coronary artery with unspecified angina pectoris    CAD (coronary artery disease)    Chest pain    Ventricular tachycardia    Anemia    Diabetes mellitus    Vitamin B12 deficiency (dietary) anemia    Moderate malnutrition    Left foot pain    Abdominal pain    Dyspnea    Renal artery stenosis    Abnormal chest CT

## 2025-07-04 NOTE — TELEPHONE ENCOUNTER
No care due was identified.  [facility] Embedded Care Due Messages. Reference number: [id number].   7/04/2025 2:17:08 AM CDT

## 2025-07-07 RX ORDER — METFORMIN HYDROCHLORIDE 500 MG/1
1000 TABLET, EXTENDED RELEASE ORAL
Qty: 180 TABLET | Refills: 1 | Status: SHIPPED | OUTPATIENT
Start: 2025-07-07

## 2025-07-07 NOTE — TELEPHONE ENCOUNTER
Nadia Irene  is requesting a refill authorization.  Brief Assessment and Rationale for Refill:  Approve     Medication Therapy Plan:         Pharmacist review requested: Yes   Extended chart review required: Yes   Comments:     Note composed:10:13 AM 07/07/2025

## 2025-07-07 NOTE — TELEPHONE ENCOUNTER
Refill Routing Note   Medication(s) are not appropriate for processing by [facility] for the following reason(s):        Drug-disease interaction  ED/Hospital Visit since last OV with provider    [facility] action(s):  Defer        Medication Therapy Plan: Drug-Disease: metFORMIN and Renal artery stenosis    Pharmacist review requested: Yes     Appointments  past 12m or future 3m with PCP    Date Provider   Last Visit   5/20/2025 Frankiln Garcia MD   Next Visit   9/22/2025 Franklin Garcia MD   ED visits in past 90 days: 1        Note composed:9:24 AM 07/07/2025

## 2025-07-14 DIAGNOSIS — E11.42 TYPE 2 DIABETES MELLITUS WITH DIABETIC POLYNEUROPATHY, WITHOUT LONG-TERM CURRENT USE OF INSULIN: ICD-10-CM

## 2025-07-14 RX ORDER — PEN NEEDLE, DIABETIC 32GX 5/32"
NEEDLE, DISPOSABLE MISCELLANEOUS
Qty: 400 EACH | Refills: 3 | Status: SHIPPED | OUTPATIENT
Start: 2025-07-14

## 2025-07-14 NOTE — TELEPHONE ENCOUNTER
No care due was identified.  [facility] Embedded Care Due Messages. Reference number: [id number].   7/14/2025 8:54:24 AM CDT

## 2025-07-14 NOTE — TELEPHONE ENCOUNTER
Nadia Irene  is requesting a refill authorization.  Brief Assessment and Rationale for Refill:  Approve     Medication Therapy Plan:         Comments:     Note composed:2:45 PM 07/14/2025

## 2025-07-14 NOTE — TELEPHONE ENCOUNTER
Please approve for  BD PEN NEEDLE PARISH 2ND GEN 32G X 4 MM Miscellaneous     Last OV 6/18/25  Last refill date 5/07/24  Next appt 9/22/25

## 2025-07-18 DIAGNOSIS — J43.1 PANLOBULAR EMPHYSEMA: Chronic | ICD-10-CM

## 2025-07-18 NOTE — TELEPHONE ENCOUNTER
No care due was identified.  [facility] Embedded Care Due Messages. Reference number: [id number].   7/18/2025 12:30:31 PM CDT

## 2025-07-18 NOTE — TELEPHONE ENCOUNTER
Refill Routing Note   Medication(s) are not appropriate for processing by [facility] for the following reason(s):        ED/Hospital Visit since last OV with provider    [facility] action(s):  Defer             Appointments  past 12m or future 3m with PCP    Date Provider   Last Visit   5/20/2025 Franklin Garcia MD   Next Visit   9/22/2025 Franklin Garcia MD   ED visits in past 90 days: 1        Note composed:4:54 PM 07/18/2025

## 2025-07-19 RX ORDER — LEVALBUTEROL TARTRATE 45 UG/1
1-2 AEROSOL, METERED ORAL EVERY 4 HOURS PRN
Qty: 15 G | Refills: 1 | Status: SHIPPED | OUTPATIENT
Start: 2025-07-19 | End: 2026-07-19

## 2025-08-05 ENCOUNTER — OFFICE VISIT (OUTPATIENT)
Dept: CARDIOLOGY | Facility: CLINIC | Age: 75
End: 2025-08-05
Payer: MEDICARE

## 2025-08-05 VITALS
DIASTOLIC BLOOD PRESSURE: 70 MMHG | SYSTOLIC BLOOD PRESSURE: 148 MMHG | HEART RATE: 68 BPM | HEIGHT: 61 IN | WEIGHT: 110.25 LBS | BODY MASS INDEX: 20.82 KG/M2

## 2025-08-05 DIAGNOSIS — I38 VALVULAR HEART DISEASE: Chronic | ICD-10-CM

## 2025-08-05 DIAGNOSIS — I48.0 PAF (PAROXYSMAL ATRIAL FIBRILLATION): ICD-10-CM

## 2025-08-05 DIAGNOSIS — R06.00 DYSPNEA, UNSPECIFIED TYPE: ICD-10-CM

## 2025-08-05 DIAGNOSIS — J43.2 CENTRILOBULAR EMPHYSEMA: Primary | ICD-10-CM

## 2025-08-05 DIAGNOSIS — I65.23 BILATERAL CAROTID ARTERY STENOSIS: ICD-10-CM

## 2025-08-05 DIAGNOSIS — I25.10 CORONARY ARTERY DISEASE, UNSPECIFIED VESSEL OR LESION TYPE, UNSPECIFIED WHETHER ANGINA PRESENT, UNSPECIFIED WHETHER NATIVE OR TRANSPLANTED HEART: Chronic | ICD-10-CM

## 2025-08-05 DIAGNOSIS — F17.210 CIGARETTE NICOTINE DEPENDENCE WITHOUT COMPLICATION: ICD-10-CM

## 2025-08-05 DIAGNOSIS — I70.1 RENAL ARTERY STENOSIS: ICD-10-CM

## 2025-08-05 DIAGNOSIS — I70.0 AORTIC ATHEROSCLEROSIS: ICD-10-CM

## 2025-08-05 DIAGNOSIS — I25.119 ATHEROSCLEROSIS OF NATIVE CORONARY ARTERY OF NATIVE HEART WITH ANGINA PECTORIS: ICD-10-CM

## 2025-08-05 DIAGNOSIS — I50.42 CHRONIC COMBINED SYSTOLIC AND DIASTOLIC HEART FAILURE: ICD-10-CM

## 2025-08-05 PROCEDURE — 1159F MED LIST DOCD IN RCRD: CPT | Mod: CPTII,S$GLB,, | Performed by: INTERNAL MEDICINE

## 2025-08-05 PROCEDURE — 3052F HG A1C>EQUAL 8.0%<EQUAL 9.0%: CPT | Mod: CPTII,S$GLB,, | Performed by: INTERNAL MEDICINE

## 2025-08-05 PROCEDURE — 3078F DIAST BP <80 MM HG: CPT | Mod: CPTII,S$GLB,, | Performed by: INTERNAL MEDICINE

## 2025-08-05 PROCEDURE — 3077F SYST BP >= 140 MM HG: CPT | Mod: CPTII,S$GLB,, | Performed by: INTERNAL MEDICINE

## 2025-08-05 PROCEDURE — 4010F ACE/ARB THERAPY RXD/TAKEN: CPT | Mod: CPTII,S$GLB,, | Performed by: INTERNAL MEDICINE

## 2025-08-05 PROCEDURE — 3008F BODY MASS INDEX DOCD: CPT | Mod: CPTII,S$GLB,, | Performed by: INTERNAL MEDICINE

## 2025-08-05 PROCEDURE — 1126F AMNT PAIN NOTED NONE PRSNT: CPT | Mod: CPTII,S$GLB,, | Performed by: INTERNAL MEDICINE

## 2025-08-05 PROCEDURE — 1101F PT FALLS ASSESS-DOCD LE1/YR: CPT | Mod: CPTII,S$GLB,, | Performed by: INTERNAL MEDICINE

## 2025-08-05 PROCEDURE — 99214 OFFICE O/P EST MOD 30 MIN: CPT | Mod: S$GLB,,, | Performed by: INTERNAL MEDICINE

## 2025-08-05 PROCEDURE — 3288F FALL RISK ASSESSMENT DOCD: CPT | Mod: CPTII,S$GLB,, | Performed by: INTERNAL MEDICINE

## 2025-08-05 PROCEDURE — 99999 PR PBB SHADOW E&M-EST. PATIENT-LVL IV: CPT | Mod: PBBFAC,,, | Performed by: INTERNAL MEDICINE

## 2025-08-05 NOTE — PROGRESS NOTES
Subjective:    Patient ID:  Nadia Irene is a 74 y.o. female patient here for evaluation Follow-up (6 month)      History of Present Illness:  Follow up visit.  Hospitalization at [facility] May of 2025.  Atypical chest pain.  Follow up nuclear perfusion imaging was normal.  Echocardiogram dated 01/2024 with preserved EF, mild AS.  Patient's dental complaints of mild shortness breath.  Underlying history of COPD, past tobacco use.    Since hospital discharge stable dyspnea.         Known coronary artery disease.  Status post remote CABG.  PCI stent 2023 occluded JEFF to the LAD successful PCI stent LAD.  At that time moderate disease noted in the vein graft to the obtuse marginal, and vein graft to the 1st diagonal , RCA patent.  EF normal.     Hypertension, diabetes mellitus dyslipidemia, past tobacco use.     PAF.     Remote history of GI bleed, seen by GI.  Endoscopy with angioectasias stomach, resolved.  Treated with cauterization.  Patient did receive blood transfusion.  Non recurrent.                 Review of patient's allergies indicates:  No Known Allergies    Past Medical History:   Diagnosis Date    Anemia     Biliary obstruction     Cholangitis     Diabetes mellitus     pt denies, does not take meds 11/2016    EtOH dependence     GERD (gastroesophageal reflux disease)     HTN (hypertension) 12/30/2013     Past Surgical History:   Procedure Laterality Date    ARTERIOGRAPHY OF SUBCLAVIAN ARTERY  05/22/2020    Procedure: Arteriogram, Subclavian;  Surgeon: Heather Carbajal MD;  Location: [facility] CATH;  Service: Cardiology;;    COLONOSCOPY      COLONOSCOPY N/A 02/23/2024    Procedure: COLONOSCOPY;  Surgeon: Murphy Maguire Jr., MD;  Location: [facility] ENDO;  Service: Endoscopy;  Laterality: N/A;    CORONARY ANGIOGRAPHY N/A 05/22/2020    Procedure: ANGIOGRAM, CORONARY ARTERY;  Surgeon: Heather Carbajal MD;  Location: [facility] CATH;  Service: Cardiology;  Laterality: N/A;    CORONARY ARTERY BYPASS GRAFT (CABG) N/A  05/29/2020    Procedure: CORONARY ARTERY BYPASS GRAFT (CABG) x 5 VESSELS;  Surgeon: Dima Laughlin MD;  Location: [facility] OR;  Service: Cardiovascular;  Laterality: N/A;    ENDOSCOPIC HARVEST OF VEIN N/A 05/29/2020    Procedure: SURGICAL PROCUREMENT, VEIN, ENDOSCOPIC;  Surgeon: Dima Laughlin MD;  Location: [facility] OR;  Service: Cardiovascular;  Laterality: N/A;    ENDOSCOPIC ULTRASOUND OF UPPER GASTROINTESTINAL TRACT N/A 04/24/2024    Procedure: ULTRASOUND, UPPER GI TRACT, ENDOSCOPIC;  Surgeon: Holden Aguirre MD;  Location: [facility];  Service: Endoscopy;  Laterality: N/A;    ERCP W/ PLASTIC STENT PLACEMENT      ERCP W/ SPHICTEROTOMY      ESOPHAGOGASTRODUODENOSCOPY      ESOPHAGOGASTRODUODENOSCOPY N/A 02/23/2024    Procedure: ESOPHAGOGASTRODUODENOSCOPY (EGD);  Surgeon: Murphy Maguire Jr., MD;  Location: [facility] ENDO;  Service: Endoscopy;  Laterality: N/A;    ESOPHAGOGASTRODUODENOSCOPY N/A 04/24/2024    Procedure: EGD (ESOPHAGOGASTRODUODENOSCOPY);  Surgeon: Holden Aguirre MD;  Location: [facility];  Service: Endoscopy;  Laterality: N/A;    HYSTERECTOMY      INSERTION OF INTRA-AORTIC BALLOON ASSIST DEVICE N/A 05/29/2020    Procedure: INSERTION, INTRA-AORTIC BALLOON PUMP;  Surgeon: Dima Laughlin MD;  Location: [facility] OR;  Service: Cardiovascular;  Laterality: N/A;    LEFT HEART CATHETERIZATION Left 05/22/2020    Procedure: Left heart cath;  Surgeon: Heather Carbajal MD;  Location: [facility] CATH;  Service: Cardiology;  Laterality: Left;    SMALL BOWEL ENTEROSCOPY N/A 03/06/2024    Procedure: ENTEROSCOPY;  Surgeon: Bria Hand MD;  Location: [facility] ENDO;  Service: Endoscopy;  Laterality: N/A;    SMALL BOWEL ENTEROSCOPY N/A 07/30/2024    Procedure: ENTEROSCOPY;  Surgeon: Charles Hawthorne MD;  Location: [facility] ENDO;  Service: Endoscopy;  Laterality: N/A;     Social History[1]     Review of Systems:    As noted in HPI in addition      REVIEW OF SYSTEMS  Review of Systems   Constitutional: Negative for decreased appetite,  diaphoresis, night sweats, weight gain and weight loss.   HENT:  Negative for nosebleeds and odynophagia.    Eyes:  Negative for double vision and photophobia.   Cardiovascular:  Positive for chest pain. Negative for claudication, cyanosis, dyspnea on exertion, irregular heartbeat, leg swelling, near-syncope, orthopnea, palpitations, paroxysmal nocturnal dyspnea and syncope.   Respiratory:  Positive for shortness of breath. Negative for cough, hemoptysis and wheezing.    Hematologic/Lymphatic: Negative for adenopathy.   Skin:  Negative for flushing, skin cancer and suspicious lesions.   Musculoskeletal:  Negative for gout, myalgias and neck pain.   Gastrointestinal:  Negative for abdominal pain, heartburn, hematemesis and hematochezia.   Genitourinary:  Negative for bladder incontinence, hesitancy and nocturia.   Neurological:  Negative for focal weakness, headaches, light-headedness and paresthesias.   Psychiatric/Behavioral:  Negative for memory loss and substance abuse.               Objective:        Vitals:    08/05/25 1138   BP: (!) 148/70   Pulse: 68       Lab Results   Component Value Date    WBC 5.17 05/22/2025    HGB 9.4 (L) 05/22/2025    HCT 30.0 (L) 05/22/2025     05/22/2025    CHOL 127 04/21/2025    TRIG 42 04/21/2025    HDL 59 04/21/2025    ALT 15 06/09/2025    AST 21 06/09/2025     06/09/2025    K 3.7 06/09/2025     (H) 06/09/2025    CREATININE 0.8 06/09/2025    BUN 10 06/09/2025    CO2 19 (L) 06/09/2025    TSH 0.856 10/14/2024    INR 1.1 07/29/2024    HGBA1C 9.0 (H) 06/09/2025    MICROALBUR 0.2 02/05/2018        ECHOCARDIOGRAM RESULTS  Results for orders placed during the hospital encounter of 02/20/24    Echo Saline Bubble? No    Interpretation Summary    Left Ventricle: There is mild concentric hypertrophy. There is normal systolic function with a visually estimated ejection fraction of 60 - 65%. There is normal diastolic function.    Right Ventricle: Normal right ventricular  cavity size. Wall thickness is normal. Right ventricle wall motion  is normal. Systolic function is normal.    Aortic Valve: There is mild stenosis. Aortic valve area by VTI is 1.66 cm². Aortic valve peak velocity is 2.17 m/s. Mean gradient is 9 mmHg. The dimensionless index is 0.58. There is mild aortic regurgitation.    Pulmonary Artery: The estimated pulmonary artery systolic pressure is 30 mmHg.    IVC/SVC: Intermediate venous pressure at 8 mmHg.    Results for orders placed during the hospital encounter of 05/19/20    Cardiac catheterization    Conclusion  · Three vessel coronary artery disease.  · Mid LAD lesion , 85% stenosed.  · Ost 2nd Diag lesion , 80% stenosed.  · Dist Cx lesion , 90% stenosed.  · Ost 3rd Mrg lesion , 90% stenosed.  · RPDA lesion , 80% stenosed.  · LV end diastolic pressure is severely elevated.    I certify that I was present for catheter insertion, catheter manipulation, angiography, and angiographic interpretation of this patient.    Procedure Log documented by Documenter: Camille Faith RN and verified by Heather Carbajal MD.    Date: 5/22/2020  Time: 1:10 PM          CURRENT/PREVIOUS VISIT EKG  Results for orders placed or performed during the hospital encounter of 05/23/25   EKG 12-lead    Collection Time: 05/22/25  8:28 PM   Result Value Ref Range    QRS Duration 98 ms    [facility]HS QTC Calculation 457 ms    Narrative    Test Reason : R07.9,    Vent. Rate :  88 BPM     Atrial Rate :  88 BPM     P-R Int : 144 ms          QRS Dur :  98 ms      QT Int : 378 ms       P-R-T Axes :  61  66  82 degrees    QTcB Int : 457 ms    Normal sinus rhythm  Minimal voltage criteria for LVH, may be normal variant ( [place] product )  Anteroseptal infarct (cited on or before 07-Jun-2020)  Abnormal ECG  When compared with ECG of 03-Feb-2025 10:03,  Questionable change in The axis  Confirmed by Chaz Vargas ([id number]) on 5/23/2025 4:52:27 PM    Referred By: AAAREFERRAL SELF           Confirmed By: Chaz  Alicia     No valid procedures specified.   Results for orders placed during the hospital encounter of 04/18/23    Nuclear Stress - Cardiology Interpreted    Interpretation Summary    Normal myocardial perfusion scan. There is no evidence of myocardial ischemia or infarction.    The gated perfusion images showed an ejection fraction of 55% post stress.    There is normal wall motion post stress.    LV cavity size is normal at rest and normal at stress.    The ECG portion of the study is abnormal but not diagnostic.    The patient reported chest pain during the stress test.    During stress, occasional PVCs are noted.    No valid procedures specified.    PHYSICAL EXAM  GENERAL: well built, well nourished, well-developed in no apparent distress alert and oriented.   HEENT: Normocephalic. Pupils normal and conjunctivae normal.  Mucous membranes normal, no cyanosis or icterus, trachea central,no pallor or icterus is noted..   NECK: No JVD. No bruit..   THYROID: Thyroid not enlarged. No nodules present..   CARDIAC:  Distant S1-S2.  Grade 1-2/6 crescendo decrescendo murmur aortic area.    LUNGS:  Decreased breath sounds bilaterally.    ABDOMEN: Soft no masses or organomegaly.  No abdomen pulsations or bruits.  Normal bowel sounds. No pulsations and no masses felt, No guarding or rebound.   URINARY: No nagel catheter   EXTREMITIES: No cyanosis, clubbing or edema noted at this time., no calf tenderness bilaterally.   PERIPHERAL VASCULAR SYSTEM: Good palpable distal pulses.  2+ femoral, popliteal and pedal pulses.  No bruits    CENTRAL NERVOUS SYSTEM: No focal motor or sensory deficits noted.   SKIN: Skin without lesions, moist, well perfused.   MUSCLE STRENGTH & TONE: No noteable weakness, atrophy or abnormal movement    I HAVE REVIEWED :    The vital signs, nurses notes, and all the pertinent radiology and labs.         Current Outpatient Medications   Medication Instructions    amiodarone (PACERONE) 100 mg, Oral, Daily  "   aspirin (ECOTRIN) 81 mg, Oral, Daily    atorvastatin (LIPITOR) 10 mg, Oral, Nightly    azelastine (ASTELIN) 137 mcg, Nasal, 2 times daily    BD PARISH 2ND GEN PEN NEEDLE 32 gauge x 5/32" Ndle USES 4 DAILY, ON MULTIPLE DAILY INSULIN INJECTIONS    blood sugar diagnostic (ACCU-CHEK GUIDE TEST STRIPS) Strp Use to test blood glucose three (3) times a day, to be used with insurance-preferred brand of glucometer/supplies.    blood-glucose meter kit To check BG 3 times daily, to use with insurance preferred meter    blood-glucose sensor (FREESTYLE NAUN 3 SENSOR) Bren Change every 14 days.    cloNIDine (CATAPRES) 0.1 MG tablet TAKE ONE TABLET BY MOUTH ONLY IF BLOOD PRESSURE IS GREATER THAN OR EQUAL /110.    clopidogreL (PLAVIX) 75 mg, Oral    cyanocobalamin (VITAMIN B-12) 1,000 mcg, Oral, Daily    empagliflozin (JARDIANCE) 10 mg, Oral, Daily    ENTRESTO  mg per tablet 1 tablet, Oral, 2 times daily    famotidine (PEPCID) 40 mg, Oral, 2 times daily    ferrous sulfate (FEOSOL) 325 mg, Oral, With breakfast    flash glucose scanning reader Misc To be used with Freestyle 3 sensor.    fluticasone-umeclidin-vilanter (TRELEGY ELLIPTA) 100-62.5-25 mcg DsDv 1 puff, Inhalation, Daily    hydrALAZINE (APRESOLINE) 25 MG tablet TAKE 2 TABLETS BY MOUTH 3 (THREE) TIMES DAILY    insulin aspart U-100 (NOVOLOG FLEXPEN U-100 INSULIN) 100 unit/mL (3 mL) InPn pen ADMINISTER PER SLIDING SCALE THREE TIMES DAILY BEFORE MEALS, MAX DAILY DOSE 20 UNITS    lancets (ACCU-CHEK SOFTCLIX LANCETS) Misc Use to test blood glucose three (3) times a day, discard lancet after each use    LANTUS SOLOSTAR U-100 INSULIN 10 Units, Subcutaneous, Nightly    latanoprost 0.005 % ophthalmic solution 1 drop, Nightly    levalbuterol (XOPENEX HFA) 45 mcg/actuation inhaler 1-2 puffs, Inhalation, Every 4 hours PRN, Rescue    magnesium oxide (MAG-OX) 400 mg, Oral, Daily    metFORMIN (GLUCOPHAGE-XR) 1,000 mg, Oral    metoprolol succinate (TOPROL-XL) 75 mg, Oral, " Daily    mometasone (NASONEX) 50 mcg/actuation nasal spray 2 sprays, Nasal, Daily    NIFEdipine (PROCARDIA-XL) 60 mg, Oral, Daily    pantoprazole (PROTONIX) 40 mg, Oral, Every morning    potassium chloride SA (K-DUR,KLOR-CON) 20 MEQ tablet 20 mEq, Oral, Daily    spironolactone (ALDACTONE) 25 mg, Oral    sucralfate (CARAFATE) 1 g, Oral, Before meals & nightly    timolol maleate 0.5% (TIMOPTIC) 0.5 % Drop 1 drop, 2 times daily          Assessment:   Follow up hospitalization May/2025.  Chest pain.  Negative nuclear study done at that time.  Echo 2024 with mild AS, preserved EF.    Dyspnea.  Chronic atypical chest pain.    Known remote past CABG with post CABG PCI stent LAD .  Vein graft known to be otherwise stable.        Plan:   Exam review systems stable.  Change in medications  Echo, call results.  Holter monitor, call results        No follow-ups on file.            [1]   Social History  Tobacco Use    Smoking status: Former     Current packs/day: 0.00     Average packs/day: 0.5 packs/day for 40.0 years (20.0 ttl pk-yrs)     Types: Cigarettes     Start date: 1980     Quit date: 2020     Years since quittin.2     Passive exposure: Past    Smokeless tobacco: Never   Substance Use Topics    Alcohol use: Not Currently     Comment: sober 2 years 3 months    Drug use: No

## 2025-08-08 DIAGNOSIS — I10 ESSENTIAL HYPERTENSION: ICD-10-CM

## 2025-08-08 RX ORDER — BLOOD-GLUCOSE,RECEIVER,CONT
EACH MISCELLANEOUS
COMMUNITY
Start: 2025-06-18

## 2025-08-08 RX ORDER — ALBUTEROL SULFATE 90 UG/1
2 INHALANT RESPIRATORY (INHALATION) EVERY 4 HOURS PRN
COMMUNITY
Start: 2025-07-18

## 2025-08-08 RX ORDER — BIMATOPROST 0.1 MG/ML
1 SOLUTION/ DROPS OPHTHALMIC NIGHTLY
COMMUNITY
Start: 2025-08-04

## 2025-08-08 RX ORDER — METOPROLOL SUCCINATE 25 MG/1
75 TABLET, EXTENDED RELEASE ORAL DAILY
Qty: 90 TABLET | Refills: 3 | Status: SHIPPED | OUTPATIENT
Start: 2025-08-08

## 2025-08-08 RX ORDER — DORZOLAMIDE HYDROCHLORIDE AND TIMOLOL MALEATE 20; 5 MG/ML; MG/ML
1 SOLUTION/ DROPS OPHTHALMIC NIGHTLY
COMMUNITY
Start: 2025-06-09

## 2025-08-12 RX ORDER — PANTOPRAZOLE SODIUM 40 MG/1
40 TABLET, DELAYED RELEASE ORAL EVERY MORNING
Qty: 30 TABLET | Refills: 2 | Status: SHIPPED | OUTPATIENT
Start: 2025-08-12

## 2025-08-25 ENCOUNTER — HOSPITAL ENCOUNTER (OUTPATIENT)
Dept: CARDIOLOGY | Facility: HOSPITAL | Age: 75
Discharge: HOME | End: 2025-08-25
Attending: INTERNAL MEDICINE
Payer: MEDICARE

## 2025-08-25 VITALS — BODY MASS INDEX: 20.77 KG/M2 | HEIGHT: 61 IN | WEIGHT: 110 LBS

## 2025-08-25 DIAGNOSIS — R06.00 DYSPNEA, UNSPECIFIED TYPE: ICD-10-CM

## 2025-08-25 DIAGNOSIS — I25.10 CORONARY ARTERY DISEASE, UNSPECIFIED VESSEL OR LESION TYPE, UNSPECIFIED WHETHER ANGINA PRESENT, UNSPECIFIED WHETHER NATIVE OR TRANSPLANTED HEART: Chronic | ICD-10-CM

## 2025-08-25 DIAGNOSIS — J43.2 CENTRILOBULAR EMPHYSEMA: ICD-10-CM

## 2025-08-25 DIAGNOSIS — I25.119 ATHEROSCLEROSIS OF NATIVE CORONARY ARTERY OF NATIVE HEART WITH ANGINA PECTORIS: ICD-10-CM

## 2025-08-25 LAB
AORTIC SIZE INDEX (SOV): 2.1 CM/M2
AORTIC SIZE INDEX: 2.2 CM/M2
ASCENDING AORTA: 3.3 CM
AV INDEX (PROSTH): 0.82
AV MEAN GRADIENT: 4 MMHG
AV PEAK GRADIENT: 8 MMHG
AV REGURGITATION PRESSURE HALF TIME: 393 MS
AV VALVE AREA BY VELOCITY RATIO: 2.2 CM²
AV VALVE AREA: 2.6 CM²
AV VELOCITY RATIO: 0.71
BSA FOR ECHO PROCEDURE: 1.47 M2
CV ECHO LV RWT: 0.4 CM
DOP CALC AO PEAK VEL: 1.4 M/S
DOP CALC AO VTI: 27.9 CM
DOP CALC LVOT AREA: 3.1 CM2
DOP CALC LVOT DIAMETER: 2 CM
DOP CALC LVOT PEAK VEL: 1 M/S
DOP CALCLVOT PEAK VEL VTI: 23 CM
E WAVE DECELERATION TIME: 188 MSEC
E/A RATIO: 0.66
E/E' RATIO: 18 M/S
ECHO LV POSTERIOR WALL: 0.9 CM (ref 0.6–1.1)
FRACTIONAL SHORTENING: 26.7 % (ref 28–44)
INTERVENTRICULAR SEPTUM: 1.1 CM (ref 0.6–1.1)
LEFT ATRIUM AREA SYSTOLIC (APICAL 2 CHAMBER): 17.58 CM2
LEFT ATRIUM AREA SYSTOLIC (APICAL 4 CHAMBER): 15.42 CM2
LEFT ATRIUM SIZE: 3.6 CM
LEFT ATRIUM VOLUME INDEX MOD: 31 ML/M2
LEFT ATRIUM VOLUME MOD: 45 ML
LEFT INTERNAL DIMENSION IN SYSTOLE: 3.3 CM (ref 2.1–4)
LEFT VENTRICLE DIASTOLIC VOLUME INDEX: 64.63 ML/M2
LEFT VENTRICLE DIASTOLIC VOLUME: 95 ML
LEFT VENTRICLE END SYSTOLIC VOLUME APICAL 2 CHAMBER: 48.23 ML
LEFT VENTRICLE END SYSTOLIC VOLUME APICAL 4 CHAMBER: 39.36 ML
LEFT VENTRICLE MASS INDEX: 104.3 G/M2
LEFT VENTRICLE SYSTOLIC VOLUME INDEX: 29.3 ML/M2
LEFT VENTRICLE SYSTOLIC VOLUME: 43 ML
LEFT VENTRICULAR INTERNAL DIMENSION IN DIASTOLE: 4.5 CM (ref 3.5–6)
LEFT VENTRICULAR MASS: 153.3 G
LV LATERAL E/E' RATIO: 17.6 M/S
LV SEPTAL E/E' RATIO: 17.6 M/S
LVED V (TEICH): 94.59 ML
LVES V (TEICH): 42.65 ML
LVOT MG: 1.95 MMHG
LVOT MV: 0.64 CM/S
Lab: 2 CM/M
Lab: 2.1 CM/M
MV PEAK A VEL: 1.33 M/S
MV PEAK E VEL: 0.88 M/S
MV STENOSIS PRESSURE HALF TIME: 54.49 MS
MV VALVE AREA P 1/2 METHOD: 4.04 CM2
OHS CV CPX PATIENT HEIGHT IN: 61
OHS CV RV/LV RATIO: 0.71 CM
PISA AR MAX VEL: 4.17 M/S
PISA TR MAX VEL: 2.3 M/S
PULM VEIN S/D RATIO: 1.55
PV PEAK D VEL: 0.47 M/S
PV PEAK S VEL: 0.73 M/S
RA PRESSURE ESTIMATED: 3 MMHG
RA VOL SYS: 15.33 ML
RIGHT ATRIAL AREA: 8.3 CM2
RIGHT ATRIUM END SYSTOLIC VOLUME APICAL 4 CHAMBER INDEX BSA: 10.46 ML/M2
RIGHT ATRIUM VOLUME AREA LENGTH APICAL 4 CHAMBER: 15.38 ML
RIGHT VENTRICLE DIASTOLIC BASEL DIMENSION: 3.2 CM
RIGHT VENTRICLE DIASTOLIC LENGTH: 6.2 CM
RIGHT VENTRICLE DIASTOLIC MID DIMENSION: 2.2 CM
RIGHT VENTRICULAR END-DIASTOLIC DIMENSION: 3.2 CM
RIGHT VENTRICULAR LENGTH IN DIASTOLE (APICAL 4-CHAMBER VIEW): 6.19 CM
RV MID DIAMA: 2.18 CM
RV TB RVSP: 5 MMHG
RV TISSUE DOPPLER FREE WALL SYSTOLIC VELOCITY 1 (APICAL 4 CHAMBER VIEW): 4.56 CM/S
SINUS: 3.1 CM
STJ: 2.9 CM
TDI LATERAL: 0.05 M/S
TDI SEPTAL: 0.05 M/S
TDI: 0.05 M/S
TR MAX PG: 21 MMHG
TRICUSPID ANNULAR PLANE SYSTOLIC EXCURSION: 1.5 CM
TV REST PULMONARY ARTERY PRESSURE: 24 MMHG
Z-SCORE OF LEFT VENTRICULAR DIMENSION IN END DIASTOLE: 0.22
Z-SCORE OF LEFT VENTRICULAR DIMENSION IN END SYSTOLE: 1.49

## 2025-08-25 PROCEDURE — 93225 XTRNL ECG REC<48 HRS REC: CPT | Mod: PO

## 2025-08-25 PROCEDURE — 93306 TTE W/DOPPLER COMPLETE: CPT | Mod: 26,,, | Performed by: INTERNAL MEDICINE

## 2025-08-25 PROCEDURE — 93306 TTE W/DOPPLER COMPLETE: CPT | Mod: PO

## 2025-08-28 PROBLEM — R29.898 LEG WEAKNESS, BILATERAL: Status: ACTIVE | Noted: 2025-08-28

## 2025-08-28 PROBLEM — G45.9 TIA (TRANSIENT ISCHEMIC ATTACK): Status: ACTIVE | Noted: 2025-08-28

## 2025-08-29 PROBLEM — Z73.6 LIMITATION OF ACTIVITY DUE TO DISABILITY: Status: ACTIVE | Noted: 2025-08-29

## 2025-09-02 DIAGNOSIS — J30.2 SEASONAL ALLERGIC RHINITIS, UNSPECIFIED TRIGGER: ICD-10-CM

## 2025-09-03 RX ORDER — AZELASTINE 1 MG/ML
SPRAY, METERED NASAL
Qty: 30 ML | Refills: 6 | Status: SHIPPED | OUTPATIENT
Start: 2025-09-03

## 2025-09-04 RX ORDER — ATORVASTATIN CALCIUM 10 MG/1
10 TABLET, FILM COATED ORAL NIGHTLY
Qty: 90 TABLET | Refills: 2 | Status: SHIPPED | OUTPATIENT
Start: 2025-09-04

## 2025-09-05 ENCOUNTER — TELEPHONE (OUTPATIENT)
Dept: FAMILY MEDICINE | Facility: CLINIC | Age: 75
End: 2025-09-05
Payer: MEDICARE